# Patient Record
Sex: MALE | Race: ASIAN | NOT HISPANIC OR LATINO | URBAN - METROPOLITAN AREA
[De-identification: names, ages, dates, MRNs, and addresses within clinical notes are randomized per-mention and may not be internally consistent; named-entity substitution may affect disease eponyms.]

---

## 2017-08-11 ENCOUNTER — OUTPATIENT (OUTPATIENT)
Dept: OUTPATIENT SERVICES | Facility: HOSPITAL | Age: 72
LOS: 1 days | End: 2017-08-11
Payer: MEDICARE

## 2017-08-11 DIAGNOSIS — M79.605 PAIN IN LEFT LEG: ICD-10-CM

## 2017-08-11 PROCEDURE — 73502 X-RAY EXAM HIP UNI 2-3 VIEWS: CPT | Mod: 26,LT

## 2017-08-11 PROCEDURE — 73502 X-RAY EXAM HIP UNI 2-3 VIEWS: CPT

## 2017-09-27 ENCOUNTER — APPOINTMENT (OUTPATIENT)
Dept: ULTRASOUND IMAGING | Facility: HOSPITAL | Age: 72
End: 2017-09-27

## 2017-09-27 ENCOUNTER — OUTPATIENT (OUTPATIENT)
Dept: OUTPATIENT SERVICES | Facility: HOSPITAL | Age: 72
LOS: 1 days | End: 2017-09-27
Payer: MEDICARE

## 2017-09-27 DIAGNOSIS — Z00.00 ENCOUNTER FOR GENERAL ADULT MEDICAL EXAMINATION WITHOUT ABNORMAL FINDINGS: ICD-10-CM

## 2017-09-27 DIAGNOSIS — M79.604 PAIN IN RIGHT LEG: ICD-10-CM

## 2017-09-27 PROCEDURE — 76882 US LMTD JT/FCL EVL NVASC XTR: CPT | Mod: 26,LT

## 2017-09-27 PROCEDURE — 76882 US LMTD JT/FCL EVL NVASC XTR: CPT

## 2017-10-05 ENCOUNTER — APPOINTMENT (OUTPATIENT)
Dept: SURGERY | Facility: CLINIC | Age: 72
End: 2017-10-05

## 2017-10-05 VITALS
WEIGHT: 178 LBS | SYSTOLIC BLOOD PRESSURE: 212 MMHG | OXYGEN SATURATION: 100 % | HEIGHT: 66 IN | DIASTOLIC BLOOD PRESSURE: 85 MMHG | HEART RATE: 60 BPM | BODY MASS INDEX: 28.61 KG/M2 | TEMPERATURE: 98 F

## 2017-10-13 ENCOUNTER — APPOINTMENT (OUTPATIENT)
Dept: CT IMAGING | Facility: CLINIC | Age: 72
End: 2017-10-13

## 2017-10-13 ENCOUNTER — APPOINTMENT (OUTPATIENT)
Dept: MRI IMAGING | Facility: CLINIC | Age: 72
End: 2017-10-13
Payer: MEDICARE

## 2017-10-13 ENCOUNTER — OUTPATIENT (OUTPATIENT)
Dept: OUTPATIENT SERVICES | Facility: HOSPITAL | Age: 72
LOS: 1 days | End: 2017-10-13
Payer: MEDICARE

## 2017-10-13 DIAGNOSIS — Z00.8 ENCOUNTER FOR OTHER GENERAL EXAMINATION: ICD-10-CM

## 2017-10-13 PROCEDURE — A9585: CPT

## 2017-10-13 PROCEDURE — 72197 MRI PELVIS W/O & W/DYE: CPT

## 2017-10-13 PROCEDURE — 72197 MRI PELVIS W/O & W/DYE: CPT | Mod: 26

## 2017-10-23 ENCOUNTER — APPOINTMENT (OUTPATIENT)
Dept: SURGERY | Facility: CLINIC | Age: 72
End: 2017-10-23

## 2017-10-24 ENCOUNTER — OUTPATIENT (OUTPATIENT)
Dept: OUTPATIENT SERVICES | Facility: HOSPITAL | Age: 72
LOS: 1 days | End: 2017-10-24
Payer: MEDICARE

## 2017-10-24 ENCOUNTER — APPOINTMENT (OUTPATIENT)
Dept: RADIOLOGY | Facility: IMAGING CENTER | Age: 72
End: 2017-10-24
Payer: MEDICARE

## 2017-10-24 DIAGNOSIS — Z00.8 ENCOUNTER FOR OTHER GENERAL EXAMINATION: ICD-10-CM

## 2017-10-24 PROCEDURE — 73521 X-RAY EXAM HIPS BI 2 VIEWS: CPT | Mod: 26

## 2017-10-24 PROCEDURE — 73521 X-RAY EXAM HIPS BI 2 VIEWS: CPT

## 2017-10-24 PROCEDURE — 73552 X-RAY EXAM OF FEMUR 2/>: CPT

## 2017-10-24 PROCEDURE — 73552 X-RAY EXAM OF FEMUR 2/>: CPT | Mod: 26,LT

## 2017-10-27 ENCOUNTER — INPATIENT (INPATIENT)
Facility: HOSPITAL | Age: 72
LOS: 3 days | Discharge: HOME | DRG: 470 | End: 2017-10-31
Attending: ORTHOPAEDIC SURGERY | Admitting: ORTHOPAEDIC SURGERY
Payer: MEDICARE

## 2017-10-27 ENCOUNTER — RESULT REVIEW (OUTPATIENT)
Age: 72
End: 2017-10-27

## 2017-10-27 ENCOUNTER — TRANSCRIPTION ENCOUNTER (OUTPATIENT)
Age: 72
End: 2017-10-27

## 2017-10-27 VITALS
SYSTOLIC BLOOD PRESSURE: 204 MMHG | RESPIRATION RATE: 18 BRPM | DIASTOLIC BLOOD PRESSURE: 88 MMHG | OXYGEN SATURATION: 99 % | HEIGHT: 68 IN | TEMPERATURE: 98 F | HEART RATE: 66 BPM | WEIGHT: 169.76 LBS

## 2017-10-27 DIAGNOSIS — M84.350D STRESS FRACTURE, PELVIS, SUBSEQUENT ENCOUNTER FOR FRACTURE WITH ROUTINE HEALING: ICD-10-CM

## 2017-10-27 DIAGNOSIS — I10 ESSENTIAL (PRIMARY) HYPERTENSION: ICD-10-CM

## 2017-10-27 DIAGNOSIS — C21.0 MALIGNANT NEOPLASM OF ANUS, UNSPECIFIED: ICD-10-CM

## 2017-10-27 DIAGNOSIS — S72.009A FRACTURE OF UNSPECIFIED PART OF NECK OF UNSPECIFIED FEMUR, INITIAL ENCOUNTER FOR CLOSED FRACTURE: ICD-10-CM

## 2017-10-27 DIAGNOSIS — Z29.9 ENCOUNTER FOR PROPHYLACTIC MEASURES, UNSPECIFIED: ICD-10-CM

## 2017-10-27 LAB
ALBUMIN SERPL ELPH-MCNC: 3.9 G/DL — SIGNIFICANT CHANGE UP (ref 3.3–5)
ALP SERPL-CCNC: 123 U/L — HIGH (ref 30–120)
ALT FLD-CCNC: 16 U/L DA — SIGNIFICANT CHANGE UP (ref 10–60)
ANION GAP SERPL CALC-SCNC: 4 MMOL/L — LOW (ref 5–17)
ANION GAP SERPL CALC-SCNC: 5 MMOL/L — SIGNIFICANT CHANGE UP (ref 5–17)
APPEARANCE UR: ABNORMAL
APTT BLD: 31.7 SEC — SIGNIFICANT CHANGE UP (ref 27.5–37.4)
AST SERPL-CCNC: 19 U/L — SIGNIFICANT CHANGE UP (ref 10–40)
BACTERIA # UR AUTO: ABNORMAL
BASOPHILS # BLD AUTO: 0 K/UL — SIGNIFICANT CHANGE UP (ref 0–0.2)
BASOPHILS NFR BLD AUTO: 0.7 % — SIGNIFICANT CHANGE UP (ref 0–2)
BILIRUB SERPL-MCNC: 0.7 MG/DL — SIGNIFICANT CHANGE UP (ref 0.2–1.2)
BILIRUB UR-MCNC: NEGATIVE — SIGNIFICANT CHANGE UP
BLD GP AB SCN SERPL QL: SIGNIFICANT CHANGE UP
BUN SERPL-MCNC: 20 MG/DL — SIGNIFICANT CHANGE UP (ref 7–23)
BUN SERPL-MCNC: 21 MG/DL — SIGNIFICANT CHANGE UP (ref 7–23)
CALCIUM SERPL-MCNC: 9.3 MG/DL — SIGNIFICANT CHANGE UP (ref 8.4–10.5)
CALCIUM SERPL-MCNC: 9.8 MG/DL — SIGNIFICANT CHANGE UP (ref 8.4–10.5)
CHLORIDE SERPL-SCNC: 102 MMOL/L — SIGNIFICANT CHANGE UP (ref 96–108)
CHLORIDE SERPL-SCNC: 104 MMOL/L — SIGNIFICANT CHANGE UP (ref 96–108)
CO2 SERPL-SCNC: 29 MMOL/L — SIGNIFICANT CHANGE UP (ref 22–31)
CO2 SERPL-SCNC: 30 MMOL/L — SIGNIFICANT CHANGE UP (ref 22–31)
COLOR SPEC: YELLOW — SIGNIFICANT CHANGE UP
CREAT SERPL-MCNC: 1.09 MG/DL — SIGNIFICANT CHANGE UP (ref 0.5–1.3)
CREAT SERPL-MCNC: 1.27 MG/DL — SIGNIFICANT CHANGE UP (ref 0.5–1.3)
DIFF PNL FLD: NEGATIVE — SIGNIFICANT CHANGE UP
EOSINOPHIL # BLD AUTO: 0.1 K/UL — SIGNIFICANT CHANGE UP (ref 0–0.5)
EOSINOPHIL NFR BLD AUTO: 2.2 % — SIGNIFICANT CHANGE UP (ref 0–6)
EPI CELLS # UR: SIGNIFICANT CHANGE UP
GLUCOSE SERPL-MCNC: 105 MG/DL — HIGH (ref 70–99)
GLUCOSE SERPL-MCNC: 193 MG/DL — HIGH (ref 70–99)
GLUCOSE UR QL: NEGATIVE MG/DL — SIGNIFICANT CHANGE UP
HCT VFR BLD CALC: 38 % — LOW (ref 39–50)
HCT VFR BLD CALC: 41.4 % — SIGNIFICANT CHANGE UP (ref 39–50)
HGB BLD-MCNC: 12.1 G/DL — LOW (ref 13–17)
HGB BLD-MCNC: 12.6 G/DL — LOW (ref 13–17)
INR BLD: 1.13 RATIO — SIGNIFICANT CHANGE UP (ref 0.88–1.16)
KETONES UR-MCNC: NEGATIVE — SIGNIFICANT CHANGE UP
LEUKOCYTE ESTERASE UR-ACNC: NEGATIVE — SIGNIFICANT CHANGE UP
LYMPHOCYTES # BLD AUTO: 1.2 K/UL — SIGNIFICANT CHANGE UP (ref 1–3.3)
LYMPHOCYTES # BLD AUTO: 17.6 % — SIGNIFICANT CHANGE UP (ref 13–44)
MCHC RBC-ENTMCNC: 27 PG — SIGNIFICANT CHANGE UP (ref 27–34)
MCHC RBC-ENTMCNC: 30.5 GM/DL — LOW (ref 32–36)
MCV RBC AUTO: 88.3 FL — SIGNIFICANT CHANGE UP (ref 80–100)
MONOCYTES # BLD AUTO: 0.8 K/UL — SIGNIFICANT CHANGE UP (ref 0–0.9)
MONOCYTES NFR BLD AUTO: 12.2 % — SIGNIFICANT CHANGE UP (ref 2–14)
NEUTROPHILS # BLD AUTO: 4.4 K/UL — SIGNIFICANT CHANGE UP (ref 1.8–7.4)
NEUTROPHILS NFR BLD AUTO: 67.3 % — SIGNIFICANT CHANGE UP (ref 43–77)
NITRITE UR-MCNC: NEGATIVE — SIGNIFICANT CHANGE UP
PH UR: 7 — SIGNIFICANT CHANGE UP (ref 5–8)
PLATELET # BLD AUTO: 229 K/UL — SIGNIFICANT CHANGE UP (ref 150–400)
POTASSIUM SERPL-MCNC: 4.4 MMOL/L — SIGNIFICANT CHANGE UP (ref 3.5–5.3)
POTASSIUM SERPL-MCNC: 4.8 MMOL/L — SIGNIFICANT CHANGE UP (ref 3.5–5.3)
POTASSIUM SERPL-SCNC: 4.4 MMOL/L — SIGNIFICANT CHANGE UP (ref 3.5–5.3)
POTASSIUM SERPL-SCNC: 4.8 MMOL/L — SIGNIFICANT CHANGE UP (ref 3.5–5.3)
PROT SERPL-MCNC: 6.3 G/DL — SIGNIFICANT CHANGE UP (ref 6–8.3)
PROT UR-MCNC: 30 MG/DL
PROTHROM AB SERPL-ACNC: 12.4 SEC — SIGNIFICANT CHANGE UP (ref 9.8–12.7)
RBC # BLD: 4.68 M/UL — SIGNIFICANT CHANGE UP (ref 4.2–5.8)
RBC # FLD: 14.8 % — HIGH (ref 10.3–14.5)
RBC CASTS # UR COMP ASSIST: SIGNIFICANT CHANGE UP /HPF (ref 0–4)
SODIUM SERPL-SCNC: 136 MMOL/L — SIGNIFICANT CHANGE UP (ref 135–145)
SODIUM SERPL-SCNC: 138 MMOL/L — SIGNIFICANT CHANGE UP (ref 135–145)
SP GR SPEC: 1.01 — SIGNIFICANT CHANGE UP (ref 1.01–1.02)
UROBILINOGEN FLD QL: NEGATIVE MG/DL — SIGNIFICANT CHANGE UP
WBC # BLD: 6.6 K/UL — SIGNIFICANT CHANGE UP (ref 3.8–10.5)
WBC # FLD AUTO: 6.6 K/UL — SIGNIFICANT CHANGE UP (ref 3.8–10.5)
WBC UR QL: SIGNIFICANT CHANGE UP

## 2017-10-27 PROCEDURE — 73502 X-RAY EXAM HIP UNI 2-3 VIEWS: CPT | Mod: 26,LT

## 2017-10-27 PROCEDURE — 88311 DECALCIFY TISSUE: CPT | Mod: 26

## 2017-10-27 PROCEDURE — 99285 EMERGENCY DEPT VISIT HI MDM: CPT

## 2017-10-27 PROCEDURE — 27130 TOTAL HIP ARTHROPLASTY: CPT | Mod: AS,LT

## 2017-10-27 PROCEDURE — 71020: CPT | Mod: 26

## 2017-10-27 PROCEDURE — 88305 TISSUE EXAM BY PATHOLOGIST: CPT | Mod: 26

## 2017-10-27 PROCEDURE — 73700 CT LOWER EXTREMITY W/O DYE: CPT | Mod: 26,LT

## 2017-10-27 PROCEDURE — 93010 ELECTROCARDIOGRAM REPORT: CPT

## 2017-10-27 RX ORDER — MAGNESIUM HYDROXIDE 400 MG/1
30 TABLET, CHEWABLE ORAL DAILY
Qty: 0 | Refills: 0 | Status: DISCONTINUED | OUTPATIENT
Start: 2017-10-27 | End: 2017-10-31

## 2017-10-27 RX ORDER — CEFAZOLIN SODIUM 1 G
2000 VIAL (EA) INJECTION ONCE
Qty: 0 | Refills: 0 | Status: COMPLETED | OUTPATIENT
Start: 2017-10-27 | End: 2017-10-27

## 2017-10-27 RX ORDER — SENNA PLUS 8.6 MG/1
2 TABLET ORAL AT BEDTIME
Qty: 0 | Refills: 0 | Status: DISCONTINUED | OUTPATIENT
Start: 2017-10-27 | End: 2017-10-31

## 2017-10-27 RX ORDER — ACETAMINOPHEN 500 MG
1000 TABLET ORAL EVERY 6 HOURS
Qty: 0 | Refills: 0 | Status: COMPLETED | OUTPATIENT
Start: 2017-10-27 | End: 2017-10-28

## 2017-10-27 RX ORDER — HYDROMORPHONE HYDROCHLORIDE 2 MG/ML
0.5 INJECTION INTRAMUSCULAR; INTRAVENOUS; SUBCUTANEOUS
Qty: 0 | Refills: 0 | Status: DISCONTINUED | OUTPATIENT
Start: 2017-10-27 | End: 2017-10-31

## 2017-10-27 RX ORDER — SODIUM CHLORIDE 9 MG/ML
1000 INJECTION, SOLUTION INTRAVENOUS
Qty: 0 | Refills: 0 | Status: DISCONTINUED | OUTPATIENT
Start: 2017-10-27 | End: 2017-10-27

## 2017-10-27 RX ORDER — DOCUSATE SODIUM 100 MG
100 CAPSULE ORAL THREE TIMES A DAY
Qty: 0 | Refills: 0 | Status: DISCONTINUED | OUTPATIENT
Start: 2017-10-27 | End: 2017-10-31

## 2017-10-27 RX ORDER — ATENOLOL 25 MG/1
25 TABLET ORAL DAILY
Qty: 0 | Refills: 0 | Status: DISCONTINUED | OUTPATIENT
Start: 2017-10-27 | End: 2017-10-30

## 2017-10-27 RX ORDER — OXYCODONE HYDROCHLORIDE 5 MG/1
5 TABLET ORAL
Qty: 0 | Refills: 0 | Status: DISCONTINUED | OUTPATIENT
Start: 2017-10-27 | End: 2017-10-31

## 2017-10-27 RX ORDER — SODIUM CHLORIDE 9 MG/ML
3 INJECTION INTRAMUSCULAR; INTRAVENOUS; SUBCUTANEOUS ONCE
Qty: 0 | Refills: 0 | Status: COMPLETED | OUTPATIENT
Start: 2017-10-27 | End: 2017-10-27

## 2017-10-27 RX ORDER — ASPIRIN/CALCIUM CARB/MAGNESIUM 324 MG
162 TABLET ORAL EVERY 12 HOURS
Qty: 0 | Refills: 0 | Status: DISCONTINUED | OUTPATIENT
Start: 2017-10-28 | End: 2017-10-31

## 2017-10-27 RX ORDER — OXYCODONE AND ACETAMINOPHEN 5; 325 MG/1; MG/1
1 TABLET ORAL ONCE
Qty: 0 | Refills: 0 | Status: DISCONTINUED | OUTPATIENT
Start: 2017-10-27 | End: 2017-10-27

## 2017-10-27 RX ORDER — HYDROMORPHONE HYDROCHLORIDE 2 MG/ML
0.5 INJECTION INTRAMUSCULAR; INTRAVENOUS; SUBCUTANEOUS
Qty: 0 | Refills: 0 | Status: DISCONTINUED | OUTPATIENT
Start: 2017-10-27 | End: 2017-10-27

## 2017-10-27 RX ORDER — POLYETHYLENE GLYCOL 3350 17 G/17G
17 POWDER, FOR SOLUTION ORAL DAILY
Qty: 0 | Refills: 0 | Status: DISCONTINUED | OUTPATIENT
Start: 2017-10-27 | End: 2017-10-31

## 2017-10-27 RX ORDER — CEFAZOLIN SODIUM 1 G
2000 VIAL (EA) INJECTION EVERY 8 HOURS
Qty: 0 | Refills: 0 | Status: COMPLETED | OUTPATIENT
Start: 2017-10-27 | End: 2017-10-28

## 2017-10-27 RX ORDER — PANTOPRAZOLE SODIUM 20 MG/1
40 TABLET, DELAYED RELEASE ORAL DAILY
Qty: 0 | Refills: 0 | Status: DISCONTINUED | OUTPATIENT
Start: 2017-10-27 | End: 2017-10-31

## 2017-10-27 RX ORDER — HYDRALAZINE HCL 50 MG
10 TABLET ORAL ONCE
Qty: 0 | Refills: 0 | Status: COMPLETED | OUTPATIENT
Start: 2017-10-27 | End: 2017-10-27

## 2017-10-27 RX ORDER — ONDANSETRON 8 MG/1
4 TABLET, FILM COATED ORAL EVERY 6 HOURS
Qty: 0 | Refills: 0 | Status: DISCONTINUED | OUTPATIENT
Start: 2017-10-27 | End: 2017-10-31

## 2017-10-27 RX ORDER — OXYCODONE HYDROCHLORIDE 5 MG/1
10 TABLET ORAL
Qty: 0 | Refills: 0 | Status: DISCONTINUED | OUTPATIENT
Start: 2017-10-27 | End: 2017-10-31

## 2017-10-27 RX ORDER — ACETAMINOPHEN 500 MG
1000 TABLET ORAL EVERY 8 HOURS
Qty: 0 | Refills: 0 | Status: DISCONTINUED | OUTPATIENT
Start: 2017-10-28 | End: 2017-10-31

## 2017-10-27 RX ORDER — ONDANSETRON 8 MG/1
4 TABLET, FILM COATED ORAL ONCE
Qty: 0 | Refills: 0 | Status: DISCONTINUED | OUTPATIENT
Start: 2017-10-27 | End: 2017-10-27

## 2017-10-27 RX ORDER — SODIUM CHLORIDE 9 MG/ML
1000 INJECTION, SOLUTION INTRAVENOUS
Qty: 0 | Refills: 0 | Status: DISCONTINUED | OUTPATIENT
Start: 2017-10-27 | End: 2017-10-30

## 2017-10-27 RX ORDER — CELECOXIB 200 MG/1
200 CAPSULE ORAL
Qty: 0 | Refills: 0 | Status: DISCONTINUED | OUTPATIENT
Start: 2017-10-27 | End: 2017-10-30

## 2017-10-27 RX ADMIN — Medication 10 MILLIGRAM(S): at 12:00

## 2017-10-27 RX ADMIN — Medication 400 MILLIGRAM(S): at 21:08

## 2017-10-27 RX ADMIN — OXYCODONE HYDROCHLORIDE 5 MILLIGRAM(S): 5 TABLET ORAL at 22:20

## 2017-10-27 RX ADMIN — SODIUM CHLORIDE 100 MILLILITER(S): 9 INJECTION, SOLUTION INTRAVENOUS at 17:53

## 2017-10-27 RX ADMIN — OXYCODONE HYDROCHLORIDE 5 MILLIGRAM(S): 5 TABLET ORAL at 21:41

## 2017-10-27 RX ADMIN — OXYCODONE AND ACETAMINOPHEN 1 TABLET(S): 5; 325 TABLET ORAL at 09:20

## 2017-10-27 RX ADMIN — HYDROMORPHONE HYDROCHLORIDE 0.5 MILLIGRAM(S): 2 INJECTION INTRAMUSCULAR; INTRAVENOUS; SUBCUTANEOUS at 17:53

## 2017-10-27 RX ADMIN — SODIUM CHLORIDE 3 MILLILITER(S): 9 INJECTION INTRAMUSCULAR; INTRAVENOUS; SUBCUTANEOUS at 10:40

## 2017-10-27 RX ADMIN — HYDROMORPHONE HYDROCHLORIDE 0.5 MILLIGRAM(S): 2 INJECTION INTRAMUSCULAR; INTRAVENOUS; SUBCUTANEOUS at 18:10

## 2017-10-27 RX ADMIN — Medication 1000 MILLIGRAM(S): at 21:30

## 2017-10-27 RX ADMIN — OXYCODONE AND ACETAMINOPHEN 1 TABLET(S): 5; 325 TABLET ORAL at 09:18

## 2017-10-27 RX ADMIN — Medication 100 MILLIGRAM(S): at 22:24

## 2017-10-27 NOTE — CONSULT NOTE ADULT - ASSESSMENT
72 male with acute stress related left hip fx with htn likely from pain with hx of anal ca in remission

## 2017-10-27 NOTE — CONSULT NOTE ADULT - SUBJECTIVE AND OBJECTIVE BOX
HPI: pt with sudden onset of left hip pain since last night getting worse, unable to walk on it now, in er noted to have acute left hip fx, pt denies trauma, but does have hx of anal ca with radiation tx about 4 years ago, no prior hx of htn, dm or heart issues. does not take any meds except prn alieve.      PAST MEDICAL & SURGICAL HISTORY:  Anal cancer: Had Chemo and Radiation 4 years ago  No significant past surgical history      REVIEW OF SYSTEMS:    CONSTITUTIONAL: No fever, weight loss, or fatigue  EYES: No eye pain, visual disturbances, or discharge  ENMT:  No difficulty hearing, tinnitus, vertigo; No sinus or throat pain  NECK: No pain or stiffness  BREASTS: No pain, masses, or nipple discharge  RESPIRATORY: No cough, wheezing, chills or hemoptysis; No shortness of breath  CARDIOVASCULAR: No chest pain, palpitations, dizziness, or leg swelling  GASTROINTESTINAL: No abdominal or epigastric pain. No nausea, vomiting, or hematemesis; No diarrhea or constipation. No melena or hematochezia.  GENITOURINARY: No dysuria, frequency, hematuria, or incontinence  NEUROLOGICAL: No headaches, memory loss, loss of strength, numbness, or tremors  SKIN: No itching, burning, rashes, or lesions   LYMPH NODES: No enlarged glands  ENDOCRINE: No heat or cold intolerance; No hair loss  MUSCULOSKELETAL: left hip pain   PSYCHIATRIC: No depression, anxiety, mood swings, or difficulty sleeping  HEME/LYMPH: No easy bruising, or bleeding gums  ALLERGY AND IMMUNOLOGIC: No hives or eczema      MEDICATIONS  (STANDING):    MEDICATIONS  (PRN):      Allergies    No Known Allergies    Intolerances        SOCIAL HISTORY: lives with wife, non smoker, no etoh    FAMILY HISTORY: denies      Vital Signs Last 24 Hrs  T(C): 36.7 (27 Oct 2017 09:00), Max: 36.7 (27 Oct 2017 08:34)  T(F): 98.1 (27 Oct 2017 09:00), Max: 98.1 (27 Oct 2017 09:00)  HR: 62 (27 Oct 2017 10:30) (62 - 68)  BP: 180/74 (27 Oct 2017 10:30) (180/74 - 204/88)  BP(mean): --  RR: 18 (27 Oct 2017 10:30) (18 - 18)  SpO2: 99% (27 Oct 2017 10:30) (99% - 100%)    PHYSICAL EXAM:    GENERAL: NAD, well-groomed, well-developed  HEAD:  Atraumatic, Normocephalic  EYES: EOMI, PERRLA, conjunctiva and sclera clear  ENMT: No tonsillar erythema, exudates, or enlargement; Moist mucous membranes, Good dentition, No lesions  NECK: Supple, No JVD, Normal thyroid  NERVOUS SYSTEM:  Alert & Oriented X3, Good concentration  CHEST/LUNG: Clear to percussion bilaterally; No rales, rhonchi, wheezing, or rubs  HEART: Regular rate and rhythm; No murmurs, rubs, or gallops  ABDOMEN: Soft, Nontender, Nondistended; Bowel sounds present  EXTREMITIES:  2+ Peripheral Pulses, No clubbing, cyanosis, or edema, left hip pain  LYMPH: No lymphadenopathy noted   SKIN: No rashes or lesions      LABS:                        12.6   6.6   )-----------( 229      ( 27 Oct 2017 10:39 )             41.4     10-27    138  |  104  |  21  ----------------------------<  105<H>  4.8   |  30  |  1.09    Ca    9.8      27 Oct 2017 10:39    TPro  6.3  /  Alb  3.9  /  TBili  0.7  /  DBili  x   /  AST  19  /  ALT  16  /  AlkPhos  123<H>  10-27    PT/INR - ( 27 Oct 2017 10:39 )   PT: 12.4 sec;   INR: 1.13 ratio         PTT - ( 27 Oct 2017 10:39 )  PTT:31.7 sec  Urinalysis Basic - ( 27 Oct 2017 10:52 )    Color: Yellow / Appearance: Slightly Turbid / S.010 / pH: x  Gluc: x / Ketone: Negative  / Bili: Negative / Urobili: Negative mg/dL   Blood: x / Protein: 30 mg/dL / Nitrite: Negative   Leuk Esterase: Negative / RBC: 0-2 /HPF / WBC 0-2   Sq Epi: x / Non Sq Epi: Few / Bacteria: Occasional      EKG: nsr at 52 bpm without acute changes      RADIOLOGY & ADDITIONAL STUDIES:  < from: Xray Chest 2 Views PA/Lat (10.27.17 @ 11:09) >  EXAM:  CHEST PA & LAT                                  PROCEDURE DATE:  10/27/2017          INTERPRETATION:  Clinical information: Pain, admission chest x-ray, no   additional information provided.    No prior studies present for comparison    2 views, frontal and lateral.     Mediport catheter present on the right with tip localized to SVC. Clear   lungs. No sign of acute infiltrate effusion or congestive failure. Heart   size within normal limits. Hilar regions, mediastinal contours intact.   Mildly tortuous descending thoracic aorta. No acute osseous   abnormalities. Cardiac monitor leads overlie the chest.    IMPRESSION: See above report                  GEOVANNI CAMPBELL M.D.,ATTENDING RADIOLOGIST  This document has been electronically signed. Oct 27 2017 11:50AM                < end of copied text >  < from: CT Hip No Cont, Left (10.27.17 @ 09:59) >  EXAM:  CT HIP ONLY LT                                  PROCEDURE DATE:  10/27/2017          INTERPRETATION:  Clinical information: Left hip pain    Axial images obtained, coronal and sagittal images computer reformatted.    Subtle cortical fractures present, series 4 image 60, left acetabulum.   Mildly impacted subcapital fracture left hip. No evidence of hip   dislocation. Dense appearance of the left femoral head suspicious for   avascular necrosis. Bony structures appear demineralized.    Left inguinal region intact. Mildly enlarged prostate. Mild thickening of   urinary bladder wall.    IMPRESSION:    See above report. (Refer to recent MRI study dated 10/13/2017)                  GEOVANNI CAMPBELL M.D.,ATTENDING RADIOLOGIST  This document has been electronically signed. Oct 27 2017 10:06AM                < end of copied text >  < from: Xray Hip w/ Pelvis 2 or 3 Views, Left (10.27.17 @ 09:41) >  XAM:  XR HIP WITH PELV 2-3V LT                                  PROCEDURE DATE:  10/27/2017          INTERPRETATION:  Clinical information: Left hip pain    AP pelvis, AP and lateral left hip.    Comparison study dated 2017.    Bony structures are markedly demineralized.    The appearance of the left hip is changed since the prior exam. The joint   space appears almost completely absent, this may be related to avascular   necrosis of the left femoral head. Additionally fracture of the left   acetabulum may be present? Subcapital fracture of the left hip may also   be present? No evidence of hip dislocation. No destructive bone lesions   evident. Vascular calcifications present.  (Pseudoarthrosis, left   transverse process L5 and the left side of the sacrum).    IMPRESSION: See above report, (refer to recent MRI study dated   10/13/2017.)                  GEOVANNI CAMPBELL M.D.,ATTENDING RADIOLOGIST  This document has been electronically signed. Oct 27 2017  9:59AM                < end of copied text >  < from: US Extremity Nonvasc Limited, Left (17 @ 10:59) >  EXAM:  US EXTREM NONVASC LTD DONTE LT                            PROCEDURE DATE:  2017            INTERPRETATION:  CLINICAL INFORMATION: Left proximal/medial thigh   swelling and pain for 6 weeks.    COMPARISON: None available.    TECHNIQUE: Targeted sonography of the region of interest (left   proximal/medial thigh) with color Doppler.    FINDINGS:    There is mural thickening of the greater saphenous vein, which may be   from a previous venous thrombosis.    Left fat-containing nonreducible femoral hernia with a fascial defect of   1.9 cm.    IMPRESSION:    Left femoral hernia.                DOMINGA DWYER M.D., RADIOLOGY RESIDENT  This document has been electronically signed.  DEEPTHI STEWART M.D., ATTENDING RADIOLOGIST  This documenthas been electronically signed. Sep 27 2017 11:19AM                < end of copied text >

## 2017-10-27 NOTE — ED PROVIDER NOTE - MEDICAL DECISION MAKING DETAILS
left hip pain x 1 month, ortho consult, xray, ct, med left hip pain x 1 month, ortho consult, xray, ct, med, lab, ekg,

## 2017-10-27 NOTE — BRIEF OPERATIVE NOTE - PROCEDURE
<<-----Click on this checkbox to enter Procedure Hip replacement  10/27/2017  Left  Active  Edwardo Lopez

## 2017-10-27 NOTE — PATIENT PROFILE ADULT. - TEACHING/LEARNING LEARNING PREFERENCES
individual instruction/audio/written material/group instruction/verbal instruction/skill demonstration

## 2017-10-27 NOTE — ED PROVIDER NOTE - PROGRESS NOTE DETAILS
case dw Dr. Bull, requested ct and xr. pt had MRI lumbar spine 10/16/17 in [facility]- avascular necrosis of the left femoral head with ? nondisplaced subcapital left femoral neck fx.  Nondisplaced fx of left acetabulum. CT report dw Dr. Bull

## 2017-10-27 NOTE — PROGRESS NOTE ADULT - SUBJECTIVE AND OBJECTIVE BOX
Orthopaedic Post Op Note    Procedure: Left THR  Surgeon: Yung Bonner MD    72y Male comfortable, without complaints. Reported pain score = 2  Denies N/V, CP, SOB, numbness/tingling of extremities.    PE:  Vital Signs Last 24 Hrs  T(C): 36.3 (27 Oct 2017 18:21), Max: 36.8 (27 Oct 2017 11:02)  T(F): 97.4 (27 Oct 2017 18:21), Max: 98.2 (27 Oct 2017 11:02)  HR: 78 (27 Oct 2017 18:21) (62 - 83)  BP: 168/76 (27 Oct 2017 18:21) (124/60 - 204/88)  RR: 18 (27 Oct 2017 18:21) (12 - 20)  SpO2: 99% (27 Oct 2017 18:21) (99% - 100%)  General: Pt alert and oriented   Lungs: + BS CTA bilaterally  Heart: +S1 & S2 heard, RRR  Abd: + BS heard hypoactive, soft, NT, ND  Left Hip Dressing: C/D/I   Abd pillow in place  Bilateral LEs:  Motor:   5/5 dorsiflexion, plantarflexion, EHL  Sensation intact to LT   + DP Pulses    A/P: 72y Male stable POD#0 s/p  Left THR   -  Acetaminophen, Celebrex, Dilaudid/Oxycodone for Pain Control   - DVT ppx: aspirin  - Etta op IV abx: Ancef  - for HO ppx  Celebrex  - total hip precautions  - PT, OT per protocol  - F/U AM Labs  DCP = home Sun/Mon

## 2017-10-27 NOTE — CONSULT NOTE ADULT - PROBLEM SELECTOR RECOMMENDATION 2
start hydralazine 10 mg ivp tid while npo and then change to po at 25 mg po tid post op  likely pain induced  maintain adequate pain control

## 2017-10-28 ENCOUNTER — TRANSCRIPTION ENCOUNTER (OUTPATIENT)
Age: 72
End: 2017-10-28

## 2017-10-28 LAB
ANION GAP SERPL CALC-SCNC: 6 MMOL/L — SIGNIFICANT CHANGE UP (ref 5–17)
BUN SERPL-MCNC: 21 MG/DL — SIGNIFICANT CHANGE UP (ref 7–23)
CALCIUM SERPL-MCNC: 9.5 MG/DL — SIGNIFICANT CHANGE UP (ref 8.4–10.5)
CHLORIDE SERPL-SCNC: 102 MMOL/L — SIGNIFICANT CHANGE UP (ref 96–108)
CO2 SERPL-SCNC: 27 MMOL/L — SIGNIFICANT CHANGE UP (ref 22–31)
CREAT SERPL-MCNC: 1.3 MG/DL — SIGNIFICANT CHANGE UP (ref 0.5–1.3)
CULTURE RESULTS: NO GROWTH — SIGNIFICANT CHANGE UP
GLUCOSE SERPL-MCNC: 152 MG/DL — HIGH (ref 70–99)
HCT VFR BLD CALC: 33 % — LOW (ref 39–50)
HGB BLD-MCNC: 11 G/DL — LOW (ref 13–17)
MCHC RBC-ENTMCNC: 28.5 PG — SIGNIFICANT CHANGE UP (ref 27–34)
MCHC RBC-ENTMCNC: 33.5 GM/DL — SIGNIFICANT CHANGE UP (ref 32–36)
MCV RBC AUTO: 85.2 FL — SIGNIFICANT CHANGE UP (ref 80–100)
PLATELET # BLD AUTO: 218 K/UL — SIGNIFICANT CHANGE UP (ref 150–400)
POTASSIUM SERPL-MCNC: 5.3 MMOL/L — SIGNIFICANT CHANGE UP (ref 3.5–5.3)
POTASSIUM SERPL-SCNC: 5.3 MMOL/L — SIGNIFICANT CHANGE UP (ref 3.5–5.3)
RBC # BLD: 3.86 M/UL — LOW (ref 4.2–5.8)
RBC # FLD: 13.3 % — SIGNIFICANT CHANGE UP (ref 10.3–14.5)
SODIUM SERPL-SCNC: 135 MMOL/L — SIGNIFICANT CHANGE UP (ref 135–145)
SPECIMEN SOURCE: SIGNIFICANT CHANGE UP
WBC # BLD: 12.3 K/UL — HIGH (ref 3.8–10.5)
WBC # FLD AUTO: 12.3 K/UL — HIGH (ref 3.8–10.5)

## 2017-10-28 RX ADMIN — CELECOXIB 200 MILLIGRAM(S): 200 CAPSULE ORAL at 08:29

## 2017-10-28 RX ADMIN — Medication 162 MILLIGRAM(S): at 21:07

## 2017-10-28 RX ADMIN — OXYCODONE HYDROCHLORIDE 5 MILLIGRAM(S): 5 TABLET ORAL at 21:42

## 2017-10-28 RX ADMIN — Medication 100 MILLIGRAM(S): at 12:31

## 2017-10-28 RX ADMIN — Medication 400 MILLIGRAM(S): at 09:46

## 2017-10-28 RX ADMIN — Medication 1000 MILLIGRAM(S): at 11:18

## 2017-10-28 RX ADMIN — OXYCODONE HYDROCHLORIDE 5 MILLIGRAM(S): 5 TABLET ORAL at 12:20

## 2017-10-28 RX ADMIN — CELECOXIB 200 MILLIGRAM(S): 200 CAPSULE ORAL at 18:03

## 2017-10-28 RX ADMIN — OXYCODONE HYDROCHLORIDE 5 MILLIGRAM(S): 5 TABLET ORAL at 03:00

## 2017-10-28 RX ADMIN — CELECOXIB 200 MILLIGRAM(S): 200 CAPSULE ORAL at 18:06

## 2017-10-28 RX ADMIN — CELECOXIB 200 MILLIGRAM(S): 200 CAPSULE ORAL at 08:30

## 2017-10-28 RX ADMIN — OXYCODONE HYDROCHLORIDE 5 MILLIGRAM(S): 5 TABLET ORAL at 11:24

## 2017-10-28 RX ADMIN — PANTOPRAZOLE SODIUM 40 MILLIGRAM(S): 20 TABLET, DELAYED RELEASE ORAL at 12:30

## 2017-10-28 RX ADMIN — HYDROMORPHONE HYDROCHLORIDE 0.5 MILLIGRAM(S): 2 INJECTION INTRAMUSCULAR; INTRAVENOUS; SUBCUTANEOUS at 22:00

## 2017-10-28 RX ADMIN — Medication 100 MILLIGRAM(S): at 06:25

## 2017-10-28 RX ADMIN — Medication 162 MILLIGRAM(S): at 08:29

## 2017-10-28 RX ADMIN — OXYCODONE HYDROCHLORIDE 5 MILLIGRAM(S): 5 TABLET ORAL at 03:30

## 2017-10-28 RX ADMIN — Medication 400 MILLIGRAM(S): at 03:00

## 2017-10-28 RX ADMIN — HYDROMORPHONE HYDROCHLORIDE 0.5 MILLIGRAM(S): 2 INJECTION INTRAMUSCULAR; INTRAVENOUS; SUBCUTANEOUS at 21:42

## 2017-10-28 RX ADMIN — Medication 1000 MILLIGRAM(S): at 03:30

## 2017-10-28 RX ADMIN — OXYCODONE HYDROCHLORIDE 5 MILLIGRAM(S): 5 TABLET ORAL at 21:07

## 2017-10-28 RX ADMIN — Medication 1000 MILLIGRAM(S): at 16:07

## 2017-10-28 NOTE — DISCHARGE NOTE ADULT - CARE PLAN
Principal Discharge DX:	Closed fracture of left hip, initial encounter  Goal:	Improvement of Activities of Daily Living  Instructions for follow-up, activity and diet:	Physical Therapy /Occupational Therapy  Total Hip Protocol   - Ambulation  - Transfers   - Stairs   - ADLs (activities of daily living)    Posterior Total Hip Replacement precautions for 4 weeks  - Abduction pillow   - High hip chair for stiiting  - No internal rotation,   - No crossing legs   - No sleeping on opposite sides  - Ice packs to hip following Physical Therapy   Ice packs to hip for 30 min. every 3 hours and after physical therapy.   Keep incision clean and dry. May shower 5 days after surgery if no drainage from incision.  Prineo tape/suture removal on/near after Post Op Day # 14 in rehab facility / Surgeon's office. Principal Discharge DX:	Closed fracture of left hip, initial encounter  Goal:	Improvement of Activities of Daily Living  Instructions for follow-up, activity and diet:	Physical Therapy /Occupational Therapy  Total Hip Protocol   - Ambulation  - Transfers   - Stairs   - ADLs (activities of daily living)    Posterior Total Hip Replacement precautions for 4 weeks  - Abduction pillow   - High hip chair for stiiting  - No internal rotation,   - No crossing legs   - No sleeping on opposite sides  - Ice packs to hip following Physical Therapy   Ice packs to hip for 30 min. every 3 hours and after physical therapy.   Keep incision clean and dry. May shower 5 days after surgery if no drainage from incision.  Prineo tape/suture removal on/near after Post Op Day # 14 in rehab facility / Surgeon's office.  Instructions for follow-up, activity and diet:	- Call your doctor if you experience:  • An increase in pain not controlled by pain medication or change in activity or  position.  • Temperature greater than 101° F.  • Redness, increased swelling or foul smelling drainage from or around the  incision.  • Numbness, tingling or a change in color or temperature of the operative extremity.  • Call your doctor immediately if you experience chest pain, shortness of breath or calf pain.  Follow all verbal and written instructions. Take medications as prescribed. DO NOT drive, operate machinery, and/or make important decisions while on prescription pain medication. DO NOT hesitate to call Doctor's office with questions or concerns.

## 2017-10-28 NOTE — DISCHARGE NOTE ADULT - MEDICATION SUMMARY - MEDICATIONS TO TAKE
I will START or STAY ON the medications listed below when I get home from the hospital:    Rolling Walker with 5 inch Wheels  -- Indication: For S/p left total hip replacement    3-n-1 Commode  -- Indication: For S/p left total hip replacemetn    acetaminophen 500 mg oral tablet  -- 2 tab(s) by mouth every 8 hours x 14 days  -- Indication: For Pain    oxyCODONE 5 mg oral tablet  -- 1 tab(s) by mouth every 4 hours, As Needed    Reference #: [id number] MDD:6  -- Indication: For Pain    aspirin 81 mg oral delayed release tablet  -- 2 tab(s) by mouth every 12 hours  -- Indication: For Clot prevention    atenolol 25 mg oral tablet  -- 1 tab(s) by mouth once a day   -- Do not take this drug if you are pregnant.  It is very important that you take or use this exactly as directed.  Do not skip doses or discontinue unless directed by your doctor.  May cause drowsiness.  Alcohol may intensify this effect.  Use care when operating dangerous machinery.  Some non-prescription drugs may aggravate your condition.  Read all labels carefully.  If a warning appears, check with your doctor before taking.    -- Indication: For Hypertension    docusate sodium 100 mg oral capsule  -- 1 cap(s) by mouth 3 times a day as needed  -- Indication: For Constipation    polyethylene glycol 3350 oral powder for reconstitution  -- 17 gram(s) by mouth once a day, As needed, Constipation  -- Indication: For Constipation    senna oral tablet  -- 2 tab(s) by mouth once a day (at bedtime) as needed  -- Indication: For Constipation    pantoprazole 40 mg oral delayed release tablet  -- 1 tab(s) by mouth once a day  -- Indication: For Gastrointestinal protection I will START or STAY ON the medications listed below when I get home from the hospital:    Rolling Walker with 5 inch Wheels  -- Indication: For Ambulation    3-n-1 Commode  -- Indication: For S/p left total hip replacement    acetaminophen 500 mg oral tablet  -- 2 tab(s) by mouth every 8 hours x 14 days  -- Indication: For Pain    oxyCODONE 5 mg oral tablet  -- 1 tab(s) by mouth every 4 hours, As Needed    Reference #: [id number] MDD:6  -- Indication: For Pain    aspirin 81 mg oral delayed release tablet  -- 2 tab(s) by mouth every 12 hours  -- Indication: For Clot prevention    atenolol 50 mg oral tablet  -- 1 tab(s) by mouth once a day  -- Indication: For High blood pressure    docusate sodium 100 mg oral capsule  -- 1 cap(s) by mouth 3 times a day as needed  -- Indication: For Constipation    polyethylene glycol 3350 oral powder for reconstitution  -- 17 gram(s) by mouth once a day, As needed, Constipation  -- Indication: For Constipation    senna oral tablet  -- 2 tab(s) by mouth once a day (at bedtime) as needed  -- Indication: For Constipation    pantoprazole 40 mg oral delayed release tablet  -- 1 tab(s) by mouth once a day  -- Indication: For stomach protection

## 2017-10-28 NOTE — DISCHARGE NOTE ADULT - HOSPITAL COURSE
This patient was admitted to [facility] with a history of left hip supcapital fracture. Patient had medical clearance done to preform surgery. Patient had a total left hip replacement by Dr. Bonner on Oct 27th 2017. No operative or emma-operative complications arose during patients hospital course.  Patient received antibiotic according to SCIP guidelines for infection prevention.  Aspirin was given for DVT prophylaxis.  Anesthesia, Medical Hospitalist, Physical Therapy and Occupational Therapy were consulted. Patient is stable for discharge with a good prognosis.  Appropriate discharge instructions and medications are provided in this document. This patient was admitted to [facility] with a history of left hip supcapital fracture. Patient had medical clearance done to preform surgery. Patient had a total left hip replacement by Dr. Bonner on Oct 27th 2017. No operative or emma-operative complications arose during patients hospital course.  Patient received antibiotic according to SCIP guidelines for infection prevention.  Aspirin was given for DVT prophylaxis.  Anesthesia, Medical Hospitalist, Physical Therapy and Occupational Therapy were consulted. Patient is stable for discharge with a good prognosis.  Appropriate discharge instructions and medications are provided in this document. Patient has high blood pressure while inpatient and was put on atenolol 25mg oral once a day by medical doctor.

## 2017-10-28 NOTE — PHYSICAL THERAPY INITIAL EVALUATION ADULT - ACTIVE RANGE OF MOTION EXAMINATION, REHAB EVAL
kiel. upper extremity Active ROM was WNL (within normal limits)/bilateral lower extremity Active ROM was WNL (within normal limits)

## 2017-10-28 NOTE — DISCHARGE NOTE ADULT - NS AS ACTIVITY OBS
Showering allowed/Do not make important decisions/No Heavy lifting/straining/Showering allowed after post-operative day 6/Do not drive or operate machinery

## 2017-10-28 NOTE — PROGRESS NOTE ADULT - SUBJECTIVE AND OBJECTIVE BOX
Patient is a 72y old  Male who presents with a chief complaint of " Pain left hip x one month " (27 Oct 2017 10:54)      INTERVAL HPI/OVERNIGHT EVENTS: stable, pod 1 feels well. bp better controlled    MEDICATIONS  (STANDING):  acetaminophen   Tablet 1000 milliGRAM(s) Oral every 8 hours  aspirin enteric coated 162 milliGRAM(s) Oral every 12 hours  ATENolol  Tablet 25 milliGRAM(s) Oral daily  celecoxib 200 milliGRAM(s) Oral two times a day with meals  docusate sodium 100 milliGRAM(s) Oral three times a day  lactated ringers. 1000 milliLiter(s) (100 mL/Hr) IV Continuous <Continuous>  pantoprazole    Tablet 40 milliGRAM(s) Oral daily  senna 2 Tablet(s) Oral at bedtime    MEDICATIONS  (PRN):  aluminum hydroxide/magnesium hydroxide/simethicone Suspension 30 milliLiter(s) Oral four times a day PRN Indigestion  HYDROmorphone  Injectable 0.5 milliGRAM(s) IV Push every 3 hours PRN Severe Pain  magnesium hydroxide Suspension 30 milliLiter(s) Oral daily PRN Constipation  ondansetron Injectable 4 milliGRAM(s) IV Push every 6 hours PRN Nausea and/or Vomiting  oxyCODONE    IR 5 milliGRAM(s) Oral every 3 hours PRN Mild Pain  oxyCODONE    IR 10 milliGRAM(s) Oral every 3 hours PRN Moderate Pain  polyethylene glycol 3350 17 Gram(s) Oral daily PRN Constipation      Allergies    No Known Allergies    Intolerances        REVIEW OF SYSTEMS:  CONSTITUTIONAL: No fever, weight loss, or fatigue  EYES: No eye pain, visual disturbances  ENMT:  No difficulty hearing, tinnitus, vertigo; No sinus or throat pain  NECK: No pain or stiffness  RESPIRATORY: No cough, wheezing, chills or hemoptysis; No shortness of breath  CARDIOVASCULAR: No chest pain, palpitations, dizziness  GASTROINTESTINAL: No abdominal or epigastric pain. No nausea, vomiting, or hematemesis; No diarrhea or constipation. No melena or hematochezia.  GENITOURINARY: No dysuria, frequency, hematuria, or incontinence  NEUROLOGICAL: No headaches, memory loss, loss of strength, numbness, or tremors  SKIN: No itching, burning  LYMPH NODES: No enlarged glands  MUSCULOSKELETAL: No joint pain or swelling; No muscle, back, or extremity pain  PSYCHIATRIC: No depression, mood swings  HEME/LYMPH: No easy bruising, or bleeding gums  ALLERGY AND IMMUNOLOGIC: No hives    Vital Signs Last 24 Hrs  T(C): 36.6 (28 Oct 2017 07:15), Max: 36.8 (27 Oct 2017 11:02)  T(F): 97.8 (28 Oct 2017 07:15), Max: 98.2 (27 Oct 2017 11:02)  HR: 76 (28 Oct 2017 07:15) (62 - 83)  BP: 148/71 (28 Oct 2017 07:15) (124/60 - 180/74)  BP(mean): --  RR: 16 (28 Oct 2017 07:15) (12 - 20)  SpO2: 97% (28 Oct 2017 07:15) (97% - 100%)    PHYSICAL EXAM:  GENERAL: NAD, well-groomed, well-developed  HEAD:  Atraumatic, Normocephalic  EYES: EOMI, PERRLA, conjunctiva and sclera clear  ENMT: No tonsillar erythema, exudates, or enlargement   NECK: Supple, No JVD  NERVOUS SYSTEM:  Alert & Oriented X3, Good concentration  CHEST/LUNG: Clear to auscultation bilaterally; No rales, rhonchi, wheezing  HEART: Regular rate and rhythm  ABDOMEN: Soft, Nontender, Nondistended; Bowel sounds present  EXTREMITIES:  2+ Peripheral Pulses, left hip sx site clean   LYMPH: No lymphadenopathy noted  SKIN: No rashes     LABS:                        11.0   12.3  )-----------( 218      ( 28 Oct 2017 06:35 )             33.0     28 Oct 2017 06:35    135    |  102    |  21     ----------------------------<  152    5.3     |  27     |  1.30     Ca    9.5        28 Oct 2017 06:35    TPro  6.3    /  Alb  3.9    /  TBili  0.7    /  DBili  x      /  AST  19     /  ALT  16     /  AlkPhos  123    27 Oct 2017 10:39    PT/INR - ( 27 Oct 2017 10:39 )   PT: 12.4 sec;   INR: 1.13 ratio         PTT - ( 27 Oct 2017 10:39 )  PTT:31.7 sec  Urinalysis Basic - ( 27 Oct 2017 10:52 )    Color: Yellow / Appearance: Slightly Turbid / S.010 / pH: x  Gluc: x / Ketone: Negative  / Bili: Negative / Urobili: Negative mg/dL   Blood: x / Protein: 30 mg/dL / Nitrite: Negative   Leuk Esterase: Negative / RBC: 0-2 /HPF / WBC 0-2   Sq Epi: x / Non Sq Epi: Few / Bacteria: Occasional      CAPILLARY BLOOD GLUCOSE                RADIOLOGY & ADDITIONAL TESTS:  < from: Xray Chest 2 Views PA/Lat (10.27.17 @ 11:09) >  EXAM:  CHEST PA & LAT                                  PROCEDURE DATE:  10/27/2017          INTERPRETATION:  Clinical information: Pain, admission chest x-ray, no   additional information provided.    No prior studies present for comparison    2 views, frontal and lateral.     Mediport catheter present on the right with tip localized to SVC. Clear   lungs. No sign of acute infiltrate effusion or congestive failure. Heart   size within normal limits. Hilar regions, mediastinal contours intact.   Mildly tortuous descending thoracic aorta. No acute osseous   abnormalities. Cardiac monitor leads overlie the chest.    IMPRESSION: See above report                  GEOVANNI CAMPBELL M.D.,ATTENDING RADIOLOGIST  This document has been electronically signed. Oct 27 2017 11:50AM                < end of copied text >        Consultant(s) Notes Reviewed:  [x ] YES  [ ] NO    Care Discussed with Consultants/Other Providers [ x] YES  [ ] NO    Advanced Care Planning Discussed with Patien/Family [x ] YES  [ ] NO

## 2017-10-28 NOTE — PROGRESS NOTE ADULT - SUBJECTIVE AND OBJECTIVE BOX
THOMAS DUKE                                                               [id number]                                                     Allergies---No Known Allergies        Pt is a 72y year old Male s/p left THR.   Pain is 3/10.   Tolerating the diet.   The patient is A&O x 3, resting comfortably in bed with no complaints.   Denies any chest pain / shortness of breath / dyspnea/ nausea / vomiting / headaches or light headed ness.      Vital Signs Last 24 Hrs  T(F): 97.8 (10-28-17 @ 07:15), Max: 97.8 (10-28-17 @ 07:15)  HR: 76 (10-28-17 @ 07:15)  BP: 148/71 (10-28-17 @ 07:15)  RR: 16 (10-28-17 @ 07:15)  SpO2: 97% (10-28-17 @ 07:15)    I&O's Detail    27 Oct 2017 07:01  -  28 Oct 2017 07:00  --------------------------------------------------------  IN:    IV PiggyBack: 300 mL    lactated ringers.: 1100 mL    lactated ringers.: 1150 mL    Oral Fluid: 200 mL  Total IN: 2750 mL    OUT:    Estimated Blood Loss: 100 mL    Voided: 1150 mL  Total OUT: 1250 mL    Total NET: 1500 mL            PE:   Left Lower Extremity:   Dressing is C/D/I.   NVI.   Sensation is intact to light touch distally.   No gross evidence of motor deficits.   EHL/FHL/TA intact.    +2 DP/PT pulses.   Toes are pink and mobile.   Capillary refill < 2 seconds.   Negative calf tenderness.   No evidence of impending compartment syndrome.   PAS stockings are in place.   HV in place                             11.0   12.3  )--------------( 218                          10-28-17 @ 06:35               33.0       135   |  102  |  21  -----------------------------<  152                  10-28-17 @ 06:35  5.3    |  27    |  1.30    Ca    9.5                A:   Pt is a 72y year old Male S/P left total hip replacement, Post Op Day #1        Plan:    - Follow up with Medicine   - OOB with PT/OT   - DVT ppx = PAS +  aspirin enteric coated 162 milliGRAM(s) Oral every 12 hours   - Continue hip dislocation precautions   - Pain control    - Incentive spirometry   - Labs in A.M.   - Dressing change tomorrow   - D/C planning                                                                                                                                                                           Martin Brizuela RPA-C

## 2017-10-28 NOTE — DISCHARGE NOTE ADULT - COMMUNITY RESOURCES
Nurse to visit the day after hospital discharge; physical therapist to follow. Please contact the home care agency at the above phone number if you have not heard from them by 12 noon on the day after your hospital discharge.

## 2017-10-28 NOTE — OCCUPATIONAL THERAPY INITIAL EVALUATION ADULT - ADDITIONAL COMMENTS
Lives with his wife who is partially handicapped. 3 steps to enter with handrail. none inside. Stall shower.  Has a RW.

## 2017-10-28 NOTE — DISCHARGE NOTE ADULT - MEDICATION SUMMARY - MEDICATIONS TO STOP TAKING
I will STOP taking the medications listed below when I get home from the hospital:    Aleve  -- 1  by mouth , As Needed

## 2017-10-28 NOTE — DISCHARGE NOTE ADULT - OTHER SIGNIFICANT FINDINGS
Patient had high blood pressure in hospital. Patient was put on atenolol 25mg orally once a day. Patient to see primary care doctor to control blood pressure in the next 3-5 days.

## 2017-10-28 NOTE — PHYSICAL THERAPY INITIAL EVALUATION ADULT - PERTINENT HX OF CURRENT PROBLEM, REHAB EVAL
Pain in left hip for the past month. PMHx: Anal cancer, received radiation treatment resulting in stress fx.

## 2017-10-28 NOTE — PHYSICAL THERAPY INITIAL EVALUATION ADULT - ADDITIONAL COMMENTS
patient lives with wife (partially handicapped as per pt) in a house with 3 GRAY +HRs, no steps in side. Pt unsure if he has a cane, he has a rolling walker. Pt denies use of ADs prior to surgery.

## 2017-10-28 NOTE — DISCHARGE NOTE ADULT - CARE PROVIDER_API CALL
Yung Bonner), Orthopaedic Surgery  [address] [place] [address]  Phone: [phone]  Fax: [phone]

## 2017-10-28 NOTE — DISCHARGE NOTE ADULT - INSTRUCTIONS
For Constipation :   • Increase your water intake. Drink at least 8 glasses of water daily.  • Try adding fiber to your diet by eating fruits, vegetables and foods that are rich in grains.  • If you do experience constipation, you may take an over-the-counter stool softener/laxative such as Etta Colace, Sen[place]ot or  Milk of Magnesia.

## 2017-10-28 NOTE — DISCHARGE NOTE ADULT - ADDITIONAL INSTRUCTIONS
Follow up with Dr. Bonner in 7-10 days Follow up with Dr. Bonner in 7-10 days  Follow up with Primary care doctor in next 3-5 days

## 2017-10-29 LAB
ANION GAP SERPL CALC-SCNC: 4 MMOL/L — LOW (ref 5–17)
BUN SERPL-MCNC: 26 MG/DL — HIGH (ref 7–23)
CALCIUM SERPL-MCNC: 9 MG/DL — SIGNIFICANT CHANGE UP (ref 8.4–10.5)
CHLORIDE SERPL-SCNC: 101 MMOL/L — SIGNIFICANT CHANGE UP (ref 96–108)
CO2 SERPL-SCNC: 28 MMOL/L — SIGNIFICANT CHANGE UP (ref 22–31)
CREAT SERPL-MCNC: 1.29 MG/DL — SIGNIFICANT CHANGE UP (ref 0.5–1.3)
GLUCOSE SERPL-MCNC: 103 MG/DL — HIGH (ref 70–99)
HCT VFR BLD CALC: 30.5 % — LOW (ref 39–50)
HGB BLD-MCNC: 10.3 G/DL — LOW (ref 13–17)
MCHC RBC-ENTMCNC: 28.6 PG — SIGNIFICANT CHANGE UP (ref 27–34)
MCHC RBC-ENTMCNC: 33.6 GM/DL — SIGNIFICANT CHANGE UP (ref 32–36)
MCV RBC AUTO: 85.2 FL — SIGNIFICANT CHANGE UP (ref 80–100)
PLATELET # BLD AUTO: 204 K/UL — SIGNIFICANT CHANGE UP (ref 150–400)
POTASSIUM SERPL-MCNC: 4.4 MMOL/L — SIGNIFICANT CHANGE UP (ref 3.5–5.3)
POTASSIUM SERPL-SCNC: 4.4 MMOL/L — SIGNIFICANT CHANGE UP (ref 3.5–5.3)
RBC # BLD: 3.58 M/UL — LOW (ref 4.2–5.8)
RBC # FLD: 14.4 % — SIGNIFICANT CHANGE UP (ref 10.3–14.5)
SODIUM SERPL-SCNC: 133 MMOL/L — LOW (ref 135–145)
WBC # BLD: 8.1 K/UL — SIGNIFICANT CHANGE UP (ref 3.8–10.5)
WBC # FLD AUTO: 8.1 K/UL — SIGNIFICANT CHANGE UP (ref 3.8–10.5)

## 2017-10-29 RX ADMIN — OXYCODONE HYDROCHLORIDE 10 MILLIGRAM(S): 5 TABLET ORAL at 21:02

## 2017-10-29 RX ADMIN — OXYCODONE HYDROCHLORIDE 10 MILLIGRAM(S): 5 TABLET ORAL at 15:53

## 2017-10-29 RX ADMIN — CELECOXIB 200 MILLIGRAM(S): 200 CAPSULE ORAL at 19:26

## 2017-10-29 RX ADMIN — OXYCODONE HYDROCHLORIDE 10 MILLIGRAM(S): 5 TABLET ORAL at 20:30

## 2017-10-29 RX ADMIN — Medication 100 MILLIGRAM(S): at 15:53

## 2017-10-29 RX ADMIN — Medication 1000 MILLIGRAM(S): at 20:30

## 2017-10-29 RX ADMIN — SENNA PLUS 2 TABLET(S): 8.6 TABLET ORAL at 20:30

## 2017-10-29 RX ADMIN — PANTOPRAZOLE SODIUM 40 MILLIGRAM(S): 20 TABLET, DELAYED RELEASE ORAL at 12:41

## 2017-10-29 RX ADMIN — Medication 162 MILLIGRAM(S): at 09:30

## 2017-10-29 RX ADMIN — OXYCODONE HYDROCHLORIDE 10 MILLIGRAM(S): 5 TABLET ORAL at 16:23

## 2017-10-29 RX ADMIN — HYDROMORPHONE HYDROCHLORIDE 0.5 MILLIGRAM(S): 2 INJECTION INTRAMUSCULAR; INTRAVENOUS; SUBCUTANEOUS at 10:47

## 2017-10-29 RX ADMIN — OXYCODONE HYDROCHLORIDE 10 MILLIGRAM(S): 5 TABLET ORAL at 10:00

## 2017-10-29 RX ADMIN — CELECOXIB 200 MILLIGRAM(S): 200 CAPSULE ORAL at 09:30

## 2017-10-29 RX ADMIN — OXYCODONE HYDROCHLORIDE 10 MILLIGRAM(S): 5 TABLET ORAL at 13:11

## 2017-10-29 RX ADMIN — Medication 162 MILLIGRAM(S): at 20:30

## 2017-10-29 RX ADMIN — CELECOXIB 200 MILLIGRAM(S): 200 CAPSULE ORAL at 18:57

## 2017-10-29 RX ADMIN — OXYCODONE HYDROCHLORIDE 10 MILLIGRAM(S): 5 TABLET ORAL at 12:41

## 2017-10-29 RX ADMIN — OXYCODONE HYDROCHLORIDE 10 MILLIGRAM(S): 5 TABLET ORAL at 09:30

## 2017-10-29 RX ADMIN — Medication 1000 MILLIGRAM(S): at 15:53

## 2017-10-29 RX ADMIN — Medication 1000 MILLIGRAM(S): at 06:16

## 2017-10-29 RX ADMIN — HYDROMORPHONE HYDROCHLORIDE 0.5 MILLIGRAM(S): 2 INJECTION INTRAMUSCULAR; INTRAVENOUS; SUBCUTANEOUS at 11:17

## 2017-10-29 RX ADMIN — CELECOXIB 200 MILLIGRAM(S): 200 CAPSULE ORAL at 10:00

## 2017-10-29 RX ADMIN — Medication 100 MILLIGRAM(S): at 20:30

## 2017-10-29 NOTE — PROGRESS NOTE ADULT - SUBJECTIVE AND OBJECTIVE BOX
ORTHOPEDIC PA PROGRESS NOTE  THOMAS SRIKANTH      72y Male                                                                                                                               POD #    2d    STATUS POST:       Procedure: Hip replacement: Left           No complaints.      Pain (0-10):  Pt reports 4  Current Pain Management:  PRN    T(F): 97.9  HR: 72  BP: 158/70  RR: 16  SpO2: 100%                         10.3   8.1   )-----------( 204      ( 29 Oct 2017 06:39 )             30.5         10-29    133<L>  |  101  |  26<H>  ----------------------------<  103<H>  4.4   |  28  |  1.29    Ca    9.0      29 Oct 2017 06:39      Physical Exam :    -   Dressing C/D/I.   -   Distal Neurvascular status intact grossly.   -   Warm well perfused; capillary refill <3 seconds   -   (+)EHL/FHL   -   (+) Sensation to light touch  -   (-) Calf tenderness Bilaterally    A/P: 72y Male s/p Hip replacement: Left     -   Ortho Stable  -   Pain control:    -   Medicine to follow  -   DVT ppx:    PAS +  aspirin enteric coated: 162 milliGRAM(s) Oral, aspirin enteric coated: 162 milliGRAM(s) Oral  -   Weight bearing status:   -  Dispo:   Home monday  -   Prescribed Medications:  3-n-1 Commode:   Rolling Walker with 5 inch Wheels:

## 2017-10-30 DIAGNOSIS — N17.9 ACUTE KIDNEY FAILURE, UNSPECIFIED: ICD-10-CM

## 2017-10-30 LAB
ANION GAP SERPL CALC-SCNC: 2 MMOL/L — LOW (ref 5–17)
ANION GAP SERPL CALC-SCNC: 3 MMOL/L — LOW (ref 5–17)
ANION GAP SERPL CALC-SCNC: 4 MMOL/L — LOW (ref 5–17)
BUN SERPL-MCNC: 25 MG/DL — HIGH (ref 7–23)
BUN SERPL-MCNC: 29 MG/DL — HIGH (ref 7–23)
BUN SERPL-MCNC: 32 MG/DL — HIGH (ref 7–23)
CALCIUM SERPL-MCNC: 8.9 MG/DL — SIGNIFICANT CHANGE UP (ref 8.4–10.5)
CALCIUM SERPL-MCNC: 9 MG/DL — SIGNIFICANT CHANGE UP (ref 8.4–10.5)
CALCIUM SERPL-MCNC: 9 MG/DL — SIGNIFICANT CHANGE UP (ref 8.4–10.5)
CHLORIDE SERPL-SCNC: 100 MMOL/L — SIGNIFICANT CHANGE UP (ref 96–108)
CHLORIDE SERPL-SCNC: 101 MMOL/L — SIGNIFICANT CHANGE UP (ref 96–108)
CHLORIDE SERPL-SCNC: 102 MMOL/L — SIGNIFICANT CHANGE UP (ref 96–108)
CO2 SERPL-SCNC: 28 MMOL/L — SIGNIFICANT CHANGE UP (ref 22–31)
CO2 SERPL-SCNC: 28 MMOL/L — SIGNIFICANT CHANGE UP (ref 22–31)
CO2 SERPL-SCNC: 30 MMOL/L — SIGNIFICANT CHANGE UP (ref 22–31)
CREAT SERPL-MCNC: 1.21 MG/DL — SIGNIFICANT CHANGE UP (ref 0.5–1.3)
CREAT SERPL-MCNC: 1.35 MG/DL — HIGH (ref 0.5–1.3)
CREAT SERPL-MCNC: 1.45 MG/DL — HIGH (ref 0.5–1.3)
GLUCOSE SERPL-MCNC: 125 MG/DL — HIGH (ref 70–99)
GLUCOSE SERPL-MCNC: 142 MG/DL — HIGH (ref 70–99)
GLUCOSE SERPL-MCNC: 96 MG/DL — SIGNIFICANT CHANGE UP (ref 70–99)
HCT VFR BLD CALC: 30 % — LOW (ref 39–50)
HGB BLD-MCNC: 9.9 G/DL — LOW (ref 13–17)
MCHC RBC-ENTMCNC: 28.5 PG — SIGNIFICANT CHANGE UP (ref 27–34)
MCHC RBC-ENTMCNC: 32.9 GM/DL — SIGNIFICANT CHANGE UP (ref 32–36)
MCV RBC AUTO: 86.5 FL — SIGNIFICANT CHANGE UP (ref 80–100)
PLATELET # BLD AUTO: 190 K/UL — SIGNIFICANT CHANGE UP (ref 150–400)
POTASSIUM SERPL-MCNC: 5.7 MMOL/L — HIGH (ref 3.5–5.3)
POTASSIUM SERPL-MCNC: 5.9 MMOL/L — HIGH (ref 3.5–5.3)
POTASSIUM SERPL-MCNC: 6.2 MMOL/L — CRITICAL HIGH (ref 3.5–5.3)
POTASSIUM SERPL-SCNC: 5.7 MMOL/L — HIGH (ref 3.5–5.3)
POTASSIUM SERPL-SCNC: 5.9 MMOL/L — HIGH (ref 3.5–5.3)
POTASSIUM SERPL-SCNC: 6.2 MMOL/L — CRITICAL HIGH (ref 3.5–5.3)
RBC # BLD: 3.47 M/UL — LOW (ref 4.2–5.8)
RBC # FLD: 13.8 % — SIGNIFICANT CHANGE UP (ref 10.3–14.5)
SODIUM SERPL-SCNC: 132 MMOL/L — LOW (ref 135–145)
SODIUM SERPL-SCNC: 132 MMOL/L — LOW (ref 135–145)
SODIUM SERPL-SCNC: 134 MMOL/L — LOW (ref 135–145)
WBC # BLD: 7.9 K/UL — SIGNIFICANT CHANGE UP (ref 3.8–10.5)
WBC # FLD AUTO: 7.9 K/UL — SIGNIFICANT CHANGE UP (ref 3.8–10.5)

## 2017-10-30 PROCEDURE — 73502 X-RAY EXAM HIP UNI 2-3 VIEWS: CPT | Mod: 26,LT

## 2017-10-30 RX ORDER — SODIUM POLYSTYRENE SULFONATE 4.1 MEQ/G
30 POWDER, FOR SUSPENSION ORAL ONCE
Qty: 0 | Refills: 0 | Status: COMPLETED | OUTPATIENT
Start: 2017-10-30 | End: 2017-10-30

## 2017-10-30 RX ORDER — ASPIRIN/CALCIUM CARB/MAGNESIUM 324 MG
2 TABLET ORAL
Qty: 156 | Refills: 0
Start: 2017-10-30 | End: 2017-12-08

## 2017-10-30 RX ORDER — DOCUSATE SODIUM 100 MG
1 CAPSULE ORAL
Qty: 0 | Refills: 0 | DISCHARGE
Start: 2017-10-30

## 2017-10-30 RX ORDER — SENNA PLUS 8.6 MG/1
2 TABLET ORAL
Qty: 0 | Refills: 0 | DISCHARGE
Start: 2017-10-30

## 2017-10-30 RX ORDER — ACETAMINOPHEN 500 MG
2 TABLET ORAL
Qty: 0 | Refills: 0 | DISCHARGE
Start: 2017-10-30

## 2017-10-30 RX ORDER — SODIUM CHLORIDE 9 MG/ML
1000 INJECTION INTRAMUSCULAR; INTRAVENOUS; SUBCUTANEOUS
Qty: 0 | Refills: 0 | Status: DISCONTINUED | OUTPATIENT
Start: 2017-10-30 | End: 2017-10-31

## 2017-10-30 RX ORDER — POLYETHYLENE GLYCOL 3350 17 G/17G
17 POWDER, FOR SOLUTION ORAL
Qty: 0 | Refills: 0 | DISCHARGE
Start: 2017-10-30

## 2017-10-30 RX ORDER — ATENOLOL 25 MG/1
1 TABLET ORAL
Qty: 30 | Refills: 0
Start: 2017-10-30 | End: 2017-11-29

## 2017-10-30 RX ORDER — ATENOLOL 25 MG/1
1 TABLET ORAL
Qty: 30 | Refills: 0 | OUTPATIENT
Start: 2017-10-30 | End: 2017-11-29

## 2017-10-30 RX ORDER — CELECOXIB 200 MG/1
1 CAPSULE ORAL
Qty: 36 | Refills: 0 | OUTPATIENT
Start: 2017-10-30 | End: 2017-11-17

## 2017-10-30 RX ORDER — PANTOPRAZOLE SODIUM 20 MG/1
1 TABLET, DELAYED RELEASE ORAL
Qty: 39 | Refills: 0
Start: 2017-10-30 | End: 2017-12-08

## 2017-10-30 RX ORDER — ATENOLOL 25 MG/1
25 TABLET ORAL ONCE
Qty: 0 | Refills: 0 | Status: COMPLETED | OUTPATIENT
Start: 2017-10-30 | End: 2017-10-30

## 2017-10-30 RX ORDER — ATENOLOL 25 MG/1
50 TABLET ORAL DAILY
Qty: 0 | Refills: 0 | Status: DISCONTINUED | OUTPATIENT
Start: 2017-10-31 | End: 2017-10-31

## 2017-10-30 RX ORDER — OXYCODONE HYDROCHLORIDE 5 MG/1
1 TABLET ORAL
Qty: 78 | Refills: 0
Start: 2017-10-30 | End: 2017-11-12

## 2017-10-30 RX ORDER — SODIUM CHLORIDE 9 MG/ML
1000 INJECTION INTRAMUSCULAR; INTRAVENOUS; SUBCUTANEOUS
Qty: 0 | Refills: 0 | Status: DISCONTINUED | OUTPATIENT
Start: 2017-10-30 | End: 2017-10-30

## 2017-10-30 RX ADMIN — SODIUM CHLORIDE 100 MILLILITER(S): 9 INJECTION INTRAMUSCULAR; INTRAVENOUS; SUBCUTANEOUS at 10:29

## 2017-10-30 RX ADMIN — Medication 1000 MILLIGRAM(S): at 05:40

## 2017-10-30 RX ADMIN — Medication 100 MILLIGRAM(S): at 11:59

## 2017-10-30 RX ADMIN — SODIUM POLYSTYRENE SULFONATE 30 GRAM(S): 4.1 POWDER, FOR SUSPENSION ORAL at 15:27

## 2017-10-30 RX ADMIN — ATENOLOL 25 MILLIGRAM(S): 25 TABLET ORAL at 15:27

## 2017-10-30 RX ADMIN — Medication 1000 MILLIGRAM(S): at 11:59

## 2017-10-30 RX ADMIN — OXYCODONE HYDROCHLORIDE 10 MILLIGRAM(S): 5 TABLET ORAL at 13:40

## 2017-10-30 RX ADMIN — Medication 100 MILLIGRAM(S): at 21:31

## 2017-10-30 RX ADMIN — ATENOLOL 25 MILLIGRAM(S): 25 TABLET ORAL at 05:40

## 2017-10-30 RX ADMIN — Medication 162 MILLIGRAM(S): at 09:18

## 2017-10-30 RX ADMIN — Medication 1000 MILLIGRAM(S): at 21:32

## 2017-10-30 RX ADMIN — CELECOXIB 200 MILLIGRAM(S): 200 CAPSULE ORAL at 09:18

## 2017-10-30 RX ADMIN — OXYCODONE HYDROCHLORIDE 5 MILLIGRAM(S): 5 TABLET ORAL at 19:37

## 2017-10-30 RX ADMIN — CELECOXIB 200 MILLIGRAM(S): 200 CAPSULE ORAL at 09:19

## 2017-10-30 RX ADMIN — OXYCODONE HYDROCHLORIDE 10 MILLIGRAM(S): 5 TABLET ORAL at 10:00

## 2017-10-30 RX ADMIN — OXYCODONE HYDROCHLORIDE 10 MILLIGRAM(S): 5 TABLET ORAL at 09:18

## 2017-10-30 RX ADMIN — Medication 100 MILLIGRAM(S): at 05:40

## 2017-10-30 RX ADMIN — OXYCODONE HYDROCHLORIDE 10 MILLIGRAM(S): 5 TABLET ORAL at 17:00

## 2017-10-30 RX ADMIN — OXYCODONE HYDROCHLORIDE 10 MILLIGRAM(S): 5 TABLET ORAL at 13:03

## 2017-10-30 RX ADMIN — Medication 162 MILLIGRAM(S): at 21:32

## 2017-10-30 RX ADMIN — SODIUM POLYSTYRENE SULFONATE 30 GRAM(S): 4.1 POWDER, FOR SUSPENSION ORAL at 22:35

## 2017-10-30 RX ADMIN — OXYCODONE HYDROCHLORIDE 10 MILLIGRAM(S): 5 TABLET ORAL at 16:11

## 2017-10-30 RX ADMIN — OXYCODONE HYDROCHLORIDE 5 MILLIGRAM(S): 5 TABLET ORAL at 20:15

## 2017-10-30 RX ADMIN — PANTOPRAZOLE SODIUM 40 MILLIGRAM(S): 20 TABLET, DELAYED RELEASE ORAL at 09:18

## 2017-10-30 RX ADMIN — SENNA PLUS 2 TABLET(S): 8.6 TABLET ORAL at 21:31

## 2017-10-30 NOTE — PROGRESS NOTE ADULT - SUBJECTIVE AND OBJECTIVE BOX
Patient is a 72y old  Male who presents with a chief complaint of " Pain left hip x one month " (28 Oct 2017 14:30)      INTERVAL HPI/OVERNIGHT EVENTS: feels ok, still with pain, renal function bumped up today    MEDICATIONS  (STANDING):  acetaminophen   Tablet 1000 milliGRAM(s) Oral every 8 hours  aspirin enteric coated 162 milliGRAM(s) Oral every 12 hours  ATENolol  Tablet 25 milliGRAM(s) Oral daily  docusate sodium 100 milliGRAM(s) Oral three times a day  pantoprazole    Tablet 40 milliGRAM(s) Oral daily  senna 2 Tablet(s) Oral at bedtime  sodium chloride 0.9%. 1000 milliLiter(s) (100 mL/Hr) IV Continuous <Continuous>    MEDICATIONS  (PRN):  aluminum hydroxide/magnesium hydroxide/simethicone Suspension 30 milliLiter(s) Oral four times a day PRN Indigestion  bisacodyl Suppository 10 milliGRAM(s) Rectal daily PRN If no bowel movement by POD#2  HYDROmorphone  Injectable 0.5 milliGRAM(s) IV Push every 3 hours PRN Severe Pain  magnesium hydroxide Suspension 30 milliLiter(s) Oral daily PRN Constipation  ondansetron Injectable 4 milliGRAM(s) IV Push every 6 hours PRN Nausea and/or Vomiting  oxyCODONE    IR 5 milliGRAM(s) Oral every 3 hours PRN Mild Pain  oxyCODONE    IR 10 milliGRAM(s) Oral every 3 hours PRN Moderate Pain  polyethylene glycol 3350 17 Gram(s) Oral daily PRN Constipation      Allergies    No Known Allergies    Intolerances        REVIEW OF SYSTEMS:  CONSTITUTIONAL: No fever, weight loss, or fatigue  EYES: No eye pain, visual disturbances  ENMT:  No difficulty hearing, tinnitus, vertigo; No sinus or throat pain  NECK: No pain or stiffness  RESPIRATORY: No cough, wheezing, chills or hemoptysis; No shortness of breath  CARDIOVASCULAR: No chest pain, palpitations, dizziness  GASTROINTESTINAL: No abdominal or epigastric pain. No nausea, vomiting, or hematemesis; No diarrhea or constipation. No melena or hematochezia.  GENITOURINARY: No dysuria, frequency, hematuria, or incontinence  NEUROLOGICAL: No headaches, memory loss, loss of strength, numbness, or tremors  SKIN: No itching, burning  LYMPH NODES: No enlarged glands  MUSCULOSKELETAL: left joint pain  PSYCHIATRIC: No depression, mood swings  HEME/LYMPH: No easy bruising, or bleeding gums  ALLERGY AND IMMUNOLOGIC: No hives    Vital Signs Last 24 Hrs  T(C): 36.7 (29 Oct 2017 23:14), Max: 36.7 (29 Oct 2017 23:14)  T(F): 98 (29 Oct 2017 23:14), Max: 98 (29 Oct 2017 23:14)  HR: 80 (30 Oct 2017 08:00) (69 - 80)  BP: 160/60 (30 Oct 2017 08:00) (144/69 - 160/60)  BP(mean): --  RR: 16 (30 Oct 2017 08:00) (14 - 16)  SpO2: 99% (29 Oct 2017 23:14) (93% - 99%)    PHYSICAL EXAM:  GENERAL: NAD, well-groomed, well-developed  HEAD:  Atraumatic, Normocephalic  EYES: EOMI, PERRLA, conjunctiva and sclera clear  ENMT: No tonsillar erythema, exudates, or enlargement   NECK: Supple, No JVD  NERVOUS SYSTEM:  Alert & Oriented X3, Good concentration  CHEST/LUNG: Clear to auscultation bilaterally; No rales, rhonchi, wheezing  HEART: Regular rate and rhythm  ABDOMEN: Soft, Nontender, Nondistended; Bowel sounds present  EXTREMITIES:  2+ Peripheral Pulses   LYMPH: No lymphadenopathy noted  SKIN: No rashes     LABS:                        9.9    7.9   )-----------( 190      ( 30 Oct 2017 07:10 )             30.0     30 Oct 2017 07:10    132    |  100    |  32     ----------------------------<  96     5.7     |  28     |  1.45     Ca    8.9        30 Oct 2017 07:10          CAPILLARY BLOOD GLUCOSE        blood culture --   No growth   10-27 @ 16:56      urine culture --  10-27 @ 16:56  results   No growth 10-27 @ 16:56    wound with gram statin --    10-27 @ 16:56  organism  --   10-27 @ 16:56  specimen source .Urine Clean Catch (Midstream)  10-27 @ 16:56      RADIOLOGY & ADDITIONAL TESTS:  < from: Xray Hip w/ Pelvis 2 or 3 Views, Left (10.30.17 @ 08:27) >  XAM:  XR HIP WITH PELV 2-3V LT                                  PROCEDURE DATE:  10/30/2017          INTERPRETATION:  Clinical information: Left hip osteoarthritis    2 images, AP pelvis not including iliac crests, AP left hip.    A total left hipreplacement is identified, in place. Skin staples   present lateral soft tissues left thigh. Suggest follow-up left hip   radiographs when clinically warranted.    IMPRESSION:    See above report                    < end of copied text >        Consultant(s) Notes Reviewed:  [x ] YES  [ ] NO    Care Discussed with Consultants/Other Providers [x ] YES  [ ] NO    Advanced Care Planning Discussed with Patien/Family [x ] YES  [ ] NO

## 2017-10-30 NOTE — PROGRESS NOTE ADULT - SUBJECTIVE AND OBJECTIVE BOX
ORTHOPEDIC PA PROGRESS NOTE  THOMAS DUKE      72y Male                                                                                                                               POD #  3      STATUS POST:               Pre-Op Dx: Subcapital fracture of femur: Left    Post-Op Dx:  Subcapital fracture of femur: Left    Procedure: Hip replacement: Left by Dr. Bonner                                                Pain (0-10):  Pt reports 4/10 pain controlled with medication and proportionate to surgery. Pt denies any CP, SOB, fever, chills, numbness/tingling. Pt is positive void.  Current Pain Management:   [x ] Po Analgesics [ x] IM /IV Anagesics     T(F): --  HR: 69  BP: 144/69  RR: --  SpO2: --                         9.9    7.9   )-----------( 190      ( 30 Oct 2017 07:10 )             30.0         10-30    132<L>  |  100  |  32<H>  ----------------------------<  96  5.7<H>   |  28  |  1.45<H>    Ca    8.9      30 Oct 2017 07:10      Physical Exam :    -   Dressing C/D/I.   -   Distal Neurvascular status intact grossly.   -   Warm well perfused; capillary refill <3 seconds   -   (+)EHL/FHL   -   (+) Sensation to light touch  -   (-) Calf tenderness Bilaterally    A/P: 72y Male s/p Hip replacement: Left     -   Ortho Stable  -   F/u PT/ot  -   f/u am labs  -   Pain control   -   Medicine to follow  -   DVT ppx:     [x ]SCDs     [x ] ASA    -   Weight bearing status:  WBAT [x ]        -  Dispo:    Home when medically and PT cleared ORTHOPEDIC PA PROGRESS NOTE  THOMAS DUKE      72y Male                                                                                                                               POD #  3      STATUS POST:               Pre-Op Dx: Subcapital fracture of femur: Left    Post-Op Dx:  Subcapital fracture of femur: Left    Procedure: Hip replacement: Left by Dr. Bonner                                                Pain (0-10):  Pt reports 4/10 pain controlled with medication and proportionate to surgery. Pt denies any CP, SOB, fever, chills, numbness/tingling. Pt recently had foster removed. trial void to be done.  Current Pain Management:   [x ] Po Analgesics [ x] IM /IV Anagesics     T(F): --  HR: 69  BP: 144/69  RR: --  SpO2: --                         9.9    7.9   )-----------( 190      ( 30 Oct 2017 07:10 )             30.0         10-30    132<L>  |  100  |  32<H>  ----------------------------<  96  5.7<H>   |  28  |  1.45<H>    Ca    8.9      30 Oct 2017 07:10      Physical Exam :    -   Dressing C/D/I.   -   Distal Neurvascular status intact grossly.   -   Warm well perfused; capillary refill <3 seconds   -   (+)EHL/FHL   -   (+) Sensation to light touch  -   (-) Calf tenderness Bilaterally    A/P: 72y Male s/p Hip replacement: Left     -   Ortho Stable  -   F/u PT/ot  -   f/u am labs  -   Pain control   -   Medicine to follow  -   DVT ppx:     [x ]SCDs     [x ] ASA    -   Weight bearing status:  WBAT [x ]        -  Dispo:    Home when medically and PT cleared

## 2017-10-30 NOTE — PROVIDER CONTACT NOTE (CRITICAL VALUE NOTIFICATION) - RECOMMENDATIONS
Kayexelate ordered. Atenolol extra dose given, for BP.  Repeat labs at 20:00.  Discharge cancelled, as per OTTONIEL gan.

## 2017-10-31 VITALS
RESPIRATION RATE: 16 BRPM | OXYGEN SATURATION: 97 % | SYSTOLIC BLOOD PRESSURE: 155 MMHG | TEMPERATURE: 98 F | DIASTOLIC BLOOD PRESSURE: 73 MMHG | HEART RATE: 69 BPM

## 2017-10-31 DIAGNOSIS — E87.5 HYPERKALEMIA: ICD-10-CM

## 2017-10-31 LAB
ANION GAP SERPL CALC-SCNC: 3 MMOL/L — LOW (ref 5–17)
BUN SERPL-MCNC: 23 MG/DL — SIGNIFICANT CHANGE UP (ref 7–23)
CALCIUM SERPL-MCNC: 8.7 MG/DL — SIGNIFICANT CHANGE UP (ref 8.4–10.5)
CHLORIDE SERPL-SCNC: 101 MMOL/L — SIGNIFICANT CHANGE UP (ref 96–108)
CO2 SERPL-SCNC: 28 MMOL/L — SIGNIFICANT CHANGE UP (ref 22–31)
CREAT SERPL-MCNC: 1.24 MG/DL — SIGNIFICANT CHANGE UP (ref 0.5–1.3)
GLUCOSE SERPL-MCNC: 101 MG/DL — HIGH (ref 70–99)
POTASSIUM SERPL-MCNC: 4.9 MMOL/L — SIGNIFICANT CHANGE UP (ref 3.5–5.3)
POTASSIUM SERPL-SCNC: 4.9 MMOL/L — SIGNIFICANT CHANGE UP (ref 3.5–5.3)
SODIUM SERPL-SCNC: 132 MMOL/L — LOW (ref 135–145)

## 2017-10-31 RX ADMIN — Medication 100 MILLIGRAM(S): at 05:14

## 2017-10-31 RX ADMIN — Medication 1000 MILLIGRAM(S): at 05:14

## 2017-10-31 RX ADMIN — PANTOPRAZOLE SODIUM 40 MILLIGRAM(S): 20 TABLET, DELAYED RELEASE ORAL at 08:11

## 2017-10-31 RX ADMIN — ATENOLOL 50 MILLIGRAM(S): 25 TABLET ORAL at 05:14

## 2017-10-31 RX ADMIN — Medication 162 MILLIGRAM(S): at 08:11

## 2017-10-31 RX ADMIN — OXYCODONE HYDROCHLORIDE 10 MILLIGRAM(S): 5 TABLET ORAL at 11:34

## 2017-10-31 RX ADMIN — Medication 1000 MILLIGRAM(S): at 13:03

## 2017-10-31 RX ADMIN — POLYETHYLENE GLYCOL 3350 17 GRAM(S): 17 POWDER, FOR SOLUTION ORAL at 08:12

## 2017-10-31 RX ADMIN — Medication 100 MILLIGRAM(S): at 13:03

## 2017-10-31 RX ADMIN — OXYCODONE HYDROCHLORIDE 10 MILLIGRAM(S): 5 TABLET ORAL at 12:10

## 2017-10-31 NOTE — PROGRESS NOTE ADULT - PROBLEM SELECTOR PLAN 3
stable
corrected after 2 doses of kayaxelate  cleared for dc  stay away from nephrotoxins
stable  started on atenolol 25 mg qd last night  monitor  dc with bp meds  rx sent to pharmacy

## 2017-10-31 NOTE — PROGRESS NOTE ADULT - SUBJECTIVE AND OBJECTIVE BOX
Patient is a 72y old  Male who presents with a chief complaint of " Pain left hip x one month " (28 Oct 2017 14:30)      INTERVAL HPI/OVERNIGHT EVENTS:pt stable feels well, electrolytes corrected, ekg ok, cleared for dc    MEDICATIONS  (STANDING):  acetaminophen   Tablet 1000 milliGRAM(s) Oral every 8 hours  aspirin enteric coated 162 milliGRAM(s) Oral every 12 hours  ATENolol  Tablet 50 milliGRAM(s) Oral daily  bisacodyl Suppository 10 milliGRAM(s) Rectal once  docusate sodium 100 milliGRAM(s) Oral three times a day  pantoprazole    Tablet 40 milliGRAM(s) Oral daily  senna 2 Tablet(s) Oral at bedtime  sodium chloride 0.9%. 1000 milliLiter(s) (75 mL/Hr) IV Continuous <Continuous>    MEDICATIONS  (PRN):  aluminum hydroxide/magnesium hydroxide/simethicone Suspension 30 milliLiter(s) Oral four times a day PRN Indigestion  bisacodyl Suppository 10 milliGRAM(s) Rectal daily PRN If no bowel movement by POD#2  HYDROmorphone  Injectable 0.5 milliGRAM(s) IV Push every 3 hours PRN Severe Pain  magnesium hydroxide Suspension 30 milliLiter(s) Oral daily PRN Constipation  ondansetron Injectable 4 milliGRAM(s) IV Push every 6 hours PRN Nausea and/or Vomiting  oxyCODONE    IR 5 milliGRAM(s) Oral every 3 hours PRN Mild Pain  oxyCODONE    IR 10 milliGRAM(s) Oral every 3 hours PRN Moderate Pain  polyethylene glycol 3350 17 Gram(s) Oral daily PRN Constipation      Allergies    No Known Allergies    Intolerances        REVIEW OF SYSTEMS:  CONSTITUTIONAL: No fever, weight loss, or fatigue  EYES: No eye pain, visual disturbances  ENMT:  No difficulty hearing, tinnitus, vertigo; No sinus or throat pain  NECK: No pain or stiffness  RESPIRATORY: No cough, wheezing, chills or hemoptysis; No shortness of breath  CARDIOVASCULAR: No chest pain, palpitations, dizziness  GASTROINTESTINAL: No abdominal or epigastric pain. No nausea, vomiting, or hematemesis; No diarrhea or constipation. No melena or hematochezia.  GENITOURINARY: No dysuria, frequency, hematuria, or incontinence  NEUROLOGICAL: No headaches, memory loss, loss of strength, numbness, or tremors  SKIN: No itching, burning  LYMPH NODES: No enlarged glands  MUSCULOSKELETAL: No joint pain or swelling; No muscle, back, or extremity pain  PSYCHIATRIC: No depression, mood swings  HEME/LYMPH: No easy bruising, or bleeding gums  ALLERGY AND IMMUNOLOGIC: No hives    Vital Signs Last 24 Hrs  T(C): 36.8 (31 Oct 2017 08:07), Max: 36.8 (30 Oct 2017 23:00)  T(F): 98.3 (31 Oct 2017 08:07), Max: 98.3 (31 Oct 2017 08:07)  HR: 73 (31 Oct 2017 08:07) (71 - 76)  BP: 168/73 (31 Oct 2017 08:07) (166/63 - 172/76)  BP(mean): --  RR: 16 (31 Oct 2017 08:07) (16 - 16)  SpO2: 99% (31 Oct 2017 08:07) (96% - 99%)    PHYSICAL EXAM:  GENERAL: NAD, well-groomed, well-developed  HEAD:  Atraumatic, Normocephalic  EYES: EOMI, PERRLA, conjunctiva and sclera clear  ENMT: No tonsillar erythema, exudates, or enlargement   NECK: Supple, No JVD  NERVOUS SYSTEM:  Alert & Oriented X3, Good concentration  CHEST/LUNG: Clear to auscultation bilaterally; No rales, rhonchi, wheezing  HEART: Regular rate and rhythm  ABDOMEN: Soft, Nontender, Nondistended; Bowel sounds present  EXTREMITIES:  2+ Peripheral Pulses   LYMPH: No lymphadenopathy noted  SKIN: No rashes     LABS:    31 Oct 2017 08:40    132    |  101    |  23     ----------------------------<  101    4.9     |  28     |  1.24     Ca    8.7        31 Oct 2017 08:40          CAPILLARY BLOOD GLUCOSE        blood culture --   No growth   10-27 @ 16:56      urine culture --  10-27 @ 16:56  results   No growth 10-27 @ 16:56    wound with gram statin --    10-27 @ 16:56  organism  --   10-27 @ 16:56  specimen source .Urine Clean Catch (Midstream)  10-27 @ 16:56      RADIOLOGY & ADDITIONAL TESTS:  no new  EKG: nsr without any changes      Consultant(s) Notes Reviewed:  [x ] YES  [ ] NO    Care Discussed with Consultants/Other Providers [ x] YES  [ ] NO    Advanced Care Planning Discussed with Patien/Family [x ] YES  [ ] NO

## 2017-10-31 NOTE — PROGRESS NOTE ADULT - PROVIDER SPECIALTY LIST ADULT
Internal Medicine
Internal Medicine
Orthopedics
Internal Medicine

## 2017-10-31 NOTE — PROGRESS NOTE ADULT - SUBJECTIVE AND OBJECTIVE BOX
Orthopedic P.A.- POD# 4- s/p Left THR for fraqcture    Patient alert and comfortable in bed.  Denies hip pain or nausea.  NO BM since PREop 4 to 5 days ago.  Normal saline IVF running at 75ml/H.  Elevated systolic BP noted.    Vital Signs Last 24 Hrs  T(C): 36.8 (30 Oct 2017 23:00), Max: 36.8 (30 Oct 2017 23:00)  T(F): 98.2 (30 Oct 2017 23:00), Max: 98.2 (30 Oct 2017 23:00)  HR: 76 (31 Oct 2017 05:11) (71 - 76)  BP: 166/63 (31 Oct 2017 05:11) (166/63 - 172/76)  BP(mean): --  RR: 16 (30 Oct 2017 23:00) (16 - 16)  SpO2: 98% (30 Oct 2017 23:00) (96% - 99%)         I&O's Detail    30 Oct 2017 07:01  -  31 Oct 2017 07:00  --------------------------------------------------------  IN:    sodium chloride 0.9%.: 950 mL  Total IN: 950 mL    OUT:    Voided: 800 mL  Total OUT: 800 mL    Total NET: 150 mL      Labs:    TO BE DRAWN TODAY****                 30 Oct 2017 20:28    132<L>  |  101    |  25<H>  ----------------------------<  142<H>   ***Elevated creatinine slowly improving.  5.9<H>   |  28     |  1.21     Ca    9.0        30 Oct 2017 20:28                    MEDICATIONS:acetaminophen   Tablet 1000 milliGRAM(s) Oral every 8 hours  aluminum hydroxide/magnesium hydroxide/simethicone Suspension 30 milliLiter(s) Oral four times a day PRN  aspirin enteric coated 162 milliGRAM(s) Oral every 12 hours  ATENolol  Tablet 50 milliGRAM(s) Oral daily  bisacodyl Suppository 10 milliGRAM(s) Rectal once  bisacodyl Suppository 10 milliGRAM(s) Rectal daily PRN  docusate sodium 100 milliGRAM(s) Oral three times a day  HYDROmorphone  Injectable 0.5 milliGRAM(s) IV Push every 3 hours PRN  magnesium hydroxide Suspension 30 milliLiter(s) Oral daily PRN  ondansetron Injectable 4 milliGRAM(s) IV Push every 6 hours PRN  oxyCODONE    IR 5 milliGRAM(s) Oral every 3 hours PRN  oxyCODONE    IR 10 milliGRAM(s) Oral every 3 hours PRN  pantoprazole    Tablet 40 milliGRAM(s) Oral daily  polyethylene glycol 3350 17 Gram(s) Oral daily PRN  senna 2 Tablet(s) Oral at bedtime  sodium chloride 0.9%. 1000 milliLiter(s) IV Continuous <Continuous>    Anticoagulation:  aspirin enteric coated 162 milliGRAM(s) Oral every 12 hours      Pain medications:   acetaminophen   Tablet 1000 milliGRAM(s) Oral every 8 hours  HYDROmorphone  Injectable 0.5 milliGRAM(s) IV Push every 3 hours PRN  ondansetron Injectable 4 milliGRAM(s) IV Push every 6 hours PRN  oxyCODONE    IR 5 milliGRAM(s) Oral every 3 hours PRN  oxyCODONE    IR 10 milliGRAM(s) Oral every 3 hours PRN                                       Physical Exam:  Left hip- Abduction pillow in place.  Primary surgical bandage removed and sterile bandage placed over dry long stapled surgical wound. Scant bloody drainage noted mid wound,  Neurovascular grossly intact LE's.  EHL/ant.tibs 5/5.    Calves soft and non-tender.                                                                             -Follow up labs today (serum potassium, sodium and creatinine). Given Kayexolate yesterday for hyperkalemia.                        A/P:  Orthopedically stable; serum electrolytes abnormal  -Continue pain management with current plan.  -Continue DVT prophylaxis with  Ecotrin 162mg po every 12 hours for 6 weeks.  -PT/OT to increase ambulation and review posterior dislocation precautions  -Dr. Forde for medical care  -Discharge planning for HOME when medically cleared and ambulating safely.  -Further plan as per attendings. [facility]opedic P.A.- POD# 4- s/p Left THR for fraqcture    Patient alert and comfortable in bed.  Denies hip pain or nausea.  NO BM since PREop 4 to 5 days ago.  Normal saline IVF running at 75ml/H.  Elevated systolic BP noted.    Vital Signs Last 24 Hrs  T(C): 36.8 (30 Oct 2017 23:00), Max: 36.8 (30 Oct 2017 23:00)  T(F): 98.2 (30 Oct 2017 23:00), Max: 98.2 (30 Oct 2017 23:00)  HR: 76 (31 Oct 2017 05:11) (71 - 76)  BP: 166/63 (31 Oct 2017 05:11) (166/63 - 172/76)  BP(mean): --  RR: 16 (30 Oct 2017 23:00) (16 - 16)  SpO2: 98% (30 Oct 2017 23:00) (96% - 99%)         I&O's Detail    30 Oct 2017 07:01  -  31 Oct 2017 07:00  --------------------------------------------------------  IN:    sodium chloride 0.9%.: 950 mL  Total IN: 950 mL    OUT:    Voided: 800 mL  Total OUT: 800 mL    Total NET: 150 mL      Labs:    TO BE DRAWN TODAY****                 30 Oct 2017 20:28    132<L>  |  101    |  25<H>  ----------------------------<  142<H>   ***Elevated creatinine slowly improving.  5.9<H>   |  28     |  1.21     Ca    9.0        30 Oct 2017 20:28                    MEDICATIONS:acetaminophen   Tablet 1000 milliGRAM(s) Oral every 8 hours  aluminum hydroxide/magnesium hydroxide/simethicone Suspension 30 milliLiter(s) Oral four times a day PRN  aspirin enteric coated 162 milliGRAM(s) Oral every 12 hours  ATENolol  Tablet 50 milliGRAM(s) Oral daily  bisacodyl Suppository 10 milliGRAM(s) Rectal once  bisacodyl Suppository 10 milliGRAM(s) Rectal daily PRN  docusate sodium 100 milliGRAM(s) Oral three times a day  HYDROmorphone  Injectable 0.5 milliGRAM(s) IV Push every 3 hours PRN  magnesium hydroxide Suspension 30 milliLiter(s) Oral daily PRN  ondansetron Injectable 4 milliGRAM(s) IV Push every 6 hours PRN  oxyCODONE    IR 5 milliGRAM(s) Oral every 3 hours PRN  oxyCODONE    IR 10 milliGRAM(s) Oral every 3 hours PRN  pantoprazole    Tablet 40 milliGRAM(s) Oral daily  polyethylene glycol 3350 17 Gram(s) Oral daily PRN  senna 2 Tablet(s) Oral at bedtime  sodium chloride 0.9%. 1000 milliLiter(s) IV Continuous <Continuous>    Anticoagulation:  aspirin enteric coated 162 milliGRAM(s) Oral every 12 hours      Pain medications:   acetaminophen   Tablet 1000 milliGRAM(s) Oral every 8 hours  HYDROmorphone  Injectable 0.5 milliGRAM(s) IV Push every 3 hours PRN  ondansetron Injectable 4 milliGRAM(s) IV Push every 6 hours PRN  oxyCODONE    IR 5 milliGRAM(s) Oral every 3 hours PRN  oxyCODONE    IR 10 milliGRAM(s) Oral every 3 hours PRN                                       Physical Exam:  Left hip- Abduction pillow in place.  Primary surgical bandage removed and sterile bandage placed over dry long stapled surgical wound. Scant bloody drainage noted mid wound,  Neurovascular grossly intact LE's.  EHL/ant.tibs 5/5.    Calves soft and non-tender.                                                                                                 A/P:  Orthopedically stable; serum electrolytes abnormal    -Follow up labs today (serum potassium, sodium and creatinine). Given Kayexalate twice yesterday for hyperkalemia.   -Dulcolax suppository ordered for persistent constipation.  -Continue pain management with current plan.  -Continue DVT prophylaxis with  Ecotrin 162mg po every 12 hours for 6 weeks.  -PT/OT to increase ambulation and review posterior dislocation precautions  -Dr. Forde for medical care  -Discharge planning for HOME when medically cleared and ambulating safely.  -Further plan as per attendings. [facility]A.- POD# 4- s/p Left THR for fraqcture    Patient alert and comfortable in bed.  Denies hip pain or nausea.  NO BM since PREop 4 to 5 days ago.  Normal saline IVF running at 75ml/H.  Elevated systolic BP noted.    Vital Signs Last 24 Hrs  T(C): 36.8 (30 Oct 2017 23:00), Max: 36.8 (30 Oct 2017 23:00)  T(F): 98.2 (30 Oct 2017 23:00), Max: 98.2 (30 Oct 2017 23:00)  HR: 76 (31 Oct 2017 05:11) (71 - 76)  BP: 166/63 (31 Oct 2017 05:11) (166/63 - 172/76)  BP(mean): --  RR: 16 (30 Oct 2017 23:00) (16 - 16)  SpO2: 98% (30 Oct 2017 23:00) (96% - 99%)         I&O's Detail    30 Oct 2017 07:01  -  31 Oct 2017 07:00  --------------------------------------------------------  IN:    sodium chloride 0.9%.: 950 mL  Total IN: 950 mL    OUT:    Voided: 800 mL  Total OUT: 800 mL    Total NET: 150 mL      Labs:    TO BE DRAWN TODAY****                 30 Oct 2017 20:28    132<L>  |  101    |  25<H>  ----------------------------<  142<H>   ***Elevated creatinine slowly improving.  5.9<H>   |  28     |  1.21     Ca    9.0        30 Oct 2017 20:28                    MEDICATIONS:acetaminophen   Tablet 1000 milliGRAM(s) Oral every 8 hours  aluminum hydroxide/magnesium hydroxide/simethicone Suspension 30 milliLiter(s) Oral four times a day PRN  aspirin enteric coated 162 milliGRAM(s) Oral every 12 hours  ATENolol  Tablet 50 milliGRAM(s) Oral daily  bisacodyl Suppository 10 milliGRAM(s) Rectal once  bisacodyl Suppository 10 milliGRAM(s) Rectal daily PRN  docusate sodium 100 milliGRAM(s) Oral three times a day  HYDROmorphone  Injectable 0.5 milliGRAM(s) IV Push every 3 hours PRN  magnesium hydroxide Suspension 30 milliLiter(s) Oral daily PRN  ondansetron Injectable 4 milliGRAM(s) IV Push every 6 hours PRN  oxyCODONE    IR 5 milliGRAM(s) Oral every 3 hours PRN  oxyCODONE    IR 10 milliGRAM(s) Oral every 3 hours PRN  pantoprazole    Tablet 40 milliGRAM(s) Oral daily  polyethylene glycol 3350 17 Gram(s) Oral daily PRN  senna 2 Tablet(s) Oral at bedtime  sodium chloride 0.9%. 1000 milliLiter(s) IV Continuous <Continuous>    Anticoagulation:  aspirin enteric coated 162 milliGRAM(s) Oral every 12 hours      Pain medications:   acetaminophen   Tablet 1000 milliGRAM(s) Oral every 8 hours  HYDROmorphone  Injectable 0.5 milliGRAM(s) IV Push every 3 hours PRN  ondansetron Injectable 4 milliGRAM(s) IV Push every 6 hours PRN  oxyCODONE    IR 5 milliGRAM(s) Oral every 3 hours PRN  oxyCODONE    IR 10 milliGRAM(s) Oral every 3 hours PRN                                       Physical Exam:  Left hip- Abduction pillow in place.  Primary surgical bandage removed and sterile bandage placed over dry long stapled surgical wound. Scant bloody drainage noted mid wound,  Neurovascular grossly intact LE's.  EHL/ant.tibs 5/5.    Calves soft and non-tender.                                                                                                 A/P:  Orthopedically stable; serum electrolytes abnormal    -Follow up labs today (serum potassium, sodium and creatinine). Given Kayexalate twice yesterday for hyperkalemia.   -Dulcolax suppository ordered for persistent constipation.  -Continue pain management with current plan.  -Continue DVT prophylaxis with  Ecotrin 162mg po every 12 hours for 6 weeks.  -PT/OT to increase ambulation and review posterior dislocation precautions  -Dr. Maguire for continued medical care and management of newly diagnosed hypertension.  -Discharge planning for HOME when medically cleared and ambulating safely.  -Further plan as per attendings.

## 2017-10-31 NOTE — PROGRESS NOTE ADULT - PROBLEM SELECTOR PLAN 1
pod 3  doing well  pt fu  pain management  ortho fu  dc planning
pod 4  doing well  pt fu  pain management  ortho fu  dc planning
pod 1  doing well  pt fu  pain management  ortho fu  dc planning once ok with ortho

## 2017-10-31 NOTE — PROGRESS NOTE ADULT - PROBLEM SELECTOR PLAN 4
dvt prevention  pt
stable
stable  increase atenolol to 50 mg qd  rx sent to pharmacy  monitor  dc with bp meds

## 2017-10-31 NOTE — PROGRESS NOTE ADULT - PROBLEM SELECTOR PLAN 2
improved  dc ivf  cleared for dc home  encourage po fluid intake
slight bump in cr and drop in na  likely from volume depletion  start ivf  rpt bmp at 3 pm  can dc today if bmp normalized  encourage increase in po fluids
stable  started on atenolol 25 mg qd last night  monitor

## 2017-11-10 PROBLEM — C21.0 MALIGNANT NEOPLASM OF ANUS, UNSPECIFIED: Chronic | Status: ACTIVE | Noted: 2017-10-27

## 2017-11-15 ENCOUNTER — OUTPATIENT (OUTPATIENT)
Dept: OUTPATIENT SERVICES | Facility: HOSPITAL | Age: 72
LOS: 1 days | End: 2017-11-15
Payer: MEDICARE

## 2017-11-15 ENCOUNTER — APPOINTMENT (OUTPATIENT)
Dept: RADIOLOGY | Facility: CLINIC | Age: 72
End: 2017-11-15
Payer: MEDICARE

## 2017-11-15 DIAGNOSIS — Z00.8 ENCOUNTER FOR OTHER GENERAL EXAMINATION: ICD-10-CM

## 2017-11-15 PROCEDURE — 73502 X-RAY EXAM HIP UNI 2-3 VIEWS: CPT | Mod: 26,LT

## 2017-11-15 PROCEDURE — 73552 X-RAY EXAM OF FEMUR 2/>: CPT | Mod: 26,LT

## 2017-11-15 PROCEDURE — 73552 X-RAY EXAM OF FEMUR 2/>: CPT

## 2017-11-15 PROCEDURE — 73502 X-RAY EXAM HIP UNI 2-3 VIEWS: CPT

## 2017-12-19 PROCEDURE — 73502 X-RAY EXAM HIP UNI 2-3 VIEWS: CPT

## 2017-12-19 PROCEDURE — 99285 EMERGENCY DEPT VISIT HI MDM: CPT | Mod: 25

## 2017-12-19 PROCEDURE — 88311 DECALCIFY TISSUE: CPT

## 2017-12-19 PROCEDURE — 86850 RBC ANTIBODY SCREEN: CPT

## 2017-12-19 PROCEDURE — 96366 THER/PROPH/DIAG IV INF ADDON: CPT

## 2017-12-19 PROCEDURE — 86900 BLOOD TYPING SEROLOGIC ABO: CPT

## 2017-12-19 PROCEDURE — 97116 GAIT TRAINING THERAPY: CPT

## 2017-12-19 PROCEDURE — 97535 SELF CARE MNGMENT TRAINING: CPT

## 2017-12-19 PROCEDURE — C1713: CPT

## 2017-12-19 PROCEDURE — 80048 BASIC METABOLIC PNL TOTAL CA: CPT

## 2017-12-19 PROCEDURE — 86901 BLOOD TYPING SEROLOGIC RH(D): CPT

## 2017-12-19 PROCEDURE — 97165 OT EVAL LOW COMPLEX 30 MIN: CPT

## 2017-12-19 PROCEDURE — 97110 THERAPEUTIC EXERCISES: CPT

## 2017-12-19 PROCEDURE — 71046 X-RAY EXAM CHEST 2 VIEWS: CPT

## 2017-12-19 PROCEDURE — 36415 COLL VENOUS BLD VENIPUNCTURE: CPT

## 2017-12-19 PROCEDURE — 96365 THER/PROPH/DIAG IV INF INIT: CPT

## 2017-12-19 PROCEDURE — 96375 TX/PRO/DX INJ NEW DRUG ADDON: CPT

## 2017-12-19 PROCEDURE — 93005 ELECTROCARDIOGRAM TRACING: CPT

## 2017-12-19 PROCEDURE — 85730 THROMBOPLASTIN TIME PARTIAL: CPT

## 2017-12-19 PROCEDURE — 87086 URINE CULTURE/COLONY COUNT: CPT

## 2017-12-19 PROCEDURE — 85610 PROTHROMBIN TIME: CPT

## 2017-12-19 PROCEDURE — 94664 DEMO&/EVAL PT USE INHALER: CPT

## 2017-12-19 PROCEDURE — C1776: CPT

## 2017-12-19 PROCEDURE — 85018 HEMOGLOBIN: CPT

## 2017-12-19 PROCEDURE — 80053 COMPREHEN METABOLIC PANEL: CPT

## 2017-12-19 PROCEDURE — 73700 CT LOWER EXTREMITY W/O DYE: CPT

## 2017-12-19 PROCEDURE — 85027 COMPLETE CBC AUTOMATED: CPT

## 2017-12-19 PROCEDURE — 88305 TISSUE EXAM BY PATHOLOGIST: CPT

## 2017-12-19 PROCEDURE — 81001 URINALYSIS AUTO W/SCOPE: CPT

## 2018-02-28 ENCOUNTER — APPOINTMENT (OUTPATIENT)
Dept: RADIOLOGY | Facility: CLINIC | Age: 73
End: 2018-02-28
Payer: MEDICARE

## 2018-02-28 ENCOUNTER — OUTPATIENT (OUTPATIENT)
Dept: OUTPATIENT SERVICES | Facility: HOSPITAL | Age: 73
LOS: 1 days | End: 2018-02-28
Payer: MEDICARE

## 2018-02-28 DIAGNOSIS — Z00.8 ENCOUNTER FOR OTHER GENERAL EXAMINATION: ICD-10-CM

## 2018-02-28 PROCEDURE — 73502 X-RAY EXAM HIP UNI 2-3 VIEWS: CPT

## 2018-02-28 PROCEDURE — 73502 X-RAY EXAM HIP UNI 2-3 VIEWS: CPT | Mod: 26,LT

## 2018-05-08 ENCOUNTER — APPOINTMENT (OUTPATIENT)
Dept: SURGERY | Facility: CLINIC | Age: 73
End: 2018-05-08
Payer: MEDICARE

## 2018-05-08 VITALS
TEMPERATURE: 98.4 F | WEIGHT: 180 LBS | OXYGEN SATURATION: 100 % | SYSTOLIC BLOOD PRESSURE: 186 MMHG | HEIGHT: 66 IN | BODY MASS INDEX: 28.93 KG/M2 | HEART RATE: 68 BPM | RESPIRATION RATE: 15 BRPM | DIASTOLIC BLOOD PRESSURE: 104 MMHG

## 2018-05-08 DIAGNOSIS — K62.5 HEMORRHAGE OF ANUS AND RECTUM: ICD-10-CM

## 2018-05-08 DIAGNOSIS — R15.9 FULL INCONTINENCE OF FECES: ICD-10-CM

## 2018-05-08 DIAGNOSIS — K41.90 UNILATERAL FEMORAL HERNIA, W/OUT OBSTRUCTION OR GANGRENE, NOT SPECIFIED AS RECURRENT: ICD-10-CM

## 2018-05-08 DIAGNOSIS — R15.2 FULL INCONTINENCE OF FECES: ICD-10-CM

## 2018-05-08 PROCEDURE — 99213 OFFICE O/P EST LOW 20 MIN: CPT | Mod: 25

## 2018-05-08 PROCEDURE — 46614 ANOSCOPY CONTROL BLEEDING: CPT

## 2018-05-08 RX ORDER — CLOTRIMAZOLE AND BETAMETHASONE DIPROPIONATE 10; .5 MG/G; MG/G
1-0.05 CREAM TOPICAL
Qty: 45 | Refills: 0 | Status: DISCONTINUED | COMMUNITY
Start: 2017-12-18

## 2018-05-08 RX ORDER — DOXYCYCLINE HYCLATE 100 MG/1
100 TABLET ORAL
Qty: 14 | Refills: 0 | Status: DISCONTINUED | COMMUNITY
Start: 2018-03-08

## 2018-05-08 RX ORDER — SULFAMETHOXAZOLE AND TRIMETHOPRIM 800; 160 MG/1; MG/1
800-160 TABLET ORAL
Qty: 20 | Refills: 0 | Status: DISCONTINUED | COMMUNITY
Start: 2018-01-18

## 2018-05-08 RX ORDER — FLUCONAZOLE 100 MG/1
100 TABLET ORAL
Qty: 7 | Refills: 0 | Status: DISCONTINUED | COMMUNITY
Start: 2017-12-18

## 2018-05-08 RX ORDER — AZITHROMYCIN 250 MG/1
250 TABLET, FILM COATED ORAL
Qty: 6 | Refills: 0 | Status: DISCONTINUED | COMMUNITY
Start: 2018-01-08

## 2018-05-08 RX ORDER — METHYLPREDNISOLONE 4 MG/1
4 TABLET ORAL
Qty: 21 | Refills: 0 | Status: DISCONTINUED | COMMUNITY
Start: 2018-03-08

## 2018-10-24 ENCOUNTER — EMERGENCY (EMERGENCY)
Facility: HOSPITAL | Age: 73
LOS: 1 days | Discharge: HOME | End: 2018-10-24
Attending: EMERGENCY MEDICINE
Payer: MEDICARE

## 2018-10-24 VITALS
RESPIRATION RATE: 16 BRPM | HEART RATE: 97 BPM | OXYGEN SATURATION: 96 % | TEMPERATURE: 98 F | SYSTOLIC BLOOD PRESSURE: 190 MMHG | DIASTOLIC BLOOD PRESSURE: 82 MMHG

## 2018-10-24 VITALS
TEMPERATURE: 98 F | OXYGEN SATURATION: 100 % | RESPIRATION RATE: 18 BRPM | DIASTOLIC BLOOD PRESSURE: 97 MMHG | SYSTOLIC BLOOD PRESSURE: 184 MMHG | HEART RATE: 88 BPM

## 2018-10-24 LAB
ALBUMIN SERPL ELPH-MCNC: 4.3 G/DL — SIGNIFICANT CHANGE UP (ref 3.3–5)
ALP SERPL-CCNC: 84 U/L — SIGNIFICANT CHANGE UP (ref 40–120)
ALT FLD-CCNC: 9 U/L — LOW (ref 10–45)
ANION GAP SERPL CALC-SCNC: 13 MMOL/L — SIGNIFICANT CHANGE UP (ref 5–17)
AST SERPL-CCNC: 13 U/L — SIGNIFICANT CHANGE UP (ref 10–40)
BASE EXCESS BLDV CALC-SCNC: 0.5 MMOL/L — SIGNIFICANT CHANGE UP (ref -2–2)
BASOPHILS # BLD AUTO: 0 K/UL — SIGNIFICANT CHANGE UP (ref 0–0.2)
BASOPHILS NFR BLD AUTO: 0.1 % — SIGNIFICANT CHANGE UP (ref 0–2)
BILIRUB SERPL-MCNC: 0.8 MG/DL — SIGNIFICANT CHANGE UP (ref 0.2–1.2)
BUN SERPL-MCNC: 20 MG/DL — SIGNIFICANT CHANGE UP (ref 7–23)
CA-I SERPL-SCNC: 1.24 MMOL/L — SIGNIFICANT CHANGE UP (ref 1.12–1.3)
CALCIUM SERPL-MCNC: 10.5 MG/DL — SIGNIFICANT CHANGE UP (ref 8.4–10.5)
CHLORIDE BLDV-SCNC: 107 MMOL/L — SIGNIFICANT CHANGE UP (ref 96–108)
CHLORIDE SERPL-SCNC: 101 MMOL/L — SIGNIFICANT CHANGE UP (ref 96–108)
CO2 BLDV-SCNC: 28 MMOL/L — SIGNIFICANT CHANGE UP (ref 22–30)
CO2 SERPL-SCNC: 25 MMOL/L — SIGNIFICANT CHANGE UP (ref 22–31)
CREAT SERPL-MCNC: 1.42 MG/DL — HIGH (ref 0.5–1.3)
EOSINOPHIL # BLD AUTO: 0.2 K/UL — SIGNIFICANT CHANGE UP (ref 0–0.5)
EOSINOPHIL NFR BLD AUTO: 1.5 % — SIGNIFICANT CHANGE UP (ref 0–6)
GAS PNL BLDV: 130 MMOL/L — LOW (ref 136–145)
GAS PNL BLDV: SIGNIFICANT CHANGE UP
GLUCOSE BLDV-MCNC: 103 MG/DL — HIGH (ref 70–99)
GLUCOSE SERPL-MCNC: 104 MG/DL — HIGH (ref 70–99)
HCO3 BLDV-SCNC: 27 MMOL/L — SIGNIFICANT CHANGE UP (ref 21–29)
HCT VFR BLD CALC: 48.2 % — SIGNIFICANT CHANGE UP (ref 39–50)
HCT VFR BLDA CALC: 50 % — SIGNIFICANT CHANGE UP (ref 39–50)
HGB BLD CALC-MCNC: 16.3 G/DL — SIGNIFICANT CHANGE UP (ref 13–17)
HGB BLD-MCNC: 16.1 G/DL — SIGNIFICANT CHANGE UP (ref 13–17)
LACTATE BLDV-MCNC: 1.2 MMOL/L — SIGNIFICANT CHANGE UP (ref 0.7–2)
LIDOCAIN IGE QN: 37 U/L — SIGNIFICANT CHANGE UP (ref 7–60)
LYMPHOCYTES # BLD AUTO: 1.2 K/UL — SIGNIFICANT CHANGE UP (ref 1–3.3)
LYMPHOCYTES # BLD AUTO: 10.5 % — LOW (ref 13–44)
MAGNESIUM SERPL-MCNC: 1.8 MG/DL — SIGNIFICANT CHANGE UP (ref 1.6–2.6)
MCHC RBC-ENTMCNC: 28.5 PG — SIGNIFICANT CHANGE UP (ref 27–34)
MCHC RBC-ENTMCNC: 33.3 GM/DL — SIGNIFICANT CHANGE UP (ref 32–36)
MCV RBC AUTO: 85.7 FL — SIGNIFICANT CHANGE UP (ref 80–100)
MONOCYTES # BLD AUTO: 1.2 K/UL — HIGH (ref 0–0.9)
MONOCYTES NFR BLD AUTO: 10.3 % — SIGNIFICANT CHANGE UP (ref 2–14)
NEUTROPHILS # BLD AUTO: 9.1 K/UL — HIGH (ref 1.8–7.4)
NEUTROPHILS NFR BLD AUTO: 77.6 % — HIGH (ref 43–77)
PCO2 BLDV: 50 MMHG — SIGNIFICANT CHANGE UP (ref 35–50)
PH BLDV: 7.34 — LOW (ref 7.35–7.45)
PHOSPHATE SERPL-MCNC: 2.8 MG/DL — SIGNIFICANT CHANGE UP (ref 2.5–4.5)
PLATELET # BLD AUTO: 269 K/UL — SIGNIFICANT CHANGE UP (ref 150–400)
PO2 BLDV: 27 MMHG — SIGNIFICANT CHANGE UP (ref 25–45)
POTASSIUM BLDV-SCNC: 3.6 MMOL/L — SIGNIFICANT CHANGE UP (ref 3.5–5.3)
POTASSIUM SERPL-MCNC: 4.1 MMOL/L — SIGNIFICANT CHANGE UP (ref 3.5–5.3)
POTASSIUM SERPL-SCNC: 4.1 MMOL/L — SIGNIFICANT CHANGE UP (ref 3.5–5.3)
PROT SERPL-MCNC: 6.8 G/DL — SIGNIFICANT CHANGE UP (ref 6–8.3)
RAPID RVP RESULT: DETECTED
RBC # BLD: 5.63 M/UL — SIGNIFICANT CHANGE UP (ref 4.2–5.8)
RBC # FLD: 14 % — SIGNIFICANT CHANGE UP (ref 10.3–14.5)
RV+EV RNA SPEC QL NAA+PROBE: DETECTED
SAO2 % BLDV: 41 % — LOW (ref 67–88)
SODIUM SERPL-SCNC: 139 MMOL/L — SIGNIFICANT CHANGE UP (ref 135–145)
WBC # BLD: 11.8 K/UL — HIGH (ref 3.8–10.5)
WBC # FLD AUTO: 11.8 K/UL — HIGH (ref 3.8–10.5)

## 2018-10-24 PROCEDURE — 74176 CT ABD & PELVIS W/O CONTRAST: CPT

## 2018-10-24 PROCEDURE — 74176 CT ABD & PELVIS W/O CONTRAST: CPT | Mod: 26

## 2018-10-24 PROCEDURE — 83690 ASSAY OF LIPASE: CPT

## 2018-10-24 PROCEDURE — 71046 X-RAY EXAM CHEST 2 VIEWS: CPT

## 2018-10-24 PROCEDURE — 82803 BLOOD GASES ANY COMBINATION: CPT

## 2018-10-24 PROCEDURE — 82435 ASSAY OF BLOOD CHLORIDE: CPT

## 2018-10-24 PROCEDURE — 84100 ASSAY OF PHOSPHORUS: CPT

## 2018-10-24 PROCEDURE — 87633 RESP VIRUS 12-25 TARGETS: CPT

## 2018-10-24 PROCEDURE — 87486 CHLMYD PNEUM DNA AMP PROBE: CPT

## 2018-10-24 PROCEDURE — 36415 COLL VENOUS BLD VENIPUNCTURE: CPT

## 2018-10-24 PROCEDURE — 84295 ASSAY OF SERUM SODIUM: CPT

## 2018-10-24 PROCEDURE — 83735 ASSAY OF MAGNESIUM: CPT

## 2018-10-24 PROCEDURE — 85027 COMPLETE CBC AUTOMATED: CPT

## 2018-10-24 PROCEDURE — 82947 ASSAY GLUCOSE BLOOD QUANT: CPT

## 2018-10-24 PROCEDURE — 99284 EMERGENCY DEPT VISIT MOD MDM: CPT | Mod: 25

## 2018-10-24 PROCEDURE — 82330 ASSAY OF CALCIUM: CPT

## 2018-10-24 PROCEDURE — 87581 M.PNEUMON DNA AMP PROBE: CPT

## 2018-10-24 PROCEDURE — 71046 X-RAY EXAM CHEST 2 VIEWS: CPT | Mod: 26

## 2018-10-24 PROCEDURE — 80053 COMPREHEN METABOLIC PANEL: CPT

## 2018-10-24 PROCEDURE — 84132 ASSAY OF SERUM POTASSIUM: CPT

## 2018-10-24 PROCEDURE — 85014 HEMATOCRIT: CPT

## 2018-10-24 PROCEDURE — 87798 DETECT AGENT NOS DNA AMP: CPT

## 2018-10-24 PROCEDURE — 83605 ASSAY OF LACTIC ACID: CPT

## 2018-10-24 RX ORDER — SODIUM CHLORIDE 9 MG/ML
1000 INJECTION, SOLUTION INTRAVENOUS ONCE
Qty: 0 | Refills: 0 | Status: COMPLETED | OUTPATIENT
Start: 2018-10-24 | End: 2018-10-24

## 2018-10-24 RX ADMIN — SODIUM CHLORIDE 2000 MILLILITER(S): 9 INJECTION, SOLUTION INTRAVENOUS at 07:39

## 2018-10-24 NOTE — ED PROVIDER NOTE - OBJECTIVE STATEMENT
73 year old male presents c/o sore throat and cough x 3-4 days w/ onset of n/v/d for 2 days. Per patient he has been having a productive cough for the past few days associated with sore throat. Yesterday began to feel nauseous w /nb/nb vomiting and  non-bloddy diarrhea x 1 as well as malaise. This morning had 4 episodes of  watery diarrhea associated with mild abdominal pain . Denies sick contacts, recent abx use, recent travel, fevers, chills, headaches, dizziness, chest pain, sob, urinary symptoms

## 2018-10-24 NOTE — ED ADULT NURSE NOTE - OBJECTIVE STATEMENT
73 year old male presents to the ED ambulatory through the waiting room complaining of 2 days of abdominal pain and diarrhea and one episode of bilious vomiting yesterday. PMH of HTN and L hip replacement. Patient also endorses sore throat and productive cough (yellow sputum) for 1 week. Pt. denies headache, SOB, fever, chills, blood in stool, dysuria, hematuria, recent travel, recent sick contact. 20g peripheral IV placed in right AC and labs drawn and sent to lab. Patient undressed and placed into gown, call bell in hand and side rails up with bed in lowest position for safety. blanket provided. Comfort and safety provided.

## 2018-10-24 NOTE — ED PROVIDER NOTE - MEDICAL DECISION MAKING DETAILS
Arsenio: 73 year old male with cough, sore throat, and then nb/nb vomiting, and diarrhea.  no rash, no recent travel.w ill get labs, ivf, cxr, reassess

## 2018-10-24 NOTE — ED ADULT NURSE NOTE - CHPI ED NUR SYMPTOMS NEG
no abdominal distension/no blood in stool/no burning urination/no chills/no hematuria/no dysuria/no fever

## 2018-10-24 NOTE — ED PROVIDER NOTE - PHYSICAL EXAMINATION
CONSTITUTIONAL: Patient is awake, alert and oriented x 3. Patient is well appearing and in no acute distress.  HEAD: NCAT,   EYES: PERRL b/l, EOMI,   ENT: Airway patent, Nasal mucosa clear. Mouth with normal mucosa. Throat w/ erythema, no vesicles, no oropharyngeal exudates and uvula is midline.  NECK: Supple, FROM  LUNGS: CTA B/L,  HEART: RRR.+S1S2 no murmurs,   ABDOMEN: Soft nd/nt+bs no rebound or guarding.   EXTREMITY: no edema or calf tenderness b/l, FROM upper and lower ext b/l  SKIN: with no rash or lesions.   NEURO: No focal deficits

## 2018-10-24 NOTE — ED PROVIDER NOTE - PROGRESS NOTE DETAILS
Patient feeling better since arrival to ED. Reports decrease in diarrhea. Feels safe to go home. Will d/c w/ PCP follow up in the next 1-2 days   Shaila Fitzgerald PA-C

## 2018-10-24 NOTE — ED ADULT NURSE NOTE - NSIMPLEMENTINTERV_GEN_ALL_ED
Implemented All Universal Safety Interventions:  [place] to call system. Call bell, personal items and telephone within reach. Instruct patient to call for assistance. Room bathroom lighting operational. Non-slip footwear when patient is off stretcher. Physically safe environment: no spills, clutter or unnecessary equipment. Stretcher in lowest position, wheels locked, appropriate side rails in place.

## 2018-10-30 LAB
C DIFF TOX GENS STL QL NAA+PROBE: NORMAL
CDIFF BY PCR: NOT DETECTED

## 2018-10-31 ENCOUNTER — APPOINTMENT (OUTPATIENT)
Dept: SURGERY | Facility: CLINIC | Age: 73
End: 2018-10-31

## 2018-11-01 LAB — BACTERIA STL CULT: NORMAL

## 2019-07-30 ENCOUNTER — OUTPATIENT (OUTPATIENT)
Dept: OUTPATIENT SERVICES | Facility: HOSPITAL | Age: 74
LOS: 1 days | End: 2019-07-30
Payer: MEDICARE

## 2019-07-30 ENCOUNTER — APPOINTMENT (OUTPATIENT)
Dept: RADIOLOGY | Facility: HOSPITAL | Age: 74
End: 2019-07-30

## 2019-07-30 DIAGNOSIS — M87.00 IDIOPATHIC ASEPTIC NECROSIS OF UNSPECIFIED BONE: ICD-10-CM

## 2019-07-30 PROCEDURE — 73502 X-RAY EXAM HIP UNI 2-3 VIEWS: CPT | Mod: 26,RT

## 2019-12-09 ENCOUNTER — APPOINTMENT (OUTPATIENT)
Dept: RADIOLOGY | Facility: IMAGING CENTER | Age: 74
End: 2019-12-09
Payer: MEDICARE

## 2019-12-09 ENCOUNTER — OUTPATIENT (OUTPATIENT)
Dept: OUTPATIENT SERVICES | Facility: HOSPITAL | Age: 74
LOS: 1 days | End: 2019-12-09
Payer: MEDICARE

## 2019-12-09 DIAGNOSIS — Z00.8 ENCOUNTER FOR OTHER GENERAL EXAMINATION: ICD-10-CM

## 2019-12-09 PROCEDURE — 71046 X-RAY EXAM CHEST 2 VIEWS: CPT

## 2019-12-09 PROCEDURE — 71046 X-RAY EXAM CHEST 2 VIEWS: CPT | Mod: 26

## 2020-03-03 ENCOUNTER — OUTPATIENT (OUTPATIENT)
Dept: OUTPATIENT SERVICES | Facility: HOSPITAL | Age: 75
LOS: 1 days | End: 2020-03-03
Payer: MEDICARE

## 2020-03-03 DIAGNOSIS — Z00.8 ENCOUNTER FOR OTHER GENERAL EXAMINATION: ICD-10-CM

## 2020-03-03 PROCEDURE — 71260 CT THORAX DX C+: CPT | Mod: 26

## 2020-03-03 PROCEDURE — 82565 ASSAY OF CREATININE: CPT

## 2020-03-03 PROCEDURE — 74177 CT ABD & PELVIS W/CONTRAST: CPT | Mod: 26

## 2020-03-03 PROCEDURE — 74177 CT ABD & PELVIS W/CONTRAST: CPT

## 2020-03-03 PROCEDURE — 71260 CT THORAX DX C+: CPT

## 2020-05-06 ENCOUNTER — APPOINTMENT (OUTPATIENT)
Dept: SURGERY | Facility: CLINIC | Age: 75
End: 2020-05-06
Payer: MEDICARE

## 2020-05-06 VITALS
OXYGEN SATURATION: 95 % | RESPIRATION RATE: 18 BRPM | DIASTOLIC BLOOD PRESSURE: 80 MMHG | HEART RATE: 82 BPM | SYSTOLIC BLOOD PRESSURE: 160 MMHG | TEMPERATURE: 97.9 F

## 2020-05-06 DIAGNOSIS — K62.7 RADIATION PROCTITIS: ICD-10-CM

## 2020-05-06 DIAGNOSIS — K62.5 HEMORRHAGE OF ANUS AND RECTUM: ICD-10-CM

## 2020-05-06 PROCEDURE — 99213 OFFICE O/P EST LOW 20 MIN: CPT | Mod: 25

## 2020-05-06 PROCEDURE — 46614 ANOSCOPY CONTROL BLEEDING: CPT

## 2020-05-06 RX ORDER — SILVER SULFADIAZINE 10 MG/G
1 CREAM TOPICAL
Qty: 1 | Refills: 3 | Status: ACTIVE | COMMUNITY
Start: 2020-05-06 | End: 1900-01-01

## 2020-05-06 NOTE — ASSESSMENT
[FreeTextEntry1] : 10% formalin applied.  Silvadene prescription renewed.  Patient will follow-up PRN

## 2020-05-06 NOTE — HISTORY OF PRESENT ILLNESS
[de-identified] : Rick is a 75 y/o male here for evaluation of rectal bleeding. Pt has hx anal cancer 2012, chemo and RT ;proctitis with ulcer and bleeding controlled with Formalin since 2012; perianal skin bleeding and excoriation controlled with Silvadene. Colonoscopy from 11/7/12 demonstrated an anterior ulcer - Formalin applied. Last colonoscopy from 10/27/15 by Dr. Chandra demonstrated localized mucosal atrophy found at the anus and in the rectum. Non-bleeding external and internal hemorrhoids. Last seen on 5/8/18, patient with recurrent bleeding from radiation proctitis. 10% topical formalin applied.  \par Pt  reports BRPR  on Monday. Currently denies any bleeding. Denies any pain ,constipation or use of laxatives.  having daily BM once or twice

## 2020-05-06 NOTE — PHYSICAL EXAM
[de-identified] : Perianal inspection revealed punctate erythema consistent with radiation damage.  Scarring and mild stenosis noted on digital exam.  Anoscopy confirmed anterior radiation proctitis with bleeding.  Medium Hirschman anoscope needed.

## 2020-07-17 ENCOUNTER — APPOINTMENT (OUTPATIENT)
Dept: RADIOLOGY | Facility: IMAGING CENTER | Age: 75
End: 2020-07-17
Payer: MEDICARE

## 2020-07-17 ENCOUNTER — OUTPATIENT (OUTPATIENT)
Dept: OUTPATIENT SERVICES | Facility: HOSPITAL | Age: 75
LOS: 1 days | End: 2020-07-17
Payer: MEDICARE

## 2020-07-17 DIAGNOSIS — Z00.8 ENCOUNTER FOR OTHER GENERAL EXAMINATION: ICD-10-CM

## 2020-07-17 PROCEDURE — 73502 X-RAY EXAM HIP UNI 2-3 VIEWS: CPT | Mod: 26,LT

## 2020-07-17 PROCEDURE — 73502 X-RAY EXAM HIP UNI 2-3 VIEWS: CPT

## 2020-09-21 NOTE — OCCUPATIONAL THERAPY INITIAL EVALUATION ADULT - PHYSICAL ASSIST/NONPHYSICAL ASSIST:DRESS UPPER BODY, OT EVAL
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 Calderon 6/23/2020   
set-up required

## 2021-07-16 NOTE — PRE-OP CHECKLIST - TYPE OF SOLUTION
Pt. is leaning towards Advanced lucy. Pt. is Advanced vs Custom lucy pending Visual Field result. r/l

## 2022-01-01 ENCOUNTER — INPATIENT (INPATIENT)
Facility: HOSPITAL | Age: 77
LOS: 13 days | Discharge: HOME HEALTH | End: 2022-05-24
Attending: SURGERY | Admitting: SURGERY
Payer: MEDICARE

## 2022-01-01 ENCOUNTER — EMERGENCY (EMERGENCY)
Facility: HOSPITAL | Age: 77
LOS: 1 days | Discharge: HOME | End: 2022-01-01
Attending: EMERGENCY MEDICINE | Admitting: EMERGENCY MEDICINE
Payer: MEDICARE

## 2022-01-01 ENCOUNTER — OUTPATIENT (OUTPATIENT)
Dept: OUTPATIENT SERVICES | Facility: HOSPITAL | Age: 77
LOS: 1 days | End: 2022-01-01

## 2022-01-01 ENCOUNTER — TRANSCRIPTION ENCOUNTER (OUTPATIENT)
Age: 77
End: 2022-01-01

## 2022-01-01 ENCOUNTER — INPATIENT (INPATIENT)
Facility: HOSPITAL | Age: 77
LOS: 11 days | Discharge: EXPIRED | End: 2022-08-30
Attending: INTERNAL MEDICINE | Admitting: INTERNAL MEDICINE

## 2022-01-01 ENCOUNTER — RESULT REVIEW (OUTPATIENT)
Age: 77
End: 2022-01-01

## 2022-01-01 ENCOUNTER — APPOINTMENT (OUTPATIENT)
Dept: SURGICAL ONCOLOGY | Facility: HOSPITAL | Age: 77
End: 2022-01-01

## 2022-01-01 ENCOUNTER — INPATIENT (INPATIENT)
Facility: HOSPITAL | Age: 77
LOS: 13 days | Discharge: NURSING, REHAB OR LONG-TERM CARE | End: 2022-07-06
Attending: INTERNAL MEDICINE | Admitting: INTERNAL MEDICINE
Payer: MEDICARE

## 2022-01-01 ENCOUNTER — INPATIENT (INPATIENT)
Facility: HOSPITAL | Age: 77
LOS: 4 days | Discharge: HOME HEALTH | End: 2022-06-01
Attending: INTERNAL MEDICINE | Admitting: INTERNAL MEDICINE
Payer: MEDICARE

## 2022-01-01 ENCOUNTER — APPOINTMENT (OUTPATIENT)
Dept: SURGICAL ONCOLOGY | Facility: CLINIC | Age: 77
End: 2022-01-01

## 2022-01-01 ENCOUNTER — NON-APPOINTMENT (OUTPATIENT)
Age: 77
End: 2022-01-01

## 2022-01-01 ENCOUNTER — INPATIENT (INPATIENT)
Facility: HOSPITAL | Age: 77
LOS: 7 days | Discharge: HOME | DRG: 435 | End: 2022-05-07
Attending: SURGERY | Admitting: SURGERY
Payer: MEDICARE

## 2022-01-01 ENCOUNTER — APPOINTMENT (OUTPATIENT)
Dept: SURGICAL ONCOLOGY | Facility: CLINIC | Age: 77
End: 2022-01-01
Payer: MEDICARE

## 2022-01-01 ENCOUNTER — INPATIENT (INPATIENT)
Facility: HOSPITAL | Age: 77
LOS: 1 days | Discharge: HOME HEALTH | DRG: 393 | End: 2022-07-22
Attending: INTERNAL MEDICINE | Admitting: INTERNAL MEDICINE
Payer: MEDICARE

## 2022-01-01 ENCOUNTER — APPOINTMENT (OUTPATIENT)
Dept: INFECTIOUS DISEASE | Facility: CLINIC | Age: 77
End: 2022-01-01

## 2022-01-01 VITALS
RESPIRATION RATE: 18 BRPM | RESPIRATION RATE: 18 BRPM | HEART RATE: 56 BPM | OXYGEN SATURATION: 99 % | SYSTOLIC BLOOD PRESSURE: 132 MMHG | DIASTOLIC BLOOD PRESSURE: 68 MMHG | SYSTOLIC BLOOD PRESSURE: 186 MMHG | HEART RATE: 71 BPM | TEMPERATURE: 98 F | OXYGEN SATURATION: 100 % | DIASTOLIC BLOOD PRESSURE: 65 MMHG | TEMPERATURE: 98 F

## 2022-01-01 VITALS
HEART RATE: 80 BPM | TEMPERATURE: 99 F | SYSTOLIC BLOOD PRESSURE: 199 MMHG | DIASTOLIC BLOOD PRESSURE: 75 MMHG | OXYGEN SATURATION: 99 % | HEIGHT: 62 IN | RESPIRATION RATE: 18 BRPM

## 2022-01-01 VITALS
OXYGEN SATURATION: 99 % | HEART RATE: 85 BPM | SYSTOLIC BLOOD PRESSURE: 150 MMHG | DIASTOLIC BLOOD PRESSURE: 88 MMHG | HEIGHT: 62 IN | TEMPERATURE: 98 F | RESPIRATION RATE: 18 BRPM

## 2022-01-01 VITALS
DIASTOLIC BLOOD PRESSURE: 47 MMHG | RESPIRATION RATE: 16 BRPM | OXYGEN SATURATION: 100 % | SYSTOLIC BLOOD PRESSURE: 137 MMHG | HEART RATE: 47 BPM | HEIGHT: 62 IN | TEMPERATURE: 97 F

## 2022-01-01 VITALS
SYSTOLIC BLOOD PRESSURE: 178 MMHG | TEMPERATURE: 98 F | DIASTOLIC BLOOD PRESSURE: 75 MMHG | HEART RATE: 78 BPM | RESPIRATION RATE: 18 BRPM | OXYGEN SATURATION: 98 %

## 2022-01-01 VITALS
WEIGHT: 139 LBS | RESPIRATION RATE: 17 BRPM | BODY MASS INDEX: 22.34 KG/M2 | SYSTOLIC BLOOD PRESSURE: 177 MMHG | TEMPERATURE: 98.2 F | DIASTOLIC BLOOD PRESSURE: 76 MMHG | HEIGHT: 66 IN | OXYGEN SATURATION: 95 % | HEART RATE: 84 BPM

## 2022-01-01 VITALS
HEART RATE: 86 BPM | DIASTOLIC BLOOD PRESSURE: 71 MMHG | HEIGHT: 62 IN | OXYGEN SATURATION: 98 % | TEMPERATURE: 98 F | SYSTOLIC BLOOD PRESSURE: 144 MMHG | RESPIRATION RATE: 16 BRPM

## 2022-01-01 VITALS
OXYGEN SATURATION: 99 % | RESPIRATION RATE: 18 BRPM | HEART RATE: 69 BPM | SYSTOLIC BLOOD PRESSURE: 157 MMHG | TEMPERATURE: 98 F | DIASTOLIC BLOOD PRESSURE: 65 MMHG

## 2022-01-01 VITALS
HEART RATE: 102 BPM | HEIGHT: 62 IN | OXYGEN SATURATION: 99 % | TEMPERATURE: 98 F | DIASTOLIC BLOOD PRESSURE: 78 MMHG | SYSTOLIC BLOOD PRESSURE: 166 MMHG | RESPIRATION RATE: 18 BRPM

## 2022-01-01 VITALS
SYSTOLIC BLOOD PRESSURE: 190 MMHG | OXYGEN SATURATION: 99 % | HEIGHT: 66 IN | WEIGHT: 164.02 LBS | HEART RATE: 83 BPM | RESPIRATION RATE: 18 BRPM | TEMPERATURE: 98 F | DIASTOLIC BLOOD PRESSURE: 89 MMHG

## 2022-01-01 VITALS
DIASTOLIC BLOOD PRESSURE: 62 MMHG | HEIGHT: 70 IN | SYSTOLIC BLOOD PRESSURE: 133 MMHG | TEMPERATURE: 102 F | RESPIRATION RATE: 18 BRPM | OXYGEN SATURATION: 99 % | HEART RATE: 103 BPM

## 2022-01-01 VITALS
OXYGEN SATURATION: 99 % | WEIGHT: 125 LBS | HEART RATE: 60 BPM | DIASTOLIC BLOOD PRESSURE: 75 MMHG | RESPIRATION RATE: 16 BRPM | TEMPERATURE: 99 F | HEIGHT: 62 IN | SYSTOLIC BLOOD PRESSURE: 123 MMHG

## 2022-01-01 VITALS
DIASTOLIC BLOOD PRESSURE: 80 MMHG | OXYGEN SATURATION: 100 % | SYSTOLIC BLOOD PRESSURE: 178 MMHG | HEART RATE: 74 BPM | RESPIRATION RATE: 18 BRPM

## 2022-01-01 VITALS
OXYGEN SATURATION: 100 % | RESPIRATION RATE: 16 BRPM | TEMPERATURE: 98 F | HEART RATE: 59 BPM | DIASTOLIC BLOOD PRESSURE: 44 MMHG | SYSTOLIC BLOOD PRESSURE: 151 MMHG

## 2022-01-01 VITALS
DIASTOLIC BLOOD PRESSURE: 75 MMHG | SYSTOLIC BLOOD PRESSURE: 118 MMHG | HEIGHT: 66 IN | RESPIRATION RATE: 16 BRPM | OXYGEN SATURATION: 97 % | BODY MASS INDEX: 19.61 KG/M2 | HEART RATE: 88 BPM | WEIGHT: 122 LBS

## 2022-01-01 VITALS
HEART RATE: 80 BPM | SYSTOLIC BLOOD PRESSURE: 136 MMHG | RESPIRATION RATE: 18 BRPM | DIASTOLIC BLOOD PRESSURE: 67 MMHG | TEMPERATURE: 99 F | OXYGEN SATURATION: 98 %

## 2022-01-01 VITALS — TEMPERATURE: 98 F | HEART RATE: 102 BPM

## 2022-01-01 VITALS — HEART RATE: 63 BPM

## 2022-01-01 DIAGNOSIS — A08.4 VIRAL INTESTINAL INFECTION, UNSPECIFIED: ICD-10-CM

## 2022-01-01 DIAGNOSIS — I10 ESSENTIAL (PRIMARY) HYPERTENSION: ICD-10-CM

## 2022-01-01 DIAGNOSIS — N17.9 ACUTE KIDNEY FAILURE, UNSPECIFIED: ICD-10-CM

## 2022-01-01 DIAGNOSIS — Z01.818 ENCOUNTER FOR OTHER PREPROCEDURAL EXAMINATION: ICD-10-CM

## 2022-01-01 DIAGNOSIS — R60.9 EDEMA, UNSPECIFIED: ICD-10-CM

## 2022-01-01 DIAGNOSIS — Z29.9 ENCOUNTER FOR PROPHYLACTIC MEASURES, UNSPECIFIED: ICD-10-CM

## 2022-01-01 DIAGNOSIS — D64.9 ANEMIA, UNSPECIFIED: ICD-10-CM

## 2022-01-01 DIAGNOSIS — N39.0 URINARY TRACT INFECTION, SITE NOT SPECIFIED: ICD-10-CM

## 2022-01-01 DIAGNOSIS — K94.19 OTHER COMPLICATIONS OF ENTEROSTOMY: ICD-10-CM

## 2022-01-01 DIAGNOSIS — R10.9 UNSPECIFIED ABDOMINAL PAIN: ICD-10-CM

## 2022-01-01 DIAGNOSIS — E87.8 OTHER DISORDERS OF ELECTROLYTE AND FLUID BALANCE, NOT ELSEWHERE CLASSIFIED: ICD-10-CM
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
## 2022-01-01 DIAGNOSIS — Z98.890 OTHER SPECIFIED POSTPROCEDURAL STATES: Chronic | ICD-10-CM

## 2022-01-01 DIAGNOSIS — R50.9 FEVER, UNSPECIFIED: ICD-10-CM

## 2022-01-01 DIAGNOSIS — Z93.4 OTHER ARTIFICIAL OPENINGS OF GASTROINTESTINAL TRACT STATUS: Chronic | ICD-10-CM

## 2022-01-01 DIAGNOSIS — K94.13 ENTEROSTOMY MALFUNCTION: ICD-10-CM

## 2022-01-01 DIAGNOSIS — A41.89 OTHER SPECIFIED SEPSIS: ICD-10-CM

## 2022-01-01 DIAGNOSIS — R78.81 BACTEREMIA: ICD-10-CM

## 2022-01-01 DIAGNOSIS — Z46.59 ENCOUNTER FOR FITTING AND ADJUSTMENT OF OTHER GASTROINTESTINAL APPLIANCE AND DEVICE: ICD-10-CM

## 2022-01-01 DIAGNOSIS — Z93.4 OTHER ARTIFICIAL OPENINGS OF GASTROINTESTINAL TRACT STATUS: ICD-10-CM

## 2022-01-01 DIAGNOSIS — K52.9 NONINFECTIVE GASTROENTERITIS AND COLITIS, UNSPECIFIED: ICD-10-CM

## 2022-01-01 DIAGNOSIS — K31.5 OBSTRUCTION OF DUODENUM: ICD-10-CM

## 2022-01-01 DIAGNOSIS — A41.9 SEPSIS, UNSPECIFIED ORGANISM: ICD-10-CM

## 2022-01-01 DIAGNOSIS — T85.528A DISPLACEMENT OF OTHER GASTROINTESTINAL PROSTHETIC DEVICES, IMPLANTS AND GRAFTS, INITIAL ENCOUNTER: ICD-10-CM

## 2022-01-01 DIAGNOSIS — Z85.068 PERSONAL HISTORY OF OTHER MALIGNANT NEOPLASM OF SMALL INTESTINE: ICD-10-CM

## 2022-01-01 DIAGNOSIS — J21.9 ACUTE BRONCHIOLITIS, UNSPECIFIED: ICD-10-CM

## 2022-01-01 DIAGNOSIS — C25.9 MALIGNANT NEOPLASM OF PANCREAS, UNSPECIFIED: ICD-10-CM

## 2022-01-01 DIAGNOSIS — R19.7 DIARRHEA, UNSPECIFIED: ICD-10-CM

## 2022-01-01 DIAGNOSIS — K86.89 OTHER SPECIFIED DISEASES OF PANCREAS: ICD-10-CM

## 2022-01-01 DIAGNOSIS — R11.2 NAUSEA WITH VOMITING, UNSPECIFIED: ICD-10-CM

## 2022-01-01 DIAGNOSIS — Z51.89 ENCOUNTER FOR OTHER SPECIFIED AFTERCARE: ICD-10-CM

## 2022-01-01 DIAGNOSIS — R10.13 EPIGASTRIC PAIN: ICD-10-CM

## 2022-01-01 DIAGNOSIS — C26.9 MALIGNANT NEOPLASM OF ILL-DEFINED SITES WITHIN THE DIGESTIVE SYSTEM: ICD-10-CM

## 2022-01-01 DIAGNOSIS — C21.0 MALIGNANT NEOPLASM OF ANUS, UNSPECIFIED: ICD-10-CM

## 2022-01-01 DIAGNOSIS — D72.829 ELEVATED WHITE BLOOD CELL COUNT, UNSPECIFIED: ICD-10-CM

## 2022-01-01 DIAGNOSIS — C17.0 MALIGNANT NEOPLASM OF DUODENUM: ICD-10-CM

## 2022-01-01 DIAGNOSIS — Z71.89 OTHER SPECIFIED COUNSELING: ICD-10-CM

## 2022-01-01 DIAGNOSIS — R11.10 VOMITING, UNSPECIFIED: ICD-10-CM

## 2022-01-01 LAB
-  AMIKACIN: SIGNIFICANT CHANGE UP
-  AMIKACIN: SIGNIFICANT CHANGE UP
-  AMOXICILLIN/CLAVULANIC ACID: SIGNIFICANT CHANGE UP
-  AMOXICILLIN/CLAVULANIC ACID: SIGNIFICANT CHANGE UP
-  AMPICILLIN/SULBACTAM: SIGNIFICANT CHANGE UP
-  AMPICILLIN: SIGNIFICANT CHANGE UP
-  AMPICILLIN: SIGNIFICANT CHANGE UP
-  AZTREONAM: SIGNIFICANT CHANGE UP
-  AZTREONAM: SIGNIFICANT CHANGE UP
-  CEFAZOLIN: SIGNIFICANT CHANGE UP
-  CEFEPIME: SIGNIFICANT CHANGE UP
-  CEFEPIME: SIGNIFICANT CHANGE UP
-  CEFOXITIN: SIGNIFICANT CHANGE UP
-  CEFOXITIN: SIGNIFICANT CHANGE UP
-  CEFTRIAXONE: SIGNIFICANT CHANGE UP
-  CEFTRIAXONE: SIGNIFICANT CHANGE UP
-  CIPROFLOXACIN: SIGNIFICANT CHANGE UP
-  CIPROFLOXACIN: SIGNIFICANT CHANGE UP
-  CLINDAMYCIN: SIGNIFICANT CHANGE UP
-  ERTAPENEM: SIGNIFICANT CHANGE UP
-  ERTAPENEM: SIGNIFICANT CHANGE UP
-  ERYTHROMYCIN: SIGNIFICANT CHANGE UP
-  GENTAMICIN: SIGNIFICANT CHANGE UP
-  IMIPENEM: SIGNIFICANT CHANGE UP
-  IMIPENEM: SIGNIFICANT CHANGE UP
-  LEVOFLOXACIN: SIGNIFICANT CHANGE UP
-  LEVOFLOXACIN: SIGNIFICANT CHANGE UP
-  MEROPENEM: SIGNIFICANT CHANGE UP
-  MEROPENEM: SIGNIFICANT CHANGE UP
-  NITROFURANTOIN: SIGNIFICANT CHANGE UP
-  OXACILLIN: SIGNIFICANT CHANGE UP
-  PENICILLIN: SIGNIFICANT CHANGE UP
-  PIPERACILLIN/TAZOBACTAM: SIGNIFICANT CHANGE UP
-  PIPERACILLIN/TAZOBACTAM: SIGNIFICANT CHANGE UP
-  RIFAMPIN: SIGNIFICANT CHANGE UP
-  TETRACYCLINE: SIGNIFICANT CHANGE UP
-  TIGECYCLINE: SIGNIFICANT CHANGE UP
-  TOBRAMYCIN: SIGNIFICANT CHANGE UP
-  TOBRAMYCIN: SIGNIFICANT CHANGE UP
-  TRIMETHOPRIM/SULFAMETHOXAZOLE: SIGNIFICANT CHANGE UP
-  VANCOMYCIN: SIGNIFICANT CHANGE UP
ALBUMIN SERPL ELPH-MCNC: 2.8 G/DL — LOW (ref 3.3–5)
ALBUMIN SERPL ELPH-MCNC: 2.9 G/DL — LOW (ref 3.3–5)
ALBUMIN SERPL ELPH-MCNC: 3 G/DL — LOW (ref 3.3–5)
ALBUMIN SERPL ELPH-MCNC: 3.1 G/DL — LOW (ref 3.3–5)
ALBUMIN SERPL ELPH-MCNC: 3.1 G/DL — LOW (ref 3.3–5)
ALBUMIN SERPL ELPH-MCNC: 3.2 G/DL — LOW (ref 3.3–5)
ALBUMIN SERPL ELPH-MCNC: 3.3 G/DL — SIGNIFICANT CHANGE UP (ref 3.3–5)
ALBUMIN SERPL ELPH-MCNC: 3.3 G/DL — SIGNIFICANT CHANGE UP (ref 3.3–5)
ALBUMIN SERPL ELPH-MCNC: 3.4 G/DL — SIGNIFICANT CHANGE UP (ref 3.3–5)
ALBUMIN SERPL ELPH-MCNC: 3.5 G/DL — SIGNIFICANT CHANGE UP (ref 3.3–5)
ALBUMIN SERPL ELPH-MCNC: 3.5 G/DL — SIGNIFICANT CHANGE UP (ref 3.3–5)
ALBUMIN SERPL ELPH-MCNC: 3.7 G/DL — SIGNIFICANT CHANGE UP (ref 3.3–5)
ALBUMIN SERPL ELPH-MCNC: 3.7 G/DL — SIGNIFICANT CHANGE UP (ref 3.3–5)
ALBUMIN SERPL ELPH-MCNC: 4 G/DL — SIGNIFICANT CHANGE UP (ref 3.3–5)
ALBUMIN SERPL ELPH-MCNC: 4.5 G/DL — SIGNIFICANT CHANGE UP (ref 3.3–5)
ALP SERPL-CCNC: 107 U/L — SIGNIFICANT CHANGE UP (ref 40–120)
ALP SERPL-CCNC: 114 U/L — SIGNIFICANT CHANGE UP (ref 40–120)
ALP SERPL-CCNC: 120 U/L — SIGNIFICANT CHANGE UP (ref 40–120)
ALP SERPL-CCNC: 126 U/L — HIGH (ref 40–120)
ALP SERPL-CCNC: 129 U/L — HIGH (ref 40–120)
ALP SERPL-CCNC: 134 U/L — HIGH (ref 40–120)
ALP SERPL-CCNC: 135 U/L — HIGH (ref 40–120)
ALP SERPL-CCNC: 137 U/L — HIGH (ref 40–120)
ALP SERPL-CCNC: 141 U/L — HIGH (ref 40–120)
ALP SERPL-CCNC: 149 U/L — HIGH (ref 40–120)
ALP SERPL-CCNC: 155 U/L — HIGH (ref 40–120)
ALP SERPL-CCNC: 164 U/L — HIGH (ref 40–120)
ALP SERPL-CCNC: 165 U/L — HIGH (ref 40–120)
ALP SERPL-CCNC: 200 U/L — HIGH (ref 40–120)
ALP SERPL-CCNC: 49 U/L — SIGNIFICANT CHANGE UP (ref 40–120)
ALP SERPL-CCNC: 50 U/L — SIGNIFICANT CHANGE UP (ref 40–120)
ALP SERPL-CCNC: 51 U/L — SIGNIFICANT CHANGE UP (ref 40–120)
ALP SERPL-CCNC: 55 U/L — SIGNIFICANT CHANGE UP (ref 40–120)
ALP SERPL-CCNC: 63 U/L — SIGNIFICANT CHANGE UP (ref 40–120)
ALP SERPL-CCNC: 67 U/L — SIGNIFICANT CHANGE UP (ref 40–120)
ALP SERPL-CCNC: 71 U/L — SIGNIFICANT CHANGE UP (ref 40–120)
ALT FLD-CCNC: 11 U/L — SIGNIFICANT CHANGE UP (ref 10–45)
ALT FLD-CCNC: 11 U/L — SIGNIFICANT CHANGE UP (ref 4–41)
ALT FLD-CCNC: 12 U/L — SIGNIFICANT CHANGE UP (ref 4–41)
ALT FLD-CCNC: 13 U/L — SIGNIFICANT CHANGE UP (ref 4–41)
ALT FLD-CCNC: 22 U/L — SIGNIFICANT CHANGE UP (ref 4–41)
ALT FLD-CCNC: 28 U/L — SIGNIFICANT CHANGE UP (ref 4–41)
ALT FLD-CCNC: 45 U/L — HIGH (ref 4–41)
ALT FLD-CCNC: 50 U/L — HIGH (ref 4–41)
ALT FLD-CCNC: 54 U/L — HIGH (ref 4–41)
ALT FLD-CCNC: 6 U/L — LOW (ref 10–45)
ALT FLD-CCNC: 7 U/L — LOW (ref 10–45)
ALT FLD-CCNC: 7 U/L — LOW (ref 10–45)
ALT FLD-CCNC: 71 U/L — HIGH (ref 4–41)
ALT FLD-CCNC: 8 U/L — LOW (ref 10–45)
ALT FLD-CCNC: 8 U/L — SIGNIFICANT CHANGE UP (ref 4–41)
ALT FLD-CCNC: 9 U/L — SIGNIFICANT CHANGE UP (ref 4–41)
ALT FLD-CCNC: <5 U/L — LOW (ref 10–45)
ALT FLD-CCNC: <5 U/L — LOW (ref 10–45)
ANION GAP SERPL CALC-SCNC: 10 MMOL/L — SIGNIFICANT CHANGE UP (ref 5–17)
ANION GAP SERPL CALC-SCNC: 10 MMOL/L — SIGNIFICANT CHANGE UP (ref 7–14)
ANION GAP SERPL CALC-SCNC: 11 MMOL/L — SIGNIFICANT CHANGE UP (ref 5–17)
ANION GAP SERPL CALC-SCNC: 11 MMOL/L — SIGNIFICANT CHANGE UP (ref 5–17)
ANION GAP SERPL CALC-SCNC: 11 MMOL/L — SIGNIFICANT CHANGE UP (ref 7–14)
ANION GAP SERPL CALC-SCNC: 12 MMOL/L — SIGNIFICANT CHANGE UP (ref 5–17)
ANION GAP SERPL CALC-SCNC: 12 MMOL/L — SIGNIFICANT CHANGE UP (ref 7–14)
ANION GAP SERPL CALC-SCNC: 13 MMOL/L — SIGNIFICANT CHANGE UP (ref 5–17)
ANION GAP SERPL CALC-SCNC: 13 MMOL/L — SIGNIFICANT CHANGE UP (ref 7–14)
ANION GAP SERPL CALC-SCNC: 13 MMOL/L — SIGNIFICANT CHANGE UP (ref 7–14)
ANION GAP SERPL CALC-SCNC: 14 MMOL/L — SIGNIFICANT CHANGE UP (ref 7–14)
ANION GAP SERPL CALC-SCNC: 14 MMOL/L — SIGNIFICANT CHANGE UP (ref 7–14)
ANION GAP SERPL CALC-SCNC: 15 MMOL/L — HIGH (ref 7–14)
ANION GAP SERPL CALC-SCNC: 15 MMOL/L — HIGH (ref 7–14)
ANION GAP SERPL CALC-SCNC: 15 MMOL/L — SIGNIFICANT CHANGE UP (ref 5–17)
ANION GAP SERPL CALC-SCNC: 16 MMOL/L — HIGH (ref 7–14)
ANION GAP SERPL CALC-SCNC: 7 MMOL/L — SIGNIFICANT CHANGE UP (ref 5–17)
ANION GAP SERPL CALC-SCNC: 8 MMOL/L — SIGNIFICANT CHANGE UP (ref 5–17)
ANION GAP SERPL CALC-SCNC: 8 MMOL/L — SIGNIFICANT CHANGE UP (ref 7–14)
ANION GAP SERPL CALC-SCNC: 9 MMOL/L — SIGNIFICANT CHANGE UP (ref 5–17)
ANION GAP SERPL CALC-SCNC: 9 MMOL/L — SIGNIFICANT CHANGE UP (ref 5–17)
ANION GAP SERPL CALC-SCNC: 9 MMOL/L — SIGNIFICANT CHANGE UP (ref 7–14)
ANISOCYTOSIS BLD QL: SLIGHT — SIGNIFICANT CHANGE UP
APPEARANCE UR: ABNORMAL
APPEARANCE UR: CLEAR — SIGNIFICANT CHANGE UP
APTT BLD: 28.3 SEC — SIGNIFICANT CHANGE UP (ref 27–36.3)
APTT BLD: 28.4 SEC — SIGNIFICANT CHANGE UP (ref 27–36.3)
APTT BLD: 28.5 SEC — SIGNIFICANT CHANGE UP (ref 27.5–35.5)
APTT BLD: 28.6 SEC — SIGNIFICANT CHANGE UP (ref 27.5–35.5)
APTT BLD: 29.7 SEC — SIGNIFICANT CHANGE UP (ref 27–36.3)
APTT BLD: 30.7 SEC — SIGNIFICANT CHANGE UP (ref 27–36.3)
APTT BLD: 35.3 SEC — SIGNIFICANT CHANGE UP (ref 27.5–35.5)
APTT BLD: 36.7 SEC — HIGH (ref 27–36.3)
APTT BLD: 68.8 SEC — HIGH (ref 27–36.3)
AST SERPL-CCNC: 10 U/L — SIGNIFICANT CHANGE UP (ref 10–40)
AST SERPL-CCNC: 11 U/L — SIGNIFICANT CHANGE UP (ref 10–40)
AST SERPL-CCNC: 12 U/L — SIGNIFICANT CHANGE UP (ref 10–40)
AST SERPL-CCNC: 14 U/L — SIGNIFICANT CHANGE UP (ref 10–40)
AST SERPL-CCNC: 15 U/L — SIGNIFICANT CHANGE UP (ref 10–40)
AST SERPL-CCNC: 16 U/L — SIGNIFICANT CHANGE UP (ref 4–40)
AST SERPL-CCNC: 17 U/L — SIGNIFICANT CHANGE UP (ref 4–40)
AST SERPL-CCNC: 18 U/L — SIGNIFICANT CHANGE UP (ref 10–40)
AST SERPL-CCNC: 19 U/L — SIGNIFICANT CHANGE UP (ref 4–40)
AST SERPL-CCNC: 20 U/L — SIGNIFICANT CHANGE UP (ref 4–40)
AST SERPL-CCNC: 22 U/L — SIGNIFICANT CHANGE UP (ref 10–40)
AST SERPL-CCNC: 22 U/L — SIGNIFICANT CHANGE UP (ref 4–40)
AST SERPL-CCNC: 28 U/L — SIGNIFICANT CHANGE UP (ref 4–40)
AST SERPL-CCNC: 30 U/L — SIGNIFICANT CHANGE UP (ref 4–40)
AST SERPL-CCNC: 32 U/L — SIGNIFICANT CHANGE UP (ref 4–40)
AST SERPL-CCNC: 32 U/L — SIGNIFICANT CHANGE UP (ref 4–40)
AST SERPL-CCNC: 38 U/L — SIGNIFICANT CHANGE UP (ref 4–40)
B PERT DNA SPEC QL NAA+PROBE: SIGNIFICANT CHANGE UP
B PERT+PARAPERT DNA PNL SPEC NAA+PROBE: SIGNIFICANT CHANGE UP
BACTERIA # UR AUTO: ABNORMAL
BACTERIA # UR AUTO: NEGATIVE — SIGNIFICANT CHANGE UP
BASE EXCESS BLDV CALC-SCNC: 1.4 MMOL/L — SIGNIFICANT CHANGE UP (ref -2–3)
BASE EXCESS BLDV CALC-SCNC: 11.7 MMOL/L — HIGH (ref -2–2)
BASE EXCESS BLDV CALC-SCNC: 3 MMOL/L — SIGNIFICANT CHANGE UP (ref -2–3)
BASE EXCESS BLDV CALC-SCNC: 4.3 MMOL/L — HIGH (ref -2–3)
BASE EXCESS BLDV CALC-SCNC: 6.1 MMOL/L — HIGH (ref -2–3)
BASE EXCESS BLDV CALC-SCNC: 6.4 MMOL/L — HIGH (ref -2–3)
BASE EXCESS BLDV CALC-SCNC: 9.5 MMOL/L — HIGH (ref -2–3)
BASOPHILS # BLD AUTO: 0 K/UL — SIGNIFICANT CHANGE UP (ref 0–0.2)
BASOPHILS # BLD AUTO: 0 K/UL — SIGNIFICANT CHANGE UP (ref 0–0.2)
BASOPHILS # BLD AUTO: 0.02 K/UL — SIGNIFICANT CHANGE UP (ref 0–0.2)
BASOPHILS # BLD AUTO: 0.03 K/UL — SIGNIFICANT CHANGE UP (ref 0–0.2)
BASOPHILS # BLD AUTO: 0.04 K/UL — SIGNIFICANT CHANGE UP (ref 0–0.2)
BASOPHILS # BLD AUTO: 0.05 K/UL — SIGNIFICANT CHANGE UP (ref 0–0.2)
BASOPHILS # BLD AUTO: 0.06 K/UL — SIGNIFICANT CHANGE UP (ref 0–0.2)
BASOPHILS # BLD AUTO: 0.06 K/UL — SIGNIFICANT CHANGE UP (ref 0–0.2)
BASOPHILS NFR BLD AUTO: 0 % — SIGNIFICANT CHANGE UP (ref 0–2)
BASOPHILS NFR BLD AUTO: 0 % — SIGNIFICANT CHANGE UP (ref 0–2)
BASOPHILS NFR BLD AUTO: 0.3 % — SIGNIFICANT CHANGE UP (ref 0–2)
BASOPHILS NFR BLD AUTO: 0.4 % — SIGNIFICANT CHANGE UP (ref 0–2)
BASOPHILS NFR BLD AUTO: 0.5 % — SIGNIFICANT CHANGE UP (ref 0–2)
BASOPHILS NFR BLD AUTO: 0.5 % — SIGNIFICANT CHANGE UP (ref 0–2)
BASOPHILS NFR BLD AUTO: 0.6 % — SIGNIFICANT CHANGE UP (ref 0–2)
BASOPHILS NFR BLD AUTO: 0.8 % — SIGNIFICANT CHANGE UP (ref 0–2)
BILIRUB DIRECT SERPL-MCNC: <0.2 MG/DL — SIGNIFICANT CHANGE UP (ref 0–0.3)
BILIRUB INDIRECT FLD-MCNC: >0.2 MG/DL — SIGNIFICANT CHANGE UP (ref 0–1)
BILIRUB SERPL-MCNC: 0.3 MG/DL — SIGNIFICANT CHANGE UP (ref 0.2–1.2)
BILIRUB SERPL-MCNC: 0.4 MG/DL — SIGNIFICANT CHANGE UP (ref 0.2–1.2)
BILIRUB SERPL-MCNC: 0.5 MG/DL — SIGNIFICANT CHANGE UP (ref 0.2–1.2)
BILIRUB SERPL-MCNC: 0.6 MG/DL — SIGNIFICANT CHANGE UP (ref 0.2–1.2)
BILIRUB SERPL-MCNC: 0.7 MG/DL — SIGNIFICANT CHANGE UP (ref 0.2–1.2)
BILIRUB SERPL-MCNC: 0.8 MG/DL — SIGNIFICANT CHANGE UP (ref 0.2–1.2)
BILIRUB UR-MCNC: NEGATIVE — SIGNIFICANT CHANGE UP
BLD GP AB SCN SERPL QL: NEGATIVE — SIGNIFICANT CHANGE UP
BLOOD GAS VENOUS COMPREHENSIVE RESULT: SIGNIFICANT CHANGE UP
BLOOD GAS VENOUS COMPREHENSIVE RESULT: SIGNIFICANT CHANGE UP
BORDETELLA PARAPERTUSSIS (RAPRVP): SIGNIFICANT CHANGE UP
BUN SERPL-MCNC: 12 MG/DL — SIGNIFICANT CHANGE UP (ref 7–23)
BUN SERPL-MCNC: 13 MG/DL — SIGNIFICANT CHANGE UP (ref 7–23)
BUN SERPL-MCNC: 14 MG/DL — SIGNIFICANT CHANGE UP (ref 7–23)
BUN SERPL-MCNC: 15 MG/DL — SIGNIFICANT CHANGE UP (ref 7–23)
BUN SERPL-MCNC: 16 MG/DL — SIGNIFICANT CHANGE UP (ref 7–23)
BUN SERPL-MCNC: 16 MG/DL — SIGNIFICANT CHANGE UP (ref 7–23)
BUN SERPL-MCNC: 17 MG/DL — SIGNIFICANT CHANGE UP (ref 7–23)
BUN SERPL-MCNC: 17 MG/DL — SIGNIFICANT CHANGE UP (ref 7–23)
BUN SERPL-MCNC: 18 MG/DL — SIGNIFICANT CHANGE UP (ref 7–23)
BUN SERPL-MCNC: 19 MG/DL — SIGNIFICANT CHANGE UP (ref 7–23)
BUN SERPL-MCNC: 20 MG/DL — SIGNIFICANT CHANGE UP (ref 7–23)
BUN SERPL-MCNC: 21 MG/DL — SIGNIFICANT CHANGE UP (ref 7–23)
BUN SERPL-MCNC: 22 MG/DL — SIGNIFICANT CHANGE UP (ref 7–23)
BUN SERPL-MCNC: 22 MG/DL — SIGNIFICANT CHANGE UP (ref 7–23)
BUN SERPL-MCNC: 23 MG/DL — SIGNIFICANT CHANGE UP (ref 7–23)
BUN SERPL-MCNC: 24 MG/DL — HIGH (ref 7–23)
BUN SERPL-MCNC: 24 MG/DL — HIGH (ref 7–23)
BUN SERPL-MCNC: 25 MG/DL — HIGH (ref 7–23)
BUN SERPL-MCNC: 25 MG/DL — HIGH (ref 7–23)
BUN SERPL-MCNC: 26 MG/DL — HIGH (ref 7–23)
BUN SERPL-MCNC: 26 MG/DL — HIGH (ref 7–23)
BUN SERPL-MCNC: 27 MG/DL — HIGH (ref 7–23)
BUN SERPL-MCNC: 30 MG/DL — HIGH (ref 7–23)
BUN SERPL-MCNC: 31 MG/DL — HIGH (ref 7–23)
BUN SERPL-MCNC: 31 MG/DL — HIGH (ref 7–23)
BUN SERPL-MCNC: 32 MG/DL — HIGH (ref 7–23)
BUN SERPL-MCNC: 33 MG/DL — HIGH (ref 7–23)
BUN SERPL-MCNC: 34 MG/DL — HIGH (ref 7–23)
BUN SERPL-MCNC: 35 MG/DL — HIGH (ref 7–23)
BUN SERPL-MCNC: 36 MG/DL — HIGH (ref 7–23)
BUN SERPL-MCNC: 37 MG/DL — HIGH (ref 7–23)
BUN SERPL-MCNC: 38 MG/DL — HIGH (ref 7–23)
BUN SERPL-MCNC: 40 MG/DL — HIGH (ref 7–23)
BUN SERPL-MCNC: 40 MG/DL — HIGH (ref 7–23)
BUN SERPL-MCNC: 41 MG/DL — HIGH (ref 7–23)
BUN SERPL-MCNC: 41 MG/DL — HIGH (ref 7–23)
BUN SERPL-MCNC: 44 MG/DL — HIGH (ref 7–23)
BUN SERPL-MCNC: 44 MG/DL — HIGH (ref 7–23)
BUN SERPL-MCNC: 46 MG/DL — HIGH (ref 7–23)
BUN SERPL-MCNC: 47 MG/DL — HIGH (ref 7–23)
BUN SERPL-MCNC: 47 MG/DL — HIGH (ref 7–23)
BUN SERPL-MCNC: 51 MG/DL — HIGH (ref 7–23)
BUN SERPL-MCNC: 54 MG/DL — HIGH (ref 7–23)
BUN SERPL-MCNC: 55 MG/DL — HIGH (ref 7–23)
BUN SERPL-MCNC: 57 MG/DL — HIGH (ref 7–23)
BUN SERPL-MCNC: 60 MG/DL — HIGH (ref 7–23)
C PNEUM DNA SPEC QL NAA+PROBE: SIGNIFICANT CHANGE UP
CA-I SERPL-SCNC: 1.28 MMOL/L — SIGNIFICANT CHANGE UP (ref 1.15–1.33)
CA-I SERPL-SCNC: 1.3 MMOL/L — SIGNIFICANT CHANGE UP (ref 1.15–1.33)
CA-I SERPL-SCNC: 1.32 MMOL/L — SIGNIFICANT CHANGE UP (ref 1.15–1.33)
CA-I SERPL-SCNC: 1.35 MMOL/L — HIGH (ref 1.15–1.33)
CA-I SERPL-SCNC: 1.35 MMOL/L — HIGH (ref 1.15–1.33)
CA-I SERPL-SCNC: 1.37 MMOL/L — HIGH (ref 1.15–1.33)
CALCIUM SERPL-MCNC: 10 MG/DL — SIGNIFICANT CHANGE UP (ref 8.4–10.5)
CALCIUM SERPL-MCNC: 10 MG/DL — SIGNIFICANT CHANGE UP (ref 8.4–10.5)
CALCIUM SERPL-MCNC: 10.1 MG/DL — SIGNIFICANT CHANGE UP (ref 8.4–10.5)
CALCIUM SERPL-MCNC: 10.3 MG/DL — SIGNIFICANT CHANGE UP (ref 8.4–10.5)
CALCIUM SERPL-MCNC: 10.4 MG/DL — SIGNIFICANT CHANGE UP (ref 8.4–10.5)
CALCIUM SERPL-MCNC: 10.4 MG/DL — SIGNIFICANT CHANGE UP (ref 8.4–10.5)
CALCIUM SERPL-MCNC: 10.9 MG/DL — HIGH (ref 8.4–10.5)
CALCIUM SERPL-MCNC: 8.4 MG/DL — SIGNIFICANT CHANGE UP (ref 8.4–10.5)
CALCIUM SERPL-MCNC: 8.5 MG/DL — SIGNIFICANT CHANGE UP (ref 8.4–10.5)
CALCIUM SERPL-MCNC: 8.6 MG/DL — SIGNIFICANT CHANGE UP (ref 8.4–10.5)
CALCIUM SERPL-MCNC: 8.7 MG/DL — SIGNIFICANT CHANGE UP (ref 8.4–10.5)
CALCIUM SERPL-MCNC: 8.8 MG/DL — SIGNIFICANT CHANGE UP (ref 8.4–10.5)
CALCIUM SERPL-MCNC: 8.9 MG/DL — SIGNIFICANT CHANGE UP (ref 8.4–10.5)
CALCIUM SERPL-MCNC: 9 MG/DL — SIGNIFICANT CHANGE UP (ref 8.4–10.5)
CALCIUM SERPL-MCNC: 9 MG/DL — SIGNIFICANT CHANGE UP (ref 8.4–10.5)
CALCIUM SERPL-MCNC: 9.1 MG/DL — SIGNIFICANT CHANGE UP (ref 8.4–10.5)
CALCIUM SERPL-MCNC: 9.2 MG/DL — SIGNIFICANT CHANGE UP (ref 8.4–10.5)
CALCIUM SERPL-MCNC: 9.3 MG/DL — SIGNIFICANT CHANGE UP (ref 8.4–10.5)
CALCIUM SERPL-MCNC: 9.4 MG/DL — SIGNIFICANT CHANGE UP (ref 8.4–10.5)
CALCIUM SERPL-MCNC: 9.5 MG/DL — SIGNIFICANT CHANGE UP (ref 8.4–10.5)
CALCIUM SERPL-MCNC: 9.5 MG/DL — SIGNIFICANT CHANGE UP (ref 8.4–10.5)
CALCIUM SERPL-MCNC: 9.6 MG/DL — SIGNIFICANT CHANGE UP (ref 8.4–10.5)
CALCIUM SERPL-MCNC: 9.7 MG/DL — SIGNIFICANT CHANGE UP (ref 8.4–10.5)
CALCIUM SERPL-MCNC: 9.8 MG/DL — SIGNIFICANT CHANGE UP (ref 8.4–10.5)
CALCIUM SERPL-MCNC: 9.9 MG/DL — SIGNIFICANT CHANGE UP (ref 8.4–10.5)
CANCER AG19-9 SERPL-ACNC: 42 U/ML — HIGH
CANCER AG19-9 SERPL-ACNC: 47 U/ML — HIGH
CEA SERPL-MCNC: 5.4 NG/ML — HIGH (ref 0–3.8)
CEA SERPL-MCNC: 5.7 NG/ML — HIGH (ref 0–3.8)
CHLORIDE BLDV-SCNC: 100 MMOL/L — SIGNIFICANT CHANGE UP (ref 96–108)
CHLORIDE BLDV-SCNC: 101 MMOL/L — SIGNIFICANT CHANGE UP (ref 96–108)
CHLORIDE BLDV-SCNC: 109 MMOL/L — HIGH (ref 96–108)
CHLORIDE BLDV-SCNC: 93 MMOL/L — LOW (ref 96–108)
CHLORIDE BLDV-SCNC: 99 MMOL/L — SIGNIFICANT CHANGE UP (ref 96–108)
CHLORIDE SERPL-SCNC: 100 MMOL/L — SIGNIFICANT CHANGE UP (ref 96–108)
CHLORIDE SERPL-SCNC: 100 MMOL/L — SIGNIFICANT CHANGE UP (ref 98–107)
CHLORIDE SERPL-SCNC: 101 MMOL/L — SIGNIFICANT CHANGE UP (ref 98–107)
CHLORIDE SERPL-SCNC: 102 MMOL/L — SIGNIFICANT CHANGE UP (ref 96–108)
CHLORIDE SERPL-SCNC: 102 MMOL/L — SIGNIFICANT CHANGE UP (ref 98–107)
CHLORIDE SERPL-SCNC: 102 MMOL/L — SIGNIFICANT CHANGE UP (ref 98–107)
CHLORIDE SERPL-SCNC: 103 MMOL/L — SIGNIFICANT CHANGE UP (ref 98–107)
CHLORIDE SERPL-SCNC: 104 MMOL/L — SIGNIFICANT CHANGE UP (ref 96–108)
CHLORIDE SERPL-SCNC: 104 MMOL/L — SIGNIFICANT CHANGE UP (ref 98–107)
CHLORIDE SERPL-SCNC: 105 MMOL/L — SIGNIFICANT CHANGE UP (ref 98–107)
CHLORIDE SERPL-SCNC: 106 MMOL/L — SIGNIFICANT CHANGE UP (ref 96–108)
CHLORIDE SERPL-SCNC: 106 MMOL/L — SIGNIFICANT CHANGE UP (ref 96–108)
CHLORIDE SERPL-SCNC: 106 MMOL/L — SIGNIFICANT CHANGE UP (ref 98–107)
CHLORIDE SERPL-SCNC: 106 MMOL/L — SIGNIFICANT CHANGE UP (ref 98–107)
CHLORIDE SERPL-SCNC: 107 MMOL/L — SIGNIFICANT CHANGE UP (ref 96–108)
CHLORIDE SERPL-SCNC: 107 MMOL/L — SIGNIFICANT CHANGE UP (ref 98–107)
CHLORIDE SERPL-SCNC: 107 MMOL/L — SIGNIFICANT CHANGE UP (ref 98–107)
CHLORIDE SERPL-SCNC: 108 MMOL/L — SIGNIFICANT CHANGE UP (ref 96–108)
CHLORIDE SERPL-SCNC: 108 MMOL/L — SIGNIFICANT CHANGE UP (ref 96–108)
CHLORIDE SERPL-SCNC: 109 MMOL/L — HIGH (ref 96–108)
CHLORIDE SERPL-SCNC: 109 MMOL/L — HIGH (ref 98–107)
CHLORIDE SERPL-SCNC: 110 MMOL/L — HIGH (ref 98–107)
CHLORIDE SERPL-SCNC: 113 MMOL/L — HIGH (ref 98–107)
CHLORIDE SERPL-SCNC: 93 MMOL/L — LOW (ref 98–107)
CHLORIDE SERPL-SCNC: 94 MMOL/L — LOW (ref 96–108)
CHLORIDE SERPL-SCNC: 95 MMOL/L — LOW (ref 96–108)
CHLORIDE SERPL-SCNC: 97 MMOL/L — LOW (ref 98–107)
CHLORIDE SERPL-SCNC: 97 MMOL/L — LOW (ref 98–107)
CHLORIDE SERPL-SCNC: 97 MMOL/L — SIGNIFICANT CHANGE UP (ref 96–108)
CHLORIDE SERPL-SCNC: 98 MMOL/L — SIGNIFICANT CHANGE UP (ref 96–108)
CHLORIDE SERPL-SCNC: 98 MMOL/L — SIGNIFICANT CHANGE UP (ref 96–108)
CHLORIDE SERPL-SCNC: 98 MMOL/L — SIGNIFICANT CHANGE UP (ref 98–107)
CHLORIDE SERPL-SCNC: 99 MMOL/L — SIGNIFICANT CHANGE UP (ref 98–107)
CO2 BLDV-SCNC: 29.2 MMOL/L — HIGH (ref 22–26)
CO2 BLDV-SCNC: 30 MMOL/L — HIGH (ref 22–26)
CO2 BLDV-SCNC: 31.8 MMOL/L — HIGH (ref 22–26)
CO2 BLDV-SCNC: 33.7 MMOL/L — HIGH (ref 22–26)
CO2 BLDV-SCNC: 33.8 MMOL/L — HIGH (ref 22–26)
CO2 BLDV-SCNC: 36.3 MMOL/L — HIGH (ref 22–26)
CO2 BLDV-SCNC: 40 MMOL/L — HIGH (ref 22–26)
CO2 SERPL-SCNC: 16 MMOL/L — LOW (ref 22–31)
CO2 SERPL-SCNC: 17 MMOL/L — LOW (ref 22–31)
CO2 SERPL-SCNC: 18 MMOL/L — LOW (ref 22–31)
CO2 SERPL-SCNC: 19 MMOL/L — LOW (ref 22–31)
CO2 SERPL-SCNC: 20 MMOL/L — LOW (ref 22–31)
CO2 SERPL-SCNC: 21 MMOL/L — LOW (ref 22–31)
CO2 SERPL-SCNC: 22 MMOL/L — SIGNIFICANT CHANGE UP (ref 22–31)
CO2 SERPL-SCNC: 23 MMOL/L — SIGNIFICANT CHANGE UP (ref 22–31)
CO2 SERPL-SCNC: 24 MMOL/L — SIGNIFICANT CHANGE UP (ref 22–31)
CO2 SERPL-SCNC: 24 MMOL/L — SIGNIFICANT CHANGE UP (ref 22–31)
CO2 SERPL-SCNC: 25 MMOL/L — SIGNIFICANT CHANGE UP (ref 22–31)
CO2 SERPL-SCNC: 26 MMOL/L — SIGNIFICANT CHANGE UP (ref 22–31)
CO2 SERPL-SCNC: 26 MMOL/L — SIGNIFICANT CHANGE UP (ref 22–31)
CO2 SERPL-SCNC: 27 MMOL/L — SIGNIFICANT CHANGE UP (ref 22–31)
CO2 SERPL-SCNC: 27 MMOL/L — SIGNIFICANT CHANGE UP (ref 22–31)
CO2 SERPL-SCNC: 29 MMOL/L — SIGNIFICANT CHANGE UP (ref 22–31)
CO2 SERPL-SCNC: 30 MMOL/L — SIGNIFICANT CHANGE UP (ref 22–31)
CO2 SERPL-SCNC: 30 MMOL/L — SIGNIFICANT CHANGE UP (ref 22–31)
CO2 SERPL-SCNC: 31 MMOL/L — SIGNIFICANT CHANGE UP (ref 22–31)
CO2 SERPL-SCNC: 36 MMOL/L — HIGH (ref 22–31)
COLOR SPEC: SIGNIFICANT CHANGE UP
COLOR SPEC: SIGNIFICANT CHANGE UP
COLOR SPEC: YELLOW — SIGNIFICANT CHANGE UP
CREAT ?TM UR-MCNC: 106 MG/DL — SIGNIFICANT CHANGE UP
CREAT SERPL-MCNC: 0.69 MG/DL — SIGNIFICANT CHANGE UP (ref 0.5–1.3)
CREAT SERPL-MCNC: 0.71 MG/DL — SIGNIFICANT CHANGE UP (ref 0.5–1.3)
CREAT SERPL-MCNC: 0.72 MG/DL — SIGNIFICANT CHANGE UP (ref 0.5–1.3)
CREAT SERPL-MCNC: 0.73 MG/DL — SIGNIFICANT CHANGE UP (ref 0.5–1.3)
CREAT SERPL-MCNC: 0.73 MG/DL — SIGNIFICANT CHANGE UP (ref 0.5–1.3)
CREAT SERPL-MCNC: 0.74 MG/DL — SIGNIFICANT CHANGE UP (ref 0.5–1.3)
CREAT SERPL-MCNC: 0.75 MG/DL — SIGNIFICANT CHANGE UP (ref 0.5–1.3)
CREAT SERPL-MCNC: 0.8 MG/DL — SIGNIFICANT CHANGE UP (ref 0.5–1.3)
CREAT SERPL-MCNC: 0.92 MG/DL — SIGNIFICANT CHANGE UP (ref 0.5–1.3)
CREAT SERPL-MCNC: 0.94 MG/DL — SIGNIFICANT CHANGE UP (ref 0.5–1.3)
CREAT SERPL-MCNC: 0.94 MG/DL — SIGNIFICANT CHANGE UP (ref 0.5–1.3)
CREAT SERPL-MCNC: 0.96 MG/DL — SIGNIFICANT CHANGE UP (ref 0.5–1.3)
CREAT SERPL-MCNC: 0.97 MG/DL — SIGNIFICANT CHANGE UP (ref 0.5–1.3)
CREAT SERPL-MCNC: 0.99 MG/DL — SIGNIFICANT CHANGE UP (ref 0.5–1.3)
CREAT SERPL-MCNC: 1 MG/DL — SIGNIFICANT CHANGE UP (ref 0.5–1.3)
CREAT SERPL-MCNC: 1.02 MG/DL — SIGNIFICANT CHANGE UP (ref 0.5–1.3)
CREAT SERPL-MCNC: 1.04 MG/DL — SIGNIFICANT CHANGE UP (ref 0.5–1.3)
CREAT SERPL-MCNC: 1.05 MG/DL — SIGNIFICANT CHANGE UP (ref 0.5–1.3)
CREAT SERPL-MCNC: 1.07 MG/DL — SIGNIFICANT CHANGE UP (ref 0.5–1.3)
CREAT SERPL-MCNC: 1.08 MG/DL — SIGNIFICANT CHANGE UP (ref 0.5–1.3)
CREAT SERPL-MCNC: 1.08 MG/DL — SIGNIFICANT CHANGE UP (ref 0.5–1.3)
CREAT SERPL-MCNC: 1.09 MG/DL — SIGNIFICANT CHANGE UP (ref 0.5–1.3)
CREAT SERPL-MCNC: 1.13 MG/DL — SIGNIFICANT CHANGE UP (ref 0.5–1.3)
CREAT SERPL-MCNC: 1.14 MG/DL — SIGNIFICANT CHANGE UP (ref 0.5–1.3)
CREAT SERPL-MCNC: 1.15 MG/DL — SIGNIFICANT CHANGE UP (ref 0.5–1.3)
CREAT SERPL-MCNC: 1.15 MG/DL — SIGNIFICANT CHANGE UP (ref 0.5–1.3)
CREAT SERPL-MCNC: 1.17 MG/DL — SIGNIFICANT CHANGE UP (ref 0.5–1.3)
CREAT SERPL-MCNC: 1.19 MG/DL — SIGNIFICANT CHANGE UP (ref 0.5–1.3)
CREAT SERPL-MCNC: 1.19 MG/DL — SIGNIFICANT CHANGE UP (ref 0.5–1.3)
CREAT SERPL-MCNC: 1.2 MG/DL — SIGNIFICANT CHANGE UP (ref 0.5–1.3)
CREAT SERPL-MCNC: 1.21 MG/DL — SIGNIFICANT CHANGE UP (ref 0.5–1.3)
CREAT SERPL-MCNC: 1.22 MG/DL — SIGNIFICANT CHANGE UP (ref 0.5–1.3)
CREAT SERPL-MCNC: 1.23 MG/DL — SIGNIFICANT CHANGE UP (ref 0.5–1.3)
CREAT SERPL-MCNC: 1.24 MG/DL — SIGNIFICANT CHANGE UP (ref 0.5–1.3)
CREAT SERPL-MCNC: 1.27 MG/DL — SIGNIFICANT CHANGE UP (ref 0.5–1.3)
CREAT SERPL-MCNC: 1.28 MG/DL — SIGNIFICANT CHANGE UP (ref 0.5–1.3)
CREAT SERPL-MCNC: 1.31 MG/DL — HIGH (ref 0.5–1.3)
CREAT SERPL-MCNC: 1.37 MG/DL — HIGH (ref 0.5–1.3)
CREAT SERPL-MCNC: 1.38 MG/DL — HIGH (ref 0.5–1.3)
CREAT SERPL-MCNC: 1.39 MG/DL — HIGH (ref 0.5–1.3)
CREAT SERPL-MCNC: 1.4 MG/DL — HIGH (ref 0.5–1.3)
CREAT SERPL-MCNC: 1.41 MG/DL — HIGH (ref 0.5–1.3)
CREAT SERPL-MCNC: 1.46 MG/DL — HIGH (ref 0.5–1.3)
CREAT SERPL-MCNC: 1.49 MG/DL — HIGH (ref 0.5–1.3)
CREAT SERPL-MCNC: 1.49 MG/DL — HIGH (ref 0.5–1.3)
CREAT SERPL-MCNC: 1.55 MG/DL — HIGH (ref 0.5–1.3)
CREAT SERPL-MCNC: 1.59 MG/DL — HIGH (ref 0.5–1.3)
CREAT SERPL-MCNC: 1.66 MG/DL — HIGH (ref 0.5–1.3)
CULTURE RESULTS: SIGNIFICANT CHANGE UP
DIFF PNL FLD: NEGATIVE — SIGNIFICANT CHANGE UP
EGFR: 43 ML/MIN/1.73M2 — LOW
EGFR: 45 ML/MIN/1.73M2 — LOW
EGFR: 46 ML/MIN/1.73M2 — LOW
EGFR: 49 ML/MIN/1.73M2 — LOW
EGFR: 49 ML/MIN/1.73M2 — LOW
EGFR: 50 ML/MIN/1.73M2 — LOW
EGFR: 52 ML/MIN/1.73M2 — LOW
EGFR: 52 ML/MIN/1.73M2 — LOW
EGFR: 53 ML/MIN/1.73M2 — LOW
EGFR: 53 ML/MIN/1.73M2 — LOW
EGFR: 54 ML/MIN/1.73M2 — LOW
EGFR: 57 ML/MIN/1.73M2 — LOW
EGFR: 58 ML/MIN/1.73M2 — LOW
EGFR: 59 ML/MIN/1.73M2 — LOW
EGFR: 60 ML/MIN/1.73M2 — SIGNIFICANT CHANGE UP
EGFR: 61 ML/MIN/1.73M2 — SIGNIFICANT CHANGE UP
EGFR: 62 ML/MIN/1.73M2 — SIGNIFICANT CHANGE UP
EGFR: 63 ML/MIN/1.73M2 — SIGNIFICANT CHANGE UP
EGFR: 63 ML/MIN/1.73M2 — SIGNIFICANT CHANGE UP
EGFR: 64 ML/MIN/1.73M2 — SIGNIFICANT CHANGE UP
EGFR: 65 ML/MIN/1.73M2 — SIGNIFICANT CHANGE UP
EGFR: 66 ML/MIN/1.73M2 — SIGNIFICANT CHANGE UP
EGFR: 66 ML/MIN/1.73M2 — SIGNIFICANT CHANGE UP
EGFR: 67 ML/MIN/1.73M2 — SIGNIFICANT CHANGE UP
EGFR: 68 ML/MIN/1.73M2 — SIGNIFICANT CHANGE UP
EGFR: 71 ML/MIN/1.73M2 — SIGNIFICANT CHANGE UP
EGFR: 71 ML/MIN/1.73M2 — SIGNIFICANT CHANGE UP
EGFR: 72 ML/MIN/1.73M2 — SIGNIFICANT CHANGE UP
EGFR: 72 ML/MIN/1.73M2 — SIGNIFICANT CHANGE UP
EGFR: 74 ML/MIN/1.73M2 — SIGNIFICANT CHANGE UP
EGFR: 75 ML/MIN/1.73M2 — SIGNIFICANT CHANGE UP
EGFR: 76 ML/MIN/1.73M2 — SIGNIFICANT CHANGE UP
EGFR: 78 ML/MIN/1.73M2 — SIGNIFICANT CHANGE UP
EGFR: 79 ML/MIN/1.73M2 — SIGNIFICANT CHANGE UP
EGFR: 81 ML/MIN/1.73M2 — SIGNIFICANT CHANGE UP
EGFR: 82 ML/MIN/1.73M2 — SIGNIFICANT CHANGE UP
EGFR: 84 ML/MIN/1.73M2 — SIGNIFICANT CHANGE UP
EGFR: 85 ML/MIN/1.73M2 — SIGNIFICANT CHANGE UP
EGFR: 86 ML/MIN/1.73M2 — SIGNIFICANT CHANGE UP
EGFR: 92 ML/MIN/1.73M2 — SIGNIFICANT CHANGE UP
EGFR: 94 ML/MIN/1.73M2 — SIGNIFICANT CHANGE UP
EGFR: 95 ML/MIN/1.73M2 — SIGNIFICANT CHANGE UP
EGFR: 95 ML/MIN/1.73M2 — SIGNIFICANT CHANGE UP
EGFR: 96 ML/MIN/1.73M2 — SIGNIFICANT CHANGE UP
EOSINOPHIL # BLD AUTO: 0 K/UL — SIGNIFICANT CHANGE UP (ref 0–0.5)
EOSINOPHIL # BLD AUTO: 0.02 K/UL — SIGNIFICANT CHANGE UP (ref 0–0.5)
EOSINOPHIL # BLD AUTO: 0.03 K/UL — SIGNIFICANT CHANGE UP (ref 0–0.5)
EOSINOPHIL # BLD AUTO: 0.04 K/UL — SIGNIFICANT CHANGE UP (ref 0–0.5)
EOSINOPHIL # BLD AUTO: 0.05 K/UL — SIGNIFICANT CHANGE UP (ref 0–0.5)
EOSINOPHIL # BLD AUTO: 0.06 K/UL — SIGNIFICANT CHANGE UP (ref 0–0.5)
EOSINOPHIL # BLD AUTO: 0.06 K/UL — SIGNIFICANT CHANGE UP (ref 0–0.5)
EOSINOPHIL # BLD AUTO: 0.07 K/UL — SIGNIFICANT CHANGE UP (ref 0–0.5)
EOSINOPHIL # BLD AUTO: 0.08 K/UL — SIGNIFICANT CHANGE UP (ref 0–0.5)
EOSINOPHIL # BLD AUTO: 0.08 K/UL — SIGNIFICANT CHANGE UP (ref 0–0.5)
EOSINOPHIL # BLD AUTO: 0.09 K/UL — SIGNIFICANT CHANGE UP (ref 0–0.5)
EOSINOPHIL # BLD AUTO: 0.09 K/UL — SIGNIFICANT CHANGE UP (ref 0–0.5)
EOSINOPHIL # BLD AUTO: 0.1 K/UL — SIGNIFICANT CHANGE UP (ref 0–0.5)
EOSINOPHIL # BLD AUTO: 0.1 K/UL — SIGNIFICANT CHANGE UP (ref 0–0.5)
EOSINOPHIL # BLD AUTO: 0.16 K/UL — SIGNIFICANT CHANGE UP (ref 0–0.5)
EOSINOPHIL NFR BLD AUTO: 0 % — SIGNIFICANT CHANGE UP (ref 0–6)
EOSINOPHIL NFR BLD AUTO: 0.3 % — SIGNIFICANT CHANGE UP (ref 0–6)
EOSINOPHIL NFR BLD AUTO: 0.3 % — SIGNIFICANT CHANGE UP (ref 0–6)
EOSINOPHIL NFR BLD AUTO: 0.4 % — SIGNIFICANT CHANGE UP (ref 0–6)
EOSINOPHIL NFR BLD AUTO: 0.4 % — SIGNIFICANT CHANGE UP (ref 0–6)
EOSINOPHIL NFR BLD AUTO: 0.5 % — SIGNIFICANT CHANGE UP (ref 0–6)
EOSINOPHIL NFR BLD AUTO: 0.5 % — SIGNIFICANT CHANGE UP (ref 0–6)
EOSINOPHIL NFR BLD AUTO: 0.6 % — SIGNIFICANT CHANGE UP (ref 0–6)
EOSINOPHIL NFR BLD AUTO: 0.8 % — SIGNIFICANT CHANGE UP (ref 0–6)
EOSINOPHIL NFR BLD AUTO: 0.8 % — SIGNIFICANT CHANGE UP (ref 0–6)
EOSINOPHIL NFR BLD AUTO: 0.9 % — SIGNIFICANT CHANGE UP (ref 0–6)
EOSINOPHIL NFR BLD AUTO: 1 % — SIGNIFICANT CHANGE UP (ref 0–6)
EOSINOPHIL NFR BLD AUTO: 1.3 % — SIGNIFICANT CHANGE UP (ref 0–6)
EOSINOPHIL NFR BLD AUTO: 1.4 % — SIGNIFICANT CHANGE UP (ref 0–6)
EOSINOPHIL NFR BLD AUTO: 1.7 % — SIGNIFICANT CHANGE UP (ref 0–6)
EPI CELLS # UR: 0 /HPF — SIGNIFICANT CHANGE UP
EPI CELLS # UR: 0 /HPF — SIGNIFICANT CHANGE UP (ref 0–5)
EPI CELLS # UR: 1 /HPF — SIGNIFICANT CHANGE UP (ref 0–5)
EPI CELLS # UR: 1 /HPF — SIGNIFICANT CHANGE UP (ref 0–5)
FERRITIN SERPL-MCNC: 118 NG/ML — SIGNIFICANT CHANGE UP (ref 30–400)
FERRITIN SERPL-MCNC: 48 NG/ML — SIGNIFICANT CHANGE UP (ref 30–400)
FERRITIN SERPL-MCNC: 71 NG/ML — SIGNIFICANT CHANGE UP (ref 30–400)
FLUAV AG NPH QL: SIGNIFICANT CHANGE UP
FLUAV SUBTYP SPEC NAA+PROBE: SIGNIFICANT CHANGE UP
FLUBV AG NPH QL: SIGNIFICANT CHANGE UP
FLUBV RNA SPEC QL NAA+PROBE: SIGNIFICANT CHANGE UP
FOLATE SERPL-MCNC: 8.7 NG/ML — SIGNIFICANT CHANGE UP
FOLATE SERPL-MCNC: >20 NG/ML — SIGNIFICANT CHANGE UP
GAS PNL BLDV: 132 MMOL/L — LOW (ref 136–145)
GAS PNL BLDV: 133 MMOL/L — LOW (ref 136–145)
GAS PNL BLDV: 133 MMOL/L — LOW (ref 136–145)
GAS PNL BLDV: 135 MMOL/L — LOW (ref 136–145)
GAS PNL BLDV: 136 MMOL/L — SIGNIFICANT CHANGE UP (ref 136–145)
GAS PNL BLDV: 136 MMOL/L — SIGNIFICANT CHANGE UP (ref 136–145)
GAS PNL BLDV: 144 MMOL/L — SIGNIFICANT CHANGE UP (ref 136–145)
GAS PNL BLDV: SIGNIFICANT CHANGE UP
GLUCOSE BLDC GLUCOMTR-MCNC: 139 MG/DL — HIGH (ref 70–99)
GLUCOSE BLDC GLUCOMTR-MCNC: 150 MG/DL — HIGH (ref 70–99)
GLUCOSE BLDC GLUCOMTR-MCNC: 166 MG/DL — HIGH (ref 70–99)
GLUCOSE BLDC GLUCOMTR-MCNC: 92 MG/DL — SIGNIFICANT CHANGE UP (ref 70–99)
GLUCOSE BLDV-MCNC: 102 MG/DL — HIGH (ref 70–99)
GLUCOSE BLDV-MCNC: 109 MG/DL — HIGH (ref 70–99)
GLUCOSE BLDV-MCNC: 110 MG/DL — HIGH (ref 70–99)
GLUCOSE BLDV-MCNC: 113 MG/DL — HIGH (ref 70–99)
GLUCOSE BLDV-MCNC: 153 MG/DL — HIGH (ref 70–99)
GLUCOSE BLDV-MCNC: 82 MG/DL — SIGNIFICANT CHANGE UP (ref 70–99)
GLUCOSE BLDV-MCNC: 92 MG/DL — SIGNIFICANT CHANGE UP (ref 70–99)
GLUCOSE SERPL-MCNC: 101 MG/DL — HIGH (ref 70–99)
GLUCOSE SERPL-MCNC: 109 MG/DL — HIGH (ref 70–99)
GLUCOSE SERPL-MCNC: 111 MG/DL — HIGH (ref 70–99)
GLUCOSE SERPL-MCNC: 112 MG/DL — HIGH (ref 70–99)
GLUCOSE SERPL-MCNC: 113 MG/DL — HIGH (ref 70–99)
GLUCOSE SERPL-MCNC: 114 MG/DL — HIGH (ref 70–99)
GLUCOSE SERPL-MCNC: 115 MG/DL — HIGH (ref 70–99)
GLUCOSE SERPL-MCNC: 115 MG/DL — HIGH (ref 70–99)
GLUCOSE SERPL-MCNC: 116 MG/DL — HIGH (ref 70–99)
GLUCOSE SERPL-MCNC: 117 MG/DL — HIGH (ref 70–99)
GLUCOSE SERPL-MCNC: 117 MG/DL — HIGH (ref 70–99)
GLUCOSE SERPL-MCNC: 118 MG/DL — HIGH (ref 70–99)
GLUCOSE SERPL-MCNC: 119 MG/DL — HIGH (ref 70–99)
GLUCOSE SERPL-MCNC: 120 MG/DL — HIGH (ref 70–99)
GLUCOSE SERPL-MCNC: 121 MG/DL — HIGH (ref 70–99)
GLUCOSE SERPL-MCNC: 122 MG/DL — HIGH (ref 70–99)
GLUCOSE SERPL-MCNC: 122 MG/DL — HIGH (ref 70–99)
GLUCOSE SERPL-MCNC: 123 MG/DL — HIGH (ref 70–99)
GLUCOSE SERPL-MCNC: 126 MG/DL — HIGH (ref 70–99)
GLUCOSE SERPL-MCNC: 128 MG/DL — HIGH (ref 70–99)
GLUCOSE SERPL-MCNC: 129 MG/DL — HIGH (ref 70–99)
GLUCOSE SERPL-MCNC: 129 MG/DL — HIGH (ref 70–99)
GLUCOSE SERPL-MCNC: 130 MG/DL — HIGH (ref 70–99)
GLUCOSE SERPL-MCNC: 133 MG/DL — HIGH (ref 70–99)
GLUCOSE SERPL-MCNC: 134 MG/DL — HIGH (ref 70–99)
GLUCOSE SERPL-MCNC: 137 MG/DL — HIGH (ref 70–99)
GLUCOSE SERPL-MCNC: 138 MG/DL — HIGH (ref 70–99)
GLUCOSE SERPL-MCNC: 139 MG/DL — HIGH (ref 70–99)
GLUCOSE SERPL-MCNC: 140 MG/DL — HIGH (ref 70–99)
GLUCOSE SERPL-MCNC: 141 MG/DL — HIGH (ref 70–99)
GLUCOSE SERPL-MCNC: 142 MG/DL — HIGH (ref 70–99)
GLUCOSE SERPL-MCNC: 145 MG/DL — HIGH (ref 70–99)
GLUCOSE SERPL-MCNC: 148 MG/DL — HIGH (ref 70–99)
GLUCOSE SERPL-MCNC: 151 MG/DL — HIGH (ref 70–99)
GLUCOSE SERPL-MCNC: 152 MG/DL — HIGH (ref 70–99)
GLUCOSE SERPL-MCNC: 153 MG/DL — HIGH (ref 70–99)
GLUCOSE SERPL-MCNC: 153 MG/DL — HIGH (ref 70–99)
GLUCOSE SERPL-MCNC: 159 MG/DL — HIGH (ref 70–99)
GLUCOSE SERPL-MCNC: 159 MG/DL — HIGH (ref 70–99)
GLUCOSE SERPL-MCNC: 161 MG/DL — HIGH (ref 70–99)
GLUCOSE SERPL-MCNC: 162 MG/DL — HIGH (ref 70–99)
GLUCOSE SERPL-MCNC: 172 MG/DL — HIGH (ref 70–99)
GLUCOSE SERPL-MCNC: 63 MG/DL — LOW (ref 70–99)
GLUCOSE SERPL-MCNC: 78 MG/DL — SIGNIFICANT CHANGE UP (ref 70–99)
GLUCOSE SERPL-MCNC: 80 MG/DL — SIGNIFICANT CHANGE UP (ref 70–99)
GLUCOSE SERPL-MCNC: 86 MG/DL — SIGNIFICANT CHANGE UP (ref 70–99)
GLUCOSE SERPL-MCNC: 97 MG/DL — SIGNIFICANT CHANGE UP (ref 70–99)
GLUCOSE SERPL-MCNC: 97 MG/DL — SIGNIFICANT CHANGE UP (ref 70–99)
GLUCOSE SERPL-MCNC: 98 MG/DL — SIGNIFICANT CHANGE UP (ref 70–99)
GLUCOSE SERPL-MCNC: 99 MG/DL — SIGNIFICANT CHANGE UP (ref 70–99)
GLUCOSE UR QL: ABNORMAL
GLUCOSE UR QL: NEGATIVE — SIGNIFICANT CHANGE UP
HADV DNA SPEC QL NAA+PROBE: SIGNIFICANT CHANGE UP
HAPTOGLOB SERPL-MCNC: 144 MG/DL — SIGNIFICANT CHANGE UP (ref 34–200)
HAPTOGLOB SERPL-MCNC: 225 MG/DL — HIGH (ref 34–200)
HCO3 BLDV-SCNC: 28 MMOL/L — SIGNIFICANT CHANGE UP (ref 22–29)
HCO3 BLDV-SCNC: 28 MMOL/L — SIGNIFICANT CHANGE UP (ref 22–29)
HCO3 BLDV-SCNC: 30 MMOL/L — HIGH (ref 22–29)
HCO3 BLDV-SCNC: 32 MMOL/L — HIGH (ref 22–29)
HCO3 BLDV-SCNC: 32 MMOL/L — HIGH (ref 22–29)
HCO3 BLDV-SCNC: 35 MMOL/L — HIGH (ref 22–29)
HCO3 BLDV-SCNC: 38 MMOL/L — HIGH (ref 22–29)
HCOV 229E RNA SPEC QL NAA+PROBE: SIGNIFICANT CHANGE UP
HCOV HKU1 RNA SPEC QL NAA+PROBE: SIGNIFICANT CHANGE UP
HCOV NL63 RNA SPEC QL NAA+PROBE: SIGNIFICANT CHANGE UP
HCOV OC43 RNA SPEC QL NAA+PROBE: SIGNIFICANT CHANGE UP
HCOV PNL SPEC NAA+PROBE: DETECTED
HCT VFR BLD CALC: 23.4 % — LOW (ref 39–50)
HCT VFR BLD CALC: 23.9 % — LOW (ref 39–50)
HCT VFR BLD CALC: 25.3 % — LOW (ref 39–50)
HCT VFR BLD CALC: 27.5 % — LOW (ref 39–50)
HCT VFR BLD CALC: 27.7 % — LOW (ref 39–50)
HCT VFR BLD CALC: 29 % — LOW (ref 39–50)
HCT VFR BLD CALC: 29.1 % — LOW (ref 39–50)
HCT VFR BLD CALC: 29.2 % — LOW (ref 39–50)
HCT VFR BLD CALC: 29.4 % — LOW (ref 39–50)
HCT VFR BLD CALC: 29.4 % — LOW (ref 39–50)
HCT VFR BLD CALC: 29.6 % — LOW (ref 39–50)
HCT VFR BLD CALC: 30 % — LOW (ref 39–50)
HCT VFR BLD CALC: 30.1 % — LOW (ref 39–50)
HCT VFR BLD CALC: 30.2 % — LOW (ref 39–50)
HCT VFR BLD CALC: 30.2 % — LOW (ref 39–50)
HCT VFR BLD CALC: 30.4 % — LOW (ref 39–50)
HCT VFR BLD CALC: 30.5 % — LOW (ref 39–50)
HCT VFR BLD CALC: 30.6 % — LOW (ref 39–50)
HCT VFR BLD CALC: 30.6 % — LOW (ref 39–50)
HCT VFR BLD CALC: 30.7 % — LOW (ref 39–50)
HCT VFR BLD CALC: 30.7 % — LOW (ref 39–50)
HCT VFR BLD CALC: 30.8 % — LOW (ref 39–50)
HCT VFR BLD CALC: 30.8 % — LOW (ref 39–50)
HCT VFR BLD CALC: 31.1 % — LOW (ref 39–50)
HCT VFR BLD CALC: 31.2 % — LOW (ref 39–50)
HCT VFR BLD CALC: 31.5 % — LOW (ref 39–50)
HCT VFR BLD CALC: 31.6 % — LOW (ref 39–50)
HCT VFR BLD CALC: 31.8 % — LOW (ref 39–50)
HCT VFR BLD CALC: 32 % — LOW (ref 39–50)
HCT VFR BLD CALC: 32.1 % — LOW (ref 39–50)
HCT VFR BLD CALC: 32.2 % — LOW (ref 39–50)
HCT VFR BLD CALC: 32.2 % — LOW (ref 39–50)
HCT VFR BLD CALC: 32.4 % — LOW (ref 39–50)
HCT VFR BLD CALC: 32.8 % — LOW (ref 39–50)
HCT VFR BLD CALC: 32.8 % — LOW (ref 39–50)
HCT VFR BLD CALC: 32.9 % — LOW (ref 39–50)
HCT VFR BLD CALC: 32.9 % — LOW (ref 39–50)
HCT VFR BLD CALC: 33.1 % — LOW (ref 39–50)
HCT VFR BLD CALC: 33.2 % — LOW (ref 39–50)
HCT VFR BLD CALC: 33.2 % — LOW (ref 39–50)
HCT VFR BLD CALC: 33.5 % — LOW (ref 39–50)
HCT VFR BLD CALC: 34.1 % — LOW (ref 39–50)
HCT VFR BLD CALC: 34.1 % — LOW (ref 39–50)
HCT VFR BLD CALC: 34.7 % — LOW (ref 39–50)
HCT VFR BLD CALC: 34.8 % — LOW (ref 39–50)
HCT VFR BLD CALC: 35.2 % — LOW (ref 39–50)
HCT VFR BLD CALC: 35.2 % — LOW (ref 39–50)
HCT VFR BLD CALC: 37.5 % — LOW (ref 39–50)
HCT VFR BLD CALC: 38.2 % — LOW (ref 39–50)
HCT VFR BLDA CALC: 28 % — LOW (ref 39–51)
HCT VFR BLDA CALC: 30 % — LOW (ref 39–51)
HCT VFR BLDA CALC: 32 % — LOW (ref 39–51)
HCT VFR BLDA CALC: 35 % — LOW (ref 39–51)
HCV AB S/CO SERPL IA: 0.05 S/CO — SIGNIFICANT CHANGE UP (ref 0–0.99)
HCV AB SERPL-IMP: SIGNIFICANT CHANGE UP
HGB BLD CALC-MCNC: 10 G/DL — LOW (ref 13–17)
HGB BLD CALC-MCNC: 10.1 G/DL — LOW (ref 13–17)
HGB BLD CALC-MCNC: 10.5 G/DL — LOW (ref 13–17)
HGB BLD CALC-MCNC: 11.6 G/DL — LOW (ref 12.6–17.4)
HGB BLD CALC-MCNC: 9.4 G/DL — LOW (ref 13–17)
HGB BLD CALC-MCNC: 9.9 G/DL — LOW (ref 13–17)
HGB BLD CALC-MCNC: 9.9 G/DL — LOW (ref 13–17)
HGB BLD-MCNC: 10 G/DL — LOW (ref 13–17)
HGB BLD-MCNC: 10.1 G/DL — LOW (ref 13–17)
HGB BLD-MCNC: 10.2 G/DL — LOW (ref 13–17)
HGB BLD-MCNC: 10.2 G/DL — LOW (ref 13–17)
HGB BLD-MCNC: 10.3 G/DL — LOW (ref 13–17)
HGB BLD-MCNC: 10.3 G/DL — LOW (ref 13–17)
HGB BLD-MCNC: 10.5 G/DL — LOW (ref 13–17)
HGB BLD-MCNC: 10.5 G/DL — LOW (ref 13–17)
HGB BLD-MCNC: 10.6 G/DL — LOW (ref 13–17)
HGB BLD-MCNC: 10.7 G/DL — LOW (ref 13–17)
HGB BLD-MCNC: 10.7 G/DL — LOW (ref 13–17)
HGB BLD-MCNC: 10.8 G/DL — LOW (ref 13–17)
HGB BLD-MCNC: 10.9 G/DL — LOW (ref 13–17)
HGB BLD-MCNC: 10.9 G/DL — LOW (ref 13–17)
HGB BLD-MCNC: 11.1 G/DL — LOW (ref 13–17)
HGB BLD-MCNC: 11.1 G/DL — LOW (ref 13–17)
HGB BLD-MCNC: 11.2 G/DL — LOW (ref 13–17)
HGB BLD-MCNC: 11.5 G/DL — LOW (ref 13–17)
HGB BLD-MCNC: 11.6 G/DL — LOW (ref 13–17)
HGB BLD-MCNC: 12.4 G/DL — LOW (ref 13–17)
HGB BLD-MCNC: 7.3 G/DL — LOW (ref 13–17)
HGB BLD-MCNC: 7.4 G/DL — LOW (ref 13–17)
HGB BLD-MCNC: 7.9 G/DL — LOW (ref 13–17)
HGB BLD-MCNC: 8.5 G/DL — LOW (ref 13–17)
HGB BLD-MCNC: 8.5 G/DL — LOW (ref 13–17)
HGB BLD-MCNC: 9 G/DL — LOW (ref 13–17)
HGB BLD-MCNC: 9.2 G/DL — LOW (ref 13–17)
HGB BLD-MCNC: 9.3 G/DL — LOW (ref 13–17)
HGB BLD-MCNC: 9.3 G/DL — LOW (ref 13–17)
HGB BLD-MCNC: 9.4 G/DL — LOW (ref 13–17)
HGB BLD-MCNC: 9.4 G/DL — LOW (ref 13–17)
HGB BLD-MCNC: 9.5 G/DL — LOW (ref 13–17)
HGB BLD-MCNC: 9.6 G/DL — LOW (ref 13–17)
HGB BLD-MCNC: 9.7 G/DL — LOW (ref 13–17)
HGB BLD-MCNC: 9.8 G/DL — LOW (ref 13–17)
HGB BLD-MCNC: 9.9 G/DL — LOW (ref 13–17)
HMPV RNA SPEC QL NAA+PROBE: SIGNIFICANT CHANGE UP
HPIV1 RNA SPEC QL NAA+PROBE: SIGNIFICANT CHANGE UP
HPIV2 RNA SPEC QL NAA+PROBE: SIGNIFICANT CHANGE UP
HPIV3 RNA SPEC QL NAA+PROBE: SIGNIFICANT CHANGE UP
HPIV4 RNA SPEC QL NAA+PROBE: SIGNIFICANT CHANGE UP
HYALINE CASTS # UR AUTO: 0 /LPF — SIGNIFICANT CHANGE UP (ref 0–7)
HYALINE CASTS # UR AUTO: 0 /LPF — SIGNIFICANT CHANGE UP (ref 0–7)
HYALINE CASTS # UR AUTO: 1 /LPF — SIGNIFICANT CHANGE UP (ref 0–7)
HYALINE CASTS # UR AUTO: 2 /LPF — SIGNIFICANT CHANGE UP (ref 0–2)
HYPOCHROMIA BLD QL: SLIGHT — SIGNIFICANT CHANGE UP
IANC: 4.02 K/UL — SIGNIFICANT CHANGE UP (ref 1.8–7.4)
IANC: 4.24 K/UL — SIGNIFICANT CHANGE UP (ref 1.8–7.4)
IANC: 4.85 K/UL — SIGNIFICANT CHANGE UP (ref 1.8–7.4)
IANC: 5.06 K/UL — SIGNIFICANT CHANGE UP (ref 1.8–7.4)
IANC: 5.66 K/UL — SIGNIFICANT CHANGE UP (ref 1.8–7.4)
IANC: 5.95 K/UL — SIGNIFICANT CHANGE UP (ref 1.8–7.4)
IANC: 6.09 K/UL — SIGNIFICANT CHANGE UP (ref 1.8–7.4)
IANC: 6.72 K/UL — SIGNIFICANT CHANGE UP (ref 1.8–7.4)
IANC: 6.84 K/UL — SIGNIFICANT CHANGE UP (ref 1.8–7.4)
IANC: 6.9 K/UL — SIGNIFICANT CHANGE UP (ref 1.8–7.4)
IANC: 7.14 K/UL — SIGNIFICANT CHANGE UP (ref 1.8–7.4)
IANC: 7.3 K/UL — SIGNIFICANT CHANGE UP (ref 1.8–7.4)
IANC: 8.21 K/UL — HIGH (ref 1.8–7.4)
IANC: 8.63 K/UL — HIGH (ref 1.8–7.4)
IANC: 9.11 K/UL — HIGH (ref 1.8–7.4)
IMM GRANULOCYTES NFR BLD AUTO: 0.5 % — SIGNIFICANT CHANGE UP (ref 0–1.5)
IMM GRANULOCYTES NFR BLD AUTO: 0.5 % — SIGNIFICANT CHANGE UP (ref 0–1.5)
IMM GRANULOCYTES NFR BLD AUTO: 0.6 % — SIGNIFICANT CHANGE UP (ref 0–1.5)
IMM GRANULOCYTES NFR BLD AUTO: 0.9 % — SIGNIFICANT CHANGE UP (ref 0–1.5)
IMM GRANULOCYTES NFR BLD AUTO: 1.1 % — SIGNIFICANT CHANGE UP (ref 0–1.5)
IMM GRANULOCYTES NFR BLD AUTO: 1.4 % — SIGNIFICANT CHANGE UP (ref 0–1.5)
IMM GRANULOCYTES NFR BLD AUTO: 1.5 % — SIGNIFICANT CHANGE UP (ref 0–1.5)
IMM GRANULOCYTES NFR BLD AUTO: 1.8 % — HIGH (ref 0–1.5)
IMM GRANULOCYTES NFR BLD AUTO: 2.3 % — HIGH (ref 0–1.5)
IMM GRANULOCYTES NFR BLD AUTO: 4.2 % — HIGH (ref 0–1.5)
IMM GRANULOCYTES NFR BLD AUTO: 4.4 % — HIGH (ref 0–1.5)
IMM GRANULOCYTES NFR BLD AUTO: 9.7 % — HIGH (ref 0–1.5)
IMM GRANULOCYTES NFR BLD AUTO: 9.9 % — HIGH (ref 0–1.5)
INR BLD: 1.12 RATIO — SIGNIFICANT CHANGE UP (ref 0.88–1.16)
INR BLD: 1.15 RATIO — SIGNIFICANT CHANGE UP (ref 0.88–1.16)
INR BLD: 1.18 RATIO — HIGH (ref 0.88–1.16)
INR BLD: 1.2 RATIO — HIGH (ref 0.88–1.16)
INR BLD: 1.2 RATIO — HIGH (ref 0.88–1.16)
INR BLD: 1.26 RATIO — HIGH (ref 0.88–1.16)
INR BLD: 1.26 RATIO — HIGH (ref 0.88–1.16)
INR BLD: 1.33 RATIO — HIGH (ref 0.88–1.16)
INR BLD: 1.4 RATIO — HIGH (ref 0.88–1.16)
IRON SATN MFR SERPL: 12 UG/DL — LOW (ref 45–165)
IRON SATN MFR SERPL: 15 % — SIGNIFICANT CHANGE UP (ref 14–50)
IRON SATN MFR SERPL: 28 UG/DL — LOW (ref 45–165)
IRON SATN MFR SERPL: 36 UG/DL — LOW (ref 45–165)
IRON SATN MFR SERPL: 4 % — LOW (ref 16–55)
IRON SATN MFR SERPL: 8 % — LOW (ref 16–55)
KETONES UR-MCNC: NEGATIVE — SIGNIFICANT CHANGE UP
LACTATE BLDV-MCNC: 0.8 MMOL/L — SIGNIFICANT CHANGE UP (ref 0.7–2)
LACTATE BLDV-MCNC: 1 MMOL/L — SIGNIFICANT CHANGE UP (ref 0.5–2)
LACTATE BLDV-MCNC: 1.2 MMOL/L — SIGNIFICANT CHANGE UP (ref 0.5–2)
LACTATE BLDV-MCNC: 1.4 MMOL/L — SIGNIFICANT CHANGE UP (ref 0.5–2)
LACTATE BLDV-MCNC: 1.6 MMOL/L — SIGNIFICANT CHANGE UP (ref 0.5–2)
LACTATE BLDV-MCNC: 1.8 MMOL/L — SIGNIFICANT CHANGE UP (ref 0.5–2)
LACTATE BLDV-MCNC: 1.9 MMOL/L — SIGNIFICANT CHANGE UP (ref 0.5–2)
LACTATE SERPL-SCNC: 1.5 MMOL/L — SIGNIFICANT CHANGE UP (ref 0.5–2)
LDH SERPL L TO P-CCNC: 152 U/L — SIGNIFICANT CHANGE UP (ref 50–242)
LDH SERPL L TO P-CCNC: 183 U/L — SIGNIFICANT CHANGE UP (ref 50–242)
LEUKOCYTE ESTERASE UR-ACNC: NEGATIVE — SIGNIFICANT CHANGE UP
LIDOCAIN IGE QN: 153 U/L — HIGH (ref 7–60)
LIDOCAIN IGE QN: 176 U/L — HIGH (ref 7–60)
LIDOCAIN IGE QN: 214 U/L — HIGH (ref 7–60)
LIDOCAIN IGE QN: 419 U/L — HIGH (ref 7–60)
LYMPHOCYTES # BLD AUTO: 0.08 K/UL — LOW (ref 1–3.3)
LYMPHOCYTES # BLD AUTO: 0.18 K/UL — LOW (ref 1–3.3)
LYMPHOCYTES # BLD AUTO: 0.36 K/UL — LOW (ref 1–3.3)
LYMPHOCYTES # BLD AUTO: 0.37 K/UL — LOW (ref 1–3.3)
LYMPHOCYTES # BLD AUTO: 0.43 K/UL — LOW (ref 1–3.3)
LYMPHOCYTES # BLD AUTO: 0.54 K/UL — LOW (ref 1–3.3)
LYMPHOCYTES # BLD AUTO: 0.9 % — LOW (ref 13–44)
LYMPHOCYTES # BLD AUTO: 1 K/UL — SIGNIFICANT CHANGE UP (ref 1–3.3)
LYMPHOCYTES # BLD AUTO: 1.14 K/UL — SIGNIFICANT CHANGE UP (ref 1–3.3)
LYMPHOCYTES # BLD AUTO: 1.21 K/UL — SIGNIFICANT CHANGE UP (ref 1–3.3)
LYMPHOCYTES # BLD AUTO: 1.3 K/UL — SIGNIFICANT CHANGE UP (ref 1–3.3)
LYMPHOCYTES # BLD AUTO: 1.31 K/UL — SIGNIFICANT CHANGE UP (ref 1–3.3)
LYMPHOCYTES # BLD AUTO: 1.31 K/UL — SIGNIFICANT CHANGE UP (ref 1–3.3)
LYMPHOCYTES # BLD AUTO: 1.43 K/UL — SIGNIFICANT CHANGE UP (ref 1–3.3)
LYMPHOCYTES # BLD AUTO: 1.58 K/UL — SIGNIFICANT CHANGE UP (ref 1–3.3)
LYMPHOCYTES # BLD AUTO: 1.59 K/UL — SIGNIFICANT CHANGE UP (ref 1–3.3)
LYMPHOCYTES # BLD AUTO: 1.7 % — LOW (ref 13–44)
LYMPHOCYTES # BLD AUTO: 1.89 K/UL — SIGNIFICANT CHANGE UP (ref 1–3.3)
LYMPHOCYTES # BLD AUTO: 1.94 K/UL — SIGNIFICANT CHANGE UP (ref 1–3.3)
LYMPHOCYTES # BLD AUTO: 10.2 % — LOW (ref 13–44)
LYMPHOCYTES # BLD AUTO: 12.3 % — LOW (ref 13–44)
LYMPHOCYTES # BLD AUTO: 13 % — SIGNIFICANT CHANGE UP (ref 13–44)
LYMPHOCYTES # BLD AUTO: 15 % — SIGNIFICANT CHANGE UP (ref 13–44)
LYMPHOCYTES # BLD AUTO: 15 % — SIGNIFICANT CHANGE UP (ref 13–44)
LYMPHOCYTES # BLD AUTO: 15.7 % — SIGNIFICANT CHANGE UP (ref 13–44)
LYMPHOCYTES # BLD AUTO: 16.9 % — SIGNIFICANT CHANGE UP (ref 13–44)
LYMPHOCYTES # BLD AUTO: 18 % — SIGNIFICANT CHANGE UP (ref 13–44)
LYMPHOCYTES # BLD AUTO: 19.7 % — SIGNIFICANT CHANGE UP (ref 13–44)
LYMPHOCYTES # BLD AUTO: 22.9 % — SIGNIFICANT CHANGE UP (ref 13–44)
LYMPHOCYTES # BLD AUTO: 25 % — SIGNIFICANT CHANGE UP (ref 13–44)
LYMPHOCYTES # BLD AUTO: 4.5 % — LOW (ref 13–44)
LYMPHOCYTES # BLD AUTO: 4.8 % — LOW (ref 13–44)
LYMPHOCYTES # BLD AUTO: 6.5 % — LOW (ref 13–44)
LYMPHOCYTES # BLD AUTO: 6.9 % — LOW (ref 13–44)
M PNEUMO DNA SPEC QL NAA+PROBE: SIGNIFICANT CHANGE UP
MAGNESIUM SERPL-MCNC: 1.6 MG/DL — SIGNIFICANT CHANGE UP (ref 1.6–2.6)
MAGNESIUM SERPL-MCNC: 1.7 MG/DL — SIGNIFICANT CHANGE UP (ref 1.6–2.6)
MAGNESIUM SERPL-MCNC: 1.8 MG/DL — SIGNIFICANT CHANGE UP (ref 1.6–2.6)
MAGNESIUM SERPL-MCNC: 1.9 MG/DL — SIGNIFICANT CHANGE UP (ref 1.6–2.6)
MAGNESIUM SERPL-MCNC: 2 MG/DL — SIGNIFICANT CHANGE UP (ref 1.6–2.6)
MAGNESIUM SERPL-MCNC: 2.1 MG/DL — SIGNIFICANT CHANGE UP (ref 1.6–2.6)
MAGNESIUM SERPL-MCNC: 2.2 MG/DL — SIGNIFICANT CHANGE UP (ref 1.6–2.6)
MAGNESIUM SERPL-MCNC: 2.2 MG/DL — SIGNIFICANT CHANGE UP (ref 1.6–2.6)
MAGNESIUM SERPL-MCNC: 2.3 MG/DL — SIGNIFICANT CHANGE UP (ref 1.6–2.6)
MAGNESIUM SERPL-MCNC: 2.3 MG/DL — SIGNIFICANT CHANGE UP (ref 1.6–2.6)
MAGNESIUM SERPL-MCNC: 2.4 MG/DL — SIGNIFICANT CHANGE UP (ref 1.6–2.6)
MAGNESIUM SERPL-MCNC: 2.4 MG/DL — SIGNIFICANT CHANGE UP (ref 1.6–2.6)
MAGNESIUM SERPL-MCNC: 2.6 MG/DL — SIGNIFICANT CHANGE UP (ref 1.6–2.6)
MCHC RBC-ENTMCNC: 24.8 PG — LOW (ref 27–34)
MCHC RBC-ENTMCNC: 25 PG — LOW (ref 27–34)
MCHC RBC-ENTMCNC: 25 PG — LOW (ref 27–34)
MCHC RBC-ENTMCNC: 25.1 PG — LOW (ref 27–34)
MCHC RBC-ENTMCNC: 25.2 PG — LOW (ref 27–34)
MCHC RBC-ENTMCNC: 25.3 PG — LOW (ref 27–34)
MCHC RBC-ENTMCNC: 25.4 PG — LOW (ref 27–34)
MCHC RBC-ENTMCNC: 25.4 PG — LOW (ref 27–34)
MCHC RBC-ENTMCNC: 25.5 PG — LOW (ref 27–34)
MCHC RBC-ENTMCNC: 25.7 PG — LOW (ref 27–34)
MCHC RBC-ENTMCNC: 25.8 PG — LOW (ref 27–34)
MCHC RBC-ENTMCNC: 26 PG — LOW (ref 27–34)
MCHC RBC-ENTMCNC: 26 PG — LOW (ref 27–34)
MCHC RBC-ENTMCNC: 26.1 PG — LOW (ref 27–34)
MCHC RBC-ENTMCNC: 26.3 PG — LOW (ref 27–34)
MCHC RBC-ENTMCNC: 26.3 PG — LOW (ref 27–34)
MCHC RBC-ENTMCNC: 26.6 PG — LOW (ref 27–34)
MCHC RBC-ENTMCNC: 26.7 PG — LOW (ref 27–34)
MCHC RBC-ENTMCNC: 26.8 PG — LOW (ref 27–34)
MCHC RBC-ENTMCNC: 26.9 PG — LOW (ref 27–34)
MCHC RBC-ENTMCNC: 27 PG — SIGNIFICANT CHANGE UP (ref 27–34)
MCHC RBC-ENTMCNC: 27.1 PG — SIGNIFICANT CHANGE UP (ref 27–34)
MCHC RBC-ENTMCNC: 27.1 PG — SIGNIFICANT CHANGE UP (ref 27–34)
MCHC RBC-ENTMCNC: 27.2 PG — SIGNIFICANT CHANGE UP (ref 27–34)
MCHC RBC-ENTMCNC: 27.3 PG — SIGNIFICANT CHANGE UP (ref 27–34)
MCHC RBC-ENTMCNC: 27.4 PG — SIGNIFICANT CHANGE UP (ref 27–34)
MCHC RBC-ENTMCNC: 27.4 PG — SIGNIFICANT CHANGE UP (ref 27–34)
MCHC RBC-ENTMCNC: 27.5 PG — SIGNIFICANT CHANGE UP (ref 27–34)
MCHC RBC-ENTMCNC: 30.2 GM/DL — LOW (ref 32–36)
MCHC RBC-ENTMCNC: 30.4 GM/DL — LOW (ref 32–36)
MCHC RBC-ENTMCNC: 30.4 GM/DL — LOW (ref 32–36)
MCHC RBC-ENTMCNC: 30.5 GM/DL — LOW (ref 32–36)
MCHC RBC-ENTMCNC: 30.5 GM/DL — LOW (ref 32–36)
MCHC RBC-ENTMCNC: 30.7 GM/DL — LOW (ref 32–36)
MCHC RBC-ENTMCNC: 30.8 GM/DL — LOW (ref 32–36)
MCHC RBC-ENTMCNC: 30.9 GM/DL — LOW (ref 32–36)
MCHC RBC-ENTMCNC: 31 GM/DL — LOW (ref 32–36)
MCHC RBC-ENTMCNC: 31.1 GM/DL — LOW (ref 32–36)
MCHC RBC-ENTMCNC: 31.2 GM/DL — LOW (ref 32–36)
MCHC RBC-ENTMCNC: 31.5 GM/DL — LOW (ref 32–36)
MCHC RBC-ENTMCNC: 31.6 GM/DL — LOW (ref 32–36)
MCHC RBC-ENTMCNC: 31.6 GM/DL — LOW (ref 32–36)
MCHC RBC-ENTMCNC: 31.7 GM/DL — LOW (ref 32–36)
MCHC RBC-ENTMCNC: 31.8 GM/DL — LOW (ref 32–36)
MCHC RBC-ENTMCNC: 31.9 GM/DL — LOW (ref 32–36)
MCHC RBC-ENTMCNC: 31.9 GM/DL — LOW (ref 32–36)
MCHC RBC-ENTMCNC: 32 GM/DL — SIGNIFICANT CHANGE UP (ref 32–36)
MCHC RBC-ENTMCNC: 32.1 GM/DL — SIGNIFICANT CHANGE UP (ref 32–36)
MCHC RBC-ENTMCNC: 32.2 GM/DL — SIGNIFICANT CHANGE UP (ref 32–36)
MCHC RBC-ENTMCNC: 32.3 GM/DL — SIGNIFICANT CHANGE UP (ref 32–36)
MCHC RBC-ENTMCNC: 32.5 GM/DL — SIGNIFICANT CHANGE UP (ref 32–36)
MCHC RBC-ENTMCNC: 32.7 GM/DL — SIGNIFICANT CHANGE UP (ref 32–36)
MCHC RBC-ENTMCNC: 32.9 GM/DL — SIGNIFICANT CHANGE UP (ref 32–36)
MCHC RBC-ENTMCNC: 33 GM/DL — SIGNIFICANT CHANGE UP (ref 32–36)
MCHC RBC-ENTMCNC: 33.2 GM/DL — SIGNIFICANT CHANGE UP (ref 32–36)
MCV RBC AUTO: 79.6 FL — LOW (ref 80–100)
MCV RBC AUTO: 80 FL — SIGNIFICANT CHANGE UP (ref 80–100)
MCV RBC AUTO: 80.3 FL — SIGNIFICANT CHANGE UP (ref 80–100)
MCV RBC AUTO: 80.3 FL — SIGNIFICANT CHANGE UP (ref 80–100)
MCV RBC AUTO: 80.4 FL — SIGNIFICANT CHANGE UP (ref 80–100)
MCV RBC AUTO: 80.5 FL — SIGNIFICANT CHANGE UP (ref 80–100)
MCV RBC AUTO: 80.6 FL — SIGNIFICANT CHANGE UP (ref 80–100)
MCV RBC AUTO: 80.7 FL — SIGNIFICANT CHANGE UP (ref 80–100)
MCV RBC AUTO: 81 FL — SIGNIFICANT CHANGE UP (ref 80–100)
MCV RBC AUTO: 81 FL — SIGNIFICANT CHANGE UP (ref 80–100)
MCV RBC AUTO: 81.4 FL — SIGNIFICANT CHANGE UP (ref 80–100)
MCV RBC AUTO: 81.5 FL — SIGNIFICANT CHANGE UP (ref 80–100)
MCV RBC AUTO: 81.5 FL — SIGNIFICANT CHANGE UP (ref 80–100)
MCV RBC AUTO: 81.7 FL — SIGNIFICANT CHANGE UP (ref 80–100)
MCV RBC AUTO: 82 FL — SIGNIFICANT CHANGE UP (ref 80–100)
MCV RBC AUTO: 82.2 FL — SIGNIFICANT CHANGE UP (ref 80–100)
MCV RBC AUTO: 82.3 FL — SIGNIFICANT CHANGE UP (ref 80–100)
MCV RBC AUTO: 82.4 FL — SIGNIFICANT CHANGE UP (ref 80–100)
MCV RBC AUTO: 82.4 FL — SIGNIFICANT CHANGE UP (ref 80–100)
MCV RBC AUTO: 83.1 FL — SIGNIFICANT CHANGE UP (ref 80–100)
MCV RBC AUTO: 83.4 FL — SIGNIFICANT CHANGE UP (ref 80–100)
MCV RBC AUTO: 83.4 FL — SIGNIFICANT CHANGE UP (ref 80–100)
MCV RBC AUTO: 83.5 FL — SIGNIFICANT CHANGE UP (ref 80–100)
MCV RBC AUTO: 83.6 FL — SIGNIFICANT CHANGE UP (ref 80–100)
MCV RBC AUTO: 83.6 FL — SIGNIFICANT CHANGE UP (ref 80–100)
MCV RBC AUTO: 83.8 FL — SIGNIFICANT CHANGE UP (ref 80–100)
MCV RBC AUTO: 83.9 FL — SIGNIFICANT CHANGE UP (ref 80–100)
MCV RBC AUTO: 83.9 FL — SIGNIFICANT CHANGE UP (ref 80–100)
MCV RBC AUTO: 84 FL — SIGNIFICANT CHANGE UP (ref 80–100)
MCV RBC AUTO: 84.1 FL — SIGNIFICANT CHANGE UP (ref 80–100)
MCV RBC AUTO: 84.5 FL — SIGNIFICANT CHANGE UP (ref 80–100)
MCV RBC AUTO: 84.7 FL — SIGNIFICANT CHANGE UP (ref 80–100)
MCV RBC AUTO: 84.7 FL — SIGNIFICANT CHANGE UP (ref 80–100)
MCV RBC AUTO: 84.9 FL — SIGNIFICANT CHANGE UP (ref 80–100)
MCV RBC AUTO: 85 FL — SIGNIFICANT CHANGE UP (ref 80–100)
MCV RBC AUTO: 85.5 FL — SIGNIFICANT CHANGE UP (ref 80–100)
MCV RBC AUTO: 85.6 FL — SIGNIFICANT CHANGE UP (ref 80–100)
MCV RBC AUTO: 85.7 FL — SIGNIFICANT CHANGE UP (ref 80–100)
MCV RBC AUTO: 85.7 FL — SIGNIFICANT CHANGE UP (ref 80–100)
MCV RBC AUTO: 85.8 FL — SIGNIFICANT CHANGE UP (ref 80–100)
MCV RBC AUTO: 86 FL — SIGNIFICANT CHANGE UP (ref 80–100)
MCV RBC AUTO: 86.2 FL — SIGNIFICANT CHANGE UP (ref 80–100)
MCV RBC AUTO: 86.2 FL — SIGNIFICANT CHANGE UP (ref 80–100)
MCV RBC AUTO: 86.3 FL — SIGNIFICANT CHANGE UP (ref 80–100)
MCV RBC AUTO: 86.5 FL — SIGNIFICANT CHANGE UP (ref 80–100)
MCV RBC AUTO: 86.6 FL — SIGNIFICANT CHANGE UP (ref 80–100)
MCV RBC AUTO: 86.8 FL — SIGNIFICANT CHANGE UP (ref 80–100)
MCV RBC AUTO: 87 FL — SIGNIFICANT CHANGE UP (ref 80–100)
MCV RBC AUTO: 87.8 FL — SIGNIFICANT CHANGE UP (ref 80–100)
METAMYELOCYTES # FLD: 2.6 % — HIGH (ref 0–1)
METHOD TYPE: SIGNIFICANT CHANGE UP
MICROCYTES BLD QL: SLIGHT — SIGNIFICANT CHANGE UP
MONOCYTES # BLD AUTO: 0.6 K/UL — SIGNIFICANT CHANGE UP (ref 0–0.9)
MONOCYTES # BLD AUTO: 0.8 K/UL — SIGNIFICANT CHANGE UP (ref 0–0.9)
MONOCYTES # BLD AUTO: 0.82 K/UL — SIGNIFICANT CHANGE UP (ref 0–0.9)
MONOCYTES # BLD AUTO: 0.83 K/UL — SIGNIFICANT CHANGE UP (ref 0–0.9)
MONOCYTES # BLD AUTO: 0.86 K/UL — SIGNIFICANT CHANGE UP (ref 0–0.9)
MONOCYTES # BLD AUTO: 0.89 K/UL — SIGNIFICANT CHANGE UP (ref 0–0.9)
MONOCYTES # BLD AUTO: 0.91 K/UL — HIGH (ref 0–0.9)
MONOCYTES # BLD AUTO: 0.93 K/UL — HIGH (ref 0–0.9)
MONOCYTES # BLD AUTO: 0.95 K/UL — HIGH (ref 0–0.9)
MONOCYTES # BLD AUTO: 0.95 K/UL — HIGH (ref 0–0.9)
MONOCYTES # BLD AUTO: 1.03 K/UL — HIGH (ref 0–0.9)
MONOCYTES # BLD AUTO: 1.09 K/UL — HIGH (ref 0–0.9)
MONOCYTES # BLD AUTO: 1.1 K/UL — HIGH (ref 0–0.9)
MONOCYTES # BLD AUTO: 1.13 K/UL — HIGH (ref 0–0.9)
MONOCYTES # BLD AUTO: 1.18 K/UL — HIGH (ref 0–0.9)
MONOCYTES # BLD AUTO: 1.2 K/UL — HIGH (ref 0–0.9)
MONOCYTES # BLD AUTO: 1.27 K/UL — HIGH (ref 0–0.9)
MONOCYTES NFR BLD AUTO: 10.2 % — SIGNIFICANT CHANGE UP (ref 2–14)
MONOCYTES NFR BLD AUTO: 10.4 % — SIGNIFICANT CHANGE UP (ref 2–14)
MONOCYTES NFR BLD AUTO: 10.6 % — SIGNIFICANT CHANGE UP (ref 2–14)
MONOCYTES NFR BLD AUTO: 10.7 % — SIGNIFICANT CHANGE UP (ref 2–14)
MONOCYTES NFR BLD AUTO: 10.8 % — SIGNIFICANT CHANGE UP (ref 2–14)
MONOCYTES NFR BLD AUTO: 10.9 % — SIGNIFICANT CHANGE UP (ref 2–14)
MONOCYTES NFR BLD AUTO: 11.2 % — SIGNIFICANT CHANGE UP (ref 2–14)
MONOCYTES NFR BLD AUTO: 11.7 % — SIGNIFICANT CHANGE UP (ref 2–14)
MONOCYTES NFR BLD AUTO: 12.3 % — SIGNIFICANT CHANGE UP (ref 2–14)
MONOCYTES NFR BLD AUTO: 12.9 % — SIGNIFICANT CHANGE UP (ref 2–14)
MONOCYTES NFR BLD AUTO: 13.5 % — SIGNIFICANT CHANGE UP (ref 2–14)
MONOCYTES NFR BLD AUTO: 14.5 % — HIGH (ref 2–14)
MONOCYTES NFR BLD AUTO: 16.5 % — HIGH (ref 2–14)
MONOCYTES NFR BLD AUTO: 8.5 % — SIGNIFICANT CHANGE UP (ref 2–14)
MONOCYTES NFR BLD AUTO: 8.9 % — SIGNIFICANT CHANGE UP (ref 2–14)
MRSA PCR RESULT.: SIGNIFICANT CHANGE UP
MYELOCYTES NFR BLD: 1.7 % — HIGH (ref 0–0)
NEUTROPHILS # BLD AUTO: 4.02 K/UL — SIGNIFICANT CHANGE UP (ref 1.8–7.4)
NEUTROPHILS # BLD AUTO: 4.24 K/UL — SIGNIFICANT CHANGE UP (ref 1.8–7.4)
NEUTROPHILS # BLD AUTO: 4.7 K/UL — SIGNIFICANT CHANGE UP (ref 1.8–7.4)
NEUTROPHILS # BLD AUTO: 4.85 K/UL — SIGNIFICANT CHANGE UP (ref 1.8–7.4)
NEUTROPHILS # BLD AUTO: 4.91 K/UL — SIGNIFICANT CHANGE UP (ref 1.8–7.4)
NEUTROPHILS # BLD AUTO: 5.06 K/UL — SIGNIFICANT CHANGE UP (ref 1.8–7.4)
NEUTROPHILS # BLD AUTO: 5.66 K/UL — SIGNIFICANT CHANGE UP (ref 1.8–7.4)
NEUTROPHILS # BLD AUTO: 5.95 K/UL — SIGNIFICANT CHANGE UP (ref 1.8–7.4)
NEUTROPHILS # BLD AUTO: 6.09 K/UL — SIGNIFICANT CHANGE UP (ref 1.8–7.4)
NEUTROPHILS # BLD AUTO: 6.84 K/UL — SIGNIFICANT CHANGE UP (ref 1.8–7.4)
NEUTROPHILS # BLD AUTO: 6.9 K/UL — SIGNIFICANT CHANGE UP (ref 1.8–7.4)
NEUTROPHILS # BLD AUTO: 7.14 K/UL — SIGNIFICANT CHANGE UP (ref 1.8–7.4)
NEUTROPHILS # BLD AUTO: 7.3 K/UL — SIGNIFICANT CHANGE UP (ref 1.8–7.4)
NEUTROPHILS # BLD AUTO: 7.44 K/UL — HIGH (ref 1.8–7.4)
NEUTROPHILS # BLD AUTO: 8.21 K/UL — HIGH (ref 1.8–7.4)
NEUTROPHILS # BLD AUTO: 8.63 K/UL — HIGH (ref 1.8–7.4)
NEUTROPHILS # BLD AUTO: 9.06 K/UL — HIGH (ref 1.8–7.4)
NEUTROPHILS NFR BLD AUTO: 60.2 % — SIGNIFICANT CHANGE UP (ref 43–77)
NEUTROPHILS NFR BLD AUTO: 61.2 % — SIGNIFICANT CHANGE UP (ref 43–77)
NEUTROPHILS NFR BLD AUTO: 62.6 % — SIGNIFICANT CHANGE UP (ref 43–77)
NEUTROPHILS NFR BLD AUTO: 63.1 % — SIGNIFICANT CHANGE UP (ref 43–77)
NEUTROPHILS NFR BLD AUTO: 67.8 % — SIGNIFICANT CHANGE UP (ref 43–77)
NEUTROPHILS NFR BLD AUTO: 67.9 % — SIGNIFICANT CHANGE UP (ref 43–77)
NEUTROPHILS NFR BLD AUTO: 70.7 % — SIGNIFICANT CHANGE UP (ref 43–77)
NEUTROPHILS NFR BLD AUTO: 71.1 % — SIGNIFICANT CHANGE UP (ref 43–77)
NEUTROPHILS NFR BLD AUTO: 71.5 % — SIGNIFICANT CHANGE UP (ref 43–77)
NEUTROPHILS NFR BLD AUTO: 73.3 % — SIGNIFICANT CHANGE UP (ref 43–77)
NEUTROPHILS NFR BLD AUTO: 74.3 % — SIGNIFICANT CHANGE UP (ref 43–77)
NEUTROPHILS NFR BLD AUTO: 75.7 % — SIGNIFICANT CHANGE UP (ref 43–77)
NEUTROPHILS NFR BLD AUTO: 77.2 % — HIGH (ref 43–77)
NEUTROPHILS NFR BLD AUTO: 77.4 % — HIGH (ref 43–77)
NEUTROPHILS NFR BLD AUTO: 77.4 % — HIGH (ref 43–77)
NEUTROPHILS NFR BLD AUTO: 77.9 % — HIGH (ref 43–77)
NEUTROPHILS NFR BLD AUTO: 78.3 % — HIGH (ref 43–77)
NEUTS BAND # BLD: 5.2 % — SIGNIFICANT CHANGE UP (ref 0–6)
NITRITE UR-MCNC: NEGATIVE — SIGNIFICANT CHANGE UP
NRBC # BLD: 0 /100 WBCS — SIGNIFICANT CHANGE UP
NRBC # BLD: 0 /100 WBCS — SIGNIFICANT CHANGE UP (ref 0–0)
NRBC # FLD: 0 K/UL — SIGNIFICANT CHANGE UP
NRBC # FLD: 0 K/UL — SIGNIFICANT CHANGE UP (ref 0–0)
OB PNL STL: POSITIVE
ORGANISM # SPEC MICROSCOPIC CNT: SIGNIFICANT CHANGE UP
OSMOLALITY UR: 750 MOSM/KG — SIGNIFICANT CHANGE UP (ref 50–1200)
OVALOCYTES BLD QL SMEAR: SLIGHT — SIGNIFICANT CHANGE UP
PCO2 BLDV: 43 MMHG — SIGNIFICANT CHANGE UP (ref 42–55)
PCO2 BLDV: 50 MMHG — SIGNIFICANT CHANGE UP (ref 42–55)
PCO2 BLDV: 51 MMHG — SIGNIFICANT CHANGE UP (ref 42–55)
PCO2 BLDV: 52 MMHG — SIGNIFICANT CHANGE UP (ref 42–55)
PCO2 BLDV: 53 MMHG — SIGNIFICANT CHANGE UP (ref 42–55)
PCO2 BLDV: 55 MMHG — SIGNIFICANT CHANGE UP (ref 42–55)
PCO2 BLDV: 59 MMHG — HIGH (ref 42–55)
PH BLDV: 7.32 — SIGNIFICANT CHANGE UP (ref 7.32–7.43)
PH BLDV: 7.38 — SIGNIFICANT CHANGE UP (ref 7.32–7.43)
PH BLDV: 7.39 — SIGNIFICANT CHANGE UP (ref 7.32–7.43)
PH BLDV: 7.4 — SIGNIFICANT CHANGE UP (ref 7.32–7.43)
PH BLDV: 7.42 — SIGNIFICANT CHANGE UP (ref 7.32–7.43)
PH BLDV: 7.42 — SIGNIFICANT CHANGE UP (ref 7.32–7.43)
PH BLDV: 7.45 — HIGH (ref 7.32–7.43)
PH UR: 6 — SIGNIFICANT CHANGE UP (ref 5–8)
PH UR: 7 — SIGNIFICANT CHANGE UP (ref 5–8)
PH UR: 7 — SIGNIFICANT CHANGE UP (ref 5–8)
PH UR: 8 — SIGNIFICANT CHANGE UP (ref 5–8)
PH UR: 8.5 — HIGH (ref 5–8)
PHOSPHATE SERPL-MCNC: 1.4 MG/DL — LOW (ref 2.5–4.5)
PHOSPHATE SERPL-MCNC: 1.6 MG/DL — LOW (ref 2.5–4.5)
PHOSPHATE SERPL-MCNC: 1.8 MG/DL — LOW (ref 2.5–4.5)
PHOSPHATE SERPL-MCNC: 1.8 MG/DL — LOW (ref 2.5–4.5)
PHOSPHATE SERPL-MCNC: 1.9 MG/DL — LOW (ref 2.5–4.5)
PHOSPHATE SERPL-MCNC: 2.3 MG/DL — LOW (ref 2.5–4.5)
PHOSPHATE SERPL-MCNC: 2.3 MG/DL — LOW (ref 2.5–4.5)
PHOSPHATE SERPL-MCNC: 2.4 MG/DL — LOW (ref 2.5–4.5)
PHOSPHATE SERPL-MCNC: 2.5 MG/DL — SIGNIFICANT CHANGE UP (ref 2.5–4.5)
PHOSPHATE SERPL-MCNC: 2.5 MG/DL — SIGNIFICANT CHANGE UP (ref 2.5–4.5)
PHOSPHATE SERPL-MCNC: 2.6 MG/DL — SIGNIFICANT CHANGE UP (ref 2.5–4.5)
PHOSPHATE SERPL-MCNC: 2.7 MG/DL — SIGNIFICANT CHANGE UP (ref 2.5–4.5)
PHOSPHATE SERPL-MCNC: 2.8 MG/DL — SIGNIFICANT CHANGE UP (ref 2.5–4.5)
PHOSPHATE SERPL-MCNC: 3 MG/DL — SIGNIFICANT CHANGE UP (ref 2.5–4.5)
PHOSPHATE SERPL-MCNC: 3.1 MG/DL — SIGNIFICANT CHANGE UP (ref 2.5–4.5)
PHOSPHATE SERPL-MCNC: 3.1 MG/DL — SIGNIFICANT CHANGE UP (ref 2.5–4.5)
PHOSPHATE SERPL-MCNC: 3.2 MG/DL — SIGNIFICANT CHANGE UP (ref 2.5–4.5)
PHOSPHATE SERPL-MCNC: 3.2 MG/DL — SIGNIFICANT CHANGE UP (ref 2.5–4.5)
PHOSPHATE SERPL-MCNC: 3.3 MG/DL — SIGNIFICANT CHANGE UP (ref 2.5–4.5)
PHOSPHATE SERPL-MCNC: 3.4 MG/DL — SIGNIFICANT CHANGE UP (ref 2.5–4.5)
PHOSPHATE SERPL-MCNC: 3.5 MG/DL — SIGNIFICANT CHANGE UP (ref 2.5–4.5)
PHOSPHATE SERPL-MCNC: 3.6 MG/DL — SIGNIFICANT CHANGE UP (ref 2.5–4.5)
PHOSPHATE SERPL-MCNC: 3.7 MG/DL — SIGNIFICANT CHANGE UP (ref 2.5–4.5)
PHOSPHATE SERPL-MCNC: 4.1 MG/DL — SIGNIFICANT CHANGE UP (ref 2.5–4.5)
PLAT MORPH BLD: NORMAL — SIGNIFICANT CHANGE UP
PLATELET # BLD AUTO: 160 K/UL — SIGNIFICANT CHANGE UP (ref 150–400)
PLATELET # BLD AUTO: 184 K/UL — SIGNIFICANT CHANGE UP (ref 150–400)
PLATELET # BLD AUTO: 190 K/UL — SIGNIFICANT CHANGE UP (ref 150–400)
PLATELET # BLD AUTO: 198 K/UL — SIGNIFICANT CHANGE UP (ref 150–400)
PLATELET # BLD AUTO: 198 K/UL — SIGNIFICANT CHANGE UP (ref 150–400)
PLATELET # BLD AUTO: 200 K/UL — SIGNIFICANT CHANGE UP (ref 150–400)
PLATELET # BLD AUTO: 206 K/UL — SIGNIFICANT CHANGE UP (ref 150–400)
PLATELET # BLD AUTO: 212 K/UL — SIGNIFICANT CHANGE UP (ref 150–400)
PLATELET # BLD AUTO: 214 K/UL — SIGNIFICANT CHANGE UP (ref 150–400)
PLATELET # BLD AUTO: 243 K/UL — SIGNIFICANT CHANGE UP (ref 150–400)
PLATELET # BLD AUTO: 245 K/UL — SIGNIFICANT CHANGE UP (ref 150–400)
PLATELET # BLD AUTO: 250 K/UL — SIGNIFICANT CHANGE UP (ref 150–400)
PLATELET # BLD AUTO: 251 K/UL — SIGNIFICANT CHANGE UP (ref 150–400)
PLATELET # BLD AUTO: 255 K/UL — SIGNIFICANT CHANGE UP (ref 150–400)
PLATELET # BLD AUTO: 257 K/UL — SIGNIFICANT CHANGE UP (ref 150–400)
PLATELET # BLD AUTO: 258 K/UL — SIGNIFICANT CHANGE UP (ref 150–400)
PLATELET # BLD AUTO: 259 K/UL — SIGNIFICANT CHANGE UP (ref 150–400)
PLATELET # BLD AUTO: 262 K/UL — SIGNIFICANT CHANGE UP (ref 150–400)
PLATELET # BLD AUTO: 262 K/UL — SIGNIFICANT CHANGE UP (ref 150–400)
PLATELET # BLD AUTO: 266 K/UL — SIGNIFICANT CHANGE UP (ref 150–400)
PLATELET # BLD AUTO: 273 K/UL — SIGNIFICANT CHANGE UP (ref 150–400)
PLATELET # BLD AUTO: 277 K/UL — SIGNIFICANT CHANGE UP (ref 150–400)
PLATELET # BLD AUTO: 279 K/UL — SIGNIFICANT CHANGE UP (ref 150–400)
PLATELET # BLD AUTO: 279 K/UL — SIGNIFICANT CHANGE UP (ref 150–400)
PLATELET # BLD AUTO: 280 K/UL — SIGNIFICANT CHANGE UP (ref 150–400)
PLATELET # BLD AUTO: 281 K/UL — SIGNIFICANT CHANGE UP (ref 150–400)
PLATELET # BLD AUTO: 282 K/UL — SIGNIFICANT CHANGE UP (ref 150–400)
PLATELET # BLD AUTO: 284 K/UL — SIGNIFICANT CHANGE UP (ref 150–400)
PLATELET # BLD AUTO: 284 K/UL — SIGNIFICANT CHANGE UP (ref 150–400)
PLATELET # BLD AUTO: 287 K/UL — SIGNIFICANT CHANGE UP (ref 150–400)
PLATELET # BLD AUTO: 290 K/UL — SIGNIFICANT CHANGE UP (ref 150–400)
PLATELET # BLD AUTO: 291 K/UL — SIGNIFICANT CHANGE UP (ref 150–400)
PLATELET # BLD AUTO: 293 K/UL — SIGNIFICANT CHANGE UP (ref 150–400)
PLATELET # BLD AUTO: 296 K/UL — SIGNIFICANT CHANGE UP (ref 150–400)
PLATELET # BLD AUTO: 297 K/UL — SIGNIFICANT CHANGE UP (ref 150–400)
PLATELET # BLD AUTO: 297 K/UL — SIGNIFICANT CHANGE UP (ref 150–400)
PLATELET # BLD AUTO: 308 K/UL — SIGNIFICANT CHANGE UP (ref 150–400)
PLATELET # BLD AUTO: 308 K/UL — SIGNIFICANT CHANGE UP (ref 150–400)
PLATELET # BLD AUTO: 309 K/UL — SIGNIFICANT CHANGE UP (ref 150–400)
PLATELET # BLD AUTO: 310 K/UL — SIGNIFICANT CHANGE UP (ref 150–400)
PLATELET # BLD AUTO: 321 K/UL — SIGNIFICANT CHANGE UP (ref 150–400)
PLATELET # BLD AUTO: 321 K/UL — SIGNIFICANT CHANGE UP (ref 150–400)
PLATELET # BLD AUTO: 324 K/UL — SIGNIFICANT CHANGE UP (ref 150–400)
PLATELET # BLD AUTO: 326 K/UL — SIGNIFICANT CHANGE UP (ref 150–400)
PLATELET # BLD AUTO: 333 K/UL — SIGNIFICANT CHANGE UP (ref 150–400)
PLATELET # BLD AUTO: 334 K/UL — SIGNIFICANT CHANGE UP (ref 150–400)
PLATELET # BLD AUTO: 335 K/UL — SIGNIFICANT CHANGE UP (ref 150–400)
PLATELET # BLD AUTO: 340 K/UL — SIGNIFICANT CHANGE UP (ref 150–400)
PLATELET # BLD AUTO: 348 K/UL — SIGNIFICANT CHANGE UP (ref 150–400)
PLATELET # BLD AUTO: 367 K/UL — SIGNIFICANT CHANGE UP (ref 150–400)
PLATELET # BLD AUTO: 370 K/UL — SIGNIFICANT CHANGE UP (ref 150–400)
PLATELET # BLD AUTO: 372 K/UL — SIGNIFICANT CHANGE UP (ref 150–400)
PLATELET # BLD AUTO: 381 K/UL — SIGNIFICANT CHANGE UP (ref 150–400)
PLATELET # BLD AUTO: 387 K/UL — SIGNIFICANT CHANGE UP (ref 150–400)
PLATELET # BLD AUTO: 389 K/UL — SIGNIFICANT CHANGE UP (ref 150–400)
PLATELET # BLD AUTO: 390 K/UL — SIGNIFICANT CHANGE UP (ref 150–400)
PLATELET # BLD AUTO: 399 K/UL — SIGNIFICANT CHANGE UP (ref 150–400)
PLATELET COUNT - ESTIMATE: NORMAL — SIGNIFICANT CHANGE UP
PO2 BLDV: 22 MMHG — SIGNIFICANT CHANGE UP
PO2 BLDV: 23 MMHG — LOW (ref 25–45)
PO2 BLDV: 26 MMHG — SIGNIFICANT CHANGE UP
PO2 BLDV: 28 MMHG — SIGNIFICANT CHANGE UP
PO2 BLDV: 40 MMHG — SIGNIFICANT CHANGE UP
PO2 BLDV: 41 MMHG — SIGNIFICANT CHANGE UP
PO2 BLDV: <20 MMHG — SIGNIFICANT CHANGE UP
POIKILOCYTOSIS BLD QL AUTO: SLIGHT — SIGNIFICANT CHANGE UP
POLYCHROMASIA BLD QL SMEAR: SLIGHT — SIGNIFICANT CHANGE UP
POTASSIUM BLDV-SCNC: 3.1 MMOL/L — LOW (ref 3.5–5.1)
POTASSIUM BLDV-SCNC: 3.1 MMOL/L — LOW (ref 3.5–5.1)
POTASSIUM BLDV-SCNC: 3.2 MMOL/L — LOW (ref 3.5–5.1)
POTASSIUM BLDV-SCNC: 3.5 MMOL/L — SIGNIFICANT CHANGE UP (ref 3.5–5.1)
POTASSIUM BLDV-SCNC: 3.9 MMOL/L — SIGNIFICANT CHANGE UP (ref 3.5–5.1)
POTASSIUM BLDV-SCNC: 4 MMOL/L — SIGNIFICANT CHANGE UP (ref 3.5–5.1)
POTASSIUM BLDV-SCNC: 4.5 MMOL/L — SIGNIFICANT CHANGE UP (ref 3.5–5.1)
POTASSIUM SERPL-MCNC: 3.1 MMOL/L — LOW (ref 3.5–5.3)
POTASSIUM SERPL-MCNC: 3.2 MMOL/L — LOW (ref 3.5–5.3)
POTASSIUM SERPL-MCNC: 3.4 MMOL/L — LOW (ref 3.5–5.3)
POTASSIUM SERPL-MCNC: 3.5 MMOL/L — SIGNIFICANT CHANGE UP (ref 3.5–5.3)
POTASSIUM SERPL-MCNC: 3.5 MMOL/L — SIGNIFICANT CHANGE UP (ref 3.5–5.3)
POTASSIUM SERPL-MCNC: 3.6 MMOL/L — SIGNIFICANT CHANGE UP (ref 3.5–5.3)
POTASSIUM SERPL-MCNC: 3.6 MMOL/L — SIGNIFICANT CHANGE UP (ref 3.5–5.3)
POTASSIUM SERPL-MCNC: 3.7 MMOL/L — SIGNIFICANT CHANGE UP (ref 3.5–5.3)
POTASSIUM SERPL-MCNC: 3.8 MMOL/L — SIGNIFICANT CHANGE UP (ref 3.5–5.3)
POTASSIUM SERPL-MCNC: 3.9 MMOL/L — SIGNIFICANT CHANGE UP (ref 3.5–5.3)
POTASSIUM SERPL-MCNC: 4 MMOL/L — SIGNIFICANT CHANGE UP (ref 3.5–5.3)
POTASSIUM SERPL-MCNC: 4.1 MMOL/L — SIGNIFICANT CHANGE UP (ref 3.5–5.3)
POTASSIUM SERPL-MCNC: 4.2 MMOL/L — SIGNIFICANT CHANGE UP (ref 3.5–5.3)
POTASSIUM SERPL-MCNC: 4.3 MMOL/L — SIGNIFICANT CHANGE UP (ref 3.5–5.3)
POTASSIUM SERPL-MCNC: 4.4 MMOL/L — SIGNIFICANT CHANGE UP (ref 3.5–5.3)
POTASSIUM SERPL-MCNC: 4.5 MMOL/L — SIGNIFICANT CHANGE UP (ref 3.5–5.3)
POTASSIUM SERPL-MCNC: 4.5 MMOL/L — SIGNIFICANT CHANGE UP (ref 3.5–5.3)
POTASSIUM SERPL-MCNC: 4.6 MMOL/L — SIGNIFICANT CHANGE UP (ref 3.5–5.3)
POTASSIUM SERPL-MCNC: 4.8 MMOL/L — SIGNIFICANT CHANGE UP (ref 3.5–5.3)
POTASSIUM SERPL-MCNC: 4.8 MMOL/L — SIGNIFICANT CHANGE UP (ref 3.5–5.3)
POTASSIUM SERPL-MCNC: 4.9 MMOL/L — SIGNIFICANT CHANGE UP (ref 3.5–5.3)
POTASSIUM SERPL-MCNC: 5 MMOL/L — SIGNIFICANT CHANGE UP (ref 3.5–5.3)
POTASSIUM SERPL-MCNC: 5 MMOL/L — SIGNIFICANT CHANGE UP (ref 3.5–5.3)
POTASSIUM SERPL-MCNC: 5.1 MMOL/L — SIGNIFICANT CHANGE UP (ref 3.5–5.3)
POTASSIUM SERPL-MCNC: 5.1 MMOL/L — SIGNIFICANT CHANGE UP (ref 3.5–5.3)
POTASSIUM SERPL-MCNC: 5.3 MMOL/L — SIGNIFICANT CHANGE UP (ref 3.5–5.3)
POTASSIUM SERPL-MCNC: 5.5 MMOL/L — HIGH (ref 3.5–5.3)
POTASSIUM SERPL-SCNC: 3.1 MMOL/L — LOW (ref 3.5–5.3)
POTASSIUM SERPL-SCNC: 3.2 MMOL/L — LOW (ref 3.5–5.3)
POTASSIUM SERPL-SCNC: 3.4 MMOL/L — LOW (ref 3.5–5.3)
POTASSIUM SERPL-SCNC: 3.5 MMOL/L — SIGNIFICANT CHANGE UP (ref 3.5–5.3)
POTASSIUM SERPL-SCNC: 3.5 MMOL/L — SIGNIFICANT CHANGE UP (ref 3.5–5.3)
POTASSIUM SERPL-SCNC: 3.6 MMOL/L — SIGNIFICANT CHANGE UP (ref 3.5–5.3)
POTASSIUM SERPL-SCNC: 3.6 MMOL/L — SIGNIFICANT CHANGE UP (ref 3.5–5.3)
POTASSIUM SERPL-SCNC: 3.7 MMOL/L — SIGNIFICANT CHANGE UP (ref 3.5–5.3)
POTASSIUM SERPL-SCNC: 3.8 MMOL/L — SIGNIFICANT CHANGE UP (ref 3.5–5.3)
POTASSIUM SERPL-SCNC: 3.9 MMOL/L — SIGNIFICANT CHANGE UP (ref 3.5–5.3)
POTASSIUM SERPL-SCNC: 4 MMOL/L — SIGNIFICANT CHANGE UP (ref 3.5–5.3)
POTASSIUM SERPL-SCNC: 4.1 MMOL/L — SIGNIFICANT CHANGE UP (ref 3.5–5.3)
POTASSIUM SERPL-SCNC: 4.2 MMOL/L — SIGNIFICANT CHANGE UP (ref 3.5–5.3)
POTASSIUM SERPL-SCNC: 4.3 MMOL/L — SIGNIFICANT CHANGE UP (ref 3.5–5.3)
POTASSIUM SERPL-SCNC: 4.4 MMOL/L — SIGNIFICANT CHANGE UP (ref 3.5–5.3)
POTASSIUM SERPL-SCNC: 4.5 MMOL/L — SIGNIFICANT CHANGE UP (ref 3.5–5.3)
POTASSIUM SERPL-SCNC: 4.5 MMOL/L — SIGNIFICANT CHANGE UP (ref 3.5–5.3)
POTASSIUM SERPL-SCNC: 4.6 MMOL/L — SIGNIFICANT CHANGE UP (ref 3.5–5.3)
POTASSIUM SERPL-SCNC: 4.8 MMOL/L — SIGNIFICANT CHANGE UP (ref 3.5–5.3)
POTASSIUM SERPL-SCNC: 4.8 MMOL/L — SIGNIFICANT CHANGE UP (ref 3.5–5.3)
POTASSIUM SERPL-SCNC: 4.9 MMOL/L — SIGNIFICANT CHANGE UP (ref 3.5–5.3)
POTASSIUM SERPL-SCNC: 5 MMOL/L — SIGNIFICANT CHANGE UP (ref 3.5–5.3)
POTASSIUM SERPL-SCNC: 5 MMOL/L — SIGNIFICANT CHANGE UP (ref 3.5–5.3)
POTASSIUM SERPL-SCNC: 5.1 MMOL/L — SIGNIFICANT CHANGE UP (ref 3.5–5.3)
POTASSIUM SERPL-SCNC: 5.1 MMOL/L — SIGNIFICANT CHANGE UP (ref 3.5–5.3)
POTASSIUM SERPL-SCNC: 5.3 MMOL/L — SIGNIFICANT CHANGE UP (ref 3.5–5.3)
POTASSIUM SERPL-SCNC: 5.5 MMOL/L — HIGH (ref 3.5–5.3)
POTASSIUM UR-SCNC: 51.2 MMOL/L — SIGNIFICANT CHANGE UP
PROCALCITONIN SERPL-MCNC: 0.13 NG/ML — HIGH (ref 0.02–0.1)
PROCALCITONIN SERPL-MCNC: 0.2 NG/ML — HIGH (ref 0.02–0.1)
PROCALCITONIN SERPL-MCNC: 0.28 NG/ML — HIGH (ref 0.02–0.1)
PROT SERPL-MCNC: 4.7 G/DL — LOW (ref 6–8.3)
PROT SERPL-MCNC: 4.9 G/DL — LOW (ref 6–8.3)
PROT SERPL-MCNC: 5 G/DL — LOW (ref 6–8.3)
PROT SERPL-MCNC: 5.1 G/DL — LOW (ref 6–8.3)
PROT SERPL-MCNC: 5.4 G/DL — LOW (ref 6–8.3)
PROT SERPL-MCNC: 5.5 G/DL — LOW (ref 6–8.3)
PROT SERPL-MCNC: 5.6 G/DL — LOW (ref 6–8.3)
PROT SERPL-MCNC: 5.6 G/DL — LOW (ref 6–8.3)
PROT SERPL-MCNC: 5.7 G/DL — LOW (ref 6–8.3)
PROT SERPL-MCNC: 5.8 G/DL — LOW (ref 6–8.3)
PROT SERPL-MCNC: 5.9 G/DL — LOW (ref 6–8.3)
PROT SERPL-MCNC: 6 G/DL — SIGNIFICANT CHANGE UP (ref 6–8.3)
PROT SERPL-MCNC: 6 G/DL — SIGNIFICANT CHANGE UP (ref 6–8.3)
PROT SERPL-MCNC: 6.3 G/DL — SIGNIFICANT CHANGE UP (ref 6–8.3)
PROT SERPL-MCNC: 6.6 G/DL — SIGNIFICANT CHANGE UP (ref 6–8.3)
PROT UR-MCNC: ABNORMAL
PROTHROM AB SERPL-ACNC: 13 SEC — SIGNIFICANT CHANGE UP (ref 10.5–13.4)
PROTHROM AB SERPL-ACNC: 13.4 SEC — SIGNIFICANT CHANGE UP (ref 10.5–13.4)
PROTHROM AB SERPL-ACNC: 13.7 SEC — HIGH (ref 10.5–13.4)
PROTHROM AB SERPL-ACNC: 13.9 SEC — HIGH (ref 10.5–13.4)
PROTHROM AB SERPL-ACNC: 14 SEC — HIGH (ref 10.5–13.4)
PROTHROM AB SERPL-ACNC: 14.7 SEC — HIGH (ref 10.5–13.4)
PROTHROM AB SERPL-ACNC: 14.7 SEC — HIGH (ref 10.5–13.4)
PROTHROM AB SERPL-ACNC: 15.3 SEC — HIGH (ref 10.5–13.4)
PROTHROM AB SERPL-ACNC: 16.2 SEC — HIGH (ref 10.5–13.4)
RAPID RVP RESULT: DETECTED
RAPID RVP RESULT: SIGNIFICANT CHANGE UP
RBC # BLD: 2.87 M/UL — LOW (ref 4.2–5.8)
RBC # BLD: 2.95 M/UL — LOW (ref 4.2–5.8)
RBC # BLD: 3.14 M/UL — LOW (ref 4.2–5.8)
RBC # BLD: 3.19 M/UL — LOW (ref 4.2–5.8)
RBC # BLD: 3.2 M/UL — LOW (ref 4.2–5.8)
RBC # BLD: 3.44 M/UL — LOW (ref 4.2–5.8)
RBC # BLD: 3.44 M/UL — LOW (ref 4.2–5.8)
RBC # BLD: 3.52 M/UL — LOW (ref 4.2–5.8)
RBC # BLD: 3.53 M/UL — LOW (ref 4.2–5.8)
RBC # BLD: 3.55 M/UL — LOW (ref 4.2–5.8)
RBC # BLD: 3.55 M/UL — LOW (ref 4.2–5.8)
RBC # BLD: 3.56 M/UL — LOW (ref 4.2–5.8)
RBC # BLD: 3.57 M/UL — LOW (ref 4.2–5.8)
RBC # BLD: 3.6 M/UL — LOW (ref 4.2–5.8)
RBC # BLD: 3.65 M/UL — LOW (ref 4.2–5.8)
RBC # BLD: 3.65 M/UL — LOW (ref 4.2–5.8)
RBC # BLD: 3.66 M/UL — LOW (ref 4.2–5.8)
RBC # BLD: 3.66 M/UL — LOW (ref 4.2–5.8)
RBC # BLD: 3.67 M/UL — LOW (ref 4.2–5.8)
RBC # BLD: 3.7 M/UL — LOW (ref 4.2–5.8)
RBC # BLD: 3.71 M/UL — LOW (ref 4.2–5.8)
RBC # BLD: 3.72 M/UL — LOW (ref 4.2–5.8)
RBC # BLD: 3.73 M/UL — LOW (ref 4.2–5.8)
RBC # BLD: 3.73 M/UL — LOW (ref 4.2–5.8)
RBC # BLD: 3.74 M/UL — LOW (ref 4.2–5.8)
RBC # BLD: 3.76 M/UL — LOW (ref 4.2–5.8)
RBC # BLD: 3.8 M/UL — LOW (ref 4.2–5.8)
RBC # BLD: 3.81 M/UL — LOW (ref 4.2–5.8)
RBC # BLD: 3.83 M/UL — LOW (ref 4.2–5.8)
RBC # BLD: 3.84 M/UL — LOW (ref 4.2–5.8)
RBC # BLD: 3.85 M/UL — LOW (ref 4.2–5.8)
RBC # BLD: 3.85 M/UL — LOW (ref 4.2–5.8)
RBC # BLD: 3.87 M/UL — LOW (ref 4.2–5.8)
RBC # BLD: 3.88 M/UL — LOW (ref 4.2–5.8)
RBC # BLD: 3.91 M/UL — LOW (ref 4.2–5.8)
RBC # BLD: 3.92 M/UL — LOW (ref 4.2–5.8)
RBC # BLD: 3.92 M/UL — LOW (ref 4.2–5.8)
RBC # BLD: 3.95 M/UL — LOW (ref 4.2–5.8)
RBC # BLD: 3.96 M/UL — LOW (ref 4.2–5.8)
RBC # BLD: 3.99 M/UL — LOW (ref 4.2–5.8)
RBC # BLD: 4 M/UL — LOW (ref 4.2–5.8)
RBC # BLD: 4 M/UL — LOW (ref 4.2–5.8)
RBC # BLD: 4.06 M/UL — LOW (ref 4.2–5.8)
RBC # BLD: 4.07 M/UL — LOW (ref 4.2–5.8)
RBC # BLD: 4.11 M/UL — LOW (ref 4.2–5.8)
RBC # BLD: 4.15 M/UL — LOW (ref 4.2–5.8)
RBC # BLD: 4.17 M/UL — LOW (ref 4.2–5.8)
RBC # BLD: 4.2 M/UL — SIGNIFICANT CHANGE UP (ref 4.2–5.8)
RBC # BLD: 4.21 M/UL — SIGNIFICANT CHANGE UP (ref 4.2–5.8)
RBC # BLD: 4.31 M/UL — SIGNIFICANT CHANGE UP (ref 4.2–5.8)
RBC # BLD: 4.54 M/UL — SIGNIFICANT CHANGE UP (ref 4.2–5.8)
RBC # FLD: 14 % — SIGNIFICANT CHANGE UP (ref 10.3–14.5)
RBC # FLD: 14 % — SIGNIFICANT CHANGE UP (ref 10.3–14.5)
RBC # FLD: 14.1 % — SIGNIFICANT CHANGE UP (ref 10.3–14.5)
RBC # FLD: 14.2 % — SIGNIFICANT CHANGE UP (ref 10.3–14.5)
RBC # FLD: 14.3 % — SIGNIFICANT CHANGE UP (ref 10.3–14.5)
RBC # FLD: 14.3 % — SIGNIFICANT CHANGE UP (ref 10.3–14.5)
RBC # FLD: 14.4 % — SIGNIFICANT CHANGE UP (ref 10.3–14.5)
RBC # FLD: 14.5 % — SIGNIFICANT CHANGE UP (ref 10.3–14.5)
RBC # FLD: 14.6 % — HIGH (ref 10.3–14.5)
RBC # FLD: 14.6 % — HIGH (ref 10.3–14.5)
RBC # FLD: 14.9 % — HIGH (ref 10.3–14.5)
RBC # FLD: 15 % — HIGH (ref 10.3–14.5)
RBC # FLD: 15.2 % — HIGH (ref 10.3–14.5)
RBC # FLD: 15.3 % — HIGH (ref 10.3–14.5)
RBC # FLD: 15.9 % — HIGH (ref 10.3–14.5)
RBC # FLD: 16 % — HIGH (ref 10.3–14.5)
RBC # FLD: 16 % — HIGH (ref 10.3–14.5)
RBC # FLD: 16.1 % — HIGH (ref 10.3–14.5)
RBC # FLD: 16.2 % — HIGH (ref 10.3–14.5)
RBC # FLD: 16.3 % — HIGH (ref 10.3–14.5)
RBC # FLD: 16.5 % — HIGH (ref 10.3–14.5)
RBC # FLD: 16.5 % — HIGH (ref 10.3–14.5)
RBC # FLD: 16.6 % — HIGH (ref 10.3–14.5)
RBC # FLD: 16.8 % — HIGH (ref 10.3–14.5)
RBC # FLD: 17 % — HIGH (ref 10.3–14.5)
RBC # FLD: 17.1 % — HIGH (ref 10.3–14.5)
RBC # FLD: 17.1 % — HIGH (ref 10.3–14.5)
RBC # FLD: 17.2 % — HIGH (ref 10.3–14.5)
RBC # FLD: 17.5 % — HIGH (ref 10.3–14.5)
RBC # FLD: 17.9 % — HIGH (ref 10.3–14.5)
RBC # FLD: 18 % — HIGH (ref 10.3–14.5)
RBC # FLD: 18.2 % — HIGH (ref 10.3–14.5)
RBC # FLD: 18.4 % — HIGH (ref 10.3–14.5)
RBC BLD AUTO: ABNORMAL
RBC CASTS # UR COMP ASSIST: 1 /HPF — SIGNIFICANT CHANGE UP (ref 0–4)
RBC CASTS # UR COMP ASSIST: 2 /HPF — SIGNIFICANT CHANGE UP (ref 0–4)
RH IG SCN BLD-IMP: POSITIVE — SIGNIFICANT CHANGE UP
RSV RNA NPH QL NAA+NON-PROBE: SIGNIFICANT CHANGE UP
RSV RNA SPEC QL NAA+PROBE: SIGNIFICANT CHANGE UP
RV+EV RNA SPEC QL NAA+PROBE: SIGNIFICANT CHANGE UP
S AUREUS DNA NOSE QL NAA+PROBE: DETECTED
S AUREUS DNA NOSE QL NAA+PROBE: DETECTED
S AUREUS DNA NOSE QL NAA+PROBE: SIGNIFICANT CHANGE UP
S AUREUS DNA NOSE QL NAA+PROBE: SIGNIFICANT CHANGE UP
SAO2 % BLDV: 29 % — SIGNIFICANT CHANGE UP
SAO2 % BLDV: 30.8 % — SIGNIFICANT CHANGE UP
SAO2 % BLDV: 31.4 % — LOW (ref 67–88)
SAO2 % BLDV: 32.2 % — SIGNIFICANT CHANGE UP
SAO2 % BLDV: 40 % — SIGNIFICANT CHANGE UP
SAO2 % BLDV: 63.9 % — SIGNIFICANT CHANGE UP
SAO2 % BLDV: 70.6 % — SIGNIFICANT CHANGE UP
SARS-COV-2 RNA SPEC QL NAA+PROBE: SIGNIFICANT CHANGE UP
SODIUM SERPL-SCNC: 128 MMOL/L — LOW (ref 135–145)
SODIUM SERPL-SCNC: 129 MMOL/L — LOW (ref 135–145)
SODIUM SERPL-SCNC: 129 MMOL/L — LOW (ref 135–145)
SODIUM SERPL-SCNC: 130 MMOL/L — LOW (ref 135–145)
SODIUM SERPL-SCNC: 131 MMOL/L — LOW (ref 135–145)
SODIUM SERPL-SCNC: 132 MMOL/L — LOW (ref 135–145)
SODIUM SERPL-SCNC: 133 MMOL/L — LOW (ref 135–145)
SODIUM SERPL-SCNC: 134 MMOL/L — LOW (ref 135–145)
SODIUM SERPL-SCNC: 135 MMOL/L — SIGNIFICANT CHANGE UP (ref 135–145)
SODIUM SERPL-SCNC: 136 MMOL/L — SIGNIFICANT CHANGE UP (ref 135–145)
SODIUM SERPL-SCNC: 137 MMOL/L — SIGNIFICANT CHANGE UP (ref 135–145)
SODIUM SERPL-SCNC: 138 MMOL/L — SIGNIFICANT CHANGE UP (ref 135–145)
SODIUM SERPL-SCNC: 139 MMOL/L — SIGNIFICANT CHANGE UP (ref 135–145)
SODIUM SERPL-SCNC: 141 MMOL/L — SIGNIFICANT CHANGE UP (ref 135–145)
SODIUM SERPL-SCNC: 142 MMOL/L — SIGNIFICANT CHANGE UP (ref 135–145)
SODIUM SERPL-SCNC: 146 MMOL/L — HIGH (ref 135–145)
SODIUM SERPL-SCNC: 147 MMOL/L — HIGH (ref 135–145)
SODIUM UR-SCNC: 33 MMOL/L — SIGNIFICANT CHANGE UP
SP GR SPEC: 1.01 — SIGNIFICANT CHANGE UP (ref 1.01–1.02)
SP GR SPEC: 1.02 — SIGNIFICANT CHANGE UP (ref 1.01–1.02)
SP GR SPEC: 1.02 — SIGNIFICANT CHANGE UP (ref 1–1.05)
SP GR SPEC: 1.03 — SIGNIFICANT CHANGE UP (ref 1.01–1.05)
SP GR SPEC: 1.03 — SIGNIFICANT CHANGE UP (ref 1–1.05)
SPECIMEN SOURCE: SIGNIFICANT CHANGE UP
TIBC SERPL-MCNC: 248 UG/DL — SIGNIFICANT CHANGE UP (ref 220–430)
TIBC SERPL-MCNC: 291 UG/DL — SIGNIFICANT CHANGE UP (ref 220–430)
TIBC SERPL-MCNC: 327 UG/DL — SIGNIFICANT CHANGE UP (ref 220–430)
TRANSFERRIN SERPL-MCNC: 260 MG/DL — SIGNIFICANT CHANGE UP (ref 200–360)
TROPONIN T, HIGH SENSITIVITY RESULT: 24 NG/L — SIGNIFICANT CHANGE UP (ref 0–51)
TROPONIN T, HIGH SENSITIVITY RESULT: 50 NG/L — SIGNIFICANT CHANGE UP
TROPONIN T, HIGH SENSITIVITY RESULT: 63 NG/L — CRITICAL HIGH
TROPONIN T, HIGH SENSITIVITY RESULT: 73 NG/L — CRITICAL HIGH
TSH SERPL-MCNC: 2.43 UIU/ML — SIGNIFICANT CHANGE UP (ref 0.27–4.2)
UIBC SERPL-MCNC: 212 UG/DL — SIGNIFICANT CHANGE UP (ref 110–370)
UIBC SERPL-MCNC: 279 UG/DL — SIGNIFICANT CHANGE UP (ref 110–370)
UIBC SERPL-MCNC: 299 UG/DL — SIGNIFICANT CHANGE UP (ref 110–370)
UROBILINOGEN FLD QL: NEGATIVE — SIGNIFICANT CHANGE UP
UROBILINOGEN FLD QL: NEGATIVE — SIGNIFICANT CHANGE UP
UROBILINOGEN FLD QL: SIGNIFICANT CHANGE UP
VIT B12 SERPL-MCNC: 1301 PG/ML — HIGH (ref 232–1245)
VIT B12 SERPL-MCNC: 1805 PG/ML — HIGH (ref 232–1245)
WBC # BLD: 10.05 K/UL — SIGNIFICANT CHANGE UP (ref 3.8–10.5)
WBC # BLD: 10.09 K/UL — SIGNIFICANT CHANGE UP (ref 3.8–10.5)
WBC # BLD: 10.16 K/UL — SIGNIFICANT CHANGE UP (ref 3.8–10.5)
WBC # BLD: 10.52 K/UL — HIGH (ref 3.8–10.5)
WBC # BLD: 10.62 K/UL — HIGH (ref 3.8–10.5)
WBC # BLD: 10.65 K/UL — HIGH (ref 3.8–10.5)
WBC # BLD: 10.96 K/UL — HIGH (ref 3.8–10.5)
WBC # BLD: 11.15 K/UL — HIGH (ref 3.8–10.5)
WBC # BLD: 11.78 K/UL — HIGH (ref 3.8–10.5)
WBC # BLD: 11.8 K/UL — HIGH (ref 3.8–10.5)
WBC # BLD: 12.02 K/UL — HIGH (ref 3.8–10.5)
WBC # BLD: 12.09 K/UL — HIGH (ref 3.8–10.5)
WBC # BLD: 12.33 K/UL — HIGH (ref 3.8–10.5)
WBC # BLD: 13.3 K/UL — HIGH (ref 3.8–10.5)
WBC # BLD: 19.89 K/UL — HIGH (ref 3.8–10.5)
WBC # BLD: 4.17 K/UL — SIGNIFICANT CHANGE UP (ref 3.8–10.5)
WBC # BLD: 4.25 K/UL — SIGNIFICANT CHANGE UP (ref 3.8–10.5)
WBC # BLD: 5.5 K/UL — SIGNIFICANT CHANGE UP (ref 3.8–10.5)
WBC # BLD: 6.03 K/UL — SIGNIFICANT CHANGE UP (ref 3.8–10.5)
WBC # BLD: 6.05 K/UL — SIGNIFICANT CHANGE UP (ref 3.8–10.5)
WBC # BLD: 6.29 K/UL — SIGNIFICANT CHANGE UP (ref 3.8–10.5)
WBC # BLD: 6.37 K/UL — SIGNIFICANT CHANGE UP (ref 3.8–10.5)
WBC # BLD: 6.4 K/UL — SIGNIFICANT CHANGE UP (ref 3.8–10.5)
WBC # BLD: 6.42 K/UL — SIGNIFICANT CHANGE UP (ref 3.8–10.5)
WBC # BLD: 6.65 K/UL — SIGNIFICANT CHANGE UP (ref 3.8–10.5)
WBC # BLD: 6.66 K/UL — SIGNIFICANT CHANGE UP (ref 3.8–10.5)
WBC # BLD: 6.68 K/UL — SIGNIFICANT CHANGE UP (ref 3.8–10.5)
WBC # BLD: 6.75 K/UL — SIGNIFICANT CHANGE UP (ref 3.8–10.5)
WBC # BLD: 6.87 K/UL — SIGNIFICANT CHANGE UP (ref 3.8–10.5)
WBC # BLD: 6.98 K/UL — SIGNIFICANT CHANGE UP (ref 3.8–10.5)
WBC # BLD: 7.17 K/UL — SIGNIFICANT CHANGE UP (ref 3.8–10.5)
WBC # BLD: 7.22 K/UL — SIGNIFICANT CHANGE UP (ref 3.8–10.5)
WBC # BLD: 7.23 K/UL — SIGNIFICANT CHANGE UP (ref 3.8–10.5)
WBC # BLD: 7.25 K/UL — SIGNIFICANT CHANGE UP (ref 3.8–10.5)
WBC # BLD: 7.29 K/UL — SIGNIFICANT CHANGE UP (ref 3.8–10.5)
WBC # BLD: 7.42 K/UL — SIGNIFICANT CHANGE UP (ref 3.8–10.5)
WBC # BLD: 7.45 K/UL — SIGNIFICANT CHANGE UP (ref 3.8–10.5)
WBC # BLD: 7.51 K/UL — SIGNIFICANT CHANGE UP (ref 3.8–10.5)
WBC # BLD: 7.69 K/UL — SIGNIFICANT CHANGE UP (ref 3.8–10.5)
WBC # BLD: 7.72 K/UL — SIGNIFICANT CHANGE UP (ref 3.8–10.5)
WBC # BLD: 7.75 K/UL — SIGNIFICANT CHANGE UP (ref 3.8–10.5)
WBC # BLD: 7.81 K/UL — SIGNIFICANT CHANGE UP (ref 3.8–10.5)
WBC # BLD: 8.09 K/UL — SIGNIFICANT CHANGE UP (ref 3.8–10.5)
WBC # BLD: 8.26 K/UL — SIGNIFICANT CHANGE UP (ref 3.8–10.5)
WBC # BLD: 8.28 K/UL — SIGNIFICANT CHANGE UP (ref 3.8–10.5)
WBC # BLD: 8.37 K/UL — SIGNIFICANT CHANGE UP (ref 3.8–10.5)
WBC # BLD: 8.47 K/UL — SIGNIFICANT CHANGE UP (ref 3.8–10.5)
WBC # BLD: 8.66 K/UL — SIGNIFICANT CHANGE UP (ref 3.8–10.5)
WBC # BLD: 8.87 K/UL — SIGNIFICANT CHANGE UP (ref 3.8–10.5)
WBC # BLD: 8.91 K/UL — SIGNIFICANT CHANGE UP (ref 3.8–10.5)
WBC # BLD: 9.05 K/UL — SIGNIFICANT CHANGE UP (ref 3.8–10.5)
WBC # BLD: 9.07 K/UL — SIGNIFICANT CHANGE UP (ref 3.8–10.5)
WBC # BLD: 9.3 K/UL — SIGNIFICANT CHANGE UP (ref 3.8–10.5)
WBC # BLD: 9.43 K/UL — SIGNIFICANT CHANGE UP (ref 3.8–10.5)
WBC # BLD: 9.52 K/UL — SIGNIFICANT CHANGE UP (ref 3.8–10.5)
WBC # BLD: 9.56 K/UL — SIGNIFICANT CHANGE UP (ref 3.8–10.5)
WBC # BLD: 9.64 K/UL — SIGNIFICANT CHANGE UP (ref 3.8–10.5)
WBC # FLD AUTO: 10.05 K/UL — SIGNIFICANT CHANGE UP (ref 3.8–10.5)
WBC # FLD AUTO: 10.09 K/UL — SIGNIFICANT CHANGE UP (ref 3.8–10.5)
WBC # FLD AUTO: 10.16 K/UL — SIGNIFICANT CHANGE UP (ref 3.8–10.5)
WBC # FLD AUTO: 10.52 K/UL — HIGH (ref 3.8–10.5)
WBC # FLD AUTO: 10.62 K/UL — HIGH (ref 3.8–10.5)
WBC # FLD AUTO: 10.65 K/UL — HIGH (ref 3.8–10.5)
WBC # FLD AUTO: 10.96 K/UL — HIGH (ref 3.8–10.5)
WBC # FLD AUTO: 11.15 K/UL — HIGH (ref 3.8–10.5)
WBC # FLD AUTO: 11.78 K/UL — HIGH (ref 3.8–10.5)
WBC # FLD AUTO: 11.8 K/UL — HIGH (ref 3.8–10.5)
WBC # FLD AUTO: 12.02 K/UL — HIGH (ref 3.8–10.5)
WBC # FLD AUTO: 12.09 K/UL — HIGH (ref 3.8–10.5)
WBC # FLD AUTO: 12.33 K/UL — HIGH (ref 3.8–10.5)
WBC # FLD AUTO: 13.3 K/UL — HIGH (ref 3.8–10.5)
WBC # FLD AUTO: 19.89 K/UL — HIGH (ref 3.8–10.5)
WBC # FLD AUTO: 4.17 K/UL — SIGNIFICANT CHANGE UP (ref 3.8–10.5)
WBC # FLD AUTO: 4.25 K/UL — SIGNIFICANT CHANGE UP (ref 3.8–10.5)
WBC # FLD AUTO: 5.5 K/UL — SIGNIFICANT CHANGE UP (ref 3.8–10.5)
WBC # FLD AUTO: 6.03 K/UL — SIGNIFICANT CHANGE UP (ref 3.8–10.5)
WBC # FLD AUTO: 6.05 K/UL — SIGNIFICANT CHANGE UP (ref 3.8–10.5)
WBC # FLD AUTO: 6.29 K/UL — SIGNIFICANT CHANGE UP (ref 3.8–10.5)
WBC # FLD AUTO: 6.37 K/UL — SIGNIFICANT CHANGE UP (ref 3.8–10.5)
WBC # FLD AUTO: 6.4 K/UL — SIGNIFICANT CHANGE UP (ref 3.8–10.5)
WBC # FLD AUTO: 6.42 K/UL — SIGNIFICANT CHANGE UP (ref 3.8–10.5)
WBC # FLD AUTO: 6.65 K/UL — SIGNIFICANT CHANGE UP (ref 3.8–10.5)
WBC # FLD AUTO: 6.66 K/UL — SIGNIFICANT CHANGE UP (ref 3.8–10.5)
WBC # FLD AUTO: 6.68 K/UL — SIGNIFICANT CHANGE UP (ref 3.8–10.5)
WBC # FLD AUTO: 6.75 K/UL — SIGNIFICANT CHANGE UP (ref 3.8–10.5)
WBC # FLD AUTO: 6.87 K/UL — SIGNIFICANT CHANGE UP (ref 3.8–10.5)
WBC # FLD AUTO: 6.98 K/UL — SIGNIFICANT CHANGE UP (ref 3.8–10.5)
WBC # FLD AUTO: 7.17 K/UL — SIGNIFICANT CHANGE UP (ref 3.8–10.5)
WBC # FLD AUTO: 7.22 K/UL — SIGNIFICANT CHANGE UP (ref 3.8–10.5)
WBC # FLD AUTO: 7.23 K/UL — SIGNIFICANT CHANGE UP (ref 3.8–10.5)
WBC # FLD AUTO: 7.25 K/UL — SIGNIFICANT CHANGE UP (ref 3.8–10.5)
WBC # FLD AUTO: 7.29 K/UL — SIGNIFICANT CHANGE UP (ref 3.8–10.5)
WBC # FLD AUTO: 7.42 K/UL — SIGNIFICANT CHANGE UP (ref 3.8–10.5)
WBC # FLD AUTO: 7.45 K/UL — SIGNIFICANT CHANGE UP (ref 3.8–10.5)
WBC # FLD AUTO: 7.51 K/UL — SIGNIFICANT CHANGE UP (ref 3.8–10.5)
WBC # FLD AUTO: 7.69 K/UL — SIGNIFICANT CHANGE UP (ref 3.8–10.5)
WBC # FLD AUTO: 7.72 K/UL — SIGNIFICANT CHANGE UP (ref 3.8–10.5)
WBC # FLD AUTO: 7.75 K/UL — SIGNIFICANT CHANGE UP (ref 3.8–10.5)
WBC # FLD AUTO: 7.81 K/UL — SIGNIFICANT CHANGE UP (ref 3.8–10.5)
WBC # FLD AUTO: 8.09 K/UL — SIGNIFICANT CHANGE UP (ref 3.8–10.5)
WBC # FLD AUTO: 8.26 K/UL — SIGNIFICANT CHANGE UP (ref 3.8–10.5)
WBC # FLD AUTO: 8.28 K/UL — SIGNIFICANT CHANGE UP (ref 3.8–10.5)
WBC # FLD AUTO: 8.37 K/UL — SIGNIFICANT CHANGE UP (ref 3.8–10.5)
WBC # FLD AUTO: 8.47 K/UL — SIGNIFICANT CHANGE UP (ref 3.8–10.5)
WBC # FLD AUTO: 8.66 K/UL — SIGNIFICANT CHANGE UP (ref 3.8–10.5)
WBC # FLD AUTO: 8.87 K/UL — SIGNIFICANT CHANGE UP (ref 3.8–10.5)
WBC # FLD AUTO: 8.91 K/UL — SIGNIFICANT CHANGE UP (ref 3.8–10.5)
WBC # FLD AUTO: 9.05 K/UL — SIGNIFICANT CHANGE UP (ref 3.8–10.5)
WBC # FLD AUTO: 9.07 K/UL — SIGNIFICANT CHANGE UP (ref 3.8–10.5)
WBC # FLD AUTO: 9.3 K/UL — SIGNIFICANT CHANGE UP (ref 3.8–10.5)
WBC # FLD AUTO: 9.43 K/UL — SIGNIFICANT CHANGE UP (ref 3.8–10.5)
WBC # FLD AUTO: 9.52 K/UL — SIGNIFICANT CHANGE UP (ref 3.8–10.5)
WBC # FLD AUTO: 9.56 K/UL — SIGNIFICANT CHANGE UP (ref 3.8–10.5)
WBC # FLD AUTO: 9.64 K/UL — SIGNIFICANT CHANGE UP (ref 3.8–10.5)
WBC UR QL: 0 /HPF — SIGNIFICANT CHANGE UP (ref 0–5)
WBC UR QL: 1 /HPF — SIGNIFICANT CHANGE UP (ref 0–5)

## 2022-01-01 PROCEDURE — 93005 ELECTROCARDIOGRAM TRACING: CPT

## 2022-01-01 PROCEDURE — 70450 CT HEAD/BRAIN W/O DYE: CPT | Mod: 26,MA

## 2022-01-01 PROCEDURE — 83615 LACTATE (LD) (LDH) ENZYME: CPT

## 2022-01-01 PROCEDURE — 93010 ELECTROCARDIOGRAM REPORT: CPT

## 2022-01-01 PROCEDURE — 36415 COLL VENOUS BLD VENIPUNCTURE: CPT

## 2022-01-01 PROCEDURE — 99232 SBSQ HOSP IP/OBS MODERATE 35: CPT

## 2022-01-01 PROCEDURE — 99233 SBSQ HOSP IP/OBS HIGH 50: CPT

## 2022-01-01 PROCEDURE — U0005: CPT

## 2022-01-01 PROCEDURE — 49451 REPLACE DUOD/JEJ TUBE PERC: CPT

## 2022-01-01 PROCEDURE — 12002 RPR S/N/AX/GEN/TRNK2.6-7.5CM: CPT

## 2022-01-01 PROCEDURE — 82435 ASSAY OF BLOOD CHLORIDE: CPT

## 2022-01-01 PROCEDURE — 74177 CT ABD & PELVIS W/CONTRAST: CPT | Mod: MG

## 2022-01-01 PROCEDURE — 99223 1ST HOSP IP/OBS HIGH 75: CPT

## 2022-01-01 PROCEDURE — 99285 EMERGENCY DEPT VISIT HI MDM: CPT | Mod: 25

## 2022-01-01 PROCEDURE — C1769: CPT

## 2022-01-01 PROCEDURE — 81001 URINALYSIS AUTO W/SCOPE: CPT

## 2022-01-01 PROCEDURE — 44186 LAP JEJUNOSTOMY: CPT

## 2022-01-01 PROCEDURE — 80053 COMPREHEN METABOLIC PANEL: CPT

## 2022-01-01 PROCEDURE — 80048 BASIC METABOLIC PNL TOTAL CA: CPT

## 2022-01-01 PROCEDURE — 88307 TISSUE EXAM BY PATHOLOGIST: CPT

## 2022-01-01 PROCEDURE — 74176 CT ABD & PELVIS W/O CONTRAST: CPT | Mod: 26

## 2022-01-01 PROCEDURE — 93306 TTE W/DOPPLER COMPLETE: CPT | Mod: 26

## 2022-01-01 PROCEDURE — 86850 RBC ANTIBODY SCREEN: CPT

## 2022-01-01 PROCEDURE — 71275 CT ANGIOGRAPHY CHEST: CPT | Mod: 26,MA

## 2022-01-01 PROCEDURE — 99024 POSTOP FOLLOW-UP VISIT: CPT

## 2022-01-01 PROCEDURE — 76000 FLUOROSCOPY <1 HR PHYS/QHP: CPT | Mod: 26,59,GC

## 2022-01-01 PROCEDURE — 83735 ASSAY OF MAGNESIUM: CPT

## 2022-01-01 PROCEDURE — 36430 TRANSFUSION BLD/BLD COMPNT: CPT

## 2022-01-01 PROCEDURE — 99233 SBSQ HOSP IP/OBS HIGH 50: CPT | Mod: GC

## 2022-01-01 PROCEDURE — 82378 CARCINOEMBRYONIC ANTIGEN: CPT

## 2022-01-01 PROCEDURE — 82803 BLOOD GASES ANY COMBINATION: CPT

## 2022-01-01 PROCEDURE — 84132 ASSAY OF SERUM POTASSIUM: CPT

## 2022-01-01 PROCEDURE — 83540 ASSAY OF IRON: CPT

## 2022-01-01 PROCEDURE — 82728 ASSAY OF FERRITIN: CPT

## 2022-01-01 PROCEDURE — 97162 PT EVAL MOD COMPLEX 30 MIN: CPT

## 2022-01-01 PROCEDURE — 99285 EMERGENCY DEPT VISIT HI MDM: CPT

## 2022-01-01 PROCEDURE — 87635 SARS-COV-2 COVID-19 AMP PRB: CPT

## 2022-01-01 PROCEDURE — 99231 SBSQ HOSP IP/OBS SF/LOW 25: CPT

## 2022-01-01 PROCEDURE — 83550 IRON BINDING TEST: CPT

## 2022-01-01 PROCEDURE — 76770 US EXAM ABDO BACK WALL COMP: CPT | Mod: 26

## 2022-01-01 PROCEDURE — 99223 1ST HOSP IP/OBS HIGH 75: CPT | Mod: GC

## 2022-01-01 PROCEDURE — 74177 CT ABD & PELVIS W/CONTRAST: CPT | Mod: 26,MG

## 2022-01-01 PROCEDURE — 99232 SBSQ HOSP IP/OBS MODERATE 35: CPT | Mod: GC

## 2022-01-01 PROCEDURE — 36573 INSJ PICC RS&I 5 YR+: CPT

## 2022-01-01 PROCEDURE — 86900 BLOOD TYPING SEROLOGIC ABO: CPT

## 2022-01-01 PROCEDURE — 87040 BLOOD CULTURE FOR BACTERIA: CPT

## 2022-01-01 PROCEDURE — 74018 RADEX ABDOMEN 1 VIEW: CPT | Mod: 26

## 2022-01-01 PROCEDURE — 85610 PROTHROMBIN TIME: CPT

## 2022-01-01 PROCEDURE — 83010 ASSAY OF HAPTOGLOBIN QUANT: CPT

## 2022-01-01 PROCEDURE — 93306 TTE W/DOPPLER COMPLETE: CPT

## 2022-01-01 PROCEDURE — 43753 TX GASTRO INTUB W/ASP: CPT

## 2022-01-01 PROCEDURE — 72125 CT NECK SPINE W/O DYE: CPT | Mod: 26,MA

## 2022-01-01 PROCEDURE — 88313 SPECIAL STAINS GROUP 2: CPT

## 2022-01-01 PROCEDURE — C1887: CPT

## 2022-01-01 PROCEDURE — 71045 X-RAY EXAM CHEST 1 VIEW: CPT | Mod: 26

## 2022-01-01 PROCEDURE — 82272 OCCULT BLD FECES 1-3 TESTS: CPT

## 2022-01-01 PROCEDURE — 0225U NFCT DS DNA&RNA 21 SARSCOV2: CPT

## 2022-01-01 PROCEDURE — 74330 X-RAY BILE/PANC ENDOSCOPY: CPT

## 2022-01-01 PROCEDURE — 88341 IMHCHEM/IMCYTCHM EA ADD ANTB: CPT | Mod: 26

## 2022-01-01 PROCEDURE — 71250 CT THORAX DX C-: CPT | Mod: 26

## 2022-01-01 PROCEDURE — 85025 COMPLETE CBC W/AUTO DIFF WBC: CPT

## 2022-01-01 PROCEDURE — 84466 ASSAY OF TRANSFERRIN: CPT

## 2022-01-01 PROCEDURE — 74177 CT ABD & PELVIS W/CONTRAST: CPT | Mod: 26,MA

## 2022-01-01 PROCEDURE — 96375 TX/PRO/DX INJ NEW DRUG ADDON: CPT

## 2022-01-01 PROCEDURE — 93010 ELECTROCARDIOGRAM REPORT: CPT | Mod: 76

## 2022-01-01 PROCEDURE — 99284 EMERGENCY DEPT VISIT MOD MDM: CPT

## 2022-01-01 PROCEDURE — 99222 1ST HOSP IP/OBS MODERATE 55: CPT

## 2022-01-01 PROCEDURE — G1004: CPT

## 2022-01-01 PROCEDURE — 86301 IMMUNOASSAY TUMOR CA 19-9: CPT

## 2022-01-01 PROCEDURE — 71045 X-RAY EXAM CHEST 1 VIEW: CPT

## 2022-01-01 PROCEDURE — 82746 ASSAY OF FOLIC ACID SERUM: CPT

## 2022-01-01 PROCEDURE — 85730 THROMBOPLASTIN TIME PARTIAL: CPT

## 2022-01-01 PROCEDURE — 85018 HEMOGLOBIN: CPT

## 2022-01-01 PROCEDURE — 85027 COMPLETE CBC AUTOMATED: CPT

## 2022-01-01 PROCEDURE — 93010 ELECTROCARDIOGRAM REPORT: CPT | Mod: 59

## 2022-01-01 PROCEDURE — 83605 ASSAY OF LACTIC ACID: CPT

## 2022-01-01 PROCEDURE — 88341 IMHCHEM/IMCYTCHM EA ADD ANTB: CPT

## 2022-01-01 PROCEDURE — 43830 GSTRST OPEN WO CONSTJ TUBE: CPT

## 2022-01-01 PROCEDURE — 36590 REMOVAL TUNNELED CV CATH: CPT

## 2022-01-01 PROCEDURE — U0003: CPT

## 2022-01-01 PROCEDURE — 49465 FLUORO EXAM OF G/COLON TUBE: CPT

## 2022-01-01 PROCEDURE — 88342 IMHCHEM/IMCYTCHM 1ST ANTB: CPT | Mod: 26

## 2022-01-01 PROCEDURE — 82607 VITAMIN B-12: CPT

## 2022-01-01 PROCEDURE — 88313 SPECIAL STAINS GROUP 2: CPT | Mod: 26

## 2022-01-01 PROCEDURE — 87637 SARSCOV2&INF A&B&RSV AMP PRB: CPT

## 2022-01-01 PROCEDURE — 87640 STAPH A DNA AMP PROBE: CPT

## 2022-01-01 PROCEDURE — 49465 FLUORO EXAM OF G/COLON TUBE: CPT | Mod: XS

## 2022-01-01 PROCEDURE — 87641 MR-STAPH DNA AMP PROBE: CPT

## 2022-01-01 PROCEDURE — 43238 EGD US FINE NEEDLE BX/ASPIR: CPT | Mod: GC

## 2022-01-01 PROCEDURE — 88307 TISSUE EXAM BY PATHOLOGIST: CPT | Mod: 26

## 2022-01-01 PROCEDURE — 72170 X-RAY EXAM OF PELVIS: CPT | Mod: 26

## 2022-01-01 PROCEDURE — 84100 ASSAY OF PHOSPHORUS: CPT

## 2022-01-01 PROCEDURE — 96374 THER/PROPH/DIAG INJ IV PUSH: CPT

## 2022-01-01 PROCEDURE — 84295 ASSAY OF SERUM SODIUM: CPT

## 2022-01-01 PROCEDURE — 83690 ASSAY OF LIPASE: CPT

## 2022-01-01 PROCEDURE — 86901 BLOOD TYPING SEROLOGIC RH(D): CPT

## 2022-01-01 PROCEDURE — 99222 1ST HOSP IP/OBS MODERATE 55: CPT | Mod: GC

## 2022-01-01 PROCEDURE — 86923 COMPATIBILITY TEST ELECTRIC: CPT

## 2022-01-01 PROCEDURE — L8699: CPT

## 2022-01-01 PROCEDURE — 71250 CT THORAX DX C-: CPT | Mod: MG

## 2022-01-01 PROCEDURE — P9016: CPT

## 2022-01-01 PROCEDURE — 99497 ADVNCD CARE PLAN 30 MIN: CPT | Mod: 25

## 2022-01-01 PROCEDURE — 82330 ASSAY OF CALCIUM: CPT

## 2022-01-01 PROCEDURE — 99214 OFFICE O/P EST MOD 30 MIN: CPT

## 2022-01-01 PROCEDURE — 86803 HEPATITIS C AB TEST: CPT

## 2022-01-01 PROCEDURE — 82947 ASSAY GLUCOSE BLOOD QUANT: CPT

## 2022-01-01 PROCEDURE — 84484 ASSAY OF TROPONIN QUANT: CPT

## 2022-01-01 PROCEDURE — 85014 HEMATOCRIT: CPT

## 2022-01-01 PROCEDURE — 94640 AIRWAY INHALATION TREATMENT: CPT

## 2022-01-01 DEVICE — GWIRE ANGL .025X180 STD: Type: IMPLANTABLE DEVICE | Status: FUNCTIONAL

## 2022-01-01 DEVICE — FEEDING TUBE GASTROSTOMY MIC 20FR: Type: IMPLANTABLE DEVICE | Status: FUNCTIONAL

## 2022-01-01 RX ORDER — SODIUM CHLORIDE 9 MG/ML
1000 INJECTION INTRAMUSCULAR; INTRAVENOUS; SUBCUTANEOUS
Refills: 0 | Status: DISCONTINUED | OUTPATIENT
Start: 2022-01-01 | End: 2022-01-01

## 2022-01-01 RX ORDER — AMLODIPINE BESYLATE 2.5 MG/1
10 TABLET ORAL DAILY
Refills: 0 | Status: DISCONTINUED | OUTPATIENT
Start: 2022-01-01 | End: 2022-01-01

## 2022-01-01 RX ORDER — ONDANSETRON 8 MG/1
4 TABLET, FILM COATED ORAL EVERY 8 HOURS
Refills: 0 | Status: DISCONTINUED | OUTPATIENT
Start: 2022-01-01 | End: 2022-01-01

## 2022-01-01 RX ORDER — SODIUM CHLORIDE 9 MG/ML
1000 INJECTION, SOLUTION INTRAVENOUS
Refills: 0 | Status: DISCONTINUED | OUTPATIENT
Start: 2022-01-01 | End: 2022-01-01

## 2022-01-01 RX ORDER — ACETAMINOPHEN 500 MG
650 TABLET ORAL ONCE
Refills: 0 | Status: COMPLETED | OUTPATIENT
Start: 2022-01-01 | End: 2022-01-01

## 2022-01-01 RX ORDER — BENZOCAINE AND MENTHOL 5; 1 G/100ML; G/100ML
1 LIQUID ORAL EVERY 6 HOURS
Refills: 0 | Status: DISCONTINUED | OUTPATIENT
Start: 2022-01-01 | End: 2022-01-01

## 2022-01-01 RX ORDER — SODIUM CHLORIDE 9 MG/ML
2000 INJECTION INTRAMUSCULAR; INTRAVENOUS; SUBCUTANEOUS ONCE
Refills: 0 | Status: COMPLETED | OUTPATIENT
Start: 2022-01-01 | End: 2022-01-01

## 2022-01-01 RX ORDER — SODIUM CHLORIDE 9 MG/ML
250 INJECTION INTRAMUSCULAR; INTRAVENOUS; SUBCUTANEOUS ONCE
Refills: 0 | Status: COMPLETED | OUTPATIENT
Start: 2022-01-01 | End: 2022-01-01

## 2022-01-01 RX ORDER — POTASSIUM PHOSPHATE, MONOBASIC POTASSIUM PHOSPHATE, DIBASIC 236; 224 MG/ML; MG/ML
15 INJECTION, SOLUTION INTRAVENOUS ONCE
Refills: 0 | Status: COMPLETED | OUTPATIENT
Start: 2022-01-01 | End: 2022-01-01

## 2022-01-01 RX ORDER — IRON SUCROSE 20 MG/ML
200 INJECTION, SOLUTION INTRAVENOUS EVERY 24 HOURS
Refills: 0 | Status: DISCONTINUED | OUTPATIENT
Start: 2022-01-01 | End: 2022-01-01

## 2022-01-01 RX ORDER — OXYCODONE HYDROCHLORIDE 5 MG/1
5 TABLET ORAL EVERY 4 HOURS
Refills: 0 | Status: DISCONTINUED | OUTPATIENT
Start: 2022-01-01 | End: 2022-01-01

## 2022-01-01 RX ORDER — SODIUM CHLORIDE 9 MG/ML
500 INJECTION, SOLUTION INTRAVENOUS
Refills: 0 | Status: DISCONTINUED | OUTPATIENT
Start: 2022-01-01 | End: 2022-01-01

## 2022-01-01 RX ORDER — BENZOCAINE AND MENTHOL 5; 1 G/100ML; G/100ML
1 LIQUID ORAL THREE TIMES A DAY
Refills: 0 | Status: DISCONTINUED | OUTPATIENT
Start: 2022-01-01 | End: 2022-01-01

## 2022-01-01 RX ORDER — HYDRALAZINE HCL 50 MG
2.5 TABLET ORAL ONCE
Refills: 0 | Status: COMPLETED | OUTPATIENT
Start: 2022-01-01 | End: 2022-01-01

## 2022-01-01 RX ORDER — HYDROMORPHONE HYDROCHLORIDE 2 MG/ML
0.5 INJECTION INTRAMUSCULAR; INTRAVENOUS; SUBCUTANEOUS EVERY 6 HOURS
Refills: 0 | Status: DISCONTINUED | OUTPATIENT
Start: 2022-01-01 | End: 2022-01-01

## 2022-01-01 RX ORDER — BENZOCAINE AND MENTHOL 5; 1 G/100ML; G/100ML
1 LIQUID ORAL ONCE
Refills: 0 | Status: DISCONTINUED | OUTPATIENT
Start: 2022-01-01 | End: 2022-01-01

## 2022-01-01 RX ORDER — PIPERACILLIN AND TAZOBACTAM 4; .5 G/20ML; G/20ML
3.38 INJECTION, POWDER, LYOPHILIZED, FOR SOLUTION INTRAVENOUS EVERY 8 HOURS
Refills: 0 | Status: DISCONTINUED | OUTPATIENT
Start: 2022-01-01 | End: 2022-01-01

## 2022-01-01 RX ORDER — SODIUM,POTASSIUM PHOSPHATES 278-250MG
2 POWDER IN PACKET (EA) ORAL ONCE
Refills: 0 | Status: COMPLETED | OUTPATIENT
Start: 2022-01-01 | End: 2022-01-01

## 2022-01-01 RX ORDER — ENOXAPARIN SODIUM 100 MG/ML
40 INJECTION SUBCUTANEOUS EVERY 24 HOURS
Refills: 0 | Status: DISCONTINUED | OUTPATIENT
Start: 2022-01-01 | End: 2022-01-01

## 2022-01-01 RX ORDER — PANTOPRAZOLE SODIUM 20 MG/1
40 TABLET, DELAYED RELEASE ORAL
Qty: 120 | Refills: 0
Start: 2022-01-01 | End: 2022-01-01

## 2022-01-01 RX ORDER — CEFAZOLIN SODIUM 1 G
2000 VIAL (EA) INJECTION ONCE
Refills: 0 | Status: COMPLETED | OUTPATIENT
Start: 2022-01-01 | End: 2022-01-01

## 2022-01-01 RX ORDER — PANTOPRAZOLE SODIUM 20 MG/1
40 TABLET, DELAYED RELEASE ORAL
Refills: 0 | Status: DISCONTINUED | OUTPATIENT
Start: 2022-01-01 | End: 2022-01-01

## 2022-01-01 RX ORDER — ONDANSETRON 8 MG/1
4 TABLET, FILM COATED ORAL ONCE
Refills: 0 | Status: COMPLETED | OUTPATIENT
Start: 2022-01-01 | End: 2022-01-01

## 2022-01-01 RX ORDER — ACETAMINOPHEN 500 MG
1000 TABLET ORAL ONCE
Refills: 0 | Status: COMPLETED | OUTPATIENT
Start: 2022-01-01 | End: 2022-01-01

## 2022-01-01 RX ORDER — SODIUM CHLORIDE 9 MG/ML
10 INJECTION INTRAMUSCULAR; INTRAVENOUS; SUBCUTANEOUS
Refills: 0 | Status: DISCONTINUED | OUTPATIENT
Start: 2022-01-01 | End: 2022-01-01

## 2022-01-01 RX ORDER — ATENOLOL 25 MG/1
25 TABLET ORAL DAILY
Refills: 0 | Status: DISCONTINUED | OUTPATIENT
Start: 2022-01-01 | End: 2022-01-01

## 2022-01-01 RX ORDER — FLUTICASONE PROPIONATE 50 MCG
2 SPRAY, SUSPENSION NASAL ONCE
Refills: 0 | Status: COMPLETED | OUTPATIENT
Start: 2022-01-01 | End: 2022-01-01

## 2022-01-01 RX ORDER — DEXTROSE MONOHYDRATE, SODIUM CHLORIDE, AND POTASSIUM CHLORIDE 50; .745; 4.5 G/1000ML; G/1000ML; G/1000ML
1000 INJECTION, SOLUTION INTRAVENOUS
Refills: 0 | Status: DISCONTINUED | OUTPATIENT
Start: 2022-01-01 | End: 2022-01-01

## 2022-01-01 RX ORDER — INFLUENZA VIRUS VACCINE 15; 15; 15; 15 UG/.5ML; UG/.5ML; UG/.5ML; UG/.5ML
0.7 SUSPENSION INTRAMUSCULAR ONCE
Refills: 0 | Status: DISCONTINUED | OUTPATIENT
Start: 2022-01-01 | End: 2022-01-01

## 2022-01-01 RX ORDER — SODIUM CHLORIDE 9 MG/ML
1000 INJECTION INTRAMUSCULAR; INTRAVENOUS; SUBCUTANEOUS ONCE
Refills: 0 | Status: DISCONTINUED | OUTPATIENT
Start: 2022-01-01 | End: 2022-01-01

## 2022-01-01 RX ORDER — MAGNESIUM SULFATE 500 MG/ML
2 VIAL (ML) INJECTION ONCE
Refills: 0 | Status: DISCONTINUED | OUTPATIENT
Start: 2022-01-01 | End: 2022-01-01

## 2022-01-01 RX ORDER — CHLORHEXIDINE GLUCONATE 213 G/1000ML
1 SOLUTION TOPICAL DAILY
Refills: 0 | Status: DISCONTINUED | OUTPATIENT
Start: 2022-01-01 | End: 2022-01-01

## 2022-01-01 RX ORDER — BENZOCAINE 10 %
1 GEL (GRAM) MUCOUS MEMBRANE THREE TIMES A DAY
Refills: 0 | Status: DISCONTINUED | OUTPATIENT
Start: 2022-01-01 | End: 2022-01-01

## 2022-01-01 RX ORDER — PANTOPRAZOLE SODIUM 20 MG/1
1 TABLET, DELAYED RELEASE ORAL
Qty: 30 | Refills: 0
Start: 2022-01-01 | End: 2022-01-01

## 2022-01-01 RX ORDER — HYDRALAZINE HCL 50 MG
25 TABLET ORAL THREE TIMES A DAY
Refills: 0 | Status: DISCONTINUED | OUTPATIENT
Start: 2022-01-01 | End: 2022-01-01

## 2022-01-01 RX ORDER — SODIUM CHLORIDE 9 MG/ML
1000 INJECTION, SOLUTION INTRAVENOUS ONCE
Refills: 0 | Status: COMPLETED | OUTPATIENT
Start: 2022-01-01 | End: 2022-01-01

## 2022-01-01 RX ORDER — NYSTATIN 500MM UNIT
5 POWDER (EA) MISCELLANEOUS
Qty: 0 | Refills: 0 | DISCHARGE
Start: 2022-01-01

## 2022-01-01 RX ORDER — SODIUM CHLORIDE 9 MG/ML
500 INJECTION INTRAMUSCULAR; INTRAVENOUS; SUBCUTANEOUS ONCE
Refills: 0 | Status: COMPLETED | OUTPATIENT
Start: 2022-01-01 | End: 2022-01-01

## 2022-01-01 RX ORDER — SODIUM BICARBONATE 1 MEQ/ML
650 SYRINGE (ML) INTRAVENOUS ONCE
Refills: 0 | Status: COMPLETED | OUTPATIENT
Start: 2022-01-01 | End: 2022-01-01

## 2022-01-01 RX ORDER — MUPIROCIN 20 MG/G
1 OINTMENT TOPICAL
Refills: 0 | Status: DISCONTINUED | OUTPATIENT
Start: 2022-01-01 | End: 2022-01-01

## 2022-01-01 RX ORDER — AMLODIPINE BESYLATE 2.5 MG/1
5 TABLET ORAL DAILY
Refills: 0 | Status: DISCONTINUED | OUTPATIENT
Start: 2022-01-01 | End: 2022-01-01

## 2022-01-01 RX ORDER — FLUTICASONE PROPIONATE 50 MCG
1 SPRAY, SUSPENSION NASAL EVERY 12 HOURS
Refills: 0 | Status: DISCONTINUED | OUTPATIENT
Start: 2022-01-01 | End: 2022-01-01

## 2022-01-01 RX ORDER — ACETAMINOPHEN 500 MG
1000 TABLET ORAL EVERY 6 HOURS
Refills: 0 | Status: COMPLETED | OUTPATIENT
Start: 2022-01-01 | End: 2022-01-01

## 2022-01-01 RX ORDER — NYSTATIN 500MM UNIT
500000 POWDER (EA) MISCELLANEOUS
Refills: 0 | Status: DISCONTINUED | OUTPATIENT
Start: 2022-01-01 | End: 2022-01-01

## 2022-01-01 RX ORDER — ATENOLOL 25 MG/1
50 TABLET ORAL DAILY
Refills: 0 | Status: DISCONTINUED | OUTPATIENT
Start: 2022-01-01 | End: 2022-01-01

## 2022-01-01 RX ORDER — POTASSIUM CHLORIDE 20 MEQ
40 PACKET (EA) ORAL EVERY 4 HOURS
Refills: 0 | Status: DISCONTINUED | OUTPATIENT
Start: 2022-01-01 | End: 2022-01-01

## 2022-01-01 RX ORDER — LANOLIN/MINERAL OIL
1 LOTION (ML) TOPICAL DAILY
Refills: 0 | Status: DISCONTINUED | OUTPATIENT
Start: 2022-01-01 | End: 2022-01-01

## 2022-01-01 RX ORDER — BENZOCAINE AND MENTHOL 5; 1 G/100ML; G/100ML
1 LIQUID ORAL
Refills: 0 | Status: DISCONTINUED | OUTPATIENT
Start: 2022-01-01 | End: 2022-01-01

## 2022-01-01 RX ORDER — MAGNESIUM SULFATE 500 MG/ML
1 VIAL (ML) INJECTION ONCE
Refills: 0 | Status: DISCONTINUED | OUTPATIENT
Start: 2022-01-01 | End: 2022-01-01

## 2022-01-01 RX ORDER — HEPARIN SODIUM 5000 [USP'U]/ML
5000 INJECTION INTRAVENOUS; SUBCUTANEOUS EVERY 8 HOURS
Refills: 0 | Status: DISCONTINUED | OUTPATIENT
Start: 2022-01-01 | End: 2022-01-01

## 2022-01-01 RX ORDER — MUPIROCIN 20 MG/G
1 OINTMENT TOPICAL THREE TIMES A DAY
Refills: 0 | Status: DISCONTINUED | OUTPATIENT
Start: 2022-01-01 | End: 2022-01-01

## 2022-01-01 RX ORDER — HYDRALAZINE HCL 50 MG
10 TABLET ORAL ONCE
Refills: 0 | Status: COMPLETED | OUTPATIENT
Start: 2022-01-01 | End: 2022-01-01

## 2022-01-01 RX ORDER — LANOLIN/MINERAL OIL
1 LOTION (ML) TOPICAL
Qty: 0 | Refills: 0 | DISCHARGE

## 2022-01-01 RX ORDER — SODIUM CHLORIDE 9 MG/ML
2200 INJECTION, SOLUTION INTRAVENOUS ONCE
Refills: 0 | Status: COMPLETED | OUTPATIENT
Start: 2022-01-01 | End: 2022-01-01

## 2022-01-01 RX ORDER — LANOLIN ALCOHOL/MO/W.PET/CERES
3 CREAM (GRAM) TOPICAL AT BEDTIME
Refills: 0 | Status: DISCONTINUED | OUTPATIENT
Start: 2022-01-01 | End: 2022-01-01

## 2022-01-01 RX ORDER — PIPERACILLIN AND TAZOBACTAM 4; .5 G/20ML; G/20ML
3.38 INJECTION, POWDER, LYOPHILIZED, FOR SOLUTION INTRAVENOUS ONCE
Refills: 0 | Status: COMPLETED | OUTPATIENT
Start: 2022-01-01 | End: 2022-01-01

## 2022-01-01 RX ORDER — HEPARIN SODIUM 5000 [USP'U]/ML
5000 INJECTION INTRAVENOUS; SUBCUTANEOUS EVERY 12 HOURS
Refills: 0 | Status: DISCONTINUED | OUTPATIENT
Start: 2022-01-01 | End: 2022-01-01

## 2022-01-01 RX ORDER — POTASSIUM CHLORIDE 20 MEQ
40 PACKET (EA) ORAL ONCE
Refills: 0 | Status: COMPLETED | OUTPATIENT
Start: 2022-01-01 | End: 2022-01-01

## 2022-01-01 RX ORDER — CEFAZOLIN SODIUM 1 G
2000 VIAL (EA) INJECTION EVERY 8 HOURS
Refills: 0 | Status: DISCONTINUED | OUTPATIENT
Start: 2022-01-01 | End: 2022-01-01

## 2022-01-01 RX ORDER — ATENOLOL 25 MG/1
1 TABLET ORAL
Qty: 30 | Refills: 0
Start: 2022-01-01 | End: 2022-01-01

## 2022-01-01 RX ORDER — VANCOMYCIN HCL 1 G
1000 VIAL (EA) INTRAVENOUS EVERY 12 HOURS
Refills: 0 | Status: DISCONTINUED | OUTPATIENT
Start: 2022-01-01 | End: 2022-01-01

## 2022-01-01 RX ORDER — LOPERAMIDE HCL 2 MG
4 TABLET ORAL ONCE
Refills: 0 | Status: COMPLETED | OUTPATIENT
Start: 2022-01-01 | End: 2022-01-01

## 2022-01-01 RX ORDER — ACETAMINOPHEN 500 MG
650 TABLET ORAL EVERY 6 HOURS
Refills: 0 | Status: DISCONTINUED | OUTPATIENT
Start: 2022-01-01 | End: 2022-01-01

## 2022-01-01 RX ORDER — FAMOTIDINE 10 MG/ML
20 INJECTION INTRAVENOUS ONCE
Refills: 0 | Status: COMPLETED | OUTPATIENT
Start: 2022-01-01 | End: 2022-01-01

## 2022-01-01 RX ORDER — CAPECITABINE 500 MG/1
0 TABLET ORAL
Qty: 0 | Refills: 0 | DISCHARGE

## 2022-01-01 RX ORDER — LOPERAMIDE HCL 2 MG
2 TABLET ORAL ONCE
Refills: 0 | Status: COMPLETED | OUTPATIENT
Start: 2022-01-01 | End: 2022-01-01

## 2022-01-01 RX ORDER — PANTOPRAZOLE SODIUM 20 MG/1
40 TABLET, DELAYED RELEASE ORAL DAILY
Refills: 0 | Status: DISCONTINUED | OUTPATIENT
Start: 2022-01-01 | End: 2022-01-01

## 2022-01-01 RX ORDER — HYDRALAZINE HCL 50 MG
2.5 TABLET ORAL DAILY
Refills: 0 | Status: DISCONTINUED | OUTPATIENT
Start: 2022-01-01 | End: 2022-01-01

## 2022-01-01 RX ORDER — NYSTATIN 500MM UNIT
500000 POWDER (EA) MISCELLANEOUS
Refills: 0 | Status: COMPLETED | OUTPATIENT
Start: 2022-01-01 | End: 2022-01-01

## 2022-01-01 RX ORDER — POTASSIUM CHLORIDE 20 MEQ
10 PACKET (EA) ORAL
Refills: 0 | Status: COMPLETED | OUTPATIENT
Start: 2022-01-01 | End: 2022-01-01

## 2022-01-01 RX ORDER — ATENOLOL 25 MG/1
25 TABLET ORAL ONCE
Refills: 0 | Status: DISCONTINUED | OUTPATIENT
Start: 2022-01-01 | End: 2022-01-01

## 2022-01-01 RX ORDER — ONDANSETRON 8 MG/1
1 TABLET, FILM COATED ORAL
Qty: 90 | Refills: 0
Start: 2022-01-01 | End: 2022-01-01

## 2022-01-01 RX ORDER — HYDRALAZINE HCL 50 MG
25 TABLET ORAL ONCE
Refills: 0 | Status: COMPLETED | OUTPATIENT
Start: 2022-01-01 | End: 2022-01-01

## 2022-01-01 RX ORDER — PANTOPRAZOLE SODIUM 20 MG/1
80 TABLET, DELAYED RELEASE ORAL ONCE
Refills: 0 | Status: COMPLETED | OUTPATIENT
Start: 2022-01-01 | End: 2022-01-01

## 2022-01-01 RX ORDER — LOPERAMIDE HCL 2 MG
2 TABLET ORAL ONCE
Refills: 0 | Status: DISCONTINUED | OUTPATIENT
Start: 2022-01-01 | End: 2022-01-01

## 2022-01-01 RX ORDER — ASPIRIN/CALCIUM CARB/MAGNESIUM 324 MG
81 TABLET ORAL DAILY
Refills: 0 | Status: DISCONTINUED | OUTPATIENT
Start: 2022-01-01 | End: 2022-01-01

## 2022-01-01 RX ORDER — AMLODIPINE BESYLATE 2.5 MG/1
1 TABLET ORAL
Qty: 30 | Refills: 0
Start: 2022-01-01 | End: 2022-01-01

## 2022-01-01 RX ORDER — POTASSIUM PHOSPHATE, MONOBASIC POTASSIUM PHOSPHATE, DIBASIC 236; 224 MG/ML; MG/ML
30 INJECTION, SOLUTION INTRAVENOUS ONCE
Refills: 0 | Status: DISCONTINUED | OUTPATIENT
Start: 2022-01-01 | End: 2022-01-01

## 2022-01-01 RX ORDER — POTASSIUM PHOSPHATE, MONOBASIC POTASSIUM PHOSPHATE, DIBASIC 236; 224 MG/ML; MG/ML
30 INJECTION, SOLUTION INTRAVENOUS ONCE
Refills: 0 | Status: COMPLETED | OUTPATIENT
Start: 2022-01-01 | End: 2022-01-01

## 2022-01-01 RX ORDER — LIPASE/PROTEASE/AMYLASE 16-48-48K
1 CAPSULE,DELAYED RELEASE (ENTERIC COATED) ORAL
Qty: 30 | Refills: 0
Start: 2022-01-01 | End: 2022-01-01

## 2022-01-01 RX ORDER — VANCOMYCIN HCL 1 G
1000 VIAL (EA) INTRAVENOUS ONCE
Refills: 0 | Status: COMPLETED | OUTPATIENT
Start: 2022-01-01 | End: 2022-01-01

## 2022-01-01 RX ORDER — LISINOPRIL 2.5 MG/1
5 TABLET ORAL DAILY
Refills: 0 | Status: DISCONTINUED | OUTPATIENT
Start: 2022-01-01 | End: 2022-01-01

## 2022-01-01 RX ORDER — ONDANSETRON 8 MG/1
4 TABLET, FILM COATED ORAL EVERY 6 HOURS
Refills: 0 | Status: DISCONTINUED | OUTPATIENT
Start: 2022-01-01 | End: 2022-01-01

## 2022-01-01 RX ORDER — OXYCODONE HYDROCHLORIDE 5 MG/1
5 TABLET ORAL EVERY 6 HOURS
Refills: 0 | Status: DISCONTINUED | OUTPATIENT
Start: 2022-01-01 | End: 2022-01-01

## 2022-01-01 RX ORDER — LISINOPRIL 2.5 MG/1
1 TABLET ORAL
Qty: 30 | Refills: 0
Start: 2022-01-01 | End: 2022-01-01

## 2022-01-01 RX ORDER — LABETALOL HCL 100 MG
10 TABLET ORAL ONCE
Refills: 0 | Status: COMPLETED | OUTPATIENT
Start: 2022-01-01 | End: 2022-01-01

## 2022-01-01 RX ORDER — MUPIROCIN 20 MG/G
1 OINTMENT TOPICAL
Qty: 0 | Refills: 0 | DISCHARGE

## 2022-01-01 RX ORDER — SODIUM,POTASSIUM PHOSPHATES 278-250MG
1 POWDER IN PACKET (EA) ORAL
Refills: 0 | Status: COMPLETED | OUTPATIENT
Start: 2022-01-01 | End: 2022-01-01

## 2022-01-01 RX ORDER — POTASSIUM CHLORIDE 20 MEQ
10 PACKET (EA) ORAL
Refills: 0 | Status: DISCONTINUED | OUTPATIENT
Start: 2022-01-01 | End: 2022-01-01

## 2022-01-01 RX ORDER — MAGNESIUM SULFATE 500 MG/ML
2 VIAL (ML) INJECTION ONCE
Refills: 0 | Status: COMPLETED | OUTPATIENT
Start: 2022-01-01 | End: 2022-01-01

## 2022-01-01 RX ORDER — CEFAZOLIN SODIUM 1 G
VIAL (EA) INJECTION
Refills: 0 | Status: DISCONTINUED | OUTPATIENT
Start: 2022-01-01 | End: 2022-01-01

## 2022-01-01 RX ORDER — LANOLIN/MINERAL OIL
1 LOTION (ML) TOPICAL
Qty: 0 | Refills: 0 | DISCHARGE
Start: 2022-01-01

## 2022-01-01 RX ORDER — LIPASE/PROTEASE/AMYLASE 16-48-48K
1 CAPSULE,DELAYED RELEASE (ENTERIC COATED) ORAL
Qty: 14 | Refills: 0
Start: 2022-01-01 | End: 2022-01-01

## 2022-01-01 RX ORDER — MAGNESIUM SULFATE 500 MG/ML
1 VIAL (ML) INJECTION ONCE
Refills: 0 | Status: COMPLETED | OUTPATIENT
Start: 2022-01-01 | End: 2022-01-01

## 2022-01-01 RX ORDER — METOPROLOL TARTRATE 50 MG
5 TABLET ORAL EVERY 6 HOURS
Refills: 0 | Status: DISCONTINUED | OUTPATIENT
Start: 2022-01-01 | End: 2022-01-01

## 2022-01-01 RX ORDER — CHLORHEXIDINE GLUCONATE 213 G/1000ML
1 SOLUTION TOPICAL
Refills: 0 | Status: DISCONTINUED | OUTPATIENT
Start: 2022-01-01 | End: 2022-01-01

## 2022-01-01 RX ORDER — MORPHINE SULFATE 50 MG/1
2 CAPSULE, EXTENDED RELEASE ORAL ONCE
Refills: 0 | Status: DISCONTINUED | OUTPATIENT
Start: 2022-01-01 | End: 2022-01-01

## 2022-01-01 RX ORDER — PANTOPRAZOLE SODIUM 20 MG/1
40 TABLET, DELAYED RELEASE ORAL ONCE
Refills: 0 | Status: COMPLETED | OUTPATIENT
Start: 2022-01-01 | End: 2022-01-01

## 2022-01-01 RX ORDER — CEFAZOLIN SODIUM 1 G
2000 VIAL (EA) INJECTION
Qty: 0 | Refills: 0 | DISCHARGE
Start: 2022-01-01

## 2022-01-01 RX ORDER — SENNA PLUS 8.6 MG/1
2 TABLET ORAL AT BEDTIME
Refills: 0 | Status: DISCONTINUED | OUTPATIENT
Start: 2022-01-01 | End: 2022-01-01

## 2022-01-01 RX ORDER — HYDRALAZINE HCL 50 MG
5 TABLET ORAL ONCE
Refills: 0 | Status: COMPLETED | OUTPATIENT
Start: 2022-01-01 | End: 2022-01-01

## 2022-01-01 RX ORDER — MUPIROCIN 20 MG/G
1 OINTMENT TOPICAL
Refills: 0 | Status: COMPLETED | OUTPATIENT
Start: 2022-01-01 | End: 2022-01-01

## 2022-01-01 RX ORDER — ACETAMINOPHEN 500 MG
2 TABLET ORAL
Qty: 0 | Refills: 0 | DISCHARGE
Start: 2022-01-01

## 2022-01-01 RX ORDER — ACETAMINOPHEN 500 MG
325 TABLET ORAL EVERY 6 HOURS
Refills: 0 | Status: DISCONTINUED | OUTPATIENT
Start: 2022-01-01 | End: 2022-01-01

## 2022-01-01 RX ORDER — LIPASE/PROTEASE/AMYLASE 16-48-48K
2 CAPSULE,DELAYED RELEASE (ENTERIC COATED) ORAL ONCE
Refills: 0 | Status: COMPLETED | OUTPATIENT
Start: 2022-01-01 | End: 2022-01-01

## 2022-01-01 RX ORDER — DIATRIZOATE MEGLUMINE 180 MG/ML
90 INJECTION, SOLUTION INTRAVESICAL ONCE
Refills: 0 | Status: DISCONTINUED | OUTPATIENT
Start: 2022-01-01 | End: 2022-01-01

## 2022-01-01 RX ORDER — TETRACAINE/BENZOCAINE/BUTAMBEN 2%-14%-2%
1 OINTMENT (GRAM) TOPICAL DAILY
Refills: 0 | Status: DISCONTINUED | OUTPATIENT
Start: 2022-01-01 | End: 2022-01-01

## 2022-01-01 RX ORDER — SODIUM BICARBONATE 1 MEQ/ML
325 SYRINGE (ML) INTRAVENOUS ONCE
Refills: 0 | Status: COMPLETED | OUTPATIENT
Start: 2022-01-01 | End: 2022-01-01

## 2022-01-01 RX ORDER — SODIUM,POTASSIUM PHOSPHATES 278-250MG
2 POWDER IN PACKET (EA) ORAL
Refills: 0 | Status: COMPLETED | OUTPATIENT
Start: 2022-01-01 | End: 2022-01-01

## 2022-01-01 RX ORDER — AMLODIPINE BESYLATE 2.5 MG/1
1 TABLET ORAL
Qty: 0 | Refills: 0 | DISCHARGE
Start: 2022-01-01

## 2022-01-01 RX ORDER — ASPIRIN/CALCIUM CARB/MAGNESIUM 324 MG
2 TABLET ORAL
Qty: 156 | Refills: 0
Start: 2022-01-01 | End: 2022-01-01

## 2022-01-01 RX ORDER — LABETALOL HCL 100 MG
5 TABLET ORAL ONCE
Refills: 0 | Status: COMPLETED | OUTPATIENT
Start: 2022-01-01 | End: 2022-01-01

## 2022-01-01 RX ORDER — POLYETHYLENE GLYCOL 3350 17 G/17G
17 POWDER, FOR SOLUTION ORAL DAILY
Refills: 0 | Status: DISCONTINUED | OUTPATIENT
Start: 2022-01-01 | End: 2022-01-01

## 2022-01-01 RX ORDER — LOPERAMIDE HCL 2 MG
2 TABLET ORAL EVERY 4 HOURS
Refills: 0 | Status: DISCONTINUED | OUTPATIENT
Start: 2022-01-01 | End: 2022-01-01

## 2022-01-01 RX ORDER — ASPIRIN/CALCIUM CARB/MAGNESIUM 324 MG
1 TABLET ORAL
Qty: 0 | Refills: 0 | DISCHARGE
Start: 2022-01-01 | End: 2022-01-01

## 2022-01-01 RX ORDER — HYDROMORPHONE HYDROCHLORIDE 2 MG/ML
0.5 INJECTION INTRAMUSCULAR; INTRAVENOUS; SUBCUTANEOUS
Refills: 0 | Status: DISCONTINUED | OUTPATIENT
Start: 2022-01-01 | End: 2022-01-01

## 2022-01-01 RX ORDER — POTASSIUM CHLORIDE 20 MEQ
20 PACKET (EA) ORAL ONCE
Refills: 0 | Status: COMPLETED | OUTPATIENT
Start: 2022-01-01 | End: 2022-01-01

## 2022-01-01 RX ORDER — ATENOLOL 25 MG/1
1 TABLET ORAL
Qty: 0 | Refills: 0 | DISCHARGE
Start: 2022-01-01

## 2022-01-01 RX ORDER — SODIUM CHLORIDE 9 MG/ML
500 INJECTION INTRAMUSCULAR; INTRAVENOUS; SUBCUTANEOUS
Refills: 0 | Status: COMPLETED | OUTPATIENT
Start: 2022-01-01 | End: 2022-01-01

## 2022-01-01 RX ORDER — LANOLIN/MINERAL OIL
1 LOTION (ML) TOPICAL
Refills: 0 | Status: DISCONTINUED | OUTPATIENT
Start: 2022-01-01 | End: 2022-01-01

## 2022-01-01 RX ORDER — ONDANSETRON 8 MG/1
4 TABLET, FILM COATED ORAL ONCE
Refills: 0 | Status: DISCONTINUED | OUTPATIENT
Start: 2022-01-01 | End: 2022-01-01

## 2022-01-01 RX ORDER — POLYETHYLENE GLYCOL 3350 17 G/17G
17 POWDER, FOR SOLUTION ORAL
Refills: 0 | Status: DISCONTINUED | OUTPATIENT
Start: 2022-01-01 | End: 2022-01-01

## 2022-01-01 RX ORDER — ACETAMINOPHEN 500 MG
700 TABLET ORAL EVERY 6 HOURS
Refills: 0 | Status: DISCONTINUED | OUTPATIENT
Start: 2022-01-01 | End: 2022-01-01

## 2022-01-01 RX ORDER — LOPERAMIDE HCL 2 MG
2 TABLET ORAL EVERY 6 HOURS
Refills: 0 | Status: DISCONTINUED | OUTPATIENT
Start: 2022-01-01 | End: 2022-01-01

## 2022-01-01 RX ADMIN — HEPARIN SODIUM 5000 UNIT(S): 5000 INJECTION INTRAVENOUS; SUBCUTANEOUS at 15:17

## 2022-01-01 RX ADMIN — Medication 100 MILLIGRAM(S): at 20:26

## 2022-01-01 RX ADMIN — PIPERACILLIN AND TAZOBACTAM 200 GRAM(S): 4; .5 INJECTION, POWDER, LYOPHILIZED, FOR SOLUTION INTRAVENOUS at 19:12

## 2022-01-01 RX ADMIN — AMLODIPINE BESYLATE 10 MILLIGRAM(S): 2.5 TABLET ORAL at 05:44

## 2022-01-01 RX ADMIN — Medication 25 MILLIGRAM(S): at 13:07

## 2022-01-01 RX ADMIN — HEPARIN SODIUM 5000 UNIT(S): 5000 INJECTION INTRAVENOUS; SUBCUTANEOUS at 06:43

## 2022-01-01 RX ADMIN — SODIUM CHLORIDE 500 MILLILITER(S): 9 INJECTION INTRAMUSCULAR; INTRAVENOUS; SUBCUTANEOUS at 17:15

## 2022-01-01 RX ADMIN — Medication 25 MILLIGRAM(S): at 13:22

## 2022-01-01 RX ADMIN — Medication 325 MILLIGRAM(S): at 05:20

## 2022-01-01 RX ADMIN — Medication 100 MILLIGRAM(S): at 06:30

## 2022-01-01 RX ADMIN — BENZOCAINE AND MENTHOL 1 LOZENGE: 5; 1 LIQUID ORAL at 06:05

## 2022-01-01 RX ADMIN — Medication 500000 UNIT(S): at 06:51

## 2022-01-01 RX ADMIN — PANTOPRAZOLE SODIUM 40 MILLIGRAM(S): 20 TABLET, DELAYED RELEASE ORAL at 06:46

## 2022-01-01 RX ADMIN — SODIUM CHLORIDE 125 MILLILITER(S): 9 INJECTION, SOLUTION INTRAVENOUS at 22:21

## 2022-01-01 RX ADMIN — Medication 500000 UNIT(S): at 22:42

## 2022-01-01 RX ADMIN — HEPARIN SODIUM 5000 UNIT(S): 5000 INJECTION INTRAVENOUS; SUBCUTANEOUS at 22:00

## 2022-01-01 RX ADMIN — Medication 1 TABLET(S): at 16:37

## 2022-01-01 RX ADMIN — MUPIROCIN 1 APPLICATION(S): 20 OINTMENT TOPICAL at 22:24

## 2022-01-01 RX ADMIN — AMLODIPINE BESYLATE 10 MILLIGRAM(S): 2.5 TABLET ORAL at 06:06

## 2022-01-01 RX ADMIN — MUPIROCIN 1 APPLICATION(S): 20 OINTMENT TOPICAL at 13:24

## 2022-01-01 RX ADMIN — MUPIROCIN 1 APPLICATION(S): 20 OINTMENT TOPICAL at 06:21

## 2022-01-01 RX ADMIN — Medication 2 MILLIGRAM(S): at 07:42

## 2022-01-01 RX ADMIN — Medication 500000 UNIT(S): at 17:37

## 2022-01-01 RX ADMIN — SODIUM CHLORIDE 75 MILLILITER(S): 9 INJECTION, SOLUTION INTRAVENOUS at 22:27

## 2022-01-01 RX ADMIN — Medication 2.5 MILLIGRAM(S): at 00:14

## 2022-01-01 RX ADMIN — AMLODIPINE BESYLATE 5 MILLIGRAM(S): 2.5 TABLET ORAL at 09:16

## 2022-01-01 RX ADMIN — Medication 650 MILLIGRAM(S): at 00:24

## 2022-01-01 RX ADMIN — CHLORHEXIDINE GLUCONATE 1 APPLICATION(S): 213 SOLUTION TOPICAL at 11:58

## 2022-01-01 RX ADMIN — Medication 650 MILLIGRAM(S): at 20:38

## 2022-01-01 RX ADMIN — ENOXAPARIN SODIUM 40 MILLIGRAM(S): 100 INJECTION SUBCUTANEOUS at 05:45

## 2022-01-01 RX ADMIN — Medication 1 TABLET(S): at 13:00

## 2022-01-01 RX ADMIN — Medication 1 TABLET(S): at 12:43

## 2022-01-01 RX ADMIN — HYDROMORPHONE HYDROCHLORIDE 0.5 MILLIGRAM(S): 2 INJECTION INTRAMUSCULAR; INTRAVENOUS; SUBCUTANEOUS at 20:57

## 2022-01-01 RX ADMIN — MUPIROCIN 1 APPLICATION(S): 20 OINTMENT TOPICAL at 13:36

## 2022-01-01 RX ADMIN — Medication 1 TABLET(S): at 12:11

## 2022-01-01 RX ADMIN — Medication 5 MILLIGRAM(S): at 11:08

## 2022-01-01 RX ADMIN — Medication 325 MILLIGRAM(S): at 23:34

## 2022-01-01 RX ADMIN — SODIUM CHLORIDE 100 MILLILITER(S): 9 INJECTION INTRAMUSCULAR; INTRAVENOUS; SUBCUTANEOUS at 18:33

## 2022-01-01 RX ADMIN — Medication 500000 UNIT(S): at 12:33

## 2022-01-01 RX ADMIN — Medication 1 SPRAY(S): at 18:28

## 2022-01-01 RX ADMIN — ENOXAPARIN SODIUM 40 MILLIGRAM(S): 100 INJECTION SUBCUTANEOUS at 18:23

## 2022-01-01 RX ADMIN — ONDANSETRON 4 MILLIGRAM(S): 8 TABLET, FILM COATED ORAL at 11:44

## 2022-01-01 RX ADMIN — Medication 25 MILLIGRAM(S): at 17:18

## 2022-01-01 RX ADMIN — HEPARIN SODIUM 5000 UNIT(S): 5000 INJECTION INTRAVENOUS; SUBCUTANEOUS at 21:35

## 2022-01-01 RX ADMIN — Medication 5 MILLIGRAM(S): at 05:57

## 2022-01-01 RX ADMIN — AMLODIPINE BESYLATE 5 MILLIGRAM(S): 2.5 TABLET ORAL at 06:51

## 2022-01-01 RX ADMIN — Medication 2 PACKET(S): at 08:35

## 2022-01-01 RX ADMIN — Medication 100 MILLIGRAM(S): at 12:05

## 2022-01-01 RX ADMIN — AMLODIPINE BESYLATE 5 MILLIGRAM(S): 2.5 TABLET ORAL at 05:46

## 2022-01-01 RX ADMIN — PANTOPRAZOLE SODIUM 40 MILLIGRAM(S): 20 TABLET, DELAYED RELEASE ORAL at 11:57

## 2022-01-01 RX ADMIN — BENZOCAINE AND MENTHOL 1 LOZENGE: 5; 1 LIQUID ORAL at 06:07

## 2022-01-01 RX ADMIN — Medication 10 MILLIGRAM(S): at 19:14

## 2022-01-01 RX ADMIN — Medication 500000 UNIT(S): at 17:26

## 2022-01-01 RX ADMIN — DEXTROSE MONOHYDRATE, SODIUM CHLORIDE, AND POTASSIUM CHLORIDE 100 MILLILITER(S): 50; .745; 4.5 INJECTION, SOLUTION INTRAVENOUS at 08:38

## 2022-01-01 RX ADMIN — Medication 25 MILLIGRAM(S): at 05:24

## 2022-01-01 RX ADMIN — OXYCODONE HYDROCHLORIDE 5 MILLIGRAM(S): 5 TABLET ORAL at 20:49

## 2022-01-01 RX ADMIN — Medication 1000 MILLIGRAM(S): at 06:50

## 2022-01-01 RX ADMIN — ENOXAPARIN SODIUM 40 MILLIGRAM(S): 100 INJECTION SUBCUTANEOUS at 05:39

## 2022-01-01 RX ADMIN — Medication 10 MILLIGRAM(S): at 23:07

## 2022-01-01 RX ADMIN — Medication 10 MILLIGRAM(S): at 11:09

## 2022-01-01 RX ADMIN — Medication 400 MILLIGRAM(S): at 05:03

## 2022-01-01 RX ADMIN — Medication 25 MILLIGRAM(S): at 05:19

## 2022-01-01 RX ADMIN — Medication 4 MILLIGRAM(S): at 11:48

## 2022-01-01 RX ADMIN — Medication 25 MILLIGRAM(S): at 22:44

## 2022-01-01 RX ADMIN — Medication 400 MILLIGRAM(S): at 23:05

## 2022-01-01 RX ADMIN — MUPIROCIN 1 APPLICATION(S): 20 OINTMENT TOPICAL at 13:34

## 2022-01-01 RX ADMIN — SODIUM CHLORIDE 50 MILLILITER(S): 9 INJECTION INTRAMUSCULAR; INTRAVENOUS; SUBCUTANEOUS at 07:18

## 2022-01-01 RX ADMIN — Medication 1 TABLET(S): at 11:57

## 2022-01-01 RX ADMIN — AMLODIPINE BESYLATE 5 MILLIGRAM(S): 2.5 TABLET ORAL at 06:03

## 2022-01-01 RX ADMIN — Medication 650 MILLIGRAM(S): at 19:04

## 2022-01-01 RX ADMIN — PANTOPRAZOLE SODIUM 40 MILLIGRAM(S): 20 TABLET, DELAYED RELEASE ORAL at 05:01

## 2022-01-01 RX ADMIN — Medication 10 MILLIGRAM(S): at 12:44

## 2022-01-01 RX ADMIN — Medication 25 MILLIGRAM(S): at 05:48

## 2022-01-01 RX ADMIN — PANTOPRAZOLE SODIUM 40 MILLIGRAM(S): 20 TABLET, DELAYED RELEASE ORAL at 12:32

## 2022-01-01 RX ADMIN — HEPARIN SODIUM 5000 UNIT(S): 5000 INJECTION INTRAVENOUS; SUBCUTANEOUS at 06:00

## 2022-01-01 RX ADMIN — Medication 25 MILLIGRAM(S): at 06:45

## 2022-01-01 RX ADMIN — Medication 5 MILLIGRAM(S): at 12:00

## 2022-01-01 RX ADMIN — ATENOLOL 25 MILLIGRAM(S): 25 TABLET ORAL at 07:01

## 2022-01-01 RX ADMIN — Medication 500000 UNIT(S): at 11:45

## 2022-01-01 RX ADMIN — HEPARIN SODIUM 5000 UNIT(S): 5000 INJECTION INTRAVENOUS; SUBCUTANEOUS at 06:06

## 2022-01-01 RX ADMIN — HEPARIN SODIUM 5000 UNIT(S): 5000 INJECTION INTRAVENOUS; SUBCUTANEOUS at 14:56

## 2022-01-01 RX ADMIN — HEPARIN SODIUM 5000 UNIT(S): 5000 INJECTION INTRAVENOUS; SUBCUTANEOUS at 17:11

## 2022-01-01 RX ADMIN — ATENOLOL 25 MILLIGRAM(S): 25 TABLET ORAL at 05:46

## 2022-01-01 RX ADMIN — Medication 400 MILLIGRAM(S): at 15:58

## 2022-01-01 RX ADMIN — Medication 650 MILLIGRAM(S): at 19:45

## 2022-01-01 RX ADMIN — HEPARIN SODIUM 5000 UNIT(S): 5000 INJECTION INTRAVENOUS; SUBCUTANEOUS at 05:01

## 2022-01-01 RX ADMIN — CHLORHEXIDINE GLUCONATE 1 APPLICATION(S): 213 SOLUTION TOPICAL at 12:24

## 2022-01-01 RX ADMIN — Medication 250 MILLIGRAM(S): at 21:42

## 2022-01-01 RX ADMIN — Medication 500000 UNIT(S): at 20:26

## 2022-01-01 RX ADMIN — ATENOLOL 25 MILLIGRAM(S): 25 TABLET ORAL at 06:06

## 2022-01-01 RX ADMIN — CHLORHEXIDINE GLUCONATE 1 APPLICATION(S): 213 SOLUTION TOPICAL at 13:23

## 2022-01-01 RX ADMIN — Medication 500000 UNIT(S): at 17:34

## 2022-01-01 RX ADMIN — OXYCODONE HYDROCHLORIDE 5 MILLIGRAM(S): 5 TABLET ORAL at 00:23

## 2022-01-01 RX ADMIN — ATENOLOL 25 MILLIGRAM(S): 25 TABLET ORAL at 05:00

## 2022-01-01 RX ADMIN — Medication 100 MILLIEQUIVALENT(S): at 12:11

## 2022-01-01 RX ADMIN — Medication 500000 UNIT(S): at 22:41

## 2022-01-01 RX ADMIN — MUPIROCIN 1 APPLICATION(S): 20 OINTMENT TOPICAL at 21:59

## 2022-01-01 RX ADMIN — ATENOLOL 25 MILLIGRAM(S): 25 TABLET ORAL at 05:23

## 2022-01-01 RX ADMIN — BENZOCAINE AND MENTHOL 1 LOZENGE: 5; 1 LIQUID ORAL at 05:56

## 2022-01-01 RX ADMIN — MUPIROCIN 1 APPLICATION(S): 20 OINTMENT TOPICAL at 06:49

## 2022-01-01 RX ADMIN — PANTOPRAZOLE SODIUM 40 MILLIGRAM(S): 20 TABLET, DELAYED RELEASE ORAL at 06:41

## 2022-01-01 RX ADMIN — Medication 500000 UNIT(S): at 05:44

## 2022-01-01 RX ADMIN — Medication 25 MILLIGRAM(S): at 22:05

## 2022-01-01 RX ADMIN — PIPERACILLIN AND TAZOBACTAM 25 GRAM(S): 4; .5 INJECTION, POWDER, LYOPHILIZED, FOR SOLUTION INTRAVENOUS at 10:14

## 2022-01-01 RX ADMIN — Medication 100 MILLIGRAM(S): at 13:01

## 2022-01-01 RX ADMIN — ENOXAPARIN SODIUM 40 MILLIGRAM(S): 100 INJECTION SUBCUTANEOUS at 07:36

## 2022-01-01 RX ADMIN — OXYCODONE HYDROCHLORIDE 5 MILLIGRAM(S): 5 TABLET ORAL at 22:09

## 2022-01-01 RX ADMIN — Medication 500000 UNIT(S): at 13:13

## 2022-01-01 RX ADMIN — AMLODIPINE BESYLATE 5 MILLIGRAM(S): 2.5 TABLET ORAL at 07:43

## 2022-01-01 RX ADMIN — OXYCODONE HYDROCHLORIDE 5 MILLIGRAM(S): 5 TABLET ORAL at 03:26

## 2022-01-01 RX ADMIN — Medication 100 MILLIGRAM(S): at 21:47

## 2022-01-01 RX ADMIN — PIPERACILLIN AND TAZOBACTAM 25 GRAM(S): 4; .5 INJECTION, POWDER, LYOPHILIZED, FOR SOLUTION INTRAVENOUS at 17:30

## 2022-01-01 RX ADMIN — Medication 100 MILLIGRAM(S): at 12:48

## 2022-01-01 RX ADMIN — AMLODIPINE BESYLATE 10 MILLIGRAM(S): 2.5 TABLET ORAL at 06:28

## 2022-01-01 RX ADMIN — Medication 500000 UNIT(S): at 20:56

## 2022-01-01 RX ADMIN — CHLORHEXIDINE GLUCONATE 1 APPLICATION(S): 213 SOLUTION TOPICAL at 12:48

## 2022-01-01 RX ADMIN — Medication 5 MILLIGRAM(S): at 17:52

## 2022-01-01 RX ADMIN — DEXTROSE MONOHYDRATE, SODIUM CHLORIDE, AND POTASSIUM CHLORIDE 100 MILLILITER(S): 50; .745; 4.5 INJECTION, SOLUTION INTRAVENOUS at 02:38

## 2022-01-01 RX ADMIN — PIPERACILLIN AND TAZOBACTAM 25 GRAM(S): 4; .5 INJECTION, POWDER, LYOPHILIZED, FOR SOLUTION INTRAVENOUS at 17:34

## 2022-01-01 RX ADMIN — Medication 100 MILLIGRAM(S): at 20:32

## 2022-01-01 RX ADMIN — Medication 10 MILLIGRAM(S): at 17:29

## 2022-01-01 RX ADMIN — Medication 2 PACKET(S): at 11:32

## 2022-01-01 RX ADMIN — MUPIROCIN 1 APPLICATION(S): 20 OINTMENT TOPICAL at 22:47

## 2022-01-01 RX ADMIN — Medication 100 MILLIEQUIVALENT(S): at 19:28

## 2022-01-01 RX ADMIN — OXYCODONE HYDROCHLORIDE 5 MILLIGRAM(S): 5 TABLET ORAL at 17:41

## 2022-01-01 RX ADMIN — Medication 500000 UNIT(S): at 11:51

## 2022-01-01 RX ADMIN — PANTOPRAZOLE SODIUM 40 MILLIGRAM(S): 20 TABLET, DELAYED RELEASE ORAL at 13:22

## 2022-01-01 RX ADMIN — Medication 325 MILLIGRAM(S): at 01:22

## 2022-01-01 RX ADMIN — MORPHINE SULFATE 2 MILLIGRAM(S): 50 CAPSULE, EXTENDED RELEASE ORAL at 20:55

## 2022-01-01 RX ADMIN — MUPIROCIN 1 APPLICATION(S): 20 OINTMENT TOPICAL at 15:16

## 2022-01-01 RX ADMIN — OXYCODONE HYDROCHLORIDE 5 MILLIGRAM(S): 5 TABLET ORAL at 16:50

## 2022-01-01 RX ADMIN — Medication 100 MILLIGRAM(S): at 05:24

## 2022-01-01 RX ADMIN — Medication 2 MILLIGRAM(S): at 13:50

## 2022-01-01 RX ADMIN — Medication 5 MILLIGRAM(S): at 05:15

## 2022-01-01 RX ADMIN — MUPIROCIN 1 APPLICATION(S): 20 OINTMENT TOPICAL at 16:15

## 2022-01-01 RX ADMIN — ATENOLOL 25 MILLIGRAM(S): 25 TABLET ORAL at 06:50

## 2022-01-01 RX ADMIN — CHLORHEXIDINE GLUCONATE 1 APPLICATION(S): 213 SOLUTION TOPICAL at 09:11

## 2022-01-01 RX ADMIN — Medication 500000 UNIT(S): at 07:43

## 2022-01-01 RX ADMIN — OXYCODONE HYDROCHLORIDE 5 MILLIGRAM(S): 5 TABLET ORAL at 19:53

## 2022-01-01 RX ADMIN — AMLODIPINE BESYLATE 5 MILLIGRAM(S): 2.5 TABLET ORAL at 05:58

## 2022-01-01 RX ADMIN — HEPARIN SODIUM 5000 UNIT(S): 5000 INJECTION INTRAVENOUS; SUBCUTANEOUS at 05:15

## 2022-01-01 RX ADMIN — HYDROMORPHONE HYDROCHLORIDE 0.5 MILLIGRAM(S): 2 INJECTION INTRAMUSCULAR; INTRAVENOUS; SUBCUTANEOUS at 18:05

## 2022-01-01 RX ADMIN — Medication 400 MILLIGRAM(S): at 05:01

## 2022-01-01 RX ADMIN — Medication 81 MILLIGRAM(S): at 11:22

## 2022-01-01 RX ADMIN — Medication 25 GRAM(S): at 05:48

## 2022-01-01 RX ADMIN — PANTOPRAZOLE SODIUM 40 MILLIGRAM(S): 20 TABLET, DELAYED RELEASE ORAL at 12:05

## 2022-01-01 RX ADMIN — PANTOPRAZOLE SODIUM 40 MILLIGRAM(S): 20 TABLET, DELAYED RELEASE ORAL at 14:37

## 2022-01-01 RX ADMIN — Medication 40 MILLIEQUIVALENT(S): at 10:14

## 2022-01-01 RX ADMIN — OXYCODONE HYDROCHLORIDE 5 MILLIGRAM(S): 5 TABLET ORAL at 22:15

## 2022-01-01 RX ADMIN — Medication 1 TABLET(S): at 11:46

## 2022-01-01 RX ADMIN — MUPIROCIN 1 APPLICATION(S): 20 OINTMENT TOPICAL at 06:18

## 2022-01-01 RX ADMIN — Medication 100 MILLIGRAM(S): at 05:32

## 2022-01-01 RX ADMIN — AMLODIPINE BESYLATE 10 MILLIGRAM(S): 2.5 TABLET ORAL at 06:31

## 2022-01-01 RX ADMIN — HYDROMORPHONE HYDROCHLORIDE 0.5 MILLIGRAM(S): 2 INJECTION INTRAMUSCULAR; INTRAVENOUS; SUBCUTANEOUS at 08:16

## 2022-01-01 RX ADMIN — Medication 5 MILLIGRAM(S): at 23:33

## 2022-01-01 RX ADMIN — ATENOLOL 50 MILLIGRAM(S): 25 TABLET ORAL at 06:29

## 2022-01-01 RX ADMIN — Medication 1 TABLET(S): at 09:03

## 2022-01-01 RX ADMIN — ATENOLOL 25 MILLIGRAM(S): 25 TABLET ORAL at 06:41

## 2022-01-01 RX ADMIN — DEXTROSE MONOHYDRATE, SODIUM CHLORIDE, AND POTASSIUM CHLORIDE 100 MILLILITER(S): 50; .745; 4.5 INJECTION, SOLUTION INTRAVENOUS at 14:56

## 2022-01-01 RX ADMIN — Medication 325 MILLIGRAM(S): at 11:32

## 2022-01-01 RX ADMIN — AMLODIPINE BESYLATE 5 MILLIGRAM(S): 2.5 TABLET ORAL at 05:05

## 2022-01-01 RX ADMIN — Medication 100 MILLIEQUIVALENT(S): at 08:46

## 2022-01-01 RX ADMIN — HYDROMORPHONE HYDROCHLORIDE 0.5 MILLIGRAM(S): 2 INJECTION INTRAMUSCULAR; INTRAVENOUS; SUBCUTANEOUS at 08:27

## 2022-01-01 RX ADMIN — HEPARIN SODIUM 5000 UNIT(S): 5000 INJECTION INTRAVENOUS; SUBCUTANEOUS at 22:43

## 2022-01-01 RX ADMIN — Medication 500000 UNIT(S): at 22:40

## 2022-01-01 RX ADMIN — HEPARIN SODIUM 5000 UNIT(S): 5000 INJECTION INTRAVENOUS; SUBCUTANEOUS at 17:42

## 2022-01-01 RX ADMIN — Medication 100 MILLIGRAM(S): at 12:29

## 2022-01-01 RX ADMIN — PANTOPRAZOLE SODIUM 40 MILLIGRAM(S): 20 TABLET, DELAYED RELEASE ORAL at 13:04

## 2022-01-01 RX ADMIN — AMLODIPINE BESYLATE 10 MILLIGRAM(S): 2.5 TABLET ORAL at 21:44

## 2022-01-01 RX ADMIN — HEPARIN SODIUM 5000 UNIT(S): 5000 INJECTION INTRAVENOUS; SUBCUTANEOUS at 21:30

## 2022-01-01 RX ADMIN — PANTOPRAZOLE SODIUM 40 MILLIGRAM(S): 20 TABLET, DELAYED RELEASE ORAL at 17:52

## 2022-01-01 RX ADMIN — PANTOPRAZOLE SODIUM 40 MILLIGRAM(S): 20 TABLET, DELAYED RELEASE ORAL at 11:29

## 2022-01-01 RX ADMIN — Medication 0.27 MILLIGRAM(S): at 23:53

## 2022-01-01 RX ADMIN — MUPIROCIN 1 APPLICATION(S): 20 OINTMENT TOPICAL at 05:46

## 2022-01-01 RX ADMIN — PANTOPRAZOLE SODIUM 40 MILLIGRAM(S): 20 TABLET, DELAYED RELEASE ORAL at 18:27

## 2022-01-01 RX ADMIN — ONDANSETRON 4 MILLIGRAM(S): 8 TABLET, FILM COATED ORAL at 12:30

## 2022-01-01 RX ADMIN — DEXTROSE MONOHYDRATE, SODIUM CHLORIDE, AND POTASSIUM CHLORIDE 100 MILLILITER(S): 50; .745; 4.5 INJECTION, SOLUTION INTRAVENOUS at 11:35

## 2022-01-01 RX ADMIN — MUPIROCIN 1 APPLICATION(S): 20 OINTMENT TOPICAL at 05:39

## 2022-01-01 RX ADMIN — CHLORHEXIDINE GLUCONATE 1 APPLICATION(S): 213 SOLUTION TOPICAL at 11:30

## 2022-01-01 RX ADMIN — Medication 2 MILLIGRAM(S): at 17:37

## 2022-01-01 RX ADMIN — Medication 100 MILLIEQUIVALENT(S): at 10:11

## 2022-01-01 RX ADMIN — Medication 1000 MILLIGRAM(S): at 06:07

## 2022-01-01 RX ADMIN — HEPARIN SODIUM 5000 UNIT(S): 5000 INJECTION INTRAVENOUS; SUBCUTANEOUS at 13:07

## 2022-01-01 RX ADMIN — Medication 325 MILLIGRAM(S): at 19:28

## 2022-01-01 RX ADMIN — Medication 500000 UNIT(S): at 18:44

## 2022-01-01 RX ADMIN — Medication 500000 UNIT(S): at 18:43

## 2022-01-01 RX ADMIN — HEPARIN SODIUM 5000 UNIT(S): 5000 INJECTION INTRAVENOUS; SUBCUTANEOUS at 23:40

## 2022-01-01 RX ADMIN — HEPARIN SODIUM 5000 UNIT(S): 5000 INJECTION INTRAVENOUS; SUBCUTANEOUS at 14:52

## 2022-01-01 RX ADMIN — HEPARIN SODIUM 5000 UNIT(S): 5000 INJECTION INTRAVENOUS; SUBCUTANEOUS at 23:06

## 2022-01-01 RX ADMIN — Medication 400 MILLIGRAM(S): at 17:52

## 2022-01-01 RX ADMIN — OXYCODONE HYDROCHLORIDE 5 MILLIGRAM(S): 5 TABLET ORAL at 22:55

## 2022-01-01 RX ADMIN — DEXTROSE MONOHYDRATE, SODIUM CHLORIDE, AND POTASSIUM CHLORIDE 100 MILLILITER(S): 50; .745; 4.5 INJECTION, SOLUTION INTRAVENOUS at 20:06

## 2022-01-01 RX ADMIN — DEXTROSE MONOHYDRATE, SODIUM CHLORIDE, AND POTASSIUM CHLORIDE 50 MILLILITER(S): 50; .745; 4.5 INJECTION, SOLUTION INTRAVENOUS at 22:00

## 2022-01-01 RX ADMIN — Medication 1 TABLET(S): at 11:45

## 2022-01-01 RX ADMIN — Medication 5 MILLIGRAM(S): at 23:26

## 2022-01-01 RX ADMIN — CHLORHEXIDINE GLUCONATE 1 APPLICATION(S): 213 SOLUTION TOPICAL at 12:03

## 2022-01-01 RX ADMIN — MUPIROCIN 1 APPLICATION(S): 20 OINTMENT TOPICAL at 22:44

## 2022-01-01 RX ADMIN — AMLODIPINE BESYLATE 10 MILLIGRAM(S): 2.5 TABLET ORAL at 22:20

## 2022-01-01 RX ADMIN — ENOXAPARIN SODIUM 40 MILLIGRAM(S): 100 INJECTION SUBCUTANEOUS at 06:31

## 2022-01-01 RX ADMIN — SODIUM CHLORIDE 100 MILLILITER(S): 9 INJECTION INTRAMUSCULAR; INTRAVENOUS; SUBCUTANEOUS at 23:58

## 2022-01-01 RX ADMIN — PANTOPRAZOLE SODIUM 40 MILLIGRAM(S): 20 TABLET, DELAYED RELEASE ORAL at 05:20

## 2022-01-01 RX ADMIN — Medication 1 SPRAY(S): at 05:06

## 2022-01-01 RX ADMIN — HEPARIN SODIUM 5000 UNIT(S): 5000 INJECTION INTRAVENOUS; SUBCUTANEOUS at 23:25

## 2022-01-01 RX ADMIN — Medication 400 MILLIGRAM(S): at 00:29

## 2022-01-01 RX ADMIN — Medication 1 TABLET(S): at 11:05

## 2022-01-01 RX ADMIN — HEPARIN SODIUM 5000 UNIT(S): 5000 INJECTION INTRAVENOUS; SUBCUTANEOUS at 22:31

## 2022-01-01 RX ADMIN — BENZOCAINE AND MENTHOL 1 LOZENGE: 5; 1 LIQUID ORAL at 23:53

## 2022-01-01 RX ADMIN — ENOXAPARIN SODIUM 40 MILLIGRAM(S): 100 INJECTION SUBCUTANEOUS at 06:21

## 2022-01-01 RX ADMIN — HEPARIN SODIUM 5000 UNIT(S): 5000 INJECTION INTRAVENOUS; SUBCUTANEOUS at 06:05

## 2022-01-01 RX ADMIN — OXYCODONE HYDROCHLORIDE 5 MILLIGRAM(S): 5 TABLET ORAL at 23:32

## 2022-01-01 RX ADMIN — Medication 2 MILLIGRAM(S): at 00:35

## 2022-01-01 RX ADMIN — PIPERACILLIN AND TAZOBACTAM 200 GRAM(S): 4; .5 INJECTION, POWDER, LYOPHILIZED, FOR SOLUTION INTRAVENOUS at 08:01

## 2022-01-01 RX ADMIN — HYDROMORPHONE HYDROCHLORIDE 0.5 MILLIGRAM(S): 2 INJECTION INTRAMUSCULAR; INTRAVENOUS; SUBCUTANEOUS at 21:24

## 2022-01-01 RX ADMIN — OXYCODONE HYDROCHLORIDE 5 MILLIGRAM(S): 5 TABLET ORAL at 22:59

## 2022-01-01 RX ADMIN — BENZOCAINE AND MENTHOL 1 LOZENGE: 5; 1 LIQUID ORAL at 06:43

## 2022-01-01 RX ADMIN — Medication 5 MILLIGRAM(S): at 23:05

## 2022-01-01 RX ADMIN — ATENOLOL 25 MILLIGRAM(S): 25 TABLET ORAL at 07:45

## 2022-01-01 RX ADMIN — Medication 25 MILLIGRAM(S): at 14:41

## 2022-01-01 RX ADMIN — AMLODIPINE BESYLATE 5 MILLIGRAM(S): 2.5 TABLET ORAL at 06:25

## 2022-01-01 RX ADMIN — PIPERACILLIN AND TAZOBACTAM 25 GRAM(S): 4; .5 INJECTION, POWDER, LYOPHILIZED, FOR SOLUTION INTRAVENOUS at 11:06

## 2022-01-01 RX ADMIN — AMLODIPINE BESYLATE 10 MILLIGRAM(S): 2.5 TABLET ORAL at 08:22

## 2022-01-01 RX ADMIN — AMLODIPINE BESYLATE 10 MILLIGRAM(S): 2.5 TABLET ORAL at 07:16

## 2022-01-01 RX ADMIN — Medication 650 MILLIGRAM(S): at 00:13

## 2022-01-01 RX ADMIN — Medication 100 MILLIGRAM(S): at 20:55

## 2022-01-01 RX ADMIN — SODIUM CHLORIDE 75 MILLILITER(S): 9 INJECTION, SOLUTION INTRAVENOUS at 10:59

## 2022-01-01 RX ADMIN — HEPARIN SODIUM 5000 UNIT(S): 5000 INJECTION INTRAVENOUS; SUBCUTANEOUS at 21:27

## 2022-01-01 RX ADMIN — Medication 500000 UNIT(S): at 06:28

## 2022-01-01 RX ADMIN — Medication 500000 UNIT(S): at 06:52

## 2022-01-01 RX ADMIN — Medication 5 MILLIGRAM(S): at 17:16

## 2022-01-01 RX ADMIN — HEPARIN SODIUM 5000 UNIT(S): 5000 INJECTION INTRAVENOUS; SUBCUTANEOUS at 06:01

## 2022-01-01 RX ADMIN — Medication 1 TABLET(S): at 12:57

## 2022-01-01 RX ADMIN — Medication 25 MILLIGRAM(S): at 13:03

## 2022-01-01 RX ADMIN — POTASSIUM PHOSPHATE, MONOBASIC POTASSIUM PHOSPHATE, DIBASIC 83.33 MILLIMOLE(S): 236; 224 INJECTION, SOLUTION INTRAVENOUS at 08:38

## 2022-01-01 RX ADMIN — MUPIROCIN 1 APPLICATION(S): 20 OINTMENT TOPICAL at 13:28

## 2022-01-01 RX ADMIN — PANTOPRAZOLE SODIUM 40 MILLIGRAM(S): 20 TABLET, DELAYED RELEASE ORAL at 11:55

## 2022-01-01 RX ADMIN — PANTOPRAZOLE SODIUM 40 MILLIGRAM(S): 20 TABLET, DELAYED RELEASE ORAL at 12:04

## 2022-01-01 RX ADMIN — SODIUM CHLORIDE 2200 MILLILITER(S): 9 INJECTION, SOLUTION INTRAVENOUS at 17:55

## 2022-01-01 RX ADMIN — CHLORHEXIDINE GLUCONATE 1 APPLICATION(S): 213 SOLUTION TOPICAL at 19:19

## 2022-01-01 RX ADMIN — MUPIROCIN 1 APPLICATION(S): 20 OINTMENT TOPICAL at 05:20

## 2022-01-01 RX ADMIN — DEXTROSE MONOHYDRATE, SODIUM CHLORIDE, AND POTASSIUM CHLORIDE 50 MILLILITER(S): 50; .745; 4.5 INJECTION, SOLUTION INTRAVENOUS at 06:48

## 2022-01-01 RX ADMIN — ENOXAPARIN SODIUM 40 MILLIGRAM(S): 100 INJECTION SUBCUTANEOUS at 06:54

## 2022-01-01 RX ADMIN — AMLODIPINE BESYLATE 10 MILLIGRAM(S): 2.5 TABLET ORAL at 21:34

## 2022-01-01 RX ADMIN — CHLORHEXIDINE GLUCONATE 1 APPLICATION(S): 213 SOLUTION TOPICAL at 12:10

## 2022-01-01 RX ADMIN — Medication 400 MILLIGRAM(S): at 17:15

## 2022-01-01 RX ADMIN — POTASSIUM PHOSPHATE, MONOBASIC POTASSIUM PHOSPHATE, DIBASIC 62.5 MILLIMOLE(S): 236; 224 INJECTION, SOLUTION INTRAVENOUS at 13:57

## 2022-01-01 RX ADMIN — DEXTROSE MONOHYDRATE, SODIUM CHLORIDE, AND POTASSIUM CHLORIDE 100 MILLILITER(S): 50; .745; 4.5 INJECTION, SOLUTION INTRAVENOUS at 22:31

## 2022-01-01 RX ADMIN — HEPARIN SODIUM 5000 UNIT(S): 5000 INJECTION INTRAVENOUS; SUBCUTANEOUS at 21:59

## 2022-01-01 RX ADMIN — HEPARIN SODIUM 5000 UNIT(S): 5000 INJECTION INTRAVENOUS; SUBCUTANEOUS at 21:44

## 2022-01-01 RX ADMIN — ONDANSETRON 4 MILLIGRAM(S): 8 TABLET, FILM COATED ORAL at 23:04

## 2022-01-01 RX ADMIN — HEPARIN SODIUM 5000 UNIT(S): 5000 INJECTION INTRAVENOUS; SUBCUTANEOUS at 19:05

## 2022-01-01 RX ADMIN — PANTOPRAZOLE SODIUM 40 MILLIGRAM(S): 20 TABLET, DELAYED RELEASE ORAL at 11:06

## 2022-01-01 RX ADMIN — PIPERACILLIN AND TAZOBACTAM 25 GRAM(S): 4; .5 INJECTION, POWDER, LYOPHILIZED, FOR SOLUTION INTRAVENOUS at 02:00

## 2022-01-01 RX ADMIN — PIPERACILLIN AND TAZOBACTAM 25 GRAM(S): 4; .5 INJECTION, POWDER, LYOPHILIZED, FOR SOLUTION INTRAVENOUS at 13:49

## 2022-01-01 RX ADMIN — Medication 500000 UNIT(S): at 11:56

## 2022-01-01 RX ADMIN — Medication 100 MILLIGRAM(S): at 13:10

## 2022-01-01 RX ADMIN — CHLORHEXIDINE GLUCONATE 1 APPLICATION(S): 213 SOLUTION TOPICAL at 12:04

## 2022-01-01 RX ADMIN — AMLODIPINE BESYLATE 10 MILLIGRAM(S): 2.5 TABLET ORAL at 23:16

## 2022-01-01 RX ADMIN — CHLORHEXIDINE GLUCONATE 1 APPLICATION(S): 213 SOLUTION TOPICAL at 13:01

## 2022-01-01 RX ADMIN — Medication 1000 MILLIGRAM(S): at 02:32

## 2022-01-01 RX ADMIN — ATENOLOL 25 MILLIGRAM(S): 25 TABLET ORAL at 06:30

## 2022-01-01 RX ADMIN — HEPARIN SODIUM 5000 UNIT(S): 5000 INJECTION INTRAVENOUS; SUBCUTANEOUS at 05:49

## 2022-01-01 RX ADMIN — AMLODIPINE BESYLATE 5 MILLIGRAM(S): 2.5 TABLET ORAL at 06:36

## 2022-01-01 RX ADMIN — Medication 5 MILLIGRAM(S): at 23:49

## 2022-01-01 RX ADMIN — Medication 100 MILLIEQUIVALENT(S): at 20:48

## 2022-01-01 RX ADMIN — OXYCODONE HYDROCHLORIDE 5 MILLIGRAM(S): 5 TABLET ORAL at 12:47

## 2022-01-01 RX ADMIN — Medication 25 MILLIGRAM(S): at 14:38

## 2022-01-01 RX ADMIN — BENZOCAINE AND MENTHOL 1 LOZENGE: 5; 1 LIQUID ORAL at 17:17

## 2022-01-01 RX ADMIN — Medication 325 MILLIGRAM(S): at 12:04

## 2022-01-01 RX ADMIN — MUPIROCIN 1 APPLICATION(S): 20 OINTMENT TOPICAL at 00:30

## 2022-01-01 RX ADMIN — DEXTROSE MONOHYDRATE, SODIUM CHLORIDE, AND POTASSIUM CHLORIDE 100 MILLILITER(S): 50; .745; 4.5 INJECTION, SOLUTION INTRAVENOUS at 18:26

## 2022-01-01 RX ADMIN — Medication 500000 UNIT(S): at 07:35

## 2022-01-01 RX ADMIN — AMLODIPINE BESYLATE 10 MILLIGRAM(S): 2.5 TABLET ORAL at 05:37

## 2022-01-01 RX ADMIN — MUPIROCIN 1 APPLICATION(S): 20 OINTMENT TOPICAL at 13:49

## 2022-01-01 RX ADMIN — Medication 20 MILLIEQUIVALENT(S): at 11:10

## 2022-01-01 RX ADMIN — Medication 500000 UNIT(S): at 06:31

## 2022-01-01 RX ADMIN — MUPIROCIN 1 APPLICATION(S): 20 OINTMENT TOPICAL at 06:30

## 2022-01-01 RX ADMIN — CHLORHEXIDINE GLUCONATE 1 APPLICATION(S): 213 SOLUTION TOPICAL at 12:32

## 2022-01-01 RX ADMIN — PANTOPRAZOLE SODIUM 40 MILLIGRAM(S): 20 TABLET, DELAYED RELEASE ORAL at 06:07

## 2022-01-01 RX ADMIN — PIPERACILLIN AND TAZOBACTAM 25 GRAM(S): 4; .5 INJECTION, POWDER, LYOPHILIZED, FOR SOLUTION INTRAVENOUS at 21:08

## 2022-01-01 RX ADMIN — HEPARIN SODIUM 5000 UNIT(S): 5000 INJECTION INTRAVENOUS; SUBCUTANEOUS at 22:05

## 2022-01-01 RX ADMIN — ENOXAPARIN SODIUM 40 MILLIGRAM(S): 100 INJECTION SUBCUTANEOUS at 06:18

## 2022-01-01 RX ADMIN — OXYCODONE HYDROCHLORIDE 5 MILLIGRAM(S): 5 TABLET ORAL at 13:00

## 2022-01-01 RX ADMIN — Medication 500000 UNIT(S): at 07:17

## 2022-01-01 RX ADMIN — Medication 500000 UNIT(S): at 05:24

## 2022-01-01 RX ADMIN — PIPERACILLIN AND TAZOBACTAM 25 GRAM(S): 4; .5 INJECTION, POWDER, LYOPHILIZED, FOR SOLUTION INTRAVENOUS at 07:18

## 2022-01-01 RX ADMIN — MUPIROCIN 1 APPLICATION(S): 20 OINTMENT TOPICAL at 18:09

## 2022-01-01 RX ADMIN — PANTOPRAZOLE SODIUM 40 MILLIGRAM(S): 20 TABLET, DELAYED RELEASE ORAL at 07:01

## 2022-01-01 RX ADMIN — Medication 5 MILLIGRAM(S): at 17:26

## 2022-01-01 RX ADMIN — Medication 500000 UNIT(S): at 11:06

## 2022-01-01 RX ADMIN — AMLODIPINE BESYLATE 10 MILLIGRAM(S): 2.5 TABLET ORAL at 06:41

## 2022-01-01 RX ADMIN — PANTOPRAZOLE SODIUM 40 MILLIGRAM(S): 20 TABLET, DELAYED RELEASE ORAL at 09:27

## 2022-01-01 RX ADMIN — Medication 25 MILLIGRAM(S): at 21:27

## 2022-01-01 RX ADMIN — BENZOCAINE AND MENTHOL 1 LOZENGE: 5; 1 LIQUID ORAL at 20:06

## 2022-01-01 RX ADMIN — DEXTROSE MONOHYDRATE, SODIUM CHLORIDE, AND POTASSIUM CHLORIDE 100 MILLILITER(S): 50; .745; 4.5 INJECTION, SOLUTION INTRAVENOUS at 19:30

## 2022-01-01 RX ADMIN — Medication 25 MILLIGRAM(S): at 05:15

## 2022-01-01 RX ADMIN — CHLORHEXIDINE GLUCONATE 1 APPLICATION(S): 213 SOLUTION TOPICAL at 11:56

## 2022-01-01 RX ADMIN — HEPARIN SODIUM 5000 UNIT(S): 5000 INJECTION INTRAVENOUS; SUBCUTANEOUS at 08:20

## 2022-01-01 RX ADMIN — ONDANSETRON 4 MILLIGRAM(S): 8 TABLET, FILM COATED ORAL at 17:56

## 2022-01-01 RX ADMIN — Medication 500000 UNIT(S): at 23:08

## 2022-01-01 RX ADMIN — DEXTROSE MONOHYDRATE, SODIUM CHLORIDE, AND POTASSIUM CHLORIDE 100 MILLILITER(S): 50; .745; 4.5 INJECTION, SOLUTION INTRAVENOUS at 23:19

## 2022-01-01 RX ADMIN — BENZOCAINE AND MENTHOL 1 LOZENGE: 5; 1 LIQUID ORAL at 09:31

## 2022-01-01 RX ADMIN — MUPIROCIN 1 APPLICATION(S): 20 OINTMENT TOPICAL at 18:26

## 2022-01-01 RX ADMIN — Medication 100 GRAM(S): at 11:24

## 2022-01-01 RX ADMIN — HEPARIN SODIUM 5000 UNIT(S): 5000 INJECTION INTRAVENOUS; SUBCUTANEOUS at 06:25

## 2022-01-01 RX ADMIN — CHLORHEXIDINE GLUCONATE 1 APPLICATION(S): 213 SOLUTION TOPICAL at 17:45

## 2022-01-01 RX ADMIN — OXYCODONE HYDROCHLORIDE 5 MILLIGRAM(S): 5 TABLET ORAL at 12:00

## 2022-01-01 RX ADMIN — ENOXAPARIN SODIUM 40 MILLIGRAM(S): 100 INJECTION SUBCUTANEOUS at 07:18

## 2022-01-01 RX ADMIN — Medication 5 MILLIGRAM(S): at 04:49

## 2022-01-01 RX ADMIN — OXYCODONE HYDROCHLORIDE 5 MILLIGRAM(S): 5 TABLET ORAL at 01:23

## 2022-01-01 RX ADMIN — Medication 500000 UNIT(S): at 05:50

## 2022-01-01 RX ADMIN — PANTOPRAZOLE SODIUM 40 MILLIGRAM(S): 20 TABLET, DELAYED RELEASE ORAL at 17:16

## 2022-01-01 RX ADMIN — Medication 325 MILLIGRAM(S): at 01:41

## 2022-01-01 RX ADMIN — Medication 100 MILLIGRAM(S): at 12:10

## 2022-01-01 RX ADMIN — SODIUM CHLORIDE 30 MILLILITER(S): 9 INJECTION INTRAMUSCULAR; INTRAVENOUS; SUBCUTANEOUS at 15:30

## 2022-01-01 RX ADMIN — AMLODIPINE BESYLATE 5 MILLIGRAM(S): 2.5 TABLET ORAL at 14:59

## 2022-01-01 RX ADMIN — Medication 400 MILLIGRAM(S): at 09:42

## 2022-01-01 RX ADMIN — BENZOCAINE AND MENTHOL 1 LOZENGE: 5; 1 LIQUID ORAL at 05:57

## 2022-01-01 RX ADMIN — Medication 25 MILLIGRAM(S): at 13:54

## 2022-01-01 RX ADMIN — ONDANSETRON 4 MILLIGRAM(S): 8 TABLET, FILM COATED ORAL at 13:33

## 2022-01-01 RX ADMIN — Medication 500000 UNIT(S): at 22:45

## 2022-01-01 RX ADMIN — OXYCODONE HYDROCHLORIDE 5 MILLIGRAM(S): 5 TABLET ORAL at 00:28

## 2022-01-01 RX ADMIN — ENOXAPARIN SODIUM 40 MILLIGRAM(S): 100 INJECTION SUBCUTANEOUS at 05:51

## 2022-01-01 RX ADMIN — HYDROMORPHONE HYDROCHLORIDE 0.5 MILLIGRAM(S): 2 INJECTION INTRAMUSCULAR; INTRAVENOUS; SUBCUTANEOUS at 11:55

## 2022-01-01 RX ADMIN — MUPIROCIN 1 APPLICATION(S): 20 OINTMENT TOPICAL at 06:55

## 2022-01-01 RX ADMIN — PIPERACILLIN AND TAZOBACTAM 25 GRAM(S): 4; .5 INJECTION, POWDER, LYOPHILIZED, FOR SOLUTION INTRAVENOUS at 02:05

## 2022-01-01 RX ADMIN — Medication 100 MILLIGRAM(S): at 21:20

## 2022-01-01 RX ADMIN — PIPERACILLIN AND TAZOBACTAM 25 GRAM(S): 4; .5 INJECTION, POWDER, LYOPHILIZED, FOR SOLUTION INTRAVENOUS at 18:22

## 2022-01-01 RX ADMIN — ONDANSETRON 4 MILLIGRAM(S): 8 TABLET, FILM COATED ORAL at 04:02

## 2022-01-01 RX ADMIN — Medication 1 TABLET(S): at 11:56

## 2022-01-01 RX ADMIN — Medication 400 MILLIGRAM(S): at 18:29

## 2022-01-01 RX ADMIN — HEPARIN SODIUM 5000 UNIT(S): 5000 INJECTION INTRAVENOUS; SUBCUTANEOUS at 21:58

## 2022-01-01 RX ADMIN — AMLODIPINE BESYLATE 5 MILLIGRAM(S): 2.5 TABLET ORAL at 06:50

## 2022-01-01 RX ADMIN — Medication 400 MILLIGRAM(S): at 05:19

## 2022-01-01 RX ADMIN — Medication 81 MILLIGRAM(S): at 11:11

## 2022-01-01 RX ADMIN — CHLORHEXIDINE GLUCONATE 1 APPLICATION(S): 213 SOLUTION TOPICAL at 13:03

## 2022-01-01 RX ADMIN — Medication 500000 UNIT(S): at 13:03

## 2022-01-01 RX ADMIN — SODIUM CHLORIDE 75 MILLILITER(S): 9 INJECTION INTRAMUSCULAR; INTRAVENOUS; SUBCUTANEOUS at 10:24

## 2022-01-01 RX ADMIN — LISINOPRIL 5 MILLIGRAM(S): 2.5 TABLET ORAL at 17:12

## 2022-01-01 RX ADMIN — Medication 40 MILLIEQUIVALENT(S): at 12:23

## 2022-01-01 RX ADMIN — Medication 1 TABLET(S): at 06:59

## 2022-01-01 RX ADMIN — SODIUM CHLORIDE 75 MILLILITER(S): 9 INJECTION INTRAMUSCULAR; INTRAVENOUS; SUBCUTANEOUS at 11:27

## 2022-01-01 RX ADMIN — PANTOPRAZOLE SODIUM 40 MILLIGRAM(S): 20 TABLET, DELAYED RELEASE ORAL at 12:03

## 2022-01-01 RX ADMIN — Medication 500000 UNIT(S): at 17:24

## 2022-01-01 RX ADMIN — Medication 1000 MILLIGRAM(S): at 00:00

## 2022-01-01 RX ADMIN — POTASSIUM PHOSPHATE, MONOBASIC POTASSIUM PHOSPHATE, DIBASIC 83.33 MILLIMOLE(S): 236; 224 INJECTION, SOLUTION INTRAVENOUS at 08:11

## 2022-01-01 RX ADMIN — ATENOLOL 25 MILLIGRAM(S): 25 TABLET ORAL at 06:46

## 2022-01-01 RX ADMIN — AMLODIPINE BESYLATE 10 MILLIGRAM(S): 2.5 TABLET ORAL at 06:30

## 2022-01-01 RX ADMIN — ATENOLOL 25 MILLIGRAM(S): 25 TABLET ORAL at 05:21

## 2022-01-01 RX ADMIN — SODIUM CHLORIDE 2000 MILLILITER(S): 9 INJECTION INTRAMUSCULAR; INTRAVENOUS; SUBCUTANEOUS at 13:04

## 2022-01-01 RX ADMIN — AMLODIPINE BESYLATE 10 MILLIGRAM(S): 2.5 TABLET ORAL at 07:36

## 2022-01-01 RX ADMIN — Medication 100 MILLIGRAM(S): at 22:37

## 2022-01-01 RX ADMIN — Medication 500000 UNIT(S): at 00:34

## 2022-01-01 RX ADMIN — Medication 1 TABLET(S): at 13:58

## 2022-01-01 RX ADMIN — Medication 400 MILLIGRAM(S): at 04:37

## 2022-01-01 RX ADMIN — ATENOLOL 25 MILLIGRAM(S): 25 TABLET ORAL at 08:22

## 2022-01-01 RX ADMIN — PANTOPRAZOLE SODIUM 40 MILLIGRAM(S): 20 TABLET, DELAYED RELEASE ORAL at 12:56

## 2022-01-01 RX ADMIN — PANTOPRAZOLE SODIUM 40 MILLIGRAM(S): 20 TABLET, DELAYED RELEASE ORAL at 12:11

## 2022-01-01 RX ADMIN — PANTOPRAZOLE SODIUM 40 MILLIGRAM(S): 20 TABLET, DELAYED RELEASE ORAL at 11:45

## 2022-01-01 RX ADMIN — ATENOLOL 25 MILLIGRAM(S): 25 TABLET ORAL at 13:03

## 2022-01-01 RX ADMIN — Medication 400 MILLIGRAM(S): at 11:56

## 2022-01-01 RX ADMIN — Medication 2 MILLIGRAM(S): at 12:11

## 2022-01-01 RX ADMIN — Medication 50 GRAM(S): at 10:51

## 2022-01-01 RX ADMIN — Medication 500000 UNIT(S): at 05:46

## 2022-01-01 RX ADMIN — AMLODIPINE BESYLATE 5 MILLIGRAM(S): 2.5 TABLET ORAL at 05:01

## 2022-01-01 RX ADMIN — SODIUM CHLORIDE 125 MILLILITER(S): 9 INJECTION, SOLUTION INTRAVENOUS at 17:52

## 2022-01-01 RX ADMIN — MORPHINE SULFATE 2 MILLIGRAM(S): 50 CAPSULE, EXTENDED RELEASE ORAL at 16:20

## 2022-01-01 RX ADMIN — MUPIROCIN 1 APPLICATION(S): 20 OINTMENT TOPICAL at 17:25

## 2022-01-01 RX ADMIN — ATENOLOL 25 MILLIGRAM(S): 25 TABLET ORAL at 06:05

## 2022-01-01 RX ADMIN — ONDANSETRON 4 MILLIGRAM(S): 8 TABLET, FILM COATED ORAL at 20:55

## 2022-01-01 RX ADMIN — PANTOPRAZOLE SODIUM 40 MILLIGRAM(S): 20 TABLET, DELAYED RELEASE ORAL at 05:56

## 2022-01-01 RX ADMIN — PANTOPRAZOLE SODIUM 40 MILLIGRAM(S): 20 TABLET, DELAYED RELEASE ORAL at 06:29

## 2022-01-01 RX ADMIN — Medication 1 SPRAY(S): at 09:26

## 2022-01-01 RX ADMIN — SODIUM CHLORIDE 500 MILLILITER(S): 9 INJECTION INTRAMUSCULAR; INTRAVENOUS; SUBCUTANEOUS at 17:42

## 2022-01-01 RX ADMIN — PANTOPRAZOLE SODIUM 80 MILLIGRAM(S): 20 TABLET, DELAYED RELEASE ORAL at 11:41

## 2022-01-01 RX ADMIN — Medication 500000 UNIT(S): at 18:23

## 2022-01-01 RX ADMIN — Medication 1 SPRAY(S): at 06:26

## 2022-01-01 RX ADMIN — PANTOPRAZOLE SODIUM 40 MILLIGRAM(S): 20 TABLET, DELAYED RELEASE ORAL at 05:57

## 2022-01-01 RX ADMIN — Medication 1 TABLET(S): at 14:49

## 2022-01-01 RX ADMIN — MUPIROCIN 1 APPLICATION(S): 20 OINTMENT TOPICAL at 22:14

## 2022-01-01 RX ADMIN — Medication 10 MILLIGRAM(S): at 18:13

## 2022-01-01 RX ADMIN — Medication 100 MILLIGRAM(S): at 05:23

## 2022-01-01 RX ADMIN — Medication 500000 UNIT(S): at 12:10

## 2022-01-01 RX ADMIN — Medication 500000 UNIT(S): at 17:07

## 2022-01-01 RX ADMIN — PIPERACILLIN AND TAZOBACTAM 25 GRAM(S): 4; .5 INJECTION, POWDER, LYOPHILIZED, FOR SOLUTION INTRAVENOUS at 06:50

## 2022-01-01 RX ADMIN — Medication 25 MILLIGRAM(S): at 22:00

## 2022-01-01 RX ADMIN — HEPARIN SODIUM 5000 UNIT(S): 5000 INJECTION INTRAVENOUS; SUBCUTANEOUS at 19:53

## 2022-01-01 RX ADMIN — HEPARIN SODIUM 5000 UNIT(S): 5000 INJECTION INTRAVENOUS; SUBCUTANEOUS at 22:23

## 2022-01-01 RX ADMIN — Medication 500000 UNIT(S): at 18:09

## 2022-01-01 RX ADMIN — PANTOPRAZOLE SODIUM 40 MILLIGRAM(S): 20 TABLET, DELAYED RELEASE ORAL at 19:56

## 2022-01-01 RX ADMIN — PANTOPRAZOLE SODIUM 40 MILLIGRAM(S): 20 TABLET, DELAYED RELEASE ORAL at 17:26

## 2022-01-01 RX ADMIN — Medication 325 MILLIGRAM(S): at 00:33

## 2022-01-01 RX ADMIN — LISINOPRIL 5 MILLIGRAM(S): 2.5 TABLET ORAL at 06:06

## 2022-01-01 RX ADMIN — SODIUM CHLORIDE 1000 MILLILITER(S): 9 INJECTION, SOLUTION INTRAVENOUS at 15:42

## 2022-01-01 RX ADMIN — ATENOLOL 25 MILLIGRAM(S): 25 TABLET ORAL at 09:15

## 2022-01-01 RX ADMIN — Medication 2 SPRAY(S): at 12:04

## 2022-01-01 RX ADMIN — HEPARIN SODIUM 5000 UNIT(S): 5000 INJECTION INTRAVENOUS; SUBCUTANEOUS at 22:44

## 2022-01-01 RX ADMIN — PANTOPRAZOLE SODIUM 40 MILLIGRAM(S): 20 TABLET, DELAYED RELEASE ORAL at 14:25

## 2022-01-01 RX ADMIN — ONDANSETRON 4 MILLIGRAM(S): 8 TABLET, FILM COATED ORAL at 07:50

## 2022-01-01 RX ADMIN — ONDANSETRON 4 MILLIGRAM(S): 8 TABLET, FILM COATED ORAL at 06:27

## 2022-01-01 RX ADMIN — Medication 5 MILLIGRAM(S): at 05:05

## 2022-01-01 RX ADMIN — PANTOPRAZOLE SODIUM 40 MILLIGRAM(S): 20 TABLET, DELAYED RELEASE ORAL at 13:02

## 2022-01-01 RX ADMIN — ONDANSETRON 4 MILLIGRAM(S): 8 TABLET, FILM COATED ORAL at 20:25

## 2022-01-01 RX ADMIN — Medication 500000 UNIT(S): at 06:03

## 2022-01-01 RX ADMIN — Medication 400 MILLIGRAM(S): at 11:26

## 2022-01-01 RX ADMIN — ENOXAPARIN SODIUM 40 MILLIGRAM(S): 100 INJECTION SUBCUTANEOUS at 06:29

## 2022-01-01 RX ADMIN — Medication 100 MILLIGRAM(S): at 19:10

## 2022-01-01 RX ADMIN — SODIUM CHLORIDE 125 MILLILITER(S): 9 INJECTION, SOLUTION INTRAVENOUS at 01:23

## 2022-01-01 RX ADMIN — Medication 325 MILLIGRAM(S): at 13:07

## 2022-01-01 RX ADMIN — Medication 650 MILLIGRAM(S): at 01:00

## 2022-01-01 RX ADMIN — HEPARIN SODIUM 5000 UNIT(S): 5000 INJECTION INTRAVENOUS; SUBCUTANEOUS at 15:09

## 2022-01-01 RX ADMIN — Medication 85 MILLIMOLE(S): at 08:23

## 2022-01-01 RX ADMIN — Medication 100 MILLIGRAM(S): at 06:02

## 2022-01-01 RX ADMIN — PIPERACILLIN AND TAZOBACTAM 25 GRAM(S): 4; .5 INJECTION, POWDER, LYOPHILIZED, FOR SOLUTION INTRAVENOUS at 01:41

## 2022-01-01 RX ADMIN — Medication 400 MILLIGRAM(S): at 23:06

## 2022-01-01 RX ADMIN — MUPIROCIN 1 APPLICATION(S): 20 OINTMENT TOPICAL at 23:09

## 2022-01-01 RX ADMIN — ONDANSETRON 4 MILLIGRAM(S): 8 TABLET, FILM COATED ORAL at 20:34

## 2022-01-01 RX ADMIN — Medication 25 MILLIGRAM(S): at 21:43

## 2022-01-01 RX ADMIN — Medication 400 MILLIGRAM(S): at 05:59

## 2022-01-01 RX ADMIN — SODIUM CHLORIDE 50 MILLILITER(S): 9 INJECTION INTRAMUSCULAR; INTRAVENOUS; SUBCUTANEOUS at 11:07

## 2022-01-01 RX ADMIN — OXYCODONE HYDROCHLORIDE 5 MILLIGRAM(S): 5 TABLET ORAL at 01:28

## 2022-01-01 RX ADMIN — HEPARIN SODIUM 5000 UNIT(S): 5000 INJECTION INTRAVENOUS; SUBCUTANEOUS at 13:03

## 2022-01-01 RX ADMIN — HEPARIN SODIUM 5000 UNIT(S): 5000 INJECTION INTRAVENOUS; SUBCUTANEOUS at 21:50

## 2022-01-01 RX ADMIN — Medication 500000 UNIT(S): at 06:33

## 2022-01-01 RX ADMIN — DEXTROSE MONOHYDRATE, SODIUM CHLORIDE, AND POTASSIUM CHLORIDE 100 MILLILITER(S): 50; .745; 4.5 INJECTION, SOLUTION INTRAVENOUS at 10:15

## 2022-01-01 RX ADMIN — Medication 650 MILLIGRAM(S): at 17:30

## 2022-01-01 RX ADMIN — BENZOCAINE AND MENTHOL 1 LOZENGE: 5; 1 LIQUID ORAL at 17:26

## 2022-01-01 RX ADMIN — HEPARIN SODIUM 5000 UNIT(S): 5000 INJECTION INTRAVENOUS; SUBCUTANEOUS at 13:45

## 2022-01-01 RX ADMIN — HEPARIN SODIUM 5000 UNIT(S): 5000 INJECTION INTRAVENOUS; SUBCUTANEOUS at 06:29

## 2022-01-01 RX ADMIN — Medication 25 MILLIGRAM(S): at 21:30

## 2022-01-01 RX ADMIN — PANTOPRAZOLE SODIUM 40 MILLIGRAM(S): 20 TABLET, DELAYED RELEASE ORAL at 12:43

## 2022-01-01 RX ADMIN — PANTOPRAZOLE SODIUM 40 MILLIGRAM(S): 20 TABLET, DELAYED RELEASE ORAL at 07:45

## 2022-01-01 RX ADMIN — MUPIROCIN 1 APPLICATION(S): 20 OINTMENT TOPICAL at 06:31

## 2022-01-01 RX ADMIN — Medication 650 MILLIGRAM(S): at 10:00

## 2022-01-01 RX ADMIN — MUPIROCIN 1 APPLICATION(S): 20 OINTMENT TOPICAL at 15:12

## 2022-01-01 RX ADMIN — CHLORHEXIDINE GLUCONATE 1 APPLICATION(S): 213 SOLUTION TOPICAL at 11:49

## 2022-01-01 RX ADMIN — Medication 1 TABLET(S): at 12:05

## 2022-01-01 RX ADMIN — CHLORHEXIDINE GLUCONATE 1 APPLICATION(S): 213 SOLUTION TOPICAL at 11:06

## 2022-01-01 RX ADMIN — Medication 1 SPRAY(S): at 22:32

## 2022-01-01 RX ADMIN — OXYCODONE HYDROCHLORIDE 5 MILLIGRAM(S): 5 TABLET ORAL at 16:49

## 2022-01-01 RX ADMIN — ONDANSETRON 4 MILLIGRAM(S): 8 TABLET, FILM COATED ORAL at 17:33

## 2022-01-01 RX ADMIN — MUPIROCIN 1 APPLICATION(S): 20 OINTMENT TOPICAL at 07:36

## 2022-01-01 RX ADMIN — HEPARIN SODIUM 5000 UNIT(S): 5000 INJECTION INTRAVENOUS; SUBCUTANEOUS at 07:45

## 2022-01-01 RX ADMIN — CHLORHEXIDINE GLUCONATE 1 APPLICATION(S): 213 SOLUTION TOPICAL at 14:26

## 2022-01-01 RX ADMIN — SODIUM CHLORIDE 50 MILLILITER(S): 9 INJECTION, SOLUTION INTRAVENOUS at 11:32

## 2022-01-01 RX ADMIN — Medication 500000 UNIT(S): at 05:22

## 2022-01-01 RX ADMIN — PIPERACILLIN AND TAZOBACTAM 25 GRAM(S): 4; .5 INJECTION, POWDER, LYOPHILIZED, FOR SOLUTION INTRAVENOUS at 09:29

## 2022-01-01 RX ADMIN — Medication 500000 UNIT(S): at 05:39

## 2022-01-01 RX ADMIN — MUPIROCIN 1 APPLICATION(S): 20 OINTMENT TOPICAL at 17:02

## 2022-01-01 RX ADMIN — Medication 100 GRAM(S): at 08:08

## 2022-01-01 RX ADMIN — Medication 500000 UNIT(S): at 14:49

## 2022-01-01 RX ADMIN — OXYCODONE HYDROCHLORIDE 5 MILLIGRAM(S): 5 TABLET ORAL at 09:03

## 2022-01-01 RX ADMIN — Medication 500000 UNIT(S): at 19:14

## 2022-01-01 RX ADMIN — SODIUM CHLORIDE 125 MILLILITER(S): 9 INJECTION, SOLUTION INTRAVENOUS at 20:51

## 2022-01-01 RX ADMIN — MUPIROCIN 1 APPLICATION(S): 20 OINTMENT TOPICAL at 15:07

## 2022-01-01 RX ADMIN — MUPIROCIN 1 APPLICATION(S): 20 OINTMENT TOPICAL at 05:41

## 2022-01-01 RX ADMIN — Medication 400 MILLIGRAM(S): at 23:33

## 2022-01-01 RX ADMIN — SODIUM CHLORIDE 75 MILLILITER(S): 9 INJECTION INTRAMUSCULAR; INTRAVENOUS; SUBCUTANEOUS at 08:57

## 2022-01-01 RX ADMIN — OXYCODONE HYDROCHLORIDE 5 MILLIGRAM(S): 5 TABLET ORAL at 22:44

## 2022-01-01 RX ADMIN — AMLODIPINE BESYLATE 10 MILLIGRAM(S): 2.5 TABLET ORAL at 06:18

## 2022-01-01 RX ADMIN — Medication 500000 UNIT(S): at 12:11

## 2022-01-01 RX ADMIN — MUPIROCIN 1 APPLICATION(S): 20 OINTMENT TOPICAL at 07:17

## 2022-01-01 RX ADMIN — PANTOPRAZOLE SODIUM 40 MILLIGRAM(S): 20 TABLET, DELAYED RELEASE ORAL at 17:27

## 2022-01-01 RX ADMIN — Medication 1 SPRAY(S): at 06:47

## 2022-01-01 RX ADMIN — Medication 25 MILLIGRAM(S): at 06:05

## 2022-01-01 RX ADMIN — MUPIROCIN 1 APPLICATION(S): 20 OINTMENT TOPICAL at 22:40

## 2022-01-01 RX ADMIN — PANTOPRAZOLE SODIUM 40 MILLIGRAM(S): 20 TABLET, DELAYED RELEASE ORAL at 06:38

## 2022-01-01 RX ADMIN — Medication 400 MILLIGRAM(S): at 23:26

## 2022-01-01 RX ADMIN — ATENOLOL 25 MILLIGRAM(S): 25 TABLET ORAL at 06:51

## 2022-01-01 RX ADMIN — AMLODIPINE BESYLATE 5 MILLIGRAM(S): 2.5 TABLET ORAL at 05:23

## 2022-01-01 RX ADMIN — Medication 25 MILLIGRAM(S): at 15:09

## 2022-01-01 RX ADMIN — Medication 500000 UNIT(S): at 13:00

## 2022-01-01 RX ADMIN — CHLORHEXIDINE GLUCONATE 1 APPLICATION(S): 213 SOLUTION TOPICAL at 14:50

## 2022-01-01 RX ADMIN — Medication 50 GRAM(S): at 08:11

## 2022-01-01 RX ADMIN — Medication 500000 UNIT(S): at 01:14

## 2022-01-01 RX ADMIN — MUPIROCIN 1 APPLICATION(S): 20 OINTMENT TOPICAL at 06:32

## 2022-01-01 RX ADMIN — HYDROMORPHONE HYDROCHLORIDE 0.5 MILLIGRAM(S): 2 INJECTION INTRAMUSCULAR; INTRAVENOUS; SUBCUTANEOUS at 11:39

## 2022-01-01 RX ADMIN — Medication 1 TABLET(S): at 13:02

## 2022-01-01 RX ADMIN — Medication 325 MILLIGRAM(S): at 12:02

## 2022-01-01 RX ADMIN — HEPARIN SODIUM 5000 UNIT(S): 5000 INJECTION INTRAVENOUS; SUBCUTANEOUS at 05:24

## 2022-01-01 RX ADMIN — Medication 100 MILLIGRAM(S): at 06:43

## 2022-01-01 RX ADMIN — HYDROMORPHONE HYDROCHLORIDE 0.5 MILLIGRAM(S): 2 INJECTION INTRAMUSCULAR; INTRAVENOUS; SUBCUTANEOUS at 14:57

## 2022-01-01 RX ADMIN — PANTOPRAZOLE SODIUM 40 MILLIGRAM(S): 20 TABLET, DELAYED RELEASE ORAL at 09:03

## 2022-01-01 RX ADMIN — CHLORHEXIDINE GLUCONATE 1 APPLICATION(S): 213 SOLUTION TOPICAL at 11:50

## 2022-01-01 RX ADMIN — Medication 100 MILLIGRAM(S): at 22:43

## 2022-01-01 RX ADMIN — Medication 500000 UNIT(S): at 17:09

## 2022-01-01 RX ADMIN — ONDANSETRON 4 MILLIGRAM(S): 8 TABLET, FILM COATED ORAL at 21:27

## 2022-01-01 RX ADMIN — Medication 100 MILLIGRAM(S): at 06:31

## 2022-01-01 RX ADMIN — HEPARIN SODIUM 5000 UNIT(S): 5000 INJECTION INTRAVENOUS; SUBCUTANEOUS at 13:54

## 2022-01-01 RX ADMIN — Medication 100 MILLIGRAM(S): at 13:37

## 2022-01-01 RX ADMIN — ONDANSETRON 4 MILLIGRAM(S): 8 TABLET, FILM COATED ORAL at 03:26

## 2022-01-01 RX ADMIN — Medication 500000 UNIT(S): at 12:08

## 2022-01-01 RX ADMIN — DEXTROSE MONOHYDRATE, SODIUM CHLORIDE, AND POTASSIUM CHLORIDE 100 MILLILITER(S): 50; .745; 4.5 INJECTION, SOLUTION INTRAVENOUS at 06:41

## 2022-01-01 RX ADMIN — Medication 25 GRAM(S): at 11:10

## 2022-01-01 RX ADMIN — HEPARIN SODIUM 5000 UNIT(S): 5000 INJECTION INTRAVENOUS; SUBCUTANEOUS at 14:59

## 2022-01-01 RX ADMIN — Medication 25 MILLIGRAM(S): at 23:15

## 2022-01-01 RX ADMIN — Medication 25 MILLIGRAM(S): at 06:06

## 2022-01-01 RX ADMIN — Medication 10 MILLIGRAM(S): at 01:23

## 2022-01-01 RX ADMIN — Medication 2 MILLIGRAM(S): at 02:00

## 2022-01-01 RX ADMIN — Medication 100 MILLIGRAM(S): at 12:40

## 2022-01-01 RX ADMIN — PIPERACILLIN AND TAZOBACTAM 25 GRAM(S): 4; .5 INJECTION, POWDER, LYOPHILIZED, FOR SOLUTION INTRAVENOUS at 18:40

## 2022-01-01 RX ADMIN — MUPIROCIN 1 APPLICATION(S): 20 OINTMENT TOPICAL at 06:03

## 2022-01-01 RX ADMIN — Medication 1 SPRAY(S): at 13:22

## 2022-01-01 RX ADMIN — BENZOCAINE AND MENTHOL 1 LOZENGE: 5; 1 LIQUID ORAL at 17:29

## 2022-01-01 RX ADMIN — Medication 5 MILLIGRAM(S): at 00:32

## 2022-01-01 RX ADMIN — PIPERACILLIN AND TAZOBACTAM 25 GRAM(S): 4; .5 INJECTION, POWDER, LYOPHILIZED, FOR SOLUTION INTRAVENOUS at 01:35

## 2022-01-01 RX ADMIN — Medication 5 MILLIGRAM(S): at 11:35

## 2022-01-01 RX ADMIN — ATENOLOL 25 MILLIGRAM(S): 25 TABLET ORAL at 05:20

## 2022-01-01 RX ADMIN — HEPARIN SODIUM 5000 UNIT(S): 5000 INJECTION INTRAVENOUS; SUBCUTANEOUS at 06:46

## 2022-01-01 RX ADMIN — Medication 1000 MILLIGRAM(S): at 03:12

## 2022-01-01 RX ADMIN — Medication 1000 MILLIGRAM(S): at 18:30

## 2022-01-01 RX ADMIN — Medication 5 MILLIGRAM(S): at 06:46

## 2022-01-01 RX ADMIN — OXYCODONE HYDROCHLORIDE 5 MILLIGRAM(S): 5 TABLET ORAL at 09:58

## 2022-01-01 RX ADMIN — BENZOCAINE AND MENTHOL 1 LOZENGE: 5; 1 LIQUID ORAL at 06:25

## 2022-01-01 RX ADMIN — HEPARIN SODIUM 5000 UNIT(S): 5000 INJECTION INTRAVENOUS; SUBCUTANEOUS at 05:04

## 2022-01-01 RX ADMIN — PANTOPRAZOLE SODIUM 40 MILLIGRAM(S): 20 TABLET, DELAYED RELEASE ORAL at 11:56

## 2022-01-01 RX ADMIN — Medication 400 MILLIGRAM(S): at 06:36

## 2022-01-01 RX ADMIN — MUPIROCIN 1 APPLICATION(S): 20 OINTMENT TOPICAL at 22:38

## 2022-01-01 RX ADMIN — Medication 85 MILLIMOLE(S): at 21:34

## 2022-01-01 RX ADMIN — ONDANSETRON 4 MILLIGRAM(S): 8 TABLET, FILM COATED ORAL at 21:46

## 2022-01-01 RX ADMIN — PANTOPRAZOLE SODIUM 40 MILLIGRAM(S): 20 TABLET, DELAYED RELEASE ORAL at 06:01

## 2022-01-01 RX ADMIN — HEPARIN SODIUM 5000 UNIT(S): 5000 INJECTION INTRAVENOUS; SUBCUTANEOUS at 05:21

## 2022-01-01 RX ADMIN — Medication 325 MILLIGRAM(S): at 23:06

## 2022-01-01 RX ADMIN — Medication 2 MILLIGRAM(S): at 14:50

## 2022-01-01 RX ADMIN — Medication 325 MILLIGRAM(S): at 23:31

## 2022-01-01 RX ADMIN — DEXTROSE MONOHYDRATE, SODIUM CHLORIDE, AND POTASSIUM CHLORIDE 100 MILLILITER(S): 50; .745; 4.5 INJECTION, SOLUTION INTRAVENOUS at 21:51

## 2022-01-01 RX ADMIN — PANTOPRAZOLE SODIUM 40 MILLIGRAM(S): 20 TABLET, DELAYED RELEASE ORAL at 08:22

## 2022-01-01 RX ADMIN — Medication 500000 UNIT(S): at 05:31

## 2022-01-01 RX ADMIN — Medication 10 MILLIGRAM(S): at 00:02

## 2022-01-01 RX ADMIN — Medication 100 MILLIGRAM(S): at 20:59

## 2022-01-01 RX ADMIN — PANTOPRAZOLE SODIUM 40 MILLIGRAM(S): 20 TABLET, DELAYED RELEASE ORAL at 12:10

## 2022-01-01 RX ADMIN — SODIUM CHLORIDE 1000 MILLILITER(S): 9 INJECTION INTRAMUSCULAR; INTRAVENOUS; SUBCUTANEOUS at 07:53

## 2022-01-01 RX ADMIN — ATENOLOL 25 MILLIGRAM(S): 25 TABLET ORAL at 06:53

## 2022-01-01 RX ADMIN — Medication 325 MILLIGRAM(S): at 18:58

## 2022-01-01 RX ADMIN — PIPERACILLIN AND TAZOBACTAM 25 GRAM(S): 4; .5 INJECTION, POWDER, LYOPHILIZED, FOR SOLUTION INTRAVENOUS at 10:10

## 2022-01-01 RX ADMIN — Medication 100 MILLIGRAM(S): at 13:59

## 2022-01-01 RX ADMIN — MUPIROCIN 1 APPLICATION(S): 20 OINTMENT TOPICAL at 22:09

## 2022-01-01 RX ADMIN — POTASSIUM PHOSPHATE, MONOBASIC POTASSIUM PHOSPHATE, DIBASIC 83.33 MILLIMOLE(S): 236; 224 INJECTION, SOLUTION INTRAVENOUS at 21:58

## 2022-01-01 RX ADMIN — Medication 1 TABLET(S): at 13:18

## 2022-01-01 RX ADMIN — HEPARIN SODIUM 5000 UNIT(S): 5000 INJECTION INTRAVENOUS; SUBCUTANEOUS at 13:22

## 2022-01-01 RX ADMIN — Medication 100 MILLIGRAM(S): at 05:21

## 2022-01-01 RX ADMIN — Medication 100 MILLIGRAM(S): at 20:48

## 2022-01-01 RX ADMIN — Medication 81 MILLIGRAM(S): at 11:33

## 2022-01-01 RX ADMIN — Medication 500000 UNIT(S): at 23:07

## 2022-01-01 RX ADMIN — Medication 650 MILLIGRAM(S): at 09:04

## 2022-01-01 RX ADMIN — MUPIROCIN 1 APPLICATION(S): 20 OINTMENT TOPICAL at 14:17

## 2022-01-01 RX ADMIN — IRON SUCROSE 110 MILLIGRAM(S): 20 INJECTION, SOLUTION INTRAVENOUS at 17:11

## 2022-01-01 RX ADMIN — Medication 2 MILLIGRAM(S): at 15:18

## 2022-01-01 RX ADMIN — Medication 325 MILLIGRAM(S): at 11:01

## 2022-01-01 RX ADMIN — Medication 5 MILLIGRAM(S): at 06:01

## 2022-01-01 RX ADMIN — Medication 100 MILLIGRAM(S): at 06:51

## 2022-01-01 RX ADMIN — Medication 1 SPRAY(S): at 05:58

## 2022-01-01 RX ADMIN — MUPIROCIN 1 APPLICATION(S): 20 OINTMENT TOPICAL at 05:44

## 2022-01-01 RX ADMIN — HEPARIN SODIUM 5000 UNIT(S): 5000 INJECTION INTRAVENOUS; SUBCUTANEOUS at 16:21

## 2022-01-01 RX ADMIN — ONDANSETRON 4 MILLIGRAM(S): 8 TABLET, FILM COATED ORAL at 19:42

## 2022-01-01 RX ADMIN — PIPERACILLIN AND TAZOBACTAM 25 GRAM(S): 4; .5 INJECTION, POWDER, LYOPHILIZED, FOR SOLUTION INTRAVENOUS at 15:04

## 2022-01-01 RX ADMIN — AMLODIPINE BESYLATE 10 MILLIGRAM(S): 2.5 TABLET ORAL at 06:46

## 2022-01-01 RX ADMIN — AMLODIPINE BESYLATE 10 MILLIGRAM(S): 2.5 TABLET ORAL at 17:31

## 2022-01-01 RX ADMIN — Medication 100 MILLIEQUIVALENT(S): at 16:40

## 2022-01-01 RX ADMIN — MUPIROCIN 1 APPLICATION(S): 20 OINTMENT TOPICAL at 19:15

## 2022-01-01 RX ADMIN — Medication 500000 UNIT(S): at 12:49

## 2022-01-01 RX ADMIN — HEPARIN SODIUM 5000 UNIT(S): 5000 INJECTION INTRAVENOUS; SUBCUTANEOUS at 07:00

## 2022-01-01 RX ADMIN — Medication 25 MILLIGRAM(S): at 14:57

## 2022-01-01 RX ADMIN — ATENOLOL 25 MILLIGRAM(S): 25 TABLET ORAL at 05:48

## 2022-01-01 RX ADMIN — DEXTROSE MONOHYDRATE, SODIUM CHLORIDE, AND POTASSIUM CHLORIDE 75 MILLILITER(S): 50; .745; 4.5 INJECTION, SOLUTION INTRAVENOUS at 23:05

## 2022-01-01 RX ADMIN — Medication 100 MILLIGRAM(S): at 14:25

## 2022-01-01 RX ADMIN — Medication 1 TABLET(S): at 14:25

## 2022-01-01 RX ADMIN — MUPIROCIN 1 APPLICATION(S): 20 OINTMENT TOPICAL at 13:25

## 2022-01-01 RX ADMIN — Medication 500000 UNIT(S): at 17:57

## 2022-01-01 RX ADMIN — Medication 100 MILLIEQUIVALENT(S): at 08:58

## 2022-01-01 RX ADMIN — HEPARIN SODIUM 5000 UNIT(S): 5000 INJECTION INTRAVENOUS; SUBCUTANEOUS at 05:57

## 2022-01-01 RX ADMIN — HEPARIN SODIUM 5000 UNIT(S): 5000 INJECTION INTRAVENOUS; SUBCUTANEOUS at 21:34

## 2022-01-01 RX ADMIN — AMLODIPINE BESYLATE 5 MILLIGRAM(S): 2.5 TABLET ORAL at 06:43

## 2022-01-01 RX ADMIN — CHLORHEXIDINE GLUCONATE 1 APPLICATION(S): 213 SOLUTION TOPICAL at 11:22

## 2022-01-01 RX ADMIN — HEPARIN SODIUM 5000 UNIT(S): 5000 INJECTION INTRAVENOUS; SUBCUTANEOUS at 14:37

## 2022-01-01 RX ADMIN — OXYCODONE HYDROCHLORIDE 5 MILLIGRAM(S): 5 TABLET ORAL at 04:26

## 2022-01-01 RX ADMIN — Medication 1 TABLET(S): at 12:10

## 2022-01-01 RX ADMIN — OXYCODONE HYDROCHLORIDE 5 MILLIGRAM(S): 5 TABLET ORAL at 12:10

## 2022-01-01 RX ADMIN — HYDROMORPHONE HYDROCHLORIDE 0.5 MILLIGRAM(S): 2 INJECTION INTRAMUSCULAR; INTRAVENOUS; SUBCUTANEOUS at 15:45

## 2022-01-01 RX ADMIN — Medication 500000 UNIT(S): at 06:20

## 2022-01-01 RX ADMIN — AMLODIPINE BESYLATE 10 MILLIGRAM(S): 2.5 TABLET ORAL at 06:21

## 2022-01-01 RX ADMIN — CHLORHEXIDINE GLUCONATE 1 APPLICATION(S): 213 SOLUTION TOPICAL at 11:46

## 2022-01-01 RX ADMIN — PIPERACILLIN AND TAZOBACTAM 200 GRAM(S): 4; .5 INJECTION, POWDER, LYOPHILIZED, FOR SOLUTION INTRAVENOUS at 18:32

## 2022-01-01 RX ADMIN — Medication 1 TABLET(S): at 12:03

## 2022-01-01 RX ADMIN — Medication 5 MILLIGRAM(S): at 11:15

## 2022-01-01 RX ADMIN — HEPARIN SODIUM 5000 UNIT(S): 5000 INJECTION INTRAVENOUS; SUBCUTANEOUS at 23:15

## 2022-01-01 RX ADMIN — CHLORHEXIDINE GLUCONATE 1 APPLICATION(S): 213 SOLUTION TOPICAL at 11:47

## 2022-01-01 RX ADMIN — Medication 500000 UNIT(S): at 12:04

## 2022-01-01 RX ADMIN — Medication 1 TABLET(S): at 00:30

## 2022-01-01 RX ADMIN — MUPIROCIN 1 APPLICATION(S): 20 OINTMENT TOPICAL at 05:51

## 2022-01-01 RX ADMIN — Medication 25 MILLIGRAM(S): at 06:41

## 2022-01-01 RX ADMIN — Medication 25 MILLIGRAM(S): at 05:21

## 2022-01-01 RX ADMIN — ATENOLOL 25 MILLIGRAM(S): 25 TABLET ORAL at 17:22

## 2022-01-01 RX ADMIN — Medication 100 MILLIGRAM(S): at 12:23

## 2022-01-01 RX ADMIN — Medication 0.27 MILLIGRAM(S): at 16:13

## 2022-01-01 RX ADMIN — PANTOPRAZOLE SODIUM 40 MILLIGRAM(S): 20 TABLET, DELAYED RELEASE ORAL at 11:33

## 2022-01-01 RX ADMIN — Medication 10 MILLIGRAM(S): at 11:49

## 2022-01-01 RX ADMIN — CHLORHEXIDINE GLUCONATE 1 APPLICATION(S): 213 SOLUTION TOPICAL at 12:11

## 2022-01-01 RX ADMIN — HEPARIN SODIUM 5000 UNIT(S): 5000 INJECTION INTRAVENOUS; SUBCUTANEOUS at 06:41

## 2022-01-01 RX ADMIN — PANTOPRAZOLE SODIUM 40 MILLIGRAM(S): 20 TABLET, DELAYED RELEASE ORAL at 11:32

## 2022-01-01 RX ADMIN — MUPIROCIN 1 APPLICATION(S): 20 OINTMENT TOPICAL at 15:14

## 2022-01-01 RX ADMIN — AMLODIPINE BESYLATE 5 MILLIGRAM(S): 2.5 TABLET ORAL at 13:03

## 2022-01-01 RX ADMIN — HEPARIN SODIUM 5000 UNIT(S): 5000 INJECTION INTRAVENOUS; SUBCUTANEOUS at 06:44

## 2022-01-01 RX ADMIN — HEPARIN SODIUM 5000 UNIT(S): 5000 INJECTION INTRAVENOUS; SUBCUTANEOUS at 05:20

## 2022-01-01 RX ADMIN — Medication 5 MILLIGRAM(S): at 13:21

## 2022-01-01 RX ADMIN — HEPARIN SODIUM 5000 UNIT(S): 5000 INJECTION INTRAVENOUS; SUBCUTANEOUS at 14:41

## 2022-01-01 RX ADMIN — Medication 650 MILLIGRAM(S): at 12:30

## 2022-01-01 RX ADMIN — Medication 5 MILLIGRAM(S): at 23:06

## 2022-01-01 RX ADMIN — Medication 81 MILLIGRAM(S): at 12:03

## 2022-01-01 RX ADMIN — Medication 500000 UNIT(S): at 17:30

## 2022-01-01 RX ADMIN — SODIUM CHLORIDE 1000 MILLILITER(S): 9 INJECTION, SOLUTION INTRAVENOUS at 12:30

## 2022-01-01 RX ADMIN — HEPARIN SODIUM 5000 UNIT(S): 5000 INJECTION INTRAVENOUS; SUBCUTANEOUS at 06:47

## 2022-01-01 RX ADMIN — BENZOCAINE AND MENTHOL 1 LOZENGE: 5; 1 LIQUID ORAL at 10:37

## 2022-01-01 RX ADMIN — PIPERACILLIN AND TAZOBACTAM 25 GRAM(S): 4; .5 INJECTION, POWDER, LYOPHILIZED, FOR SOLUTION INTRAVENOUS at 23:04

## 2022-01-01 RX ADMIN — Medication 500000 UNIT(S): at 11:46

## 2022-01-01 RX ADMIN — Medication 2 PACKET(S): at 11:10

## 2022-01-01 RX ADMIN — PIPERACILLIN AND TAZOBACTAM 25 GRAM(S): 4; .5 INJECTION, POWDER, LYOPHILIZED, FOR SOLUTION INTRAVENOUS at 17:26

## 2022-01-01 RX ADMIN — HYDROMORPHONE HYDROCHLORIDE 0.5 MILLIGRAM(S): 2 INJECTION INTRAMUSCULAR; INTRAVENOUS; SUBCUTANEOUS at 01:08

## 2022-01-01 RX ADMIN — MUPIROCIN 1 APPLICATION(S): 20 OINTMENT TOPICAL at 14:50

## 2022-01-01 RX ADMIN — MUPIROCIN 1 APPLICATION(S): 20 OINTMENT TOPICAL at 17:11

## 2022-01-01 RX ADMIN — HEPARIN SODIUM 5000 UNIT(S): 5000 INJECTION INTRAVENOUS; SUBCUTANEOUS at 14:10

## 2022-01-01 RX ADMIN — MUPIROCIN 1 APPLICATION(S): 20 OINTMENT TOPICAL at 06:52

## 2022-01-01 RX ADMIN — AMLODIPINE BESYLATE 10 MILLIGRAM(S): 2.5 TABLET ORAL at 07:45

## 2022-01-01 RX ADMIN — MUPIROCIN 1 APPLICATION(S): 20 OINTMENT TOPICAL at 06:22

## 2022-01-01 RX ADMIN — MUPIROCIN 1 APPLICATION(S): 20 OINTMENT TOPICAL at 06:42

## 2022-01-01 RX ADMIN — ONDANSETRON 4 MILLIGRAM(S): 8 TABLET, FILM COATED ORAL at 19:05

## 2022-01-01 RX ADMIN — Medication 5 MILLIGRAM(S): at 18:27

## 2022-01-01 RX ADMIN — AMLODIPINE BESYLATE 5 MILLIGRAM(S): 2.5 TABLET ORAL at 06:52

## 2022-01-01 RX ADMIN — Medication 400 MILLIGRAM(S): at 23:20

## 2022-01-01 RX ADMIN — PANTOPRAZOLE SODIUM 40 MILLIGRAM(S): 20 TABLET, DELAYED RELEASE ORAL at 13:00

## 2022-01-01 RX ADMIN — Medication 100 MILLIGRAM(S): at 05:56

## 2022-01-01 RX ADMIN — SODIUM CHLORIDE 500 MILLILITER(S): 9 INJECTION INTRAMUSCULAR; INTRAVENOUS; SUBCUTANEOUS at 18:45

## 2022-01-01 RX ADMIN — SODIUM CHLORIDE 2000 MILLILITER(S): 9 INJECTION INTRAMUSCULAR; INTRAVENOUS; SUBCUTANEOUS at 06:39

## 2022-01-01 RX ADMIN — IRON SUCROSE 110 MILLIGRAM(S): 20 INJECTION, SOLUTION INTRAVENOUS at 17:17

## 2022-01-01 RX ADMIN — Medication 100 MILLIGRAM(S): at 12:02

## 2022-01-01 RX ADMIN — HEPARIN SODIUM 5000 UNIT(S): 5000 INJECTION INTRAVENOUS; SUBCUTANEOUS at 12:43

## 2022-01-01 RX ADMIN — Medication 400 MILLIGRAM(S): at 12:01

## 2022-01-01 RX ADMIN — HYDROMORPHONE HYDROCHLORIDE 0.5 MILLIGRAM(S): 2 INJECTION INTRAMUSCULAR; INTRAVENOUS; SUBCUTANEOUS at 00:53

## 2022-01-01 RX ADMIN — PANTOPRAZOLE SODIUM 40 MILLIGRAM(S): 20 TABLET, DELAYED RELEASE ORAL at 14:49

## 2022-01-01 RX ADMIN — BENZOCAINE AND MENTHOL 1 LOZENGE: 5; 1 LIQUID ORAL at 05:05

## 2022-01-01 RX ADMIN — Medication 25 MILLIGRAM(S): at 00:55

## 2022-01-01 RX ADMIN — POTASSIUM PHOSPHATE, MONOBASIC POTASSIUM PHOSPHATE, DIBASIC 62.5 MILLIMOLE(S): 236; 224 INJECTION, SOLUTION INTRAVENOUS at 17:30

## 2022-01-01 RX ADMIN — ATENOLOL 25 MILLIGRAM(S): 25 TABLET ORAL at 23:58

## 2022-01-01 RX ADMIN — Medication 500000 UNIT(S): at 17:15

## 2022-01-01 RX ADMIN — ATENOLOL 25 MILLIGRAM(S): 25 TABLET ORAL at 14:26

## 2022-01-01 RX ADMIN — HEPARIN SODIUM 5000 UNIT(S): 5000 INJECTION INTRAVENOUS; SUBCUTANEOUS at 05:34

## 2022-01-01 RX ADMIN — SODIUM CHLORIDE 500 MILLILITER(S): 9 INJECTION INTRAMUSCULAR; INTRAVENOUS; SUBCUTANEOUS at 19:33

## 2022-01-01 RX ADMIN — Medication 400 MILLIGRAM(S): at 02:12

## 2022-01-01 RX ADMIN — AMLODIPINE BESYLATE 10 MILLIGRAM(S): 2.5 TABLET ORAL at 17:12

## 2022-01-01 RX ADMIN — Medication 25 MILLIGRAM(S): at 05:35

## 2022-01-01 RX ADMIN — SODIUM CHLORIDE 100 MILLILITER(S): 9 INJECTION INTRAMUSCULAR; INTRAVENOUS; SUBCUTANEOUS at 03:44

## 2022-01-01 RX ADMIN — Medication 100 MILLIGRAM(S): at 22:40

## 2022-01-01 RX ADMIN — Medication 25 MILLIGRAM(S): at 21:36

## 2022-01-01 RX ADMIN — Medication 100 MILLIGRAM(S): at 05:46

## 2022-01-01 RX ADMIN — Medication 1 TABLET(S): at 12:34

## 2022-01-01 RX ADMIN — OXYCODONE HYDROCHLORIDE 5 MILLIGRAM(S): 5 TABLET ORAL at 23:08

## 2022-01-01 RX ADMIN — OXYCODONE HYDROCHLORIDE 5 MILLIGRAM(S): 5 TABLET ORAL at 17:49

## 2022-01-01 RX ADMIN — AMLODIPINE BESYLATE 5 MILLIGRAM(S): 2.5 TABLET ORAL at 14:26

## 2022-01-01 RX ADMIN — PANTOPRAZOLE SODIUM 40 MILLIGRAM(S): 20 TABLET, DELAYED RELEASE ORAL at 06:47

## 2022-01-01 RX ADMIN — Medication 5 MILLIGRAM(S): at 05:34

## 2022-01-01 RX ADMIN — Medication 500000 UNIT(S): at 12:03

## 2022-01-01 RX ADMIN — Medication 1000 MILLIGRAM(S): at 23:50

## 2022-01-01 RX ADMIN — FAMOTIDINE 20 MILLIGRAM(S): 10 INJECTION INTRAVENOUS at 11:45

## 2022-01-01 RX ADMIN — Medication 5 MILLIGRAM(S): at 22:20

## 2022-01-01 RX ADMIN — PANTOPRAZOLE SODIUM 40 MILLIGRAM(S): 20 TABLET, DELAYED RELEASE ORAL at 14:05

## 2022-01-01 RX ADMIN — MUPIROCIN 1 APPLICATION(S): 20 OINTMENT TOPICAL at 06:28

## 2022-01-01 RX ADMIN — ONDANSETRON 4 MILLIGRAM(S): 8 TABLET, FILM COATED ORAL at 18:04

## 2022-01-01 RX ADMIN — Medication 500000 UNIT(S): at 18:40

## 2022-01-01 RX ADMIN — Medication 85 MILLIMOLE(S): at 12:01

## 2022-01-01 RX ADMIN — Medication 1 SPRAY(S): at 06:06

## 2022-01-01 RX ADMIN — ATENOLOL 25 MILLIGRAM(S): 25 TABLET ORAL at 07:43

## 2022-01-01 RX ADMIN — ATENOLOL 25 MILLIGRAM(S): 25 TABLET ORAL at 17:12

## 2022-01-01 RX ADMIN — Medication 2 MILLIGRAM(S): at 03:26

## 2022-01-01 RX ADMIN — Medication 25 GRAM(S): at 16:13

## 2022-01-01 RX ADMIN — HEPARIN SODIUM 5000 UNIT(S): 5000 INJECTION INTRAVENOUS; SUBCUTANEOUS at 17:23

## 2022-01-01 RX ADMIN — Medication 500000 UNIT(S): at 22:01

## 2022-01-01 RX ADMIN — AMLODIPINE BESYLATE 5 MILLIGRAM(S): 2.5 TABLET ORAL at 05:21

## 2022-01-01 RX ADMIN — OXYCODONE HYDROCHLORIDE 5 MILLIGRAM(S): 5 TABLET ORAL at 23:59

## 2022-01-01 RX ADMIN — Medication 500000 UNIT(S): at 00:23

## 2022-01-01 RX ADMIN — Medication 650 MILLIGRAM(S): at 23:04

## 2022-01-01 RX ADMIN — Medication 25 GRAM(S): at 09:28

## 2022-01-01 RX ADMIN — OXYCODONE HYDROCHLORIDE 5 MILLIGRAM(S): 5 TABLET ORAL at 05:15

## 2022-01-01 RX ADMIN — CHLORHEXIDINE GLUCONATE 1 APPLICATION(S): 213 SOLUTION TOPICAL at 12:14

## 2022-01-01 RX ADMIN — Medication 10 MILLIGRAM(S): at 14:06

## 2022-01-01 RX ADMIN — MUPIROCIN 1 APPLICATION(S): 20 OINTMENT TOPICAL at 23:49

## 2022-01-01 RX ADMIN — PIPERACILLIN AND TAZOBACTAM 25 GRAM(S): 4; .5 INJECTION, POWDER, LYOPHILIZED, FOR SOLUTION INTRAVENOUS at 09:35

## 2022-01-01 RX ADMIN — PIPERACILLIN AND TAZOBACTAM 25 GRAM(S): 4; .5 INJECTION, POWDER, LYOPHILIZED, FOR SOLUTION INTRAVENOUS at 02:09

## 2022-01-01 RX ADMIN — Medication 1000 MILLIGRAM(S): at 12:25

## 2022-01-01 RX ADMIN — PANTOPRAZOLE SODIUM 40 MILLIGRAM(S): 20 TABLET, DELAYED RELEASE ORAL at 12:41

## 2022-01-01 RX ADMIN — HEPARIN SODIUM 5000 UNIT(S): 5000 INJECTION INTRAVENOUS; SUBCUTANEOUS at 13:31

## 2022-01-01 RX ADMIN — PANTOPRAZOLE SODIUM 40 MILLIGRAM(S): 20 TABLET, DELAYED RELEASE ORAL at 05:05

## 2022-01-01 RX ADMIN — CHLORHEXIDINE GLUCONATE 1 APPLICATION(S): 213 SOLUTION TOPICAL at 14:13

## 2022-01-01 RX ADMIN — Medication 325 MILLIGRAM(S): at 13:52

## 2022-04-29 NOTE — CONSULT NOTE ADULT - SUBJECTIVE AND OBJECTIVE BOX
Reason for consult: gi mass    HPI:       PAST MEDICAL & SURGICAL HISTORY:  Anal cancer  Had Chemo and Radiation 4 years ago    No significant past surgical history        FAMILY HISTORY:      Alochol: Denied  Smoking: Nonsmoker  Drug Use: Denied  Marital Status:         Allergies    No Known Allergies    Intolerances        MEDICATIONS  (STANDING):    MEDICATIONS  (PRN):      ROS:     General:  No wt loss, fevers, chills, night sweats, fatigue,   Eyes:  Good vision, no reported pain  ENT:  No sore throat, pain, runny nose, dysphagia  CV:  No pain, palpitations, hypo/hypertension  Resp:  No dyspnea, cough, tachypnea, wheezing  GI:  See HPI   :  No pain, bleeding, incontinence, nocturia  Muscle:  No pain, weakness  Neuro:  No weakness, tingling, memory problems  Psych:  No fatigue, insomnia, mood problems, depression  Endocrine:  No polyuria, polydipsia, cold/heat intolerance  Heme:  No petechiae, ecchymosis, easy bruisability  Skin:  No rash, tattoos, scars, edema      PHYSICAL EXAM:     GENERAL:  Appears stated age, well-groomed  HEENT:  NC/AT,  conjunctivae clear and pink  CHEST:  Full & symmetric excursion, no increased effort, breath sounds clear  HEART:  Regular rhythm, S1, S2, no murmur/rub/S3/S4  ABDOMEN:  Soft, non-tender, non-distended  EXTEREMITIES:  no cyanosis,clubbing or edema  SKIN:  No rash/erythema/ecchymoses  NEURO:  Alert, oriented, no asterixis  LN: no palpable Lymphadenopathy                           10.9   6.65  )-----------( 262      ( 29 Apr 2022 11:21 )             33.5       04-29    138  |  94<L>  |  24<H>  ----------------------------<  121<H>  3.9   |  31  |  1.66<H>    Ca    10.4      29 Apr 2022 11:21  Mg     2.6     04-29    TPro  6.3  /  Alb  4.5  /  TBili  0.5  /  DBili  x   /  AST  10  /  ALT  8<L>  /  AlkPhos  71  04-29   Reason for consult:    HPI:                                                                                              General Surgery Consult  Consulting surgical team: [facility]  Consulting attending: Dr. Avilez    HPI:  76M anal cancer s/p chemo/rad , presented for follow up at Dr. Siddiqui office w/ worsening abdominal discomfort over the past month .      surgery consulted  nonsmoker  last c  scope and egd 2 years ago reportedly normal  denies family hx of cancer  wbc 6.6, cr 1.66, tbili 0.5, ast/alt 10/8, lipase 153  CT shows annular mass of pancreas with duodenal obstruction    of note, patient's chart reveals anal cancer s/p chemo/radiation in , followed by Dr. Adrian as outpatient last seen in  for radiation ulcer bleeding that resolved with silvadene  patient denied personal cancer history, said he had bleeding per rectum that resolved after using a cream    PAST MEDICAL HISTORY:  Anal cancer        PAST SURGICAL HISTORY:  No significant past surgical history        MEDICATIONS:      ALLERGIES:  No Known Allergies      VITALS & I/Os:  Vital Signs Last 24 Hrs  T(C): 36.8 (2022 16:10), Max: 36.8 (2022 16:10)  T(F): 98.3 (2022 16:10), Max: 98.3 (2022 16:10)  HR: 65 (2022 16:10) (65 - 83)  BP: 198/70 (2022 16:10) (190/89 - 198/70)  BP(mean): --  RR: 18 (2022 16:10) (18 - 18)  SpO2: 99% (2022 16:10) (99% - 99%)    I&O's Summary      PHYSICAL EXAM:  General: No acute distress  Respiratory: Nonlabored  Cardiovascular: appears well perfused  Abdominal: Soft, nondistended, mild epigastric ttp. No rebound or guarding. No organomegaly, no palpable mass.  Extremities: Warm    LABS:                        10.9   6.65  )-----------( 262      ( 2022 11:21 )             33.5     04-    138  |  94<L>  |  24<H>  ----------------------------<  121<H>  3.9   |  31  |  1.66<H>    Ca    10.4      2022 11:21  Mg     2.6         TPro  6.3  /  Alb  4.5  /  TBili  0.5  /  DBili  x   /  AST  10  /  ALT  8<L>  /  AlkPhos  71      Lactate:   @ 11:21  0.8    PT/INR - ( 2022 11:21 )   PT: 13.0 sec;   INR: 1.12 ratio         PTT - ( 2022 11:21 )  PTT:28.5 sec          Urinalysis Basic - ( 2022 14:03 )    Color: Light Yellow / Appearance: Clear / S.025 / pH: x  Gluc: x / Ketone: Negative  / Bili: Negative / Urobili: Negative   Blood: x / Protein: 30 mg/dL / Nitrite: Negative   Leuk Esterase: Negative / RBC: 1 /hpf / WBC 0 /HPF   Sq Epi: x / Non Sq Epi: 0 /hpf / Bacteria: Negative        IMAGING:    ACC: [id number] EXAM:  CT ABDOMEN AND PELVIS IC                          PROCEDURE DATE:  2022          INTERPRETATION:  CLINICAL INFORMATION: Periumbilical abdominal pain.   Hematemesis. Black stool.    COMPARISON: March 3, 2020.    CONTRAST/COMPLICATIONS:  IV Contrast: Omnipaque 300  90 cc administered   10 cc discarded  Oral Contrast: NONE  Complications: None reported at time of study completion    PROCEDURE:  CT of the Abdomen and Pelvis was performed.  Sagittal and coronal reformats were performed.    FINDINGS:  LOWER CHEST: Calcified granuloma in the right lower lobe.    LIVER: Within normal limits.  BILE DUCTS: Mild intrahepatic and extrahepatic biliary ductal dilatation   to the level of an obstructing mass in the region of the pancreatic   uncinate process.  GALLBLADDER: Within normal limits.  SPLEEN: Within normal limits.  PANCREAS: Ill-defined mass in the region of the uncinate process of the   pancreas, which appears to extend from the third portion of the duodenum.  ADRENALS: Within normal limits.  KIDNEYS/URETERS: No hydronephrosis. Nonobstructing bilateral renal   calculi, measuring up to 0.3 cm in the right upper pole. Subcentimeter   hypodense bilateral renal lesions, too small to characterize.    BLADDER: Within normal limits.  REPRODUCTIVE ORGANS: Prostate is enlarged.    BOWEL: Duodenal obstruction at the level of the third portion, where   there is an annular mass which extends into the uncinate process of the   pancreas, measuring approximately 3.8 x 2.2 cm (series 2, image 44). Mass   appears to abut this superior mesenteric vein.  PERITONEUM: No ascites.  VESSELS: Atherosclerotic changes. Moderate stenosis proximal superior   mesenteric artery.  RETROPERITONEUM/LYMPH NODES: No lymphadenopathy.  ABDOMINAL WALL: Small fat-containing umbilical hernia.  BONES: Age-indeterminate mild compression of the L1 superior endplate.   Degenerative changes. Left hip replacement.    IMPRESSION:  Duodenal obstruction at the level of the third portion, where there is an   annular mass which extends into the uncinate process of the pancreas and   obstructed distal common bile duct.    Age indeterminate mild compression of the L1 superior endplate    --- End of Report ---            ANDRIA GORDILLO MD; Attending Radiologist  This document has been electronically signed. 2022  1:05PM   (2022 17:29)      PAST MEDICAL & SURGICAL HISTORY:  Anal cancer  Had Chemo and Radiation 4 years ago    No significant past surgical history        FAMILY HISTORY:      Alochol: Denied  Smoking: Nonsmoker  Drug Use: Denied  Marital Status:         Allergies    No Known Allergies    Intolerances        MEDICATIONS  (STANDING):  lactated ringers. 1000 milliLiter(s) (125 mL/Hr) IV Continuous <Continuous>  pantoprazole  Injectable 40 milliGRAM(s) IV Push two times a day    MEDICATIONS  (PRN):      ROS:     General:  No wt loss, fevers, chills, night sweats, fatigue,   Eyes:  Good vision, no reported pain  ENT:  No sore throat, pain, runny nose, dysphagia  CV:  No pain, palpitations, hypo/hypertension  Resp:  No dyspnea, cough, tachypnea, wheezing  GI:  See HPI   :  No pain, bleeding, incontinence, nocturia  Muscle:  No pain, weakness  Neuro:  No weakness, tingling, memory problems  Psych:  No fatigue, insomnia, mood problems, depression  Endocrine:  No polyuria, polydipsia, cold/heat intolerance  Heme:  No petechiae, ecchymosis, easy bruisability  Skin:  No rash, tattoos, scars, edema      PHYSICAL EXAM:     GENERAL:  Appears stated age, well-groomed  HEENT:  NC/AT,  conjunctivae clear and pink  CHEST:  Full & symmetric excursion, no increased effort, breath sounds clear  HEART:  Regular rhythm, S1, S2, no murmur/rub/S3/S4  ABDOMEN:  Soft, non-tender, non-distended  EXTEREMITIES:  no cyanosis,clubbing or edema  SKIN:  No rash/erythema/ecchymoses  NEURO:  Alert, oriented, no asterixis  LN: no palpable Lymphadenopathy                           10.9   6.65  )-----------( 262      ( 2022 11:21 )             33.5           138  |  94<L>  |  24<H>  ----------------------------<  121<H>  3.9   |  31  |  1.66<H>    Ca    10.4      2022 11:21  Mg     2.6         TPro  6.3  /  Alb  4.5  /  TBili  0.5  /  DBili  x   /  AST  10  /  ALT  8<L>  /  AlkPhos  71

## 2022-04-29 NOTE — ED ADULT NURSE REASSESSMENT NOTE - NS ED NURSE REASSESS COMMENT FT1
Report received from RADHA Heard. Pt A/O x4. NG tube attached to suction and working appropriate- dark green fluids exiting tube. Safety maintained. Visitor at bedside.

## 2022-04-29 NOTE — CONSULT NOTE ADULT - ATTENDING COMMENTS
Please note modifications to resident/fellow's note and plan below     76M with HTN, history of anal/rectal Ca (stable) who presents with 2 weeks of epigastric pain and associated substernal pain. Pt reports that sx started 2 weeks ago and over past 2 days he has also noted dark vomitus. He reports pain in the stomach region and reports pain (pressure and burning) in the chest region (no specific location, non radiating) whenever he has the stomach/epigastric pain. He denies isolated chest pain and not association with exertion. He states that he has recently been diagnosed with HTN but no meds were prescribed, otherwise denies any cardiac hx. He denies smoking/etoh/illicits. No trauma to chest. No FHx CAD. He reports being functional and very functional at baseline (per niece). No recent immobilization/travel. Currently no cp/sob/palpitations.     PHYSICAL EXAM:  General: No acute distress  HEENT: EOMI  Neck:  No JVD  Lungs: Clear to auscultation bilaterally; No rales or wheezing  Heart: Regular rate and rhythm; No murmurs, rubs, or gallops  Abdomen: soft, non tender, non distended   Extremities: warm, no edema   Nervous system:  Alert & Oriented X3  Psychiatric: Normal affect    Labs notable for trop negative x2, Hgb 10.9     ECG [my interpretation]: SR LVH with repol abnormalities. nonspecific ST changes V1-V2    ECHO: pending     76M with HTN, history of anal/rectal Ca (stable) who presents with 2 weeks of epigastric pain and associated substernal pain with associated dark emesis concerning for GIB found to have duodenal obstruction due to annular mass. He describes his chest pain as pressure and burning associated with his epigastric pain, it is non exertional. ECG reviewed with Interventionalist on call and no concern for acute ACS. Troponins are negative. He has no recent immobilization/travel and O2 sat is wnl and he is not tachycardic to suggest PE. Pain is not characteristic of dissection. Ischemic etiology of cp is less likely and is more likely related to GI etiology. He currently denies chest pain.   - f/u official TTE read, on my preliminary review he has LVH with normal LV and RV function and no aortic stenosis.    - recheck BP, likely has long standing HTN as evidenced by LVH on echo and ECG    - mgmt of GIB per GI  - recommend renal c/s  - start a statin   - tele monitoring
As above.    Impression:    #1.  Consulted for duodenal obstruction at level of third portion of the duodenum, from annular duodenal mass, less likely from pancreatic uncinate mass, involving superior mesenteric vein    #2. Normal LFTs, no significant biliary dilatation    Recommendations:    #1.   NG tube decompression    #2. Plan a EGD with duodenal stent on Monday    #3. Will discuss with surgical oncology with regards to ERCP/biliary stenting

## 2022-04-29 NOTE — PATIENT PROFILE ADULT - FALL HARM RISK - RISK INTERVENTIONS
Assistance OOB with selected safe patient handling equipment/Assistance with ambulation/Communicate Fall Risk and Risk Factors to all staff, patient, and family/Monitor gait and stability/Reinforce activity limits and safety measures with patient and family/Use of alarms - bed, chair and/or voice tab/Visual Cue: Yellow wristband/Bed in lowest position, wheels locked, appropriate side rails in place/Call bell, personal items and telephone in reach/Instruct patient to call for assistance before getting out of bed or chair/Non-slip footwear when patient is out of bed/[place] to call system/Physically safe environment - no spills, clutter or unnecessary equipment/Purposeful Proactive Rounding/Room/bathroom lighting operational, light cord in reach

## 2022-04-29 NOTE — CONSULT NOTE ADULT - ASSESSMENT
Impression:  # Duodenal obstruction secondary to annular mass which extends into the uncinate process of the pancreas and obstructed distal CBD. ( Dilated proximal CBD with normal liver enzymes).  # Coffee ground emesis / GI bleed likely secondary to above   # Anal CA s/p chemo and radiation (in remission)     Recommendations:  - Insert NG tube for decompression.  - Surgery consult - ( used to see dr. Adrian)   - IV PPI 40 mg bid  - NPO  - Monitor liver enzymes daily   - Will discuss his further management with surgical and medical oncologist - may need Duodenal and biliary stenting        Miranda Echeverria MD  Gastroenterology Fellow  Pager: [phone]  Please call answering service [phone] / on-call GI fellow after 5pm and before 8am, and on weekends. Impression:  # Duodenal obstruction secondary to annular mass which extends into the uncinate process of the pancreas and obstructed distal CBD. ( Dilated proximal CBD with normal liver enzymes).  # Coffee ground emesis / GI bleed likely secondary to above   # Abnormal EGD  # Anal CA s/p chemo and radiation (in remission)     Recommendations:  - Insert NG tube for decompression.  - Surgery consult - ( used to see dr. Adrian)   - IV PPI 40 mg bid  - NPO  - Monitor liver enzymes daily   - Cardiology clearance   - Will discuss his further management with surgical and medical oncologist - may need Duodenal and biliary stenting       Miranda Echeverria MD  Gastroenterology Fellow  Pager: [phone]  Please call answering service [phone] / on-call GI fellow after 5pm and before 8am, and on weekends. Impression:  # Duodenal obstruction secondary to annular mass which extends into the uncinate process of the pancreas and obstructed distal CBD. ( Dilated proximal CBD with normal liver enzymes).  # Coffee ground emesis / GI bleed likely secondary to above   # Abnormal EKG  # Anal CA s/p chemo and radiation (in remission)     Recommendations:  - Insert NG tube for decompression.  - Surgery consult - ( used to see dr. Adrian)   - IV PPI 40 mg bid  - NPO  - Monitor liver enzymes daily   - Cardiology clearance   - Will discuss his further management with surgical and medical oncologist - may need Duodenal and biliary stenting       Miranda Echeverria MD  Gastroenterology Fellow  Pager: [phone]  Please call answering service [phone] / on-call GI fellow after 5pm and before 8am, and on weekends.

## 2022-04-29 NOTE — CONSULT NOTE ADULT - ASSESSMENT
76M history of anal/rectal Ca (stable) who presents with 2 says of intermittent mid-sternal CP with abnormal ECG. Initial troponin negative. Lipase elevated, will be admitted to telemetry.     #Chest pain with #Abnormal ECG  -Possibly cardiac. Early repol V2, ST abnormality in V3, TWI in I, avL. Serial ECGs unchanged.   -Initial troponin negative and symptoms can be explained by ?pancreatitis  -Repeat troponin, if negative, do not repeat again.   -Obtain formal TTE.   -workup of potential pancreatitis per primary team  -Would admit to telemetry

## 2022-04-29 NOTE — CONSULT NOTE ADULT - ASSESSMENT
1. new mass in ucinate process of pancreas , resulting in obstruction. advanced gi consultation recommended    2. send ca 19-9.

## 2022-04-29 NOTE — CONSULT NOTE ADULT - SUBJECTIVE AND OBJECTIVE BOX
Patient seen and evaluated at bedside    Chief Complaint: chest pain x2 days     HPI:  76M history of anal/rectal Ca (stable) who presents with 2 says of intermittent mid-sternal CP (no radiation, no associated symptoms) and dark stools. No trauma, fever, chills. SOB, palpitations, nausea, vomiting. In the ED, patient with improved CP but ECG with abnormal ST changes in V2-3 c/f GRAY along with TWI in i, avL. Serial ECGs showing early repolarization in V2. Occult stool+. Initial trop negative, lipase elevated.     Of note, patient had normal stress test in 2015.     PMHx:   Anal cancer        PSHx:   No significant past surgical history        Allergies:  No Known Allergies      Home Meds: As per admission medication reconcilliation.     Current Medications:   Per med rec.     FAMILY HISTORY: No early CVD.       Social History: No tobacco, etoh, illicit drug use.     REVIEW OF SYSTEMS:  Constitutional:     [x ] negative [ ] fevers [ ] chills [ ] weight loss [ ] weight gain  HEENT:                  [x ] negative [ ] dry eyes [ ] eye irritation [ ] postnasal drip [ ] nasal congestion  CV:                         [ ] negative  [x ] chest pain [ ] orthopnea [ ] palpitations [ ] murmur  Resp:                     [x ] negative [ ] cough [ ] shortness of breath [ ] dyspnea [ ] wheezing [ ] sputum [ ]hemoptysis  GI:                          [ x] negative [ ] nausea [ ] vomiting [ ] diarrhea [ ] constipation [ ] abd pain [ ] dysphagia   :                        [ x] negative [ ] dysuria [ ] nocturia [ ] hematuria [ ] increased urinary frequency  Musculoskeletal: [x ] negative [ ] back pain [ ] myalgias [ ] arthralgias [ ] fracture  Skin:                       [ x] negative [ ] rash [ ] itch  Neurological:        [ x] negative [ ] headache [ ] dizziness [ ] syncope [ ] weakness [ ] numbness  Psychiatric:           [ x] negative [ ] anxiety [ ] depression  Endocrine:            [ x] negative [ ] diabetes [ ] thyroid problem  Heme/Lymph:      [ x] negative [ ] anemia [ ] bleeding problem  Allergic/Immune: [ x] negative [ ] itchy eyes [ ] nasal discharge [ ] hives [ ] angioedema    [ x] All other systems negative  [ ] Unable to assess ROS due to      Physical Exam:  T(F): 98.1 (04-29), Max: 98.1 (04-29)  HR: 83 (04-29) (83 - 83)  BP: 190/89 (04-29) (190/89 - 190/89)  RR: 18 (04-29)  SpO2: 99% (04-29)  General: Alert, no acute distress, appears comfortable   HEENT: No scleral icterus, EOMI, no facial dysmorphia, no external ear lesions   Cardiac: Regular rate and rhythm, no murmurs, no rubs, no gallops   Pulmonary: Clear breath sounds throughout, no wheezing, no stridor, no crackles   Abdomen: Nondistended, nontender, appears soft   Skin: no obvious rash or lesions   Extremities: no LE edema  Neurological: Moving all 4 extremities, no overt focal deficits noted   Psych: normal mood and affect     Cardiovascular Diagnostic Testing:    ECG: Personally reviewed:  Early repolarization V2. Nonspecific ST abnormality in V3. TWI in I, avL.     Echo: Personally reviewed:  None on record.     Stress Testing:    Cath:    Imaging:    CXR: Personally reviewed    Labs: Personally reviewed                        10.9   6.65  )-----------( 262      ( 29 Apr 2022 11:21 )             33.5     04-29    138  |  94<L>  |  24<H>  ----------------------------<  121<H>  3.9   |  31  |  1.66<H>    Ca    10.4      29 Apr 2022 11:21  Mg     2.6     04-29    TPro  6.3  /  Alb  4.5  /  TBili  0.5  /  DBili  x   /  AST  10  /  ALT  8<L>  /  AlkPhos  71  04-29    PT/INR - ( 29 Apr 2022 11:21 )   PT: 13.0 sec;   INR: 1.12 ratio         PTT - ( 29 Apr 2022 11:21 )  PTT:28.5 sec

## 2022-04-29 NOTE — H&P ADULT - ASSESSMENT
76M ***    Plan:  -admit to Dr. Avilez  -NPO/NGT/IVF  -CEA, CA 19-9 f/u  -CT chest staging ordered    Discussed with Dr. Avilez    RED SURGERY  [id number] 76M hx of anal cancer 2012 s/p chemo radiation here with 2 weeks of epigastric pain found to have new annular mass involving pancreas +duodenal obstruction third portion    Plan:  -admit to Dr. Avilez  -NPO/NGT/IVF  -CEA, CA 19-9 f/u  -CT chest staging ordered    Discussed with Dr. Avilez    RED SURGERY  [id number] 76M hx of anal cancer 2012 s/p chemo radiation here with 2 weeks of epigastric pain found to have new annular mass involving pancreas +duodenal obstruction third portion    Plan:  -admit to Dr. Avilez  -NPO/NGT/IVF  -CEA, CA 19-9 f/u  -CT chest staging ordered  -appreciate GI and onc recommendations    Discussed with Dr. Avilez    RED SURGERY  [id number] 76M hx of anal cancer 2012 s/p chemo radiation here with 2 weeks of epigastric pain found to have new annular mass involving pancreas +duodenal obstruction third portion    Plan:  -admit to Dr. Avilez  -NPO/NGT/IVF  -CEA, CA 19-9 f/u  -CT chest staging ordered  -appreciate GI and onc recommendations  per GI note, plan for egd with duo stent on monday, to be discussed with Dr. Avilez    Discussed with Dr. Avilez    RED SURGERY  [id number] 76M hx of anal cancer 2012 s/p chemo radiation here with 2 weeks of epigastric pain found to have new annular mass involving pancreas +duodenal obstruction third portion  +FOBT in ED    Plan:  -admit to Dr. Avilez  -NPO/NGT/IVF  -CEA, CA 19-9 f/u  -CT chest staging ordered  -appreciate GI and onc recommendations  per GI note, plan for egd with duo stent on monday, to be discussed with Dr. Avilez  -ppi bid and holding chem vte ppx i/s/o melena    Discussed with Dr. Avilez    RED SURGERY  [id number]

## 2022-04-29 NOTE — ED PROVIDER NOTE - OBJECTIVE STATEMENT
75 yo male with PMHx of anal CA s/p chemo and radiation (in remission) p/w abd pain.  Patient reports that he has had vague generalized intermittent abdominal pain for 2 weeks.  Abd pain associated with loose black stools over the same time frame.  Over the past few days, abdominal pain has worsened and is now associated with several episode of "vomiting." Patient also endorsing "burning" chest discomfort over the past few days.  Denies fevers/chills, SOB, dysuria.  No hx of GI bleed.  Currently taking ASA 81mgs daily.

## 2022-04-29 NOTE — ED ADULT NURSE NOTE - OBJECTIVE STATEMENT
Patient with history of anal CA, presents to ED for diffuse abdominal pain for 2 weeks that's been associated with black stools and now with 3 days of vomiting. Patient also reports intermittent mid chest burning sensation for past few days. Does not radiate anywhere. Denies dizziness/lightheadedness, denies palpitations, denies SOB, denies numbness/tingling to extremities, reports 2 episodes of "black" vomit today, denies urinary complaints. Denies fevers/chills.

## 2022-04-29 NOTE — ED ADULT NURSE NOTE - PRIMARY CARE PROVIDER
Assessment and Plan


Sepsis due to UTI


Acute pylonephritis and cystitis


- cont abx, UA positive for gm -ve rods, negative blood cx


- cont rocephin, follow urine cx final report





HTN, stable on current meds





Overactive bladder


- cont myrbetriq





Osteoarthritis, cont celebrex





hyperlipidemia, cont fenofibtrate and zetia





DVt, with lovenox





Brief History:


Patient presented to the ED complaining of left flank pain, nausea, vomiting 

for the past 2 days.








Radiological data:


CT abdomen/pelvis:


Mild bladder wall thickening is nonspecific. Findings could be related to 

cystitis. Renal findings are suspicious for reflux and pyelonephritis. Please 

correlate clinically. Fatty liver. Nonspecific small amount of free fluid in 

the pelvis. Differential diagnosis includes fluid related to cystitis or 

physiological fluid.





CXR: No acute cardiopulmonary findings. 











Subjective


Date of service: 09/24/18


Interval history: 





Pt seen and examined


c/o nausea, no vomiting


still has lumber back pain








Objective





- Constitutional


Vitals: 


 Vital Signs - 12hr











  09/24/18 09/24/18 09/24/18





  05:09 08:13 09:35


 


Temperature 99.3 F 98.8 F 


 


Pulse Rate 66 77 77


 


Respiratory 20 18 





Rate   


 


Blood Pressure 143/67  139/62


 


Blood Pressure  139/62 





[Right]   


 


O2 Sat by Pulse 95 95 





Oximetry   














  09/24/18 09/24/18





  10:00 13:11


 


Temperature  98.2 F


 


Pulse Rate 79 67


 


Respiratory  18





Rate  


 


Blood Pressure  


 


Blood Pressure  109/54





[Right]  


 


O2 Sat by Pulse  96





Oximetry  











General appearance: Present: no acute distress, well-nourished





- EENT


Eyes: PERRL, EOM intact


ENT: hearing intact, clear oral mucosa


Ears: bilateral: normal





- Neck


Neck: supple, normal ROM





- Respiratory


Respiratory effort: normal


Respiratory: bilateral: CTA





- Cardiovascular


Rhythm: regular


Heart Sounds: Present: S1 & S2.  Absent: gallop, rub


Extremities: pulses intact, No edema, normal color, Full ROM





- Gastrointestinal


General gastrointestinal: Present: soft, non-distended, normal bowel sounds





- Integumentary


Integumentary: clear, warm, dry





- Musculoskeletal


Musculoskeletal: 1, strength equal bilaterally





- Neurologic


Neurologic: moves all extremities





- Psychiatric


Psychiatric: memory intact, appropriate mood/affect, intact judgment & insight





- Labs


CBC & Chem 7: 


 09/24/18 04:55





 09/24/18 04:55


Labs: 


 Abnormal lab results











  09/23/18 09/24/18 09/24/18 Range/Units





  17:41 04:55 04:55 


 


Plt Count   101 L   (140-440)  K/mm3


 


Mono % (Auto)   10.0 H   (0.0-7.3)  %


 


Mono #   1.1 H   (0.0-0.8)  K/mm3


 


Potassium    3.1 L  (3.6-5.0)  mmol/L


 


BUN    6 L  (7-17)  mg/dL


 


Creatinine    0.5 L  (0.7-1.2)  mg/dL


 


Glucose    120 H  ()  mg/dL


 


Hemoglobin A1c  7.3 H    (4-6)  %


 


Total Protein    5.8 L  (6.3-8.2)  g/dL


 


Albumin    2.9 L  (3.9-5)  g/dL PCP

## 2022-04-29 NOTE — ED PROVIDER NOTE - CLINICAL SUMMARY MEDICAL DECISION MAKING FREE TEXT BOX
ATTG: : chest pain / abd pain with dark stools, concern for gi bleed / cardiac and other causes, check labs, check cardiac work up and xray and re eval for dispo

## 2022-04-29 NOTE — H&P ADULT - HISTORY OF PRESENT ILLNESS
General Surgery Consult  Consulting surgical team: [facility]  Consulting attending: Dr. Avilez    HPI:  ***    PAST MEDICAL HISTORY:  Anal cancer        PAST SURGICAL HISTORY:  No significant past surgical history        MEDICATIONS:      ALLERGIES:  No Known Allergies      VITALS & I/Os:  Vital Signs Last 24 Hrs  T(C): 36.8 (2022 16:10), Max: 36.8 (2022 16:10)  T(F): 98.3 (2022 16:10), Max: 98.3 (2022 16:10)  HR: 65 (2022 16:10) (65 - 83)  BP: 198/70 (2022 16:10) (190/89 - 198/70)  BP(mean): --  RR: 18 (2022 16:10) (18 - 18)  SpO2: 99% (2022 16:10) (99% - 99%)    I&O's Summary      PHYSICAL EXAM:  General: No acute distress  Respiratory: Nonlabored  Cardiovascular: appears well perfused  Abdominal: Soft, nondistended, mild epigastric ttp. No rebound or guarding. No organomegaly, no palpable mass.  Extremities: Warm    LABS:                        10.9   6.65  )-----------( 262      ( 2022 11:21 )             33.5         138  |  94<L>  |  24<H>  ----------------------------<  121<H>  3.9   |  31  |  1.66<H>    Ca    10.4      2022 11:21  Mg     2.6         TPro  6.3  /  Alb  4.5  /  TBili  0.5  /  DBili  x   /  AST  10  /  ALT  8<L>  /  AlkPhos  71      Lactate:   @ 11:21  0.8    PT/INR - ( 2022 11:21 )   PT: 13.0 sec;   INR: 1.12 ratio         PTT - ( 2022 11:21 )  PTT:28.5 sec          Urinalysis Basic - ( 2022 14:03 )    Color: Light Yellow / Appearance: Clear / S.025 / pH: x  Gluc: x / Ketone: Negative  / Bili: Negative / Urobili: Negative   Blood: x / Protein: 30 mg/dL / Nitrite: Negative   Leuk Esterase: Negative / RBC: 1 /hpf / WBC 0 /HPF   Sq Epi: x / Non Sq Epi: 0 /hpf / Bacteria: Negative        IMAGING:    ACC: [id number] EXAM:  CT ABDOMEN AND PELVIS IC                          PROCEDURE DATE:  2022          INTERPRETATION:  CLINICAL INFORMATION: Periumbilical abdominal pain.   Hematemesis. Black stool.    COMPARISON: March 3, 2020.    CONTRAST/COMPLICATIONS:  IV Contrast: Omnipaque 300  90 cc administered   10 cc discarded  Oral Contrast: NONE  Complications: None reported at time of study completion    PROCEDURE:  CT of the Abdomen and Pelvis was performed.  Sagittal and coronal reformats were performed.    FINDINGS:  LOWER CHEST: Calcified granuloma in the right lower lobe.    LIVER: Within normal limits.  BILE DUCTS: Mild intrahepatic and extrahepatic biliary ductal dilatation   to the level of an obstructing mass in the region of the pancreatic   uncinate process.  GALLBLADDER: Within normal limits.  SPLEEN: Within normal limits.  PANCREAS: Ill-defined mass in the region of the uncinate process of the   pancreas, which appears to extend from the third portion of the duodenum.  ADRENALS: Within normal limits.  KIDNEYS/URETERS: No hydronephrosis. Nonobstructing bilateral renal   calculi, measuring up to 0.3 cm in the right upper pole. Subcentimeter   hypodense bilateral renal lesions, too small to characterize.    BLADDER: Within normal limits.  REPRODUCTIVE ORGANS: Prostate is enlarged.    BOWEL: Duodenal obstruction at the level of the third portion, where   there is an annular mass which extends into the uncinate process of the   pancreas, measuring approximately 3.8 x 2.2 cm (series 2, image 44). Mass   appears to abut this superior mesenteric vein.  PERITONEUM: No ascites.  VESSELS: Atherosclerotic changes. Moderate stenosis proximal superior   mesenteric artery.  RETROPERITONEUM/LYMPH NODES: No lymphadenopathy.  ABDOMINAL WALL: Small fat-containing umbilical hernia.  BONES: Age-indeterminate mild compression of the L1 superior endplate.   Degenerative changes. Left hip replacement.    IMPRESSION:  Duodenal obstruction at the level of the third portion, where there is an   annular mass which extends into the uncinate process of the pancreas and   obstructed distal common bile duct.    Age indeterminate mild compression of the L1 superior endplate    --- End of Report ---            ANDRIA GORDILLO MD; Attending Radiologist  This document has been electronically signed. 2022  1:05PM                                                                                               General Surgery Consult  Consulting surgical team: [facility]  Consulting attending: Dr. Avilez    HPI:  76M anal cancer s/p chemo/rad , denies psh here with 2 weeks of epigastric pain and few days of n/v, 3-4 lb weight loss, new ECG changes with trop x 2 normal, cardiology low suspicion for ACS  surgery consulted    nonsmoker  last cscope and egd 2 years ago reportedly normal  denies family hx of cancer  wbc 6.6, cr 1.66, tbili 0.5, ast/alt 10/8, lipase 153  CT shows annular mass of pancreas with duodenal obstruction    of note, patient's chart reveals anal cancer s/p chemo/radiation in , followed by Dr. Adrian as outpatient last seen in  for radiation ulcer bleeding that resolved with silvadene  patient denied personal cancer history, said he had bleeding per rectum that resolved after using a cream    PAST MEDICAL HISTORY:  Anal cancer        PAST SURGICAL HISTORY:  No significant past surgical history        MEDICATIONS:      ALLERGIES:  No Known Allergies      VITALS & I/Os:  Vital Signs Last 24 Hrs  T(C): 36.8 (2022 16:10), Max: 36.8 (2022 16:10)  T(F): 98.3 (2022 16:10), Max: 98.3 (2022 16:10)  HR: 65 (2022 16:10) (65 - 83)  BP: 198/70 (2022 16:10) (190/89 - 198/70)  BP(mean): --  RR: 18 (2022 16:10) (18 - 18)  SpO2: 99% (2022 16:10) (99% - 99%)    I&O's Summary      PHYSICAL EXAM:  General: No acute distress  Respiratory: Nonlabored  Cardiovascular: appears well perfused  Abdominal: Soft, nondistended, mild epigastric ttp. No rebound or guarding. No organomegaly, no palpable mass.  Extremities: Warm    LABS:                        10.9   6.65  )-----------( 262      ( 2022 11:21 )             33.5     -    138  |  94<L>  |  24<H>  ----------------------------<  121<H>  3.9   |  31  |  1.66<H>    Ca    10.4      2022 11:21  Mg     2.6         TPro  6.3  /  Alb  4.5  /  TBili  0.5  /  DBili  x   /  AST  10  /  ALT  8<L>  /  AlkPhos  71      Lactate:   @ 11:21  0.8    PT/INR - ( 2022 11:21 )   PT: 13.0 sec;   INR: 1.12 ratio         PTT - ( 2022 11:21 )  PTT:28.5 sec          Urinalysis Basic - ( 2022 14:03 )    Color: Light Yellow / Appearance: Clear / S.025 / pH: x  Gluc: x / Ketone: Negative  / Bili: Negative / Urobili: Negative   Blood: x / Protein: 30 mg/dL / Nitrite: Negative   Leuk Esterase: Negative / RBC: 1 /hpf / WBC 0 /HPF   Sq Epi: x / Non Sq Epi: 0 /hpf / Bacteria: Negative        IMAGING:    ACC: [id number] EXAM:  CT ABDOMEN AND PELVIS IC                          PROCEDURE DATE:  2022          INTERPRETATION:  CLINICAL INFORMATION: Periumbilical abdominal pain.   Hematemesis. Black stool.    COMPARISON: March 3, 2020.    CONTRAST/COMPLICATIONS:  IV Contrast: Omnipaque 300  90 cc administered   10 cc discarded  Oral Contrast: NONE  Complications: None reported at time of study completion    PROCEDURE:  CT of the Abdomen and Pelvis was performed.  Sagittal and coronal reformats were performed.    FINDINGS:  LOWER CHEST: Calcified granuloma in the right lower lobe.    LIVER: Within normal limits.  BILE DUCTS: Mild intrahepatic and extrahepatic biliary ductal dilatation   to the level of an obstructing mass in the region of the pancreatic   uncinate process.  GALLBLADDER: Within normal limits.  SPLEEN: Within normal limits.  PANCREAS: Ill-defined mass in the region of the uncinate process of the   pancreas, which appears to extend from the third portion of the duodenum.  ADRENALS: Within normal limits.  KIDNEYS/URETERS: No hydronephrosis. Nonobstructing bilateral renal   calculi, measuring up to 0.3 cm in the right upper pole. Subcentimeter   hypodense bilateral renal lesions, too small to characterize.    BLADDER: Within normal limits.  REPRODUCTIVE ORGANS: Prostate is enlarged.    BOWEL: Duodenal obstruction at the level of the third portion, where   there is an annular mass which extends into the uncinate process of the   pancreas, measuring approximately 3.8 x 2.2 cm (series 2, image 44). Mass   appears to abut this superior mesenteric vein.  PERITONEUM: No ascites.  VESSELS: Atherosclerotic changes. Moderate stenosis proximal superior   mesenteric artery.  RETROPERITONEUM/LYMPH NODES: No lymphadenopathy.  ABDOMINAL WALL: Small fat-containing umbilical hernia.  BONES: Age-indeterminate mild compression of the L1 superior endplate.   Degenerative changes. Left hip replacement.    IMPRESSION:  Duodenal obstruction at the level of the third portion, where there is an   annular mass which extends into the uncinate process of the pancreas and   obstructed distal common bile duct.    Age indeterminate mild compression of the L1 superior endplate    --- End of Report ---            ANDRIA GORDILLO MD; Attending Radiologist  This document has been electronically signed. 2022  1:05PM                                                                                               General Surgery Consult  Consulting surgical team: [facility]  Consulting attending: Dr. Avilez    HPI:  76M anal cancer s/p chemo/rad , denies psh here with 2 weeks of epigastric pain and few days of n/v, melena, 3-4 lb weight loss  in ED, new ECG changes with trop x 2 normal, cardiology consulted low suspicion for ACS    surgery consulted  nonsmoker  last cscope and egd 2 years ago reportedly normal  denies family hx of cancer  wbc 6.6, cr 1.66, tbili 0.5, ast/alt 10/8, lipase 153  CT shows annular mass of pancreas with duodenal obstruction    of note, patient's chart reveals anal cancer s/p chemo/radiation in , followed by Dr. Adrian as outpatient last seen in  for radiation ulcer bleeding that resolved with silvadene  patient denied personal cancer history, said he had bleeding per rectum that resolved after using a cream    PAST MEDICAL HISTORY:  Anal cancer        PAST SURGICAL HISTORY:  No significant past surgical history        MEDICATIONS:      ALLERGIES:  No Known Allergies      VITALS & I/Os:  Vital Signs Last 24 Hrs  T(C): 36.8 (2022 16:10), Max: 36.8 (2022 16:10)  T(F): 98.3 (2022 16:10), Max: 98.3 (2022 16:10)  HR: 65 (2022 16:10) (65 - 83)  BP: 198/70 (2022 16:10) (190/89 - 198/70)  BP(mean): --  RR: 18 (2022 16:10) (18 - 18)  SpO2: 99% (2022 16:10) (99% - 99%)    I&O's Summary      PHYSICAL EXAM:  General: No acute distress  Respiratory: Nonlabored  Cardiovascular: appears well perfused  Abdominal: Soft, nondistended, mild epigastric ttp. No rebound or guarding. No organomegaly, no palpable mass.  Extremities: Warm    LABS:                        10.9   6.65  )-----------( 262      ( 2022 11:21 )             33.5     04-    138  |  94<L>  |  24<H>  ----------------------------<  121<H>  3.9   |  31  |  1.66<H>    Ca    10.4      2022 11:21  Mg     2.6         TPro  6.3  /  Alb  4.5  /  TBili  0.5  /  DBili  x   /  AST  10  /  ALT  8<L>  /  AlkPhos  71      Lactate:   @ 11:21  0.8    PT/INR - ( 2022 11:21 )   PT: 13.0 sec;   INR: 1.12 ratio         PTT - ( 2022 11:21 )  PTT:28.5 sec          Urinalysis Basic - ( 2022 14:03 )    Color: Light Yellow / Appearance: Clear / S.025 / pH: x  Gluc: x / Ketone: Negative  / Bili: Negative / Urobili: Negative   Blood: x / Protein: 30 mg/dL / Nitrite: Negative   Leuk Esterase: Negative / RBC: 1 /hpf / WBC 0 /HPF   Sq Epi: x / Non Sq Epi: 0 /hpf / Bacteria: Negative        IMAGING:    ACC: [id number] EXAM:  CT ABDOMEN AND PELVIS IC                          PROCEDURE DATE:  2022          INTERPRETATION:  CLINICAL INFORMATION: Periumbilical abdominal pain.   Hematemesis. Black stool.    COMPARISON: March 3, 2020.    CONTRAST/COMPLICATIONS:  IV Contrast: Omnipaque 300  90 cc administered   10 cc discarded  Oral Contrast: NONE  Complications: None reported at time of study completion    PROCEDURE:  CT of the Abdomen and Pelvis was performed.  Sagittal and coronal reformats were performed.    FINDINGS:  LOWER CHEST: Calcified granuloma in the right lower lobe.    LIVER: Within normal limits.  BILE DUCTS: Mild intrahepatic and extrahepatic biliary ductal dilatation   to the level of an obstructing mass in the region of the pancreatic   uncinate process.  GALLBLADDER: Within normal limits.  SPLEEN: Within normal limits.  PANCREAS: Ill-defined mass in the region of the uncinate process of the   pancreas, which appears to extend from the third portion of the duodenum.  ADRENALS: Within normal limits.  KIDNEYS/URETERS: No hydronephrosis. Nonobstructing bilateral renal   calculi, measuring up to 0.3 cm in the right upper pole. Subcentimeter   hypodense bilateral renal lesions, too small to characterize.    BLADDER: Within normal limits.  REPRODUCTIVE ORGANS: Prostate is enlarged.    BOWEL: Duodenal obstruction at the level of the third portion, where   there is an annular mass which extends into the uncinate process of the   pancreas, measuring approximately 3.8 x 2.2 cm (series 2, image 44). Mass   appears to abut this superior mesenteric vein.  PERITONEUM: No ascites.  VESSELS: Atherosclerotic changes. Moderate stenosis proximal superior   mesenteric artery.  RETROPERITONEUM/LYMPH NODES: No lymphadenopathy.  ABDOMINAL WALL: Small fat-containing umbilical hernia.  BONES: Age-indeterminate mild compression of the L1 superior endplate.   Degenerative changes. Left hip replacement.    IMPRESSION:  Duodenal obstruction at the level of the third portion, where there is an   annular mass which extends into the uncinate process of the pancreas and   obstructed distal common bile duct.    Age indeterminate mild compression of the L1 superior endplate    --- End of Report ---            ANDRIA GORDILLO MD; Attending Radiologist  This document has been electronically signed. 2022  1:05PM

## 2022-04-29 NOTE — ED PROVIDER NOTE - PROGRESS NOTE DETAILS
ATTG: : Cardiology consulted after initial ekg with st changes in leads V2/3 and v4-5. no prior to compare to. Initial trop 30.  CTAP showing duodenal obstruction 2/2 pancreatic mass.  Surgery consulted.  -Aston Cueto PA-C

## 2022-04-29 NOTE — CONSULT NOTE ADULT - SUBJECTIVE AND OBJECTIVE BOX
HPI:  75 yo male with PMHx of anal CA s/p chemo and radiation (in remission) p/w abd pain.  Patient reports that he has had vague generalized intermittent abdominal pain for 2 weeks.  Abd pain associated with loose black stools over the same time frame.  Over the past few days, abdominal pain has worsened and is now associated with several episode of "vomiting." Patient also endorsing "burning" chest discomfort over the past few days.    Denies fevers/chills, SOB, dysuria.  No hx of GI bleed.    Currently taking ASA 81mgs daily.  Was noted to have Duodenal obstruction at the level of the third portion, where there is an annular mass which extends into the uncinate process of the pancreas and obstructed distal common bile duct.  Advanced GI was called for the same.     Allergies:  No Known Allergies    Home Medications:  acetaminophen 500 mg oral tablet: 2 tab(s) orally every 8 hours x 14 days (30 Oct 2017 08:46)  docusate sodium 100 mg oral capsule: 1 cap(s) orally 3 times a day as needed (30 Oct 2017 08:46)  polyethylene glycol 3350 oral powder for reconstitution: 17 gram(s) orally once a day, As needed, Constipation (30 Oct 2017 08:46)  senna oral tablet: 2 tab(s) orally once a day (at bedtime) as needed (30 Oct 2017 08:46)      Hospital Medications:      PMHX/PSHX:  Anal cancer    No significant past surgical history        Family history:      Social History: no smoking    ROS:   General:  No fevers, chills or night sweats  ENT:  No sore throat or dysphagia  CV:  No pain or palpitations  Resp:  No dyspnea, cough or  wheezing  GI:  as above  Skin:  No rash or edema  Neuro: no weakness   Hematologic: no bleeding  Musculoskeletal: no muscle pain or join pain  Psych: no agitation     : no dysuria      PHYSICAL EXAM:   GENERAL:  NAD, Appears stated age  HEENT:  NC/AT,  conjunctivae clear and pink, sclera -anicteric  CHEST:  CTA B/L, Normal effort  HEART:  RRR S1/S2,  ABDOMEN:  Soft, non-tender, non-distended,  no masses   EXTREMITIES:  No cyanosis or Edema  SKIN:  Warm & Dry. No rash or erythema  NEURO:  Alert, oriented, no focal deficit    Vital Signs:  Vital Signs Last 24 Hrs  T(C): 36.7 (2022 10:03), Max: 36.7 (2022 10:03)  T(F): 98.1 (2022 10:03), Max: 98.1 (2022 10:03)  HR: 83 (2022 10:03) (83 - 83)  BP: 190/89 (2022 10:03) (190/89 - 190/89)  BP(mean): --  RR: 18 (2022 10:03) (18 - 18)  SpO2: 99% (2022 10:03) (99% - 99%)  Daily Height in cm: 167.64 (2022 10:03)    Daily     LABS:                        10.9   6.65  )-----------( 262      ( 2022 11:21 )             33.5     Mean Cell Volume: 84.0 fl (- @ 11:21)        138  |  94<L>  |  24<H>  ----------------------------<  121<H>  3.9   |  31  |  1.66<H>    Ca    10.4      2022 11:21  Mg     2.6         TPro  6.3  /  Alb  4.5  /  TBili  0.5  /  DBili  x   /  AST  10  /  ALT  8<L>  /  AlkPhos  71      LIVER FUNCTIONS - ( 2022 11:21 )  Alb: 4.5 g/dL / Pro: 6.3 g/dL / ALK PHOS: 71 U/L / ALT: 8 U/L / AST: 10 U/L / GGT: x           PT/INR - ( 2022 11:21 )   PT: 13.0 sec;   INR: 1.12 ratio         PTT - ( 2022 11:21 )  PTT:28.5 sec  Urinalysis Basic - ( 2022 14:03 )    Color: Light Yellow / Appearance: Clear / S.025 / pH: x  Gluc: x / Ketone: Negative  / Bili: Negative / Urobili: Negative   Blood: x / Protein: 30 mg/dL / Nitrite: Negative   Leuk Esterase: Negative / RBC: 1 /hpf / WBC 0 /HPF   Sq Epi: x / Non Sq Epi: 0 /hpf / Bacteria: Negative      Amylase Serum--      Lipase [id number]       Ammonia--                          10.9   6.65  )-----------( 262      ( 2022 11:21 )             33.5     Imaging:  < from: CT Abdomen and Pelvis w/ IV Cont (22 @ 12:13) >    ACC: [id number] EXAM:  CT ABDOMEN AND PELVIS IC                          PROCEDURE DATE:  2022          INTERPRETATION:  CLINICAL INFORMATION: Periumbilical abdominal pain.   Hematemesis. Black stool.    COMPARISON: March 3, 2020.    CONTRAST/COMPLICATIONS:  IV Contrast: Omnipaque 300  90 cc administered   10 cc discarded  Oral Contrast: NONE  Complications: None reported at time of study completion    PROCEDURE:  CT of the Abdomen and Pelvis was performed.  Sagittal and coronal reformats were performed.    FINDINGS:  LOWER CHEST: Calcified granuloma in the right lower lobe.    LIVER: Within normal limits.  BILE DUCTS: Mild intrahepatic and extrahepatic biliary ductal dilatation   to the level of an obstructing mass in the region of thepancreatic   uncinate process.  GALLBLADDER: Within normal limits.  SPLEEN: Within normal limits.  PANCREAS: Ill-defined mass in the region of the uncinate process of the   pancreas, which appears to extend from the third portion of the duodenum.  ADRENALS: Within normal limits.  KIDNEYS/URETERS: No hydronephrosis. Nonobstructing bilateral renal   calculi, measuring up to 0.3 cm in the right upper pole. Subcentimeter   hypodense bilateral renal lesions, too small to characterize.    BLADDER: Within normal limits.  REPRODUCTIVE ORGANS: Prostate is enlarged.    BOWEL: Duodenal obstruction at the level of the third portion, where   there is an annular mass which extends into the uncinate process of the   pancreas, measuring approximately 3.8 x 2.2 cm (series 2, image 44). Mass   appears to abut this superior mesenteric vein.  PERITONEUM: No ascites.  VESSELS: Atherosclerotic changes. Moderate stenosis proximal superior   mesenteric artery.  RETROPERITONEUM/LYMPH NODES: No lymphadenopathy.  ABDOMINAL WALL: Small fat-containing umbilical hernia.  BONES: Age-indeterminate mild compression of the L1 superior endplate.   Degenerative changes. Left hip replacement.    IMPRESSION:  Duodenal obstruction at the level of the third portion, where there is an   annular mass which extends into the uncinate process of the pancreas and   obstructed distal common bile duct.    Age indeterminate mild compression of the L1 superior endplate    --- End of Report ---            < end of copied text >

## 2022-04-29 NOTE — ED PROVIDER NOTE - ATTENDING APP SHARED VISIT CONTRIBUTION OF CARE
77 y/o m with pmhx HTN HLD anal Ca, presents for abd pain and chest pain. felt onset of abd pain approx 1 weeks ago and dark stool approx 2 weeks ago. no fever or chills. mild chest pressure / discomfort. no diaphoresis. no back pain. no urinary complaints. last stress test was in 2015. does not recall any recent echo or angio. no vomiting no diarrhea. no shortness of breath. no other bleeding or easy bruising  PMD Dr. Chinedu Mccauley in [place]  Gen.  no acute distress, well appearing male  HEENT:  perrl eomi  Lungs:  b/l bs  CVS: S1S2   Abd;  soft no tender no distention no guarding   Ext: no pitting edema no erythema  Neuro: aaox3 no focal deficits  MSK: strength 5/5 b/l upper and lower ext.

## 2022-04-29 NOTE — ED PROVIDER NOTE - NS ED ATTENDING STATEMENT MOD
This was a shared visit with the KULDEEP. I reviewed and verified the documentation and independently performed the documented:

## 2022-04-30 NOTE — PROGRESS NOTE ADULT - ASSESSMENT
76M hx of anal cancer 2012 s/p chemo radiation here with 2 weeks of epigastric pain found to have new annular mass involving pancreas +duodenal obstruction third portion  +FOBT in ED    Plan:  -NPO/NGT/IVF  -CEA, CA 19-9 f/u  -CT chest staging ordered  -appreciate GI and onc recommendations  per GI note, plan for egd with duo stent on monday, to be discussed with Dr. Avilez  - appreciate cardiology recs: f/u on HTN   -ppi bid and holding chem vte ppx i/s/o melena      RED SURGERY  [id number]   76M hx of anal cancer 2012 s/p chemo radiation here with 2 weeks of epigastric pain found to have new annular mass involving pancreas +duodenal obstruction third portion  +FOBT in ED    Plan:  -NPO/NGT/IVF  -CEA, CA 19-9 f/u  -CT chest staging ordered  -appreciate GI and onc recommendations  per GI note, plan for egd with duo stent on monday, to be discussed with Dr. Avilez  - appreciate cardiology recs: f/u on HTN   -ppi bid and holding chem vte ppx i/s/o melena  - Cards clearance for surgery         RED SURGERY  [id number]

## 2022-04-30 NOTE — PROGRESS NOTE ADULT - SUBJECTIVE AND OBJECTIVE BOX
Chief Complaint:  Patient is a 76y old  Male who presents with a chief complaint of duodenal obstruction 2/2 mass (2022 07:29)      Interval Events: feels better with NGT decompression  - no n/v ,abd pain      Hospital Medications:  benzocaine 15 mG/menthol 3.6 mG Lozenge 1 Lozenge Oral two times a day  benzocaine 15 mG/menthol 3.6 mG Lozenge 1 Lozenge Oral every 6 hours PRN  heparin   Injectable 5000 Unit(s) SubCutaneous every 8 hours  lactated ringers. 1000 milliLiter(s) IV Continuous <Continuous>  pantoprazole  Injectable 40 milliGRAM(s) IV Push two times a day      PMHX/PSHX:  Anal cancer    No significant past surgical history            ROS:     General:  No weight loss, fevers, chills, night sweats, fatigue   Eyes:  No vision changes  ENT:  No sore throat, pain, runny nose  CV:  No chest pain, palpitations, dizziness   Resp:  No SOB, cough, wheezing  GI:  See HPI  :  No burning with urination, hematuria  Muscle:  No pain, weakness  Neuro:  No weakness/tingling, memory problems  Psych:  No fatigue, insomnia, mood problems, depression  Heme:  No easy bruisability  Skin:  No rash, edema      PHYSICAL EXAM:     GENERAL:  Well developed, no distress  HEENT:  NC/AT,  conjunctivae clear, sclera anicteric  CHEST:  Full & symmetric excursion, no increased effort w/ respirations  HEART:  Regular rhythm & rate  ABDOMEN:  Soft, non-tender, non-distended  EXTREMITIES:  no LE  edema  SKIN:  No rash/erythema/ecchymoses/petechiae/wounds/jaundice  NEURO:  Alert, oriented    Vital Signs:  Vital Signs Last 24 Hrs  T(C): 37.4 (2022 09:06), Max: 37.4 (2022 01:03)  T(F): 99.4 (2022 09:06), Max: 99.4 (2022 09:06)  HR: 86 (2022 09:06) (65 - 94)  BP: 153/72 (2022 09:06) (152/64 - 212/83)  BP(mean): --  RR: 18 (2022 09:06) (16 - 20)  SpO2: 98% (2022 09:06) (96% - 100%)  Daily Height in cm: 157.48 (2022 22:00)    Daily     LABS:                        10.1   6.98  )-----------( 212      ( 2022 07:29 )             31.8     04-30    137  |  100  |  19  ----------------------------<  86  3.8   |  22  |  1.40<H>    Ca    9.1      2022 07:27  Phos  2.6     04-30  Mg     2.0     30    TPro  6.3  /  Alb  4.5  /  TBili  0.5  /  DBili  x   /  AST  10  /  ALT  8<L>  /  AlkPhos  71  0429    LIVER FUNCTIONS - ( 2022 11:21 )  Alb: 4.5 g/dL / Pro: 6.3 g/dL / ALK PHOS: 71 U/L / ALT: 8 U/L / AST: 10 U/L / GGT: x           PT/INR - ( 2022 11:21 )   PT: 13.0 sec;   INR: 1.12 ratio         PTT - ( 2022 11:21 )  PTT:28.5 sec  Urinalysis Basic - ( 2022 14:03 )    Color: Light Yellow / Appearance: Clear / S.025 / pH: x  Gluc: x / Ketone: Negative  / Bili: Negative / Urobili: Negative   Blood: x / Protein: 30 mg/dL / Nitrite: Negative   Leuk Esterase: Negative / RBC: 1 /hpf / WBC 0 /HPF   Sq Epi: x / Non Sq Epi: 0 /hpf / Bacteria: Negative          Imaging:

## 2022-04-30 NOTE — PROGRESS NOTE ADULT - SUBJECTIVE AND OBJECTIVE BOX
RED Surgery Progress Note    SUBJECTIVE:   Patient seen and examined at bedside with surgical team.   HTN during the day and overnight with /90. hydralazine 10 mg x2 given. patient asymptomatic, denies headache, blurry vision, CP, SOB.     OBJECTIVE:    Vital Signs Last 24 Hrs  T(C): 37.4 (30 Apr 2022 01:03), Max: 37.4 (30 Apr 2022 01:03)  T(F): 99.3 (30 Apr 2022 01:03), Max: 99.3 (30 Apr 2022 01:03)  HR: 94 (30 Apr 2022 01:40) (65 - 94)  BP: 168/67 (30 Apr 2022 01:40) (163/77 - 212/83)  BP(mean): --  RR: 18 (30 Apr 2022 01:03) (18 - 20)  SpO2: 100% (30 Apr 2022 01:03) (98% - 100%)    MEDICATIONS  (STANDING):  benzocaine 15 mG/menthol 3.6 mG Lozenge 1 Lozenge Oral two times a day  lactated ringers. 1000 milliLiter(s) (125 mL/Hr) IV Continuous <Continuous>  pantoprazole  Injectable 40 milliGRAM(s) IV Push two times a day    MEDICATIONS  (PRN):      PHYSICAL EXAM:  Constitutional:  NAD  Respiratory: Unlabored breathing  Abdomen: Soft, nondistended, NTTP. No rebound or guarding.  Extremities: WWP, LAGUERRE spontaneously    LABS:                        10.9   6.65  )-----------( 262      ( 29 Apr 2022 11:21 )             33.5     04-29    138  |  94<L>  |  24<H>  ----------------------------<  121<H>  3.9   |  31  |  1.66<H>    Ca    10.4      29 Apr 2022 11:21  Mg     2.6     04-29    TPro  6.3  /  Alb  4.5  /  TBili  0.5  /  DBili  x   /  AST  10  /  ALT  8<L>  /  AlkPhos  71  04-29    PT/INR - ( 29 Apr 2022 11:21 )   PT: 13.0 sec;   INR: 1.12 ratio         PTT - ( 29 Apr 2022 11:21 )  PTT:28.5 sec  LIVER FUNCTIONS - ( 29 Apr 2022 11:21 )  Alb: 4.5 g/dL / Pro: 6.3 g/dL / ALK PHOS: 71 U/L / ALT: 8 U/L / AST: 10 U/L / GGT: x

## 2022-04-30 NOTE — PROGRESS NOTE ADULT - SUBJECTIVE AND OBJECTIVE BOX
Patient seen and examined at bedside.    Overnight Events:   no events o/n   denies cp or sob     REVIEW OF SYSTEMS:  Constitutional:     [x ] negative [ ] fevers [ ] chills [ ] weight loss [ ] weight gain  HEENT:                  [x ] negative [ ] dry eyes [ ] eye irritation [ ] postnasal drip [ ] nasal congestion  CV:                         [ x] negative  [ ] chest pain [ ] orthopnea [ ] palpitations [ ] murmur  Resp:                     [ x] negative [ ] cough [ ] shortness of breath [ ] dyspnea [ ] wheezing [ ] sputum [ ]hemoptysis  GI:                          [ x] negative [ ] nausea [ ] vomiting [ ] diarrhea [ ] constipation [ ] abd pain [ ] dysphagia   :                        [ x] negative [ ] dysuria [ ] nocturia [ ] hematuria [ ] increased urinary frequency  Musculoskeletal: [ x] negative [ ] back pain [ ] myalgias [ ] arthralgias [ ] fracture  Skin:                       [ x] negative [ ] rash [ ] itch  Neurological:        [x ] negative [ ] headache [ ] dizziness [ ] syncope [ ] weakness [ ] numbness  Psychiatric:           [ x] negative [ ] anxiety [ ] depression  Endocrine:            [ x] negative [ ] diabetes [ ] thyroid problem  Heme/Lymph:      [ x] negative [ ] anemia [ ] bleeding problem  Allergic/Immune: [ x] negative [ ] itchy eyes [ ] nasal discharge [ ] hives [ ] angioedema    [ x] All other systems negative  [ ] Unable to assess ROS due to    Current Meds:  benzocaine 15 mG/menthol 3.6 mG Lozenge 1 Lozenge Oral two times a day  heparin   Injectable 5000 Unit(s) SubCutaneous every 8 hours  lactated ringers. 1000 milliLiter(s) IV Continuous <Continuous>  pantoprazole  Injectable 40 milliGRAM(s) IV Push two times a day      PAST MEDICAL & SURGICAL HISTORY:  Anal cancer  Had Chemo and Radiation 4 years ago    No significant past surgical history        Vitals:  T(F): 98.9 (04-30), Max: 99.3 (04-30)  HR: 92 (04-30) (65 - 94)  BP: 157/65 (04-30) (152/64 - 212/83)  RR: 16 (04-30)  SpO2: 96% (04-30)  I&O's Summary    29 Apr 2022 07:01  -  30 Apr 2022 07:00  --------------------------------------------------------  IN: 0 mL / OUT: 775 mL / NET: -775 mL        Physical Exam:  General: Alert, no acute distress, appears comfortable   HEENT: No scleral icterus, EOMI, no facial dysmorphia, no external ear lesions   Cardiac: Regular rate and rhythm, no murmurs, no rubs, no gallops   Pulmonary: Clear breath sounds throughout, no wheezing, no stridor, no crackles   Abdomen: Nondistended, nontender, appears soft   Skin: no obvious rash or lesions   Extremities: no LE edema  Neurological: Moving all 4 extremities, no overt focal deficits noted   Psych: normal mood and affect     Cardiovascular Diagnostic Testing:    ECG: Personally reviewed:  Early repolarization V2. Nonspecific ST abnormality in V3. TWI in I, avL.     Echo: Personally reviewed:  None on record.                          10.9   6.65  )-----------( 262      ( 29 Apr 2022 11:21 )             33.5     04-29    138  |  94<L>  |  24<H>  ----------------------------<  121<H>  3.9   |  31  |  1.66<H>    Ca    10.4      29 Apr 2022 11:21  Mg     2.6     04-29    TPro  6.3  /  Alb  4.5  /  TBili  0.5  /  DBili  x   /  AST  10  /  ALT  8<L>  /  AlkPhos  71  04-29    PT/INR - ( 29 Apr 2022 11:21 )   PT: 13.0 sec;   INR: 1.12 ratio         PTT - ( 29 Apr 2022 11:21 )  PTT:28.5 sec              Cardiovascular Testings:       Interpretation of Telemetry:

## 2022-04-30 NOTE — PROGRESS NOTE ADULT - ASSESSMENT
Impression:  # Duodenal obstruction secondary to annular mass which extends into the uncinate process of the pancreas and obstructed distal CBD. ( Dilated proximal CBD with normal liver enzymes).  # Coffee ground emesis / GI bleed likely secondary to above   # Abnormal EKG  # Anal CA s/p chemo and radiation (in remission)     Recommendations:  - IV PPI 40 mg bid  - NPO  - Monitor liver enzymes daily   - Cardiology clearance - pending TTE  - if cleared, can plan for EGD/stenting Monday; will need to discuss with patient's niece as well as she was refusing yesterday    Note not finalized until signed by attending.    Keila Lund PGY-5  Gastroenterology Fellow  Pager #[phone] ([facility]) or [phone] ([facility])  Available on [employer] Teams.  Please contact on-call GI fellow via answering service ([phone]) after 5pm and before 8am, and on weekends.            Impression:  # Duodenal obstruction secondary to annular mass which extends into the uncinate process of the pancreas and obstructed distal CBD. ( Dilated proximal CBD with normal liver enzymes).  # Coffee ground emesis / GI bleed likely secondary to above   # Abnormal EKG  # Anal CA s/p chemo and radiation (in remission)     Recommendations:  - IV PPI 40 mg bid  - NPO  - Monitor liver enzymes daily   - Cardiology clearance - pending TTE  - if cleared, can plan for EGD/stenting Monday; will need to discuss with patient's niece (GI physician) as well as she did not want stenting as of yesterday    Note not finalized until signed by attending.    Keila Lund PGY-5  Gastroenterology Fellow  Pager #[phone] ([facility]) or [phone] (NS)  Available on [employer] Teams.  Please contact on-call GI fellow via answering service ([phone]) after 5pm and before 8am, and on weekends.

## 2022-04-30 NOTE — PROGRESS NOTE ADULT - ASSESSMENT
76M with HTN, history of anal/rectal Ca p/w 2 weeks of atypical cp a/w n/v found to have duodenal obstruction 2/2 anular mass. Cardiology c/s for abnormal EKG.     Abnormal EKG- repolarization change     -management of GI pathology per surg onc/GI team   -risk stratification/cardiac clearance pending TTE result   -TTE read pending   -Can d/c telemetry monitoring   -NPO currently     Thank you for allowing us to participate in the care of your patient. If you have any questions or concerns please do not hesitate to contact us 24/7.   All Cardiology service information can be found 24/7 on [email or web address], password: [email or web address] Milner MD  PGY-5 Cardiology Fellow, [facility]

## 2022-05-01 NOTE — PROGRESS NOTE ADULT - SUBJECTIVE AND OBJECTIVE BOX
SURGERY PROGRESS NOTE  Hospital Day #2      SUBJECTIVE  Pt seen and examined at bedside. No complaints.  Overnight, hypertensive- asymptomatic. Given hydralazine and resumed metoprolol (on home BB).       OBJECTIVE:    PHYSICAL EXAM   General Appearance: Appears well, NAD  Resp: Patent airway, non-labored breathing  CV: RRR  Abdomen: Soft, Nontender, Nondistended      Vital Signs Last 24 Hrs  T(C): 36.9 (01 May 2022 05:15), Max: 37.7 (2022 18:41)  T(F): 98.5 (01 May 2022 05:15), Max: 99.8 (2022 18:41)  HR: 72 (01 May 2022 05:15) (68 - 88)  BP: 176/63 (01 May 2022 05:15) (151/74 - 189/66)  BP(mean): --  RR: 18 (01 May 2022 05:15) (16 - 18)  SpO2: 96% (01 May 2022 05:15) (95% - 100%)      I's & O's    22 @ 07:01  -  22 @ 07:00  --------------------------------------------------------  IN:  Total IN: 0 mL    OUT:    Nasogastric/Oral tube (mL): 250 mL    Voided (mL): 300 mL    Voided (mL): 225 mL  Total OUT: 775 mL    Total NET: -775 mL      22 @ 07:01  -  22 @ 06:51  --------------------------------------------------------  IN:    dextrose 5% + sodium chloride 0.9% w/ Additives: 1100 mL    Lactated Ringers: 1375 mL  Total IN: 2475 mL    OUT:    Nasogastric/Oral tube (mL): 950 mL    Oral Fluid: 0 mL    Voided (mL): 975 mL  Total OUT: 1925 mL    Total NET: 550 mL        LAB/STUDIES:                        10.1   6.98  )-----------( 212      ( 2022 07:29 )             31.8     137  |  100  |  19  ----------------------------<  86  3.8   |  22  |  1.40<H>    Ca    9.1      2022 07:27  Phos  2.6     04-30  Mg     2.0     04-30    TPro  6.3  /  Alb  4.5  /  TBili  0.5  /  DBili  x   /  AST  10  /  ALT  8<L>  /  AlkPhos  71  04-29    PT/INR - ( 2022 11:21 )   PT: 13.0 sec;   INR: 1.12 ratio    PTT - ( 2022 11:21 )  PTT:28.5 sec    LIVER FUNCTIONS - ( 2022 11:21 )  Alb: 4.5 g/dL / Pro: 6.3 g/dL / ALK PHOS: 71 U/L / ALT: 8 U/L / AST: 10 U/L / GGT: x           Urinalysis Basic - ( 2022 14:03 )    Color: Light Yellow / Appearance: Clear / S.025 / pH: x  Gluc: x / Ketone: Negative  / Bili: Negative / Urobili: Negative   Blood: x / Protein: 30 mg/dL / Nitrite: Negative   Leuk Esterase: Negative / RBC: 1 /hpf / WBC 0 /HPF   Sq Epi: x / Non Sq Epi: 0 /hpf / Bacteria: Negative       SURGERY PROGRESS NOTE  Hospital Day #2      SUBJECTIVE  Pt seen and examined at bedside. No complaints. NGT repositioned.   Overnight, hypertensive- asymptomatic. Given hydralazine and resumed metoprolol (on home BB).       OBJECTIVE:    PHYSICAL EXAM   General Appearance: Appears well, NAD  Resp: Patent airway, non-labored breathing  CV: RRR  Abdomen: Soft, Nontender, Nondistended      Vital Signs Last 24 Hrs  T(C): 36.9 (01 May 2022 05:15), Max: 37.7 (2022 18:41)  T(F): 98.5 (01 May 2022 05:15), Max: 99.8 (2022 18:41)  HR: 72 (01 May 2022 05:15) (68 - 88)  BP: 176/63 (01 May 2022 05:15) (151/74 - 189/66)  BP(mean): --  RR: 18 (01 May 2022 05:15) (16 - 18)  SpO2: 96% (01 May 2022 05:15) (95% - 100%)      I's & O's    22 @ 07:  -  22 @ 07:00  --------------------------------------------------------  IN:  Total IN: 0 mL    OUT:    Nasogastric/Oral tube (mL): 250 mL    Voided (mL): 300 mL    Voided (mL): 225 mL  Total OUT: 775 mL    Total NET: -775 mL      22 @ 07:01  -  22 @ 06:51  --------------------------------------------------------  IN:    dextrose 5% + sodium chloride 0.9% w/ Additives: 1100 mL    Lactated Ringers: 1375 mL  Total IN: 2475 mL    OUT:    Nasogastric/Oral tube (mL): 950 mL    Oral Fluid: 0 mL    Voided (mL): 975 mL  Total OUT: 1925 mL    Total NET: 550 mL        LAB/STUDIES:                        10.1   6.98  )-----------( 212      ( 2022 07:29 )             31.8     137  |  100  |  19  ----------------------------<  86  3.8   |  22  |  1.40<H>    Ca    9.1      2022 07:27  Phos  2.6     04-30  Mg     2.0         TPro  6.3  /  Alb  4.5  /  TBili  0.5  /  DBili  x   /  AST  10  /  ALT  8<L>  /  AlkPhos  71  04-    PT/INR - ( 2022 11:21 )   PT: 13.0 sec;   INR: 1.12 ratio    PTT - ( 2022 11:21 )  PTT:28.5 sec    LIVER FUNCTIONS - ( 2022 11:21 )  Alb: 4.5 g/dL / Pro: 6.3 g/dL / ALK PHOS: 71 U/L / ALT: 8 U/L / AST: 10 U/L / GGT: x           Urinalysis Basic - ( 2022 14:03 )    Color: Light Yellow / Appearance: Clear / S.025 / pH: x  Gluc: x / Ketone: Negative  / Bili: Negative / Urobili: Negative   Blood: x / Protein: 30 mg/dL / Nitrite: Negative   Leuk Esterase: Negative / RBC: 1 /hpf / WBC 0 /HPF   Sq Epi: x / Non Sq Epi: 0 /hpf / Bacteria: Negative       SURGERY PROGRESS NOTE  Hospital Day #2      SUBJECTIVE  Pt seen and examined at bedside. No complaints. Reports passing flatus but no bm.   Overnight, hypertensive- asymptomatic. Given hydralazine and resumed metoprolol (on home BB).       OBJECTIVE:    PHYSICAL EXAM   General Appearance: Appears well, NAD  Resp: Patent airway, non-labored breathing  CV: RRR  Abdomen: Soft, Nontender, Nondistended      Vital Signs Last 24 Hrs  T(C): 36.9 (01 May 2022 05:15), Max: 37.7 (2022 18:41)  T(F): 98.5 (01 May 2022 05:15), Max: 99.8 (2022 18:41)  HR: 72 (01 May 2022 05:15) (68 - 88)  BP: 176/63 (01 May 2022 05:15) (151/74 - 189/66)  BP(mean): --  RR: 18 (01 May 2022 05:15) (16 - 18)  SpO2: 96% (01 May 2022 05:15) (95% - 100%)      I's & O's    22 @ 07:  -  22 @ 07:00  --------------------------------------------------------  IN:  Total IN: 0 mL    OUT:    Nasogastric/Oral tube (mL): 250 mL    Voided (mL): 300 mL    Voided (mL): 225 mL  Total OUT: 775 mL    Total NET: -775 mL      22 @ 07:01  -  22 @ 06:51  --------------------------------------------------------  IN:    dextrose 5% + sodium chloride 0.9% w/ Additives: 1100 mL    Lactated Ringers: 1375 mL  Total IN: 2475 mL    OUT:    Nasogastric/Oral tube (mL): 950 mL    Oral Fluid: 0 mL    Voided (mL): 975 mL  Total OUT: 1925 mL    Total NET: 550 mL        LAB/STUDIES:                        10.1   6.98  )-----------(       ( 2022 07:29 )             31.8     137  |  100  |  19  ----------------------------<  86  3.8   |  22  |  1.40<H>    Ca    9.1      2022 07:27  Phos  2.6     04-30  Mg     2.0     -30    TPro  6.3  /  Alb  4.5  /  TBili  0.5  /  DBili  x   /  AST  10  /  ALT  8<L>  /  AlkPhos  71  04-29    PT/INR - ( 2022 11:21 )   PT: 13.0 sec;   INR: 1.12 ratio    PTT - ( 2022 11:21 )  PTT:28.5 sec    LIVER FUNCTIONS - ( 2022 11:21 )  Alb: 4.5 g/dL / Pro: 6.3 g/dL / ALK PHOS: 71 U/L / ALT: 8 U/L / AST: 10 U/L / GGT: x           Urinalysis Basic - ( 2022 14:03 )    Color: Light Yellow / Appearance: Clear / S.025 / pH: x  Gluc: x / Ketone: Negative  / Bili: Negative / Urobili: Negative   Blood: x / Protein: 30 mg/dL / Nitrite: Negative   Leuk Esterase: Negative / RBC: 1 /hpf / WBC 0 /HPF   Sq Epi: x / Non Sq Epi: 0 /hpf / Bacteria: Negative

## 2022-05-01 NOTE — PROGRESS NOTE ADULT - ASSESSMENT
76M hx of anal cancer 2012 s/p chemo radiation here with 2 weeks of epigastric pain found to have new annular mass involving pancreas +duodenal obstruction third portion  +FOBT in ED    Plan:  -NPO/NGT/IVF  -CEA, CA 19-9 f/u  -CT chest staging ordered  -appreciate GI and onc recommendations  per GI note, plan for egd with duo stent on monday, to be discussed with Dr. Avilez  - appreciate cardiology recs: f/u on HTN   -ppi bid and holding chem vte ppx i/s/o melena  - Cards clearance for surgery       RED  [id number]  76M hx of anal cancer 2012 s/p chemo radiation here with 2 weeks of epigastric pain found to have new annular mass involving pancreas +duodenal obstruction third portion  +FOBT in ED    Plan:  -NPO/NGT/IVF  -CEA, CA 19-9 f/u  -CT chest staging ordered  -appreciate GI and onc recommendations  - per GI note, plan for egd with duo stent on monday, to be discussed with Dr. Avilez  - appreciate cardiology recs: f/u on HTN   -ppi bid and holding chem vte ppx i/s/o melena  - Cards clearance for surgery       RED  [id number]  76M hx of anal cancer 2012 s/p chemo radiation here with 2 weeks of epigastric pain found to have new annular mass involving pancreas +duodenal obstruction third portion  +FOBT in ED    Plan:  -NPO/IVF  -NGT removed due to low output  -CEA, CA 19-9 f/u  -CT chest staging ordered  -appreciate GI and onc recommendations  - GI: EGD with duo stent on Monday  - appreciate cardiology recs  -ppi bid and holding chem vte ppx i/s/o melena  - Cards clearance for surgery       RED  [id number]

## 2022-05-02 NOTE — PROGRESS NOTE ADULT - ASSESSMENT
76M history of anal/rectal Ca (stable) who presents with 2 says of intermittent mid-sternal CP with abnormal ECG. Initial troponin negative. Lipase elevated, admitted to telemetry. Now plan for EGD.     #Preoperative Optimization/Risk Stratification   -Patient without CP, SOB. No evidence of dysrhythmia No evidence of acute ischemic event. Compensated and well perfused from hemodynamic standpoint. No active cardiac valve pathology. RCRI 0, low risk for low risk procedure. No further cardiac diagnostic or intervention indicated prior to EGD that would make patient a better candidate for procedure from cardiac standpoint.     #Chest pain with #Abnormal ECG  -Early repol V2, ST abnormality in V3, TWI in I, avL. Serial ECGs unchanged.   -Initial troponin negative and symptoms can be explained by ?pancreatitis or SBO  -TTE unremarkable

## 2022-05-02 NOTE — PROGRESS NOTE ADULT - ASSESSMENT
Mass of pancreas.   --Recommendation: 1. Pancreas mass,  mass in the ucincate proccess with compression of duodenum resulting in outlet obstruction.   the mass encases the smv.  patient has decreased oral intake that has worsening over the past month.  -- [facility] consulted  --Plan to do EGD and duodenal stent placement  --obtain further imaging if necessary (ie - mrcp)  --Ca 19-9 42; CEA 5.4  --monitor bilirubin  --Surgery has been consulted for consideration of resection  --Awaiting Cardiology clearance    Hx of anal cancer, s/p treatment  finding in pancreas is most likely to be a new primary.    Anemia  --Most likely from chronic disease  --Also was Guaic positive  --Will check iron, B12, folate levels  --Please transfuse PRBC's for Hgb <7.0 grams    After discharge the patient may resume care with Dr. Shantanu Hahn of [facility].    Thank you for the opportunity to participate in Mr. Pascal's care.    Jah Gallegos PA-C  Hematology/Oncology  [facility]   [phone] (office)  [phone] (alt office)  Evenings and weekends please call MD on call or office         Mass of pancreas.   --Recommendation: 1. Pancreas mass,  mass in the ucincate proccess with compression of duodenum resulting in outlet obstruction.   the mass encases the smv.  patient has decreased oral intake that has worsening over the past month.  -- [facility] consulted  --Plan to do EGD and duodenal stent placement  --obtain further imaging if necessary (ie - mrcp)  --Ca 19-9 42; CEA 5.4  --monitor bilirubin  --Surgery has been consulted for consideration of resection  --Awaiting Cardiology clearance    Hx of anal cancer, s/p treatment  finding in pancreas is most likely to be a new primary.    Anemia  --Most likely from chronic disease  --Also was Guaic positive  --Will check iron, B12, folate levels  --Please transfuse PRBC's for Hgb <7.0 grams    After discharge the patient may resume care with Dr. Shantanu Hahn of [facility].    Thank you for the opportunity to participate in Mr. Pascal's care.    Jah Gallegos PA-C  Hematology/Oncology  [facility]   [phone] (office)  [phone] (alt office)  Evenings and weekends please call MD on call or office       Patient seen with Jah Gallegos. Agree documentation and plan with above   Rosy Sandhu D.O  Hematology Oncology   [facility]  [phone] ( Office)   Evenings and weekends please call MD on call or office

## 2022-05-02 NOTE — PROVIDER CONTACT NOTE (OTHER) - ASSESSMENT
Pt is AOx4, VS as in flowsheet. Pt's voice noted with hoarseness. Pt c/o pain in the "deep" part of the right ear w/pain in the back of the throat (denies a feeling of scratchiness). Pt skin presents as hot to the touch, temp 100F. RN observed white/yellow patch (possible sore) in the back of the throat, otherwise throat is not red and does not appear inflamed.

## 2022-05-02 NOTE — PRE PROCEDURE NOTE - PRE PROCEDURE EVALUATION
Attending Physician:    Dr. Pisano                        Procedure:   EGD with possible stent placement    Indication for Procedure:   Suspected Gastric Outlet Obstruction  ________________________________________________________  PAST MEDICAL & SURGICAL HISTORY:    Anal cancer-Had Chemo and Radiation 4 years ago    No significant past surgical history      ALLERGIES:  No Known Allergies    HOME MEDICATIONS:  acetaminophen 500 mg oral tablet: 2 tab(s) orally every 8 hours x 14 days  docusate sodium 100 mg oral capsule: 1 cap(s) orally 3 times a day as needed  polyethylene glycol 3350 oral powder for reconstitution: 17 gram(s) orally once a day, As needed, Constipation  senna oral tablet: 2 tab(s) orally once a day (at bedtime) as needed    AICD/PPM: [ ] yes   [X ] no    PERTINENT LAB DATA:                        9.2    6.68  )-----------( 190      ( 02 May 2022 07:36 )             30.2     05-02    136  |  107  |  21  ----------------------------<  115<H>  3.8   |  21<L>  |  1.46<H>    Ca    8.7      02 May 2022 07:38  Phos  1.8     05-02  Mg     1.7     05-02  TPro  4.9<L>  /  Alb  2.9<L>  /  TBili  0.3  /  DBili  x   /  AST  12  /  ALT  7<L>  /  AlkPhos  50  05-02  PT/INR - ( 02 May 2022 07:38 )   PT: 16.2 sec;   INR: 1.40 ratio    PTT - ( 02 May 2022 07:38 )  PTT:35.3 sec    PHYSICAL EXAMINATION:    Height (cm): 157.5  Weight (kg): 73.9  BMI (kg/m2): 29.8  BSA (m2): 1.75T(C): 36.7  HR: 87  BP: 199/72  RR: 30  SpO2: 97%    Constitutional: NAD    Neck:  No JVD  Respiratory: CTAB/L  Cardiovascular: S1 and S2  Gastrointestinal: BS+, soft, NT/ND  Extremities: No peripheral edema  Neurological: A/O x 4      COMMENTS:    The patient is a suitable candidate for the planned procedure unless box checked [ ]  No, explain:

## 2022-05-02 NOTE — PROGRESS NOTE ADULT - SUBJECTIVE AND OBJECTIVE BOX
Patient seen and examined at bedside.    Overnight Events:   No events. Patient asymptomatic. Denies CP, SOB, palpitations. Pending EGD today.     REVIEW OF SYSTEMS:  Constitutional:     [x ] negative [ ] fevers [ ] chills [ ] weight loss [ ] weight gain  HEENT:                  [x ] negative [ ] dry eyes [ ] eye irritation [ ] postnasal drip [ ] nasal congestion  CV:                         [ ] negative  [x ] chest pain [ ] orthopnea [ ] palpitations [ ] murmur  Resp:                     [x ] negative [ ] cough [ ] shortness of breath [ ] dyspnea [ ] wheezing [ ] sputum [ ]hemoptysis  GI:                          [ x] negative [ ] nausea [ ] vomiting [ ] diarrhea [ ] constipation [ ] abd pain [ ] dysphagia   :                        [ x] negative [ ] dysuria [ ] nocturia [ ] hematuria [ ] increased urinary frequency  Musculoskeletal: [x ] negative [ ] back pain [ ] myalgias [ ] arthralgias [ ] fracture  Skin:                       [ x] negative [ ] rash [ ] itch  Neurological:        [ x] negative [ ] headache [ ] dizziness [ ] syncope [ ] weakness [ ] numbness  Psychiatric:           [ x] negative [ ] anxiety [ ] depression  Endocrine:            [ x] negative [ ] diabetes [ ] thyroid problem  Heme/Lymph:      [ x] negative [ ] anemia [ ] bleeding problem  Allergic/Immune: [ x] negative [ ] itchy eyes [ ] nasal discharge [ ] hives [ ] angioedema    [ x] All other systems negative         Current Meds:  amLODIPine   Tablet 5 milliGRAM(s) Oral daily  benzocaine 15 mG/menthol 3.6 mG Lozenge 1 Lozenge Oral two times a day  benzocaine 15 mG/menthol 3.6 mG Lozenge 1 Lozenge Oral every 6 hours PRN  dextrose 5% + sodium chloride 0.9% with potassium chloride 20 mEq/L 1000 milliLiter(s) IV Continuous <Continuous>  heparin   Injectable 5000 Unit(s) SubCutaneous every 8 hours  metoprolol tartrate Injectable 5 milliGRAM(s) IV Push every 6 hours  ondansetron Injectable 4 milliGRAM(s) IV Push every 6 hours PRN  pantoprazole  Injectable 40 milliGRAM(s) IV Push two times a day  tetracaine/benzocaine/butamben Spray 1 Spray(s) Topical daily PRN      Vitals:  T(F): 98.4 (05-02), Max: 100.7 (05-02)  HR: 68 (05-02) (64 - 84)  BP: 184/72 (05-02) (123/61 - 184/72)  RR: 18 (05-02)  SpO2: 97% (05-02)  I&O's Summary    01 May 2022 07:01  -  02 May 2022 07:00  --------------------------------------------------------  IN: 2100 mL / OUT: 1025 mL / NET: 1075 mL    02 May 2022 07:01  -  02 May 2022 11:31  --------------------------------------------------------  IN: 500 mL / OUT: 0 mL / NET: 500 mL        Physical Exam:  Appearance: No acute distress; well appearing  Eyes: EOMI, no scleral icterus   HEENT: Normal oral mucosa  Cardiovascular: RRR, S1, S2, no murmurs, rubs, or gallops; no edema; no JVD  Respiratory: Clear to auscultation bilaterally, no auditory stridor   Gastrointestinal: soft, non-tender, non-distended with normal bowel sounds  Musculoskeletal: No clubbing; no joint deformity   Neurologic: Non-focal  Psychiatry: AAOx3, mood & affect appropriate  Skin: No rashes, ecchymoses, or cyanosis                          9.2    6.68  )-----------( 190      ( 02 May 2022 07:36 )             30.2     05-02    136  |  107  |  21  ----------------------------<  115<H>  3.8   |  21<L>  |  1.46<H>    Ca    8.7      02 May 2022 07:38  Phos  1.8     05-02  Mg     1.7     05-02    TPro  4.9<L>  /  Alb  2.9<L>  /  TBili  0.3  /  DBili  x   /  AST  12  /  ALT  7<L>  /  AlkPhos  50  05-02    PT/INR - ( 02 May 2022 07:38 )   PT: 16.2 sec;   INR: 1.40 ratio         PTT - ( 02 May 2022 07:38 )  PTT:35.3 sec  CARDIAC MARKERS ( 29 Apr 2022 17:01 )  24 ng/L / x     / x     / x     / x     / x      CARDIAC MARKERS ( 29 Apr 2022 11:21 )  30 ng/L / x     / x     / x     / x     / x                  New ECG(s): Personally reviewed    Echo:  CONCLUSIONS:  1. Normal left ventricular size with mild assymmetric  septal hypertrophy.  2. Normal left ventricular systolic function. No segmental  wall motion abnormalities.  The LVEF= 60-65%.  3. Normal right ventricular size and function.  *** No previous Echo exam.  ------------------------------------------------------------------------    Stress Testing:     Cath:    Imaging:    Interpretation of Telemetry:

## 2022-05-02 NOTE — PROGRESS NOTE ADULT - SUBJECTIVE AND OBJECTIVE BOX
Plastic Surgery Progress Note (pg [facility]: [id number], [facility]: [phone])    SUBJECTIVE  The patient was seen and examined. Low grade fever overnight, that resolved with tylenol. passing flatus but no bowel movement. denies nausea or vomiting.     OBJECTIVE  ___________________________________________________  VITAL SIGNS / I&O's   Vital Signs Last 24 Hrs  T(C): 37 (02 May 2022 05:02), Max: 38.2 (02 May 2022 01:17)  T(F): 98.6 (02 May 2022 05:02), Max: 100.7 (02 May 2022 01:17)  HR: 66 (02 May 2022 05:02) (64 - 84)  BP: 164/80 (02 May 2022 05:02) (123/61 - 211/72)  BP(mean): 81 (02 May 2022 01:17) (81 - 81)  RR: 17 (02 May 2022 05:02) (17 - 18)  SpO2: 98% (02 May 2022 05:02) (96% - 98%)      01 May 2022 07:01  -  02 May 2022 07:00  --------------------------------------------------------  IN:    dextrose 5% + sodium chloride 0.9% w/ Additives: 2100 mL  Total IN: 2100 mL    OUT:    Nasogastric/Oral tube (mL): 75 mL    Oral Fluid: 0 mL    Voided (mL): 950 mL  Total OUT: 1025 mL    Total NET: 1075 mL      02 May 2022 07:01  -  02 May 2022 09:15  --------------------------------------------------------  IN:    IV PiggyBack: 500 mL  Total IN: 500 mL    OUT:  Total OUT: 0 mL    Total NET: 500 mL        ___________________________________________________  Physical Exam:  General Appearance: Appears well, NAD  Resp: Patent airway, non-labored breathing  CV: RRR  Abdomen: Soft, Nontender, Nondistended  ___________________________________________________  LABS                        9.2    6.68  )-----------( 190      ( 02 May 2022 07:36 )             30.2     02 May 2022 07:38    136    |  107    |  21     ----------------------------<  115    3.8     |  21     |  1.46     Ca    8.7        02 May 2022 07:38  Phos  1.8       02 May 2022 07:38  Mg     1.7       02 May 2022 07:38    TPro  4.9    /  Alb  2.9    /  TBili  0.3    /  DBili  x      /  AST  12     /  ALT  7      /  AlkPhos  50     02 May 2022 07:38    PT/INR - ( 02 May 2022 07:38 )   PT: 16.2 sec;   INR: 1.40 ratio         PTT - ( 02 May 2022 07:38 )  PTT:35.3 sec  CAPILLARY BLOOD GLUCOSE              ___________________________________________________  MICRO  Recent Cultures:    ___________________________________________________  MEDICATIONS  (STANDING):  amLODIPine   Tablet 5 milliGRAM(s) Oral daily  benzocaine 15 mG/menthol 3.6 mG Lozenge 1 Lozenge Oral two times a day  dextrose 5% + sodium chloride 0.9% with potassium chloride 20 mEq/L 1000 milliLiter(s) (100 mL/Hr) IV Continuous <Continuous>  heparin   Injectable 5000 Unit(s) SubCutaneous every 8 hours  metoprolol tartrate Injectable 5 milliGRAM(s) IV Push every 6 hours  pantoprazole  Injectable 40 milliGRAM(s) IV Push two times a day    MEDICATIONS  (PRN):  benzocaine 15 mG/menthol 3.6 mG Lozenge 1 Lozenge Oral every 6 hours PRN Sore Throat  ondansetron Injectable 4 milliGRAM(s) IV Push every 6 hours PRN Nausea and/or Vomiting  tetracaine/benzocaine/butamben Spray 1 Spray(s) Topical daily PRN sore throat

## 2022-05-02 NOTE — PROGRESS NOTE ADULT - SUBJECTIVE AND OBJECTIVE BOX
Surgery Progress Note     Subjective/24hour Events:   Patient seen and examined.       Vital Signs:  Vital Signs Last 24 Hrs  T(C): 38.2 (02 May 2022 01:17), Max: 38.2 (02 May 2022 01:17)  T(F): 100.7 (02 May 2022 01:17), Max: 100.7 (02 May 2022 01:17)  HR: 64 (02 May 2022 01:17) (64 - 84)  BP: 123/61 (02 May 2022 01:17) (123/61 - 211/72)  BP(mean): 81 (02 May 2022 01:17) (81 - 81)  RR: 17 (02 May 2022 01:17) (17 - 18)  SpO2: 96% (02 May 2022 01:17) (96% - 98%)    CAPILLARY BLOOD GLUCOSE          I&O's Detail    30 Apr 2022 07:01  -  01 May 2022 07:00  --------------------------------------------------------  IN:    dextrose 5% + sodium chloride 0.9% w/ Additives: 1100 mL    Lactated Ringers: 1375 mL  Total IN: 2475 mL    OUT:    Nasogastric/Oral tube (mL): 550 mL    Oral Fluid: 0 mL    Voided (mL): 975 mL  Total OUT: 1525 mL    Total NET: 950 mL      01 May 2022 07:01  -  02 May 2022 01:27  --------------------------------------------------------  IN:    dextrose 5% + sodium chloride 0.9% w/ Additives: 900 mL  Total IN: 900 mL    OUT:    Nasogastric/Oral tube (mL): 75 mL    Oral Fluid: 0 mL    Voided (mL): 575 mL  Total OUT: 650 mL    Total NET: 250 mL          MEDICATIONS  (STANDING):  amLODIPine   Tablet 5 milliGRAM(s) Oral daily  benzocaine 15 mG/menthol 3.6 mG Lozenge 1 Lozenge Oral two times a day  dextrose 5% + sodium chloride 0.9% with potassium chloride 20 mEq/L 1000 milliLiter(s) (100 mL/Hr) IV Continuous <Continuous>  heparin   Injectable 5000 Unit(s) SubCutaneous every 8 hours  metoprolol tartrate Injectable 5 milliGRAM(s) IV Push every 6 hours  pantoprazole  Injectable 40 milliGRAM(s) IV Push two times a day    MEDICATIONS  (PRN):  benzocaine 15 mG/menthol 3.6 mG Lozenge 1 Lozenge Oral every 6 hours PRN Sore Throat  ondansetron Injectable 4 milliGRAM(s) IV Push every 6 hours PRN Nausea and/or Vomiting  tetracaine/benzocaine/butamben Spray 1 Spray(s) Topical daily PRN sore throat      Physical Exam:  General Appearance: Appears well, NAD  Resp: Patent airway, non-labored breathing  CV: RRR  Abdomen: Soft, Nontender, Nondistended      Labs:    05-01    136  |  104  |  21  ----------------------------<  101<H>  3.9   |  22  |  1.37<H>    Ca    8.8      01 May 2022 07:24  Phos  2.3     05-01  Mg     1.9     05-01                              9.5    6.29  )-----------( 198      ( 01 May 2022 07:28 )             30.7

## 2022-05-02 NOTE — PROVIDER CONTACT NOTE (OTHER) - ACTION/TREATMENT ORDERED:
MD Hernandez aware, ordered Tylenol for the pain. MD states he will inform the day team of the updates to the pt's condition. No new orders at this time otherwise.

## 2022-05-02 NOTE — PROGRESS NOTE ADULT - ASSESSMENT
76M hx of anal cancer 2012 s/p chemo radiation here with 2 weeks of epigastric pain found to have new annular mass involving pancreas +duodenal obstruction third portion  +FOBT in ED    Plan:  -NPO/IVF  -NGT removed due to low output  -CEA, CA 19-9 f/u  -CT chest staging ordered  -appreciate GI and onc recommendations  - GI: EGD with duo stent on Monday  - appreciate cardiology recs  -ppi bid and holding chem vte ppx i/s/o melena  - Cards clearance for surgery       [facility]  [id number]

## 2022-05-02 NOTE — PROGRESS NOTE ADULT - SUBJECTIVE AND OBJECTIVE BOX
Patient is a 76y old  Male who presents with a chief complaint of duodenal obstruction 2/2 mass (02 May 2022 09:14)    Patient seen this morning. Was sleeping comfortably in bed but per prior notes, had low grade fever to100.2 with sore throat and hoarseness this morning.    MEDICATIONS  (STANDING):  amLODIPine   Tablet 5 milliGRAM(s) Oral daily  benzocaine 15 mG/menthol 3.6 mG Lozenge 1 Lozenge Oral two times a day  dextrose 5% + sodium chloride 0.9% with potassium chloride 20 mEq/L 1000 milliLiter(s) (100 mL/Hr) IV Continuous <Continuous>  heparin   Injectable 5000 Unit(s) SubCutaneous every 8 hours  metoprolol tartrate Injectable 5 milliGRAM(s) IV Push every 6 hours  pantoprazole  Injectable 40 milliGRAM(s) IV Push two times a day    MEDICATIONS  (PRN):  benzocaine 15 mG/menthol 3.6 mG Lozenge 1 Lozenge Oral every 6 hours PRN Sore Throat  ondansetron Injectable 4 milliGRAM(s) IV Push every 6 hours PRN Nausea and/or Vomiting  tetracaine/benzocaine/butamben Spray 1 Spray(s) Topical daily PRN sore throat      ROS - Unable to obtain - patient asleep - ROS taken from prior notes  Ffever to 100.2  Sore throat and hoarseness    Vital Signs Last 24 Hrs  T(C): 37 (02 May 2022 05:02), Max: 38.2 (02 May 2022 01:17)  T(F): 98.6 (02 May 2022 05:02), Max: 100.7 (02 May 2022 01:17)  HR: 66 (02 May 2022 05:02) (64 - 84)  BP: 164/80 (02 May 2022 05:02) (123/61 - 174/62)  BP(mean): 81 (02 May 2022 01:17) (81 - 81)  RR: 17 (02 May 2022 05:02) (17 - 18)  SpO2: 98% (02 May 2022 05:02) (96% - 98%)    PE  NAD  Sleeping  Anicteric, MMM  No rash grossly                          9.2    6.68  )-----------( 190      ( 02 May 2022 07:36 )             30.2       05-02    136  |  107  |  21  ----------------------------<  115<H>  3.8   |  21<L>  |  1.46<H>    Ca    8.7      02 May 2022 07:38  Phos  1.8     05-02  Mg     1.7     05-02    TPro  4.9<L>  /  Alb  2.9<L>  /  TBili  0.3  /  DBili  x   /  AST  12  /  ALT  7<L>  /  AlkPhos  50  05-02

## 2022-05-02 NOTE — PROGRESS NOTE ADULT - ASSESSMENT
76M hx of anal cancer 2012 s/p chemo radiation here with 2 weeks of epigastric pain found to have new annular mass involving pancreas +duodenal obstruction third portion  +FOBT in ED    Plan:  - NPO/IVF  - NGT removed due to low output  - CT chest staging w/ few indeterminate pulmonary nodules   - appreciate GI and onc recommendations  - f/u GI re EUS/FNA   - appreciate cardiology recs  - ppi bid and holding chem vte ppx i/s/o melena  - Cards clearance for surgery       [facility]  [id number]

## 2022-05-03 NOTE — PROGRESS NOTE ADULT - SUBJECTIVE AND OBJECTIVE BOX
Patient seen and examined at bedside.    Overnight Events:   Procedure on hold yesterday due to fever. This AM, patient denies fever, chills, cough, diarrhea, abd pain, nausea, vomiting, chest pain, palpitations, SOB. Endorsing 2 BMs this AM.     REVIEW OF SYSTEMS:  Constitutional:     [x ] negative [ ] fevers [ ] chills [ ] weight loss [ ] weight gain  HEENT:                  [x ] negative [ ] dry eyes [ ] eye irritation [ ] postnasal drip [ ] nasal congestion  CV:                         [ x] negative  [ ] chest pain [ ] orthopnea [ ] palpitations [ ] murmur  Resp:                     [x ] negative [ ] cough [ ] shortness of breath [ ] dyspnea [ ] wheezing [ ] sputum [ ]hemoptysis  GI:                          [ ] negative [ x] nausea [ ] vomiting [ ] diarrhea [ ] constipation [ ] abd pain [ ] dysphagia   :                        [ x] negative [ ] dysuria [ ] nocturia [ ] hematuria [ ] increased urinary frequency  Musculoskeletal: [x ] negative [ ] back pain [ ] myalgias [ ] arthralgias [ ] fracture  Skin:                       [ x] negative [ ] rash [ ] itch  Neurological:        [ x] negative [ ] headache [ ] dizziness [ ] syncope [ ] weakness [ ] numbness  Psychiatric:           [ x] negative [ ] anxiety [ ] depression  Endocrine:            [ x] negative [ ] diabetes [ ] thyroid problem  Heme/Lymph:      [ x] negative [ ] anemia [ ] bleeding problem  Allergic/Immune: [ x] negative [ ] itchy eyes [ ] nasal discharge [ ] hives [ ] angioedema    [ x] All other systems negative          Current Meds:  acetaminophen   IVPB .. 1000 milliGRAM(s) IV Intermittent every 6 hours  amLODIPine   Tablet 5 milliGRAM(s) Oral daily  benzocaine 15 mG/menthol 3.6 mG Lozenge 1 Lozenge Oral two times a day  benzocaine 15 mG/menthol 3.6 mG Lozenge 1 Lozenge Oral every 6 hours PRN  dextrose 5% + sodium chloride 0.9% with potassium chloride 20 mEq/L 1000 milliLiter(s) IV Continuous <Continuous>  fluticasone propionate 50 MICROgram(s)/spray Nasal Spray 1 Spray(s) Both Nostrils every 12 hours  heparin   Injectable 5000 Unit(s) SubCutaneous every 8 hours  metoprolol tartrate Injectable 5 milliGRAM(s) IV Push every 6 hours  ondansetron Injectable 4 milliGRAM(s) IV Push every 6 hours PRN  pantoprazole  Injectable 40 milliGRAM(s) IV Push two times a day  sodium phosphate IVPB 30 milliMole(s) IV Intermittent every 6 hours  tetracaine/benzocaine/butamben Spray 1 Spray(s) Topical daily PRN      Vitals:  T(F): 100.1 (05-03), Max: 101.4 (05-02)  HR: 78 (05-03) (65 - 89)  BP: 159/78 (05-03) (129/61 - 202/72)  RR: 18 (05-03)  SpO2: 97% (05-03)  I&O's Summary    02 May 2022 07:01  -  03 May 2022 07:00  --------------------------------------------------------  IN: 1750 mL / OUT: 1200 mL / NET: 550 mL        Physical Exam:  Appearance: No acute distress; well appearing  Eyes: EOMI, no scleral icterus   HEENT: Normal oral mucosa  Cardiovascular: RRR, S1, S2, no murmurs, rubs, or gallops; no edema; no JVD  Respiratory: Clear to auscultation bilaterally, no auditory stridor   Gastrointestinal: soft, non-tender, non-distended with normal bowel sounds  Musculoskeletal: No clubbing; no joint deformity   Neurologic: Non-focal  Psychiatry: AAOx3, mood & affect appropriate  Skin: No rashes, ecchymoses, or cyanosis                          9.3    6.42  )-----------( 198      ( 03 May 2022 07:24 )             29.2     05-03    138  |  108  |  17  ----------------------------<  120<H>  3.9   |  19<L>  |  1.21    Ca    8.6      03 May 2022 07:24  Phos  1.8     05-03  Mg     1.7     05-03    TPro  5.4<L>  /  Alb  3.3  /  TBili  0.4  /  DBili  x   /  AST  15  /  ALT  6<L>  /  AlkPhos  55  05-03    PT/INR - ( 02 May 2022 07:38 )   PT: 16.2 sec;   INR: 1.40 ratio         PTT - ( 02 May 2022 07:38 )  PTT:35.3 sec  CARDIAC MARKERS ( 29 Apr 2022 17:01 )  24 ng/L / x     / x     / x     / x     / x      CARDIAC MARKERS ( 29 Apr 2022 11:21 )  30 ng/L / x     / x     / x     / x     / x                  New ECG(s): Personally reviewed    Echo:  CONCLUSIONS:  1. Normal left ventricular size with mild assymmetric  septal hypertrophy.  2. Normal left ventricular systolic function. No segmental  wall motion abnormalities.  The LVEF= 60-65%.  3. Normal right ventricular size and function.  *** No previous Echo exam.    Stress Testing:     Cath:    Imaging:    Interpretation of Telemetry: SR 70s

## 2022-05-03 NOTE — CONSULT NOTE ADULT - PROBLEM SELECTOR RECOMMENDATION 4
DVT ppx - change to Lovenox  Diet: NPO except meds    Rest of management per primary team  Discussed in detail with patient and niece.

## 2022-05-03 NOTE — PROVIDER CONTACT NOTE (OTHER) - ACTION/TREATMENT ORDERED:
Provider ordered repeat blood pressure. Provider ordered labetalol and metropolol to be administered early.

## 2022-05-03 NOTE — DIETITIAN INITIAL EVALUATION ADULT - REASON FOR ADMISSION
Chart reviewed, events noted. This is a "76M hx of anal cancer 2012 s/p chemo radiation here with 2 weeks of epigastric pain found to have new annular mass involving pancreas +duodenal obstruction third portion." Pt was planned for EGD yesterday with possible PEG-J, however procedure cancelled while pt in endoscopy due to  worsening respiratory status and new fever.

## 2022-05-03 NOTE — DIETITIAN INITIAL EVALUATION ADULT - PERTINENT LABORATORY DATA
05-03    138  |  108  |  17  ----------------------------<  120<H>  3.9   |  19<L>  |  1.21    Ca    8.6      03 May 2022 07:24  Phos  1.8     05-03  Mg     1.7     05-03    TPro  5.4<L>  /  Alb  3.3  /  TBili  0.4  /  DBili  x   /  AST  15  /  ALT  6<L>  /  AlkPhos  55  05-03

## 2022-05-03 NOTE — PROGRESS NOTE ADULT - ASSESSMENT
Impression:  # fever - unclear etiology; continues to have fever, off abx. Would benefit from full infectious workup  # Duodenal obstruction secondary to annular mass which extends into the uncinate process of the pancreas and obstructed distal CBD. ( Dilated proximal CBD with normal liver enzymes). After discussion w/ patient and niece, no plans for duodenal stent. Will attempt endoscopic PEG/PEJ if able to bypass the obstruction once optimized  # Abnormal EKG - now improved, cleared from cardiology  # Anal CA s/p chemo and radiation (in remission)     Recommendations:  - infectious workup including blood cultures, UA, CXR, abdominal imaging  - would recommend empiric abx at this time  - trend liver enzymes  - once optimized from infectious standpoint, can re-attempt PEG-J tube w/ possible EUS for biopsies (for diagnosis). Will reassess on a daily basis    GI will continue to follow.     All recommendations are tentative until note is attested by attending.     Chapincito Urbina, PGY5  Gastroenterology/Hepatology Fellow  Available on [employer] Teams  [phone] ([employer] Short Range Pager)  [phone] (Long Range Pager)    After 5pm, please contact the on-call GI fellow. [phone]

## 2022-05-03 NOTE — PROGRESS NOTE ADULT - SUBJECTIVE AND OBJECTIVE BOX
Surgery Progress Note     Subjective/24hour Events:   Patient seen and examined.       Vital Signs:  Vital Signs Last 24 Hrs  T(C): 37.7 (03 May 2022 00:03), Max: 38.6 (02 May 2022 14:50)  T(F): 99.9 (03 May 2022 00:03), Max: 101.4 (02 May 2022 14:50)  HR: 86 (03 May 2022 00:03) (64 - 89)  BP: 166/63 (03 May 2022 00:03) (123/61 - 199/72)  BP(mean): 81 (02 May 2022 01:17) (81 - 81)  RR: 18 (03 May 2022 00:03) (13 - 30)  SpO2: 96% (03 May 2022 00:03) (95% - 98%)    CAPILLARY BLOOD GLUCOSE          I&O's Detail    01 May 2022 07:01  -  02 May 2022 07:00  --------------------------------------------------------  IN:    dextrose 5% + sodium chloride 0.9% w/ Additives: 2100 mL  Total IN: 2100 mL    OUT:    Nasogastric/Oral tube (mL): 75 mL    Oral Fluid: 0 mL    Voided (mL): 950 mL  Total OUT: 1025 mL    Total NET: 1075 mL      02 May 2022 07:01  -  03 May 2022 01:00  --------------------------------------------------------  IN:    dextrose 5% + sodium chloride 0.9% w/ Additives: 900 mL    IV PiggyBack: 600 mL    Sodium Chloride 0.9% Bolus: 250 mL  Total IN: 1750 mL    OUT:    Oral Fluid: 0 mL    Voided (mL): 1200 mL  Total OUT: 1200 mL    Total NET: 550 mL          MEDICATIONS  (STANDING):  amLODIPine   Tablet 5 milliGRAM(s) Oral daily  benzocaine 15 mG/menthol 3.6 mG Lozenge 1 Lozenge Oral two times a day  dextrose 5% + sodium chloride 0.9% with potassium chloride 20 mEq/L 1000 milliLiter(s) (100 mL/Hr) IV Continuous <Continuous>  fluticasone propionate 50 MICROgram(s)/spray Nasal Spray 1 Spray(s) Both Nostrils every 12 hours  heparin   Injectable 5000 Unit(s) SubCutaneous every 8 hours  metoprolol tartrate Injectable 5 milliGRAM(s) IV Push every 6 hours  pantoprazole  Injectable 40 milliGRAM(s) IV Push two times a day    MEDICATIONS  (PRN):  benzocaine 15 mG/menthol 3.6 mG Lozenge 1 Lozenge Oral every 6 hours PRN Sore Throat  ondansetron Injectable 4 milliGRAM(s) IV Push every 6 hours PRN Nausea and/or Vomiting  tetracaine/benzocaine/butamben Spray 1 Spray(s) Topical daily PRN sore throat      Physical Exam:  General Appearance: Appears well, NAD  Resp: Patent airway, non-labored breathing  CV: RRR  Abdomen: Soft, Nontender, Nondistended        Labs:    05-02    136  |  106  |  17  ----------------------------<  114<H>  4.2   |  19<L>  |  1.22    Ca    8.9      02 May 2022 17:17  Phos  2.4     05-02  Mg     1.8     05-02    TPro  4.9<L>  /  Alb  2.9<L>  /  TBili  0.3  /  DBili  x   /  AST  12  /  ALT  7<L>  /  AlkPhos  50  05-02    LIVER FUNCTIONS - ( 02 May 2022 07:38 )  Alb: 2.9 g/dL / Pro: 4.9 g/dL / ALK PHOS: 50 U/L / ALT: 7 U/L / AST: 12 U/L / GGT: x                                 9.6    7.45  )-----------( 200      ( 02 May 2022 17:17 )             30.5     PT/INR - ( 02 May 2022 07:38 )   PT: 16.2 sec;   INR: 1.40 ratio         PTT - ( 02 May 2022 07:38 )  PTT:35.3 sec     Surgery Progress Note       Patient seen and examined at bedside. He was laying in bed comfortably. He states he had a BM. His pain is controlled. He became febrile overnight. He denies any CP, SOB, n/v/d.        Vital Signs:  Vital Signs Last 24 Hrs  T(C): 37.7 (03 May 2022 00:03), Max: 38.6 (02 May 2022 14:50)  T(F): 99.9 (03 May 2022 00:03), Max: 101.4 (02 May 2022 14:50)  HR: 86 (03 May 2022 00:03) (64 - 89)  BP: 166/63 (03 May 2022 00:03) (123/61 - 199/72)  BP(mean): 81 (02 May 2022 01:17) (81 - 81)  RR: 18 (03 May 2022 00:03) (13 - 30)  SpO2: 96% (03 May 2022 00:03) (95% - 98%)    CAPILLARY BLOOD GLUCOSE          I&O's Detail    01 May 2022 07:01  -  02 May 2022 07:00  --------------------------------------------------------  IN:    dextrose 5% + sodium chloride 0.9% w/ Additives: 2100 mL  Total IN: 2100 mL    OUT:    Nasogastric/Oral tube (mL): 75 mL    Oral Fluid: 0 mL    Voided (mL): 950 mL  Total OUT: 1025 mL    Total NET: 1075 mL      02 May 2022 07:01  -  03 May 2022 01:00  --------------------------------------------------------  IN:    dextrose 5% + sodium chloride 0.9% w/ Additives: 900 mL    IV PiggyBack: 600 mL    Sodium Chloride 0.9% Bolus: 250 mL  Total IN: 1750 mL    OUT:    Oral Fluid: 0 mL    Voided (mL): 1200 mL  Total OUT: 1200 mL    Total NET: 550 mL          MEDICATIONS  (STANDING):  amLODIPine   Tablet 5 milliGRAM(s) Oral daily  benzocaine 15 mG/menthol 3.6 mG Lozenge 1 Lozenge Oral two times a day  dextrose 5% + sodium chloride 0.9% with potassium chloride 20 mEq/L 1000 milliLiter(s) (100 mL/Hr) IV Continuous <Continuous>  fluticasone propionate 50 MICROgram(s)/spray Nasal Spray 1 Spray(s) Both Nostrils every 12 hours  heparin   Injectable 5000 Unit(s) SubCutaneous every 8 hours  metoprolol tartrate Injectable 5 milliGRAM(s) IV Push every 6 hours  pantoprazole  Injectable 40 milliGRAM(s) IV Push two times a day    MEDICATIONS  (PRN):  benzocaine 15 mG/menthol 3.6 mG Lozenge 1 Lozenge Oral every 6 hours PRN Sore Throat  ondansetron Injectable 4 milliGRAM(s) IV Push every 6 hours PRN Nausea and/or Vomiting  tetracaine/benzocaine/butamben Spray 1 Spray(s) Topical daily PRN sore throat      Physical Exam:  General Appearance: Appears well, NAD  Resp: Patent airway, non-labored breathing  Abdomen: Soft, Nontender, Nondistended, no guarding or rebound tenderness   Extremities: warm, well perfused, movement x4 limbs b/l        Labs:    05-02    136  |  106  |  17  ----------------------------<  114<H>  4.2   |  19<L>  |  1.22    Ca    8.9      02 May 2022 17:17  Phos  2.4     05-02  Mg     1.8     05-02    TPro  4.9<L>  /  Alb  2.9<L>  /  TBili  0.3  /  DBili  x   /  AST  12  /  ALT  7<L>  /  AlkPhos  50  05-02    LIVER FUNCTIONS - ( 02 May 2022 07:38 )  Alb: 2.9 g/dL / Pro: 4.9 g/dL / ALK PHOS: 50 U/L / ALT: 7 U/L / AST: 12 U/L / GGT: x                                 9.6    7.45  )-----------( 200      ( 02 May 2022 17:17 )             30.5     PT/INR - ( 02 May 2022 07:38 )   PT: 16.2 sec;   INR: 1.40 ratio         PTT - ( 02 May 2022 07:38 )  PTT:35.3 sec

## 2022-05-03 NOTE — PROGRESS NOTE ADULT - ASSESSMENT
76M hx of anal cancer 2012 s/p chemo radiation here with 2 weeks of epigastric pain found to have new annular mass involving pancreas +duodenal obstruction third portion  +FOBT in ED    Plan:  - NPO/IVF  - NGT removed due to low output  - CT chest staging w/ few indeterminate pulmonary nodules   - appreciate GI and onc recommendations  - f/u GI re EUS/FNA   - appreciate cardiology recs  - ppi bid and holding chem vte ppx i/s/o melena  - Cards clearance for surgery       [facility]  [id number] 76M hx of anal cancer 2012 s/p chemo radiation here with 2 weeks of epigastric pain found to have new annular mass involving pancreas +duodenal obstruction third portion. +FOBT in ED. He is doing better this am     Plan:  - DVT ppx  - NPO with ice chips/IVF  - CT chest staging w/ few indeterminate pulmonary nodules   - appreciate GI and onc recommendations  - GI re-attempt EUS/FNA once optimized from infectious standpoint  - Appreciate cardiology recs, clearance for surgery  -F/u blood cultures  -CT chest non con f/u   -F/U Viral Panel   -Medicine consult     [facility]  [id number]

## 2022-05-03 NOTE — DIETITIAN INITIAL EVALUATION ADULT - OTHER INFO
Weights: Weights per [facility] HIE: 164lbs (4/29/22), dosing weight noted as 162lbs (5/3).

## 2022-05-03 NOTE — PROGRESS NOTE ADULT - SUBJECTIVE AND OBJECTIVE BOX
Gastroenterology/Hepatology Progress Note      Interval Events:   - patient planned for EGD yesterday, however in endoscopy having worsening resp status and new fever, so procedure cancelled  - overnight continues to have fever (T max 101); no abx started  - 2 bowel movements this morning    Allergies:  No Known Allergies      Hospital Medications:  acetaminophen   IVPB .. 1000 milliGRAM(s) IV Intermittent every 6 hours  amLODIPine   Tablet 5 milliGRAM(s) Oral daily  benzocaine 15 mG/menthol 3.6 mG Lozenge 1 Lozenge Oral two times a day  benzocaine 15 mG/menthol 3.6 mG Lozenge 1 Lozenge Oral every 6 hours PRN  dextrose 5% + sodium chloride 0.9% with potassium chloride 20 mEq/L 1000 milliLiter(s) IV Continuous <Continuous>  fluticasone propionate 50 MICROgram(s)/spray Nasal Spray 1 Spray(s) Both Nostrils every 12 hours  heparin   Injectable 5000 Unit(s) SubCutaneous every 8 hours  metoprolol tartrate Injectable 5 milliGRAM(s) IV Push every 6 hours  ondansetron Injectable 4 milliGRAM(s) IV Push every 6 hours PRN  pantoprazole  Injectable 40 milliGRAM(s) IV Push two times a day  sodium phosphate IVPB 30 milliMole(s) IV Intermittent every 6 hours  tetracaine/benzocaine/butamben Spray 1 Spray(s) Topical daily PRN      ROS: 14 point ROS negative unless otherwise state in subjective    PHYSICAL EXAM:   Vital Signs:  Vital Signs Last 24 Hrs  T(C): 37.6 (03 May 2022 08:51), Max: 38.6 (02 May 2022 14:50)  T(F): 99.7 (03 May 2022 08:51), Max: 101.4 (02 May 2022 14:50)  HR: 82 (03 May 2022 08:51) (65 - 89)  BP: 146/58 (03 May 2022 08:51) (129/61 - 202/72)  BP(mean): --  RR: 20 (03 May 2022 04:15) (13 - 30)  SpO2: 96% (03 May 2022 04:15) (95% - 98%)  Daily Height in cm: 157.48 (02 May 2022 14:33)    Daily     GENERAL:  No acute distress  HEENT:  NCAT, no scleral icterus  CHEST: no resp distress  HEART:  RRR  ABDOMEN:  Soft, non-tender, non-distended, normoactive bowel sounds, no masses  EXTREMITIES:  No cyanosis, clubbing, or edema  SKIN:  No rash/erythema/ecchymoses/petechiae/wounds/abscess/warm/dry  NEURO:  Alert and oriented x 3, no asterixis, no tremor    LABS:                        9.3    6.42  )-----------( 198      ( 03 May 2022 07:24 )             29.2     Mean Cell Volume: 84.9 fl (22 @ 07:24)        138  |  108  |  17  ----------------------------<  120<H>  3.9   |  19<L>  |  1.21    Ca    8.6      03 May 2022 07:24  Phos  1.8     05-03  Mg     1.7     05-    TPro  5.4<L>  /  Alb  3.3  /  TBili  0.4  /  DBili  x   /  AST  15  /  ALT  6<L>  /  AlkPhos  55  05-03    LIVER FUNCTIONS - ( 03 May 2022 07:24 )  Alb: 3.3 g/dL / Pro: 5.4 g/dL / ALK PHOS: 55 U/L / ALT: 6 U/L / AST: 15 U/L / GGT: x           PT/INR - ( 02 May 2022 07:38 )   PT: 16.2 sec;   INR: 1.40 ratio         PTT - ( 02 May 2022 07:38 )  PTT:35.3 sec  Urinalysis Basic - ( 03 May 2022 00:15 )    Color: Light Yellow / Appearance: Clear / S.014 / pH: x  Gluc: x / Ketone: Negative  / Bili: Negative / Urobili: Negative   Blood: x / Protein: 30 mg/dL / Nitrite: Negative   Leuk Esterase: Negative / RBC: 1 /hpf / WBC 0 /HPF   Sq Epi: x / Non Sq Epi: 1 /hpf / Bacteria: Negative            Imaging:

## 2022-05-03 NOTE — DIETITIAN INITIAL EVALUATION ADULT - PERTINENT MEDS FT
MEDICATIONS  (STANDING):  acetaminophen   IVPB .. 1000 milliGRAM(s) IV Intermittent every 6 hours  amLODIPine   Tablet 5 milliGRAM(s) Oral daily  benzocaine 15 mG/menthol 3.6 mG Lozenge 1 Lozenge Oral two times a day  dextrose 5% + sodium chloride 0.9% with potassium chloride 20 mEq/L 1000 milliLiter(s) (100 mL/Hr) IV Continuous <Continuous>  fluticasone propionate 50 MICROgram(s)/spray Nasal Spray 1 Spray(s) Both Nostrils every 12 hours  heparin   Injectable 5000 Unit(s) SubCutaneous every 8 hours  metoprolol tartrate Injectable 5 milliGRAM(s) IV Push every 6 hours  pantoprazole  Injectable 40 milliGRAM(s) IV Push two times a day  sodium phosphate IVPB 30 milliMole(s) IV Intermittent every 6 hours    MEDICATIONS  (PRN):  benzocaine 15 mG/menthol 3.6 mG Lozenge 1 Lozenge Oral every 6 hours PRN Sore Throat  ondansetron Injectable 4 milliGRAM(s) IV Push every 6 hours PRN Nausea and/or Vomiting  tetracaine/benzocaine/butamben Spray 1 Spray(s) Topical daily PRN sore throat

## 2022-05-03 NOTE — DIETITIAN INITIAL EVALUATION ADULT - ADD RECOMMEND
1) Medical team to advance diet when medically feasible. 2) Consider addition of multivitamin, thiamine supplement as feasible. 3) Malnutrition alert placed in EMR.

## 2022-05-03 NOTE — DIETITIAN INITIAL EVALUATION ADULT - ENERGY INTAKE
Pt NPO since admission, NGT was placed for decompression, placed on 4/29, discontinued on 5/1.  x 5 days (since 4/29)/NPO

## 2022-05-03 NOTE — DIETITIAN INITIAL EVALUATION ADULT - ORAL INTAKE PTA/DIET HISTORY
Pt sleeping during multiple attempts, information obtained from thorough chart review. Pt noted with abdominal pain for the last 2 weeks, with nausea and emesis for a few days PTA.

## 2022-05-03 NOTE — PROGRESS NOTE ADULT - SUBJECTIVE AND OBJECTIVE BOX
Patient is a 76y old  Male who presents with a chief complaint of duodenal obstruction 2/2 mass (03 May 2022 10:39)    Patient seen this morning. Other than ongoing fevers, he has no complaints.    MEDICATIONS  (STANDING):  acetaminophen   IVPB .. 1000 milliGRAM(s) IV Intermittent every 6 hours  amLODIPine   Tablet 5 milliGRAM(s) Oral daily  benzocaine 15 mG/menthol 3.6 mG Lozenge 1 Lozenge Oral two times a day  dextrose 5% + sodium chloride 0.9% with potassium chloride 20 mEq/L 1000 milliLiter(s) (100 mL/Hr) IV Continuous <Continuous>  fluticasone propionate 50 MICROgram(s)/spray Nasal Spray 1 Spray(s) Both Nostrils every 12 hours  heparin   Injectable 5000 Unit(s) SubCutaneous every 8 hours  metoprolol tartrate Injectable 5 milliGRAM(s) IV Push every 6 hours  pantoprazole  Injectable 40 milliGRAM(s) IV Push two times a day  sodium phosphate IVPB 30 milliMole(s) IV Intermittent every 6 hours    MEDICATIONS  (PRN):  benzocaine 15 mG/menthol 3.6 mG Lozenge 1 Lozenge Oral every 6 hours PRN Sore Throat  ondansetron Injectable 4 milliGRAM(s) IV Push every 6 hours PRN Nausea and/or Vomiting  tetracaine/benzocaine/butamben Spray 1 Spray(s) Topical daily PRN sore throat      ROS  Fevers - tmax 101.4  No epistaxis, HA, sore throat  No CP, SOB, cough, sputum  No n/v/d, abd pain, melena, hematochezia  No edema  No rash  No anxiety  No back pain, joint pain  No bleeding, bruising  No dysuria, hematuria    Vital Signs Last 24 Hrs  T(C): 37.8 (03 May 2022 09:44), Max: 38.6 (02 May 2022 14:50)  T(F): 100.1 (03 May 2022 09:44), Max: 101.4 (02 May 2022 14:50)  HR: 78 (03 May 2022 09:44) (65 - 89)  BP: 159/78 (03 May 2022 09:44) (129/61 - 202/72)  BP(mean): --  RR: 18 (03 May 2022 09:44) (13 - 30)  SpO2: 97% (03 May 2022 09:44) (95% - 98%)    PE  NAD  Awake, alert  Anicteric, MMM  No c/c/e  No rash grossly                            9.3    6.42  )-----------( 198      ( 03 May 2022 07:24 )             29.2       05-03    138  |  108  |  17  ----------------------------<  120<H>  3.9   |  19<L>  |  1.21    Ca    8.6      03 May 2022 07:24  Phos  1.8     05-03  Mg     1.7     05-03    TPro  5.4<L>  /  Alb  3.3  /  TBili  0.4  /  DBili  x   /  AST  15  /  ALT  6<L>  /  AlkPhos  55  05-03      ACC: [id number] EXAM:  CT CHEST                          PROCEDURE DATE:  05/03/2022          INTERPRETATION:  INDICATION: Fever, rule out pneumonia, pancreatic mass    TECHNIQUE: Volumetric images of the chest without intravenous contrast.   Maximum intensity projection images were generated.    COMPARISON: CT chest 4/29/2022.    FINDINGS:    LUNGS/AIRWAYS/PLEURA: Patent trachea and bronchi. New trace bilateral   interlobular septal thickening. Right lower lobe calcified granuloma.   Stable few bilateral sub-5 mm nodules, for example, the superior right   lower lobe (6-49) and the apical right upper lobe (6-25). New small   pleural effusions.    LYMPH NODES/MEDIASTINUM: No enlarged lymph nodes.    HEART/VASCULATURE: Normal heart size. Mild pericardial fluid which may be   physiologic. Coronary artery calcifications. Normal caliber aorta. Right   chest port catheter tip at the superior cavoatrial junction.    UPPER ABDOMEN: Unremarkable.    BONES/SOFT TISSUES: New mild loss of height of the L1 vertebral body.      IMPRESSION:    No pneumonia.    New mild interstitial edema and small pleural effusions.    Stable few bilateral sub-5 mm nodules, but new from March 2020.  Advise   follow-up as previously recommended.    --- End of Report ---

## 2022-05-03 NOTE — PROGRESS NOTE ADULT - ASSESSMENT
Mass of pancreas.   --Recommendation: 1. Pancreas mass,  mass in the ucincate proccess with compression of duodenum resulting in outlet obstruction.   the mass encases the smv.  patient has decreased oral intake that has worsening over the past month.  -- [facility] consulted  --Plan to do EGD and duodenal stent placement  --obtain further imaging if necessary (ie - mrcp)  --Ca 19-9 42; CEA 5.4  --monitor bilirubin  --Surgery has been consulted for consideration of resection  --Awaiting Cardiology clearance    Hx of anal cancer, s/p treatment  finding in pancreas is most likely to be a new primary.    Anemia  --Most likely from chronic disease  --Also was Guaic positive  --Will check iron, B12, folate levels  --Please transfuse PRBC's for Hgb <7.0 grams    Fever  --Unclear etiology  --Blood cultures pending  --UA negative  --CT Chest negative for consolidation  --Recommend ID input, possible empiric antibiotics if they recommend    After discharge the patient may resume care with Dr. Shantanu Hahn of [facility].    Thank you for the opportunity to participate in Mr. Pascal's care.    Jah Gallegos PA-C  Hematology/Oncology  [facility]   [phone] (office)  [phone] (alt office)  Evenings and weekends please call MD on call or office

## 2022-05-03 NOTE — CONSULT NOTE ADULT - SUBJECTIVE AND OBJECTIVE BOX
Chief Complaint:    HPI:                                                                                                Consulting surgical team: [facility]  Consulting attending: Dr. Avilez    HPI:  76M anal cancer s/p chemo/rad , denies psh here with 2 weeks of epigastric pain and few days of n/v, melena, 3-4 lb weight loss  in ED, new ECG changes with trop x 2 normal, cardiology consulted low suspicion for ACS. Over the course of the past few days patient was found to have melena and was scheduled for EGD with EUS but found to be febrile at 101.4F. Internal Medicine was consulted for fever. The patient is complaining of chills. He says he was not febrile at home but he also denies fever here despite obvious chills, multiple blankets on the bed and recorded fever daily. He is receiving IV Tylenol. He has a dry cough but otherwise feels well. He also has a chemoport since  with no plans to remove it.    nonsmoker  last cscope and egd 2 years ago reportedly normal  denies family hx of cancer  wbc 6.6, cr 1.66, tbili 0.5, ast/alt 10/8, lipase 153  CT shows annular mass of pancreas with duodenal obstruction    of note, patient's chart reveals anal cancer s/p chemo/radiation in , followed by Dr. Adrian as outpatient last seen in  for radiation ulcer bleeding that resolved with silvadene  patient denied personal cancer history, said he had bleeding per rectum that resolved after using a cream    PAST MEDICAL HISTORY:  Anal cancer        PAST SURGICAL HISTORY:  No significant past surgical history        MEDICATIONS:      ALLERGIES:  No Known Allergies      VITALS & I/Os:  Vital Signs Last 24 Hrs  T(C): 36.8 (2022 16:10), Max: 36.8 (2022 16:10)  T(F): 98.3 (2022 16:10), Max: 98.3 (2022 16:10)  HR: 65 (2022 16:10) (65 - 83)  BP: 198/70 (2022 16:10) (190/89 - 198/70)  BP(mean): --  RR: 18 (2022 16:10) (18 - 18)  SpO2: 99% (2022 16:10) (99% - 99%)    I&O's Summary        LABS:                        10.9   6.65  )-----------( 262      ( 2022 11:21 )             33.5         138  |  94<L>  |  24<H>  ----------------------------<  121<H>  3.9   |  31  |  1.66<H>    Ca    10.4      2022 11:21  Mg     2.6         TPro  6.3  /  Alb  4.5  /  TBili  0.5  /  DBili  x   /  AST  10  /  ALT  8<L>  /  AlkPhos  71      Lactate:   @ 11:21  0.8    PT/INR - ( 2022 11:21 )   PT: 13.0 sec;   INR: 1.12 ratio         PTT - ( 2022 11:21 )  PTT:28.5 sec          Urinalysis Basic - ( 2022 14:03 )    Color: Light Yellow / Appearance: Clear / S.025 / pH: x  Gluc: x / Ketone: Negative  / Bili: Negative / Urobili: Negative   Blood: x / Protein: 30 mg/dL / Nitrite: Negative   Leuk Esterase: Negative / RBC: 1 /hpf / WBC 0 /HPF   Sq Epi: x / Non Sq Epi: 0 /hpf / Bacteria: Negative        IMAGING:    ACC: [id number] EXAM:  CT ABDOMEN AND PELVIS IC                          PROCEDURE DATE:  2022          INTERPRETATION:  CLINICAL INFORMATION: Periumbilical abdominal pain.   Hematemesis. Black stool.    COMPARISON: March 3, 2020.    CONTRAST/COMPLICATIONS:  IV Contrast: Omnipaque 300  90 cc administered   10 cc discarded  Oral Contrast: NONE  Complications: None reported at time of study completion    PROCEDURE:  CT of the Abdomen and Pelvis was performed.  Sagittal and coronal reformats were performed.    FINDINGS:  LOWER CHEST: Calcified granuloma in the right lower lobe.    LIVER: Within normal limits.  BILE DUCTS: Mild intrahepatic and extrahepatic biliary ductal dilatation   to the level of an obstructing mass in the region of the pancreatic   uncinate process.  GALLBLADDER: Within normal limits.  SPLEEN: Within normal limits.  PANCREAS: Ill-defined mass in the region of the uncinate process of the   pancreas, which appears to extend from the third portion of the duodenum.  ADRENALS: Within normal limits.  KIDNEYS/URETERS: No hydronephrosis. Nonobstructing bilateral renal   calculi, measuring up to 0.3 cm in the right upper pole. Subcentimeter   hypodense bilateral renal lesions, too small to characterize.    BLADDER: Within normal limits.  REPRODUCTIVE ORGANS: Prostate is enlarged.    BOWEL: Duodenal obstruction at the level of the third portion, where   there is an annular mass which extends into the uncinate process of the   pancreas, measuring approximately 3.8 x 2.2 cm (series 2, image 44). Mass   appears to abut this superior mesenteric vein.  PERITONEUM: No ascites.  VESSELS: Atherosclerotic changes. Moderate stenosis proximal superior   mesenteric artery.  RETROPERITONEUM/LYMPH NODES: No lymphadenopathy.  ABDOMINAL WALL: Small fat-containing umbilical hernia.  BONES: Age-indeterminate mild compression of the L1 superior endplate.   Degenerative changes. Left hip replacement.    IMPRESSION:  Duodenal obstruction at the level of the third portion, where there is an   annular mass which extends into the uncinate process of the pancreas and   obstructed distal common bile duct.    Age indeterminate mild compression of the L1 superior endplate    --- End of Report ---            ANDRIA GORDILLO MD; Attending Radiologist  This document has been electronically signed. 2022  1:05PM   (2022 17:29)      PAST MEDICAL & SURGICAL HISTORY:  Anal cancer  Had Chemo and Radiation 4 years ago    No significant past surgical history        Review of Systems:   CONSTITUTIONAL: fever.  EYES: No eye pain or discharge.  ENMT:  No sinus or throat pain  NECK: No pain or stiffness  RESPIRATORY: No cough, wheezing, chills or hemoptysis; No shortness of breath  CARDIOVASCULAR: No chest pain, palpitations, dizziness, or leg swelling  GASTROINTESTINAL: No abdominal or epigastric pain. No nausea, vomiting, or hematemesis; No diarrhea or constipation. No melena or hematochezia.  GENITOURINARY: No dysuria or incontinence  NEUROLOGICAL: No headaches, memory loss, loss of strength, numbness, or tremors  SKIN: No rashes.  LYMPH NODES: No enlarged glands  ENDOCRINE: No heat or cold intolerance; No hair loss  MUSCULOSKELETAL: No joint pain or swelling; No muscle, back, or extremity pain  PSYCHIATRIC: No depression, anxiety, mood swings, or difficulty sleeping  HEME/LYMPH: No easy bruising, or bleeding gums  ALLERY AND IMMUNOLOGIC: No hives or eczema    Allergies    No Known Allergies    Intolerances        Social History:     FAMILY HISTORY:      Home Medications:  acetaminophen 500 mg oral tablet: 2 tab(s) orally every 8 hours x 14 days (30 Oct 2017 08:46)  docusate sodium 100 mg oral capsule: 1 cap(s) orally 3 times a day as needed (30 Oct 2017 08:46)  polyethylene glycol 3350 oral powder for reconstitution: 17 gram(s) orally once a day, As needed, Constipation (30 Oct 2017 08:46)  senna oral tablet: 2 tab(s) orally once a day (at bedtime) as needed (30 Oct 2017 08:46)      MEDICATIONS  (STANDING):  acetaminophen   IVPB .. 1000 milliGRAM(s) IV Intermittent every 6 hours  amLODIPine   Tablet 5 milliGRAM(s) Oral daily  benzocaine 15 mG/menthol 3.6 mG Lozenge 1 Lozenge Oral two times a day  dextrose 5% + sodium chloride 0.9% with potassium chloride 20 mEq/L 1000 milliLiter(s) (100 mL/Hr) IV Continuous <Continuous>  fluticasone propionate 50 MICROgram(s)/spray Nasal Spray 1 Spray(s) Both Nostrils every 12 hours  heparin   Injectable 5000 Unit(s) SubCutaneous every 8 hours  metoprolol tartrate Injectable 5 milliGRAM(s) IV Push every 6 hours  pantoprazole  Injectable 40 milliGRAM(s) IV Push two times a day  sodium phosphate IVPB 30 milliMole(s) IV Intermittent every 6 hours    MEDICATIONS  (PRN):  benzocaine 15 mG/menthol 3.6 mG Lozenge 1 Lozenge Oral every 6 hours PRN Sore Throat  ondansetron Injectable 4 milliGRAM(s) IV Push every 6 hours PRN Nausea and/or Vomiting  tetracaine/benzocaine/butamben Spray 1 Spray(s) Topical daily PRN sore throat      CAPILLARY BLOOD GLUCOSE        I&O's Summary    02 May 2022 07:01  -  03 May 2022 07:00  --------------------------------------------------------  IN: 1750 mL / OUT: 1200 mL / NET: 550 mL    03 May 2022 07:01  -  03 May 2022 14:35  --------------------------------------------------------  IN: 50 mL / OUT: 0 mL / NET: 50 mL        PHYSICAL EXAM:  Vital Signs Last 24 Hrs  T(C): 37.9 (03 May 2022 13:47), Max: 38.6 (02 May 2022 14:50)  T(F): 100.2 (03 May 2022 13:47), Max: 101.4 (02 May 2022 14:50)  HR: 79 (03 May 2022 13:47) (72 - 89)  BP: 154/53 (03 May 2022 13:47) (129/61 - 202/72)  BP(mean): --  RR: 18 (03 May 2022 13:47) (13 - 28)  SpO2: 96% (03 May 2022 13:47) (96% - 98%)  GENERAL: NAD, fatigued elderly male in bed  HEAD:  Atraumatic, Normocephalic  EYES: EOMI, PERRLA, conjunctiva and sclera clear  NECK: Supple, No JVD  CHEST/LUNG: Clear to auscultation bilaterally; No wheeze, tachypneic  HEART: Regular rate and rhythm; No murmurs, rubs, or gallops  ABDOMEN: Soft, Nontender, Nondistended; Bowel sounds normoactive  EXTREMITIES:  2+ Peripheral Pulses, No clubbing, cyanosis, or edema  PSYCH: AAOx3  NEUROLOGY: non-focal  SKIN: No rashes or lesions, no swelling or edema, skin is warm    LABS:                        9.3    6.42  )-----------( 198      ( 03 May 2022 07:24 )             29.2     05-03    138  |  108  |  17  ----------------------------<  120<H>  3.9   |  19<L>  |  1.21    Ca    8.6      03 May 2022 07:24  Phos  1.8     05-03  Mg     1.7     05-    TPro  5.4<L>  /  Alb  3.3  /  TBili  0.4  /  DBili  x   /  AST  15  /  ALT  6<L>  /  AlkPhos  55  05-03    PT/INR - ( 02 May 2022 07:38 )   PT: 16.2 sec;   INR: 1.40 ratio         PTT - ( 02 May 2022 07:38 )  PTT:35.3 sec      Urinalysis Basic - ( 03 May 2022 00:15 )    Color: Light Yellow / Appearance: Clear / S.014 / pH: x  Gluc: x / Ketone: Negative  / Bili: Negative / Urobili: Negative   Blood: x / Protein: 30 mg/dL / Nitrite: Negative   Leuk Esterase: Negative / RBC: 1 /hpf / WBC 0 /HPF   Sq Epi: x / Non Sq Epi: 1 /hpf / Bacteria: Negative        RADIOLOGY & ADDITIONAL TESTS:    Imaging Personally Reviewed: CT Chest - did not show any infiltrate.    EKG Personally Reviewed:    Consultant(s) Notes Reviewed:  Surgery, GI, Cardiology.    Care Discussed with Consultants/Other Providers: Will give report to Surgery.

## 2022-05-03 NOTE — CONSULT NOTE ADULT - PROBLEM SELECTOR RECOMMENDATION 9
Febrile since May 2 1AM  Has cough and tachypnea, may have a viral syndrome, not hypoxic  F/u RVP, F/u Blood cultures  UA negative, CT chest negative for pneumonia  If blood cultures positive would suggest removal of chemoport and ID consult.  If blood cultures negative and RVP also negative would r/o DVT/PE. Tachycardia may be masked by Metoprolol.  Change HSQ to Lovenox as pt may have an active malignancy and is high risk for thrombosis.  Would rule out all possibility of infection before considering fever of malignancy.
1. Pancreas mass,  mass in the ucincate proccess with compression of duodenum resulting in outlet obstruction.   the mass encases the smv.  patient has decreased oral intake that has worsening over the past month.   obtain advanced gi consultation.  obtain further imaging if neccesary (mrcp if neccesary before eus).  obtain ca 19-9   monitor bilirubin  supportive care.    2. hx of anal cancer, s/p treatment  finding in pancreas is most likely to be a new primary

## 2022-05-03 NOTE — PROGRESS NOTE ADULT - ASSESSMENT
76M history of anal/rectal Ca (stable) who presents with 2 says of intermittent mid-sternal CP with abnormal ECG, not concerning for ACS. Hospital course complicated by SBO.     #Chest pain with #Abnormal ECG  -Early repol V2, ST abnormality in V3, TWI in I, avL. Serial ECGs unchanged.   -Initial troponin negative and symptoms can be explained by ?pancreatitis or SBO  -TTE unremarkable    -Not concerning for ACS    #HTN  --180s, patient denies symptoms.   -Currently NPO. Can give Labetalol 10mg IVP PRN for SBP>185

## 2022-05-03 NOTE — CONSULT NOTE ADULT - ASSESSMENT
77 y/o M with a PMH of anal cancer s/p ChemoRT 2012 with mediport still in place presented with epigastric pain, found to have melena and new pancreatic annular mass who developed Fever.

## 2022-05-04 NOTE — PRE-ANESTHESIA EVALUATION ADULT - NSANTHOSAYNRD_GEN_A_CORE
No. DIXIE screening performed.  STOP BANG Legend: 0-2 = LOW Risk; 3-4 = INTERMEDIATE Risk; 5-8 = HIGH Risk
No. DIXIE screening performed.  STOP BANG Legend: 0-2 = LOW Risk; 3-4 = INTERMEDIATE Risk; 5-8 = HIGH Risk

## 2022-05-04 NOTE — PROGRESS NOTE ADULT - SUBJECTIVE AND OBJECTIVE BOX
Surgery Progress Note     Subjective/24hour Events:   Patient seen and examined.       Vital Signs:  Vital Signs Last 24 Hrs  T(C): 37.8 (04 May 2022 01:19), Max: 38.4 (03 May 2022 05:33)  T(F): 100 (04 May 2022 01:19), Max: 101.1 (03 May 2022 05:33)  HR: 72 (04 May 2022 01:19) (60 - 88)  BP: 172/75 (04 May 2022 01:19) (144/66 - 202/72)  BP(mean): --  RR: 18 (04 May 2022 01:19) (18 - 20)  SpO2: 96% (04 May 2022 01:19) (95% - 97%)    CAPILLARY BLOOD GLUCOSE          I&O's Detail    02 May 2022 07:01  -  03 May 2022 07:00  --------------------------------------------------------  IN:    dextrose 5% + sodium chloride 0.9% w/ Additives: 900 mL    IV PiggyBack: 600 mL    Sodium Chloride 0.9% Bolus: 250 mL  Total IN: 1750 mL    OUT:    Oral Fluid: 0 mL    Voided (mL): 1200 mL  Total OUT: 1200 mL    Total NET: 550 mL      03 May 2022 07:01  -  04 May 2022 02:13  --------------------------------------------------------  IN:    dextrose 5% + sodium chloride 0.9% w/ Additives: 1200 mL    IV PiggyBack: 750 mL  Total IN: 1950 mL    OUT:    Oral Fluid: 0 mL    Voided (mL): 300 mL  Total OUT: 300 mL    Total NET: 1650 mL          MEDICATIONS  (STANDING):  amLODIPine   Tablet 5 milliGRAM(s) Oral daily  benzocaine 15 mG/menthol 3.6 mG Lozenge 1 Lozenge Oral two times a day  dextrose 5% + sodium chloride 0.9% with potassium chloride 20 mEq/L 1000 milliLiter(s) (100 mL/Hr) IV Continuous <Continuous>  fluticasone propionate 50 MICROgram(s)/spray Nasal Spray 1 Spray(s) Both Nostrils every 12 hours  heparin   Injectable 5000 Unit(s) SubCutaneous every 8 hours  metoprolol tartrate Injectable 5 milliGRAM(s) IV Push every 6 hours  pantoprazole  Injectable 40 milliGRAM(s) IV Push two times a day    MEDICATIONS  (PRN):  benzocaine 15 mG/menthol 3.6 mG Lozenge 1 Lozenge Oral every 6 hours PRN Sore Throat  ondansetron Injectable 4 milliGRAM(s) IV Push every 6 hours PRN Nausea and/or Vomiting  tetracaine/benzocaine/butamben Spray 1 Spray(s) Topical daily PRN sore throat      Physical Exam:  General Appearance: Appears well, NAD  Resp: Patent airway, non-labored breathing  Abdomen: Soft, Nontender, Nondistended, no guarding or rebound tenderness   Extremities: warm, well perfused, movement x4 limbs b/l         Labs:    05-03    138  |  108  |  17  ----------------------------<  120<H>  3.9   |  19<L>  |  1.21    Ca    8.6      03 May 2022 07:24  Phos  1.8     05-03  Mg     1.7     05-03    TPro  5.4<L>  /  Alb  3.3  /  TBili  0.4  /  DBili  x   /  AST  15  /  ALT  6<L>  /  AlkPhos  55  05-03    LIVER FUNCTIONS - ( 03 May 2022 07:24 )  Alb: 3.3 g/dL / Pro: 5.4 g/dL / ALK PHOS: 55 U/L / ALT: 6 U/L / AST: 15 U/L / GGT: x                                 9.3    6.42  )-----------( 198      ( 03 May 2022 07:24 )             29.2     PT/INR - ( 02 May 2022 07:38 )   PT: 16.2 sec;   INR: 1.40 ratio         PTT - ( 02 May 2022 07:38 )  PTT:35.3 sec     Surgery Progress Note     Patient seen and examined at bedside. He was sitting up in his chair comfortably. He states he is very weak because he has not been able to eat or drink anything. He has positive bowel function. His pain is controlled. He denies any CP, SOB, n/v/d.      Vital Signs:  Vital Signs Last 24 Hrs  T(C): 37.8 (04 May 2022 01:19), Max: 38.4 (03 May 2022 05:33)  T(F): 100 (04 May 2022 01:19), Max: 101.1 (03 May 2022 05:33)  HR: 72 (04 May 2022 01:19) (60 - 88)  BP: 172/75 (04 May 2022 01:19) (144/66 - 202/72)  BP(mean): --  RR: 18 (04 May 2022 01:19) (18 - 20)  SpO2: 96% (04 May 2022 01:19) (95% - 97%)    CAPILLARY BLOOD GLUCOSE          I&O's Detail    02 May 2022 07:01  -  03 May 2022 07:00  --------------------------------------------------------  IN:    dextrose 5% + sodium chloride 0.9% w/ Additives: 900 mL    IV PiggyBack: 600 mL    Sodium Chloride 0.9% Bolus: 250 mL  Total IN: 1750 mL    OUT:    Oral Fluid: 0 mL    Voided (mL): 1200 mL  Total OUT: 1200 mL    Total NET: 550 mL      03 May 2022 07:01  -  04 May 2022 02:13  --------------------------------------------------------  IN:    dextrose 5% + sodium chloride 0.9% w/ Additives: 1200 mL    IV PiggyBack: 750 mL  Total IN: 1950 mL    OUT:    Oral Fluid: 0 mL    Voided (mL): 300 mL  Total OUT: 300 mL    Total NET: 1650 mL          MEDICATIONS  (STANDING):  amLODIPine   Tablet 5 milliGRAM(s) Oral daily  benzocaine 15 mG/menthol 3.6 mG Lozenge 1 Lozenge Oral two times a day  dextrose 5% + sodium chloride 0.9% with potassium chloride 20 mEq/L 1000 milliLiter(s) (100 mL/Hr) IV Continuous <Continuous>  fluticasone propionate 50 MICROgram(s)/spray Nasal Spray 1 Spray(s) Both Nostrils every 12 hours  heparin   Injectable 5000 Unit(s) SubCutaneous every 8 hours  metoprolol tartrate Injectable 5 milliGRAM(s) IV Push every 6 hours  pantoprazole  Injectable 40 milliGRAM(s) IV Push two times a day    MEDICATIONS  (PRN):  benzocaine 15 mG/menthol 3.6 mG Lozenge 1 Lozenge Oral every 6 hours PRN Sore Throat  ondansetron Injectable 4 milliGRAM(s) IV Push every 6 hours PRN Nausea and/or Vomiting  tetracaine/benzocaine/butamben Spray 1 Spray(s) Topical daily PRN sore throat      Physical Exam:  General Appearance: Appears well, NAD  Resp: Patent airway, non-labored breathing, +wheezing  Abdomen: Soft, Nontender, Nondistended, no guarding or rebound tenderness   Extremities: warm, well perfused, movement x4 limbs b/l         Labs:    05-03    138  |  108  |  17  ----------------------------<  120<H>  3.9   |  19<L>  |  1.21    Ca    8.6      03 May 2022 07:24  Phos  1.8     05-03  Mg     1.7     05-03    TPro  5.4<L>  /  Alb  3.3  /  TBili  0.4  /  DBili  x   /  AST  15  /  ALT  6<L>  /  AlkPhos  55  05-03    LIVER FUNCTIONS - ( 03 May 2022 07:24 )  Alb: 3.3 g/dL / Pro: 5.4 g/dL / ALK PHOS: 55 U/L / ALT: 6 U/L / AST: 15 U/L / GGT: x                                 9.3    6.42  )-----------( 198      ( 03 May 2022 07:24 )             29.2     PT/INR - ( 02 May 2022 07:38 )   PT: 16.2 sec;   INR: 1.40 ratio         PTT - ( 02 May 2022 07:38 )  PTT:35.3 sec

## 2022-05-04 NOTE — PROGRESS NOTE ADULT - ASSESSMENT
75 y/o M with a PMH of anal cancer s/p ChemoRT 2012 with mediport still in place presented with epigastric pain, found to have melena and new pancreatic annular mass who developed Fever.

## 2022-05-04 NOTE — PROGRESS NOTE ADULT - ASSESSMENT
76M hx of anal cancer 2012 s/p chemo radiation here with 2 weeks of epigastric pain found to have new annular mass involving pancreas +duodenal obstruction third portion. +FOBT in ED. He is doing better this am     Plan:  - DVT ppx  - NPO with ice chips/IVF  - CT chest staging w/ few indeterminate pulmonary nodules   - appreciate GI and onc recommendations  - GI re-attempt EUS/FNA once optimized from infectious standpoint  - Appreciate cardiology recs, clearance for surgery  -F/u blood cultures  -CT chest non con f/u   -F/U Viral Panel   -Medicine consult       [facility]  [id number] 76M hx of anal cancer 2012 s/p chemo radiation here with 2 weeks of epigastric pain found to have new annular mass involving pancreas +duodenal obstruction third portion. +FOBT in ED. His viral panel is positive for RVP and coronavirus. His prelim blood cultures are negative. He feels weak this am and is wheezing.     Plan:  - DVT ppx  - NPO with ice chips/IVF  - CT chest staging w/ few indeterminate pulmonary nodules   - appreciate GI and onc recommendations  - GI re-attempt EUS/FNA today  - Appreciate cardiology recs, clearance for surgery  - F/u final blood cultures, Prelim are NGTD  -Appreciate medicine recommendations     [facility]  [id number]

## 2022-05-04 NOTE — PROVIDER CONTACT NOTE (OTHER) - ACTION/TREATMENT ORDERED:
MD David Sanchez made aware. Pt received 5mg Lopressor IVP at 11pm and is due again at 5am. No other interventions were ordered at this time.

## 2022-05-04 NOTE — PROGRESS NOTE ADULT - PROBLEM SELECTOR PLAN 4
interstitial edema and small pleural effusion. given fluid given, my suspicion is in setting of volume.   -recommend decreasing to 50cc/hr vs. stopping all together.

## 2022-05-04 NOTE — PROGRESS NOTE ADULT - SUBJECTIVE AND OBJECTIVE BOX
PROGRESS NOTE:   Authoted by Dr. Praveen Noriega DO  Pager [phone]     Patient is a 76y old  Male who presents with a chief complaint of duodenal obstruction 2/2 mass (04 May 2022 02:13)      SUBJECTIVE / OVERNIGHT EVENTS:  No events overnight. FEver curve downtrending. Paitent without fever, chills, chest pain, SOB, abdominal pain, n/v/d/c. No further hematemesis/hematochezia/melena.   ADDITIONAL REVIEW OF SYSTEMS:    MEDICATIONS  (STANDING):  acetaminophen   IVPB .. 1000 milliGRAM(s) IV Intermittent every 6 hours  amLODIPine   Tablet 5 milliGRAM(s) Oral daily  benzocaine 15 mG/menthol 3.6 mG Lozenge 1 Lozenge Oral two times a day  dextrose 5% + sodium chloride 0.9% with potassium chloride 20 mEq/L 1000 milliLiter(s) (100 mL/Hr) IV Continuous <Continuous>  fluticasone propionate 50 MICROgram(s)/spray Nasal Spray 1 Spray(s) Both Nostrils every 12 hours  heparin   Injectable 5000 Unit(s) SubCutaneous every 8 hours  metoprolol tartrate Injectable 5 milliGRAM(s) IV Push every 6 hours  pantoprazole  Injectable 40 milliGRAM(s) IV Push two times a day    MEDICATIONS  (PRN):  benzocaine 15 mG/menthol 3.6 mG Lozenge 1 Lozenge Oral every 6 hours PRN Sore Throat  ondansetron Injectable 4 milliGRAM(s) IV Push every 6 hours PRN Nausea and/or Vomiting  tetracaine/benzocaine/butamben Spray 1 Spray(s) Topical daily PRN sore throat      CAPILLARY BLOOD GLUCOSE        I&O's Summary    03 May 2022 07:01  -  04 May 2022 07:00  --------------------------------------------------------  IN: 3050 mL / OUT: 300 mL / NET: 2750 mL        PHYSICAL EXAM:  Vital Signs Last 24 Hrs  T(C): 37.4 (04 May 2022 10:39), Max: 37.9 (03 May 2022 13:47)  T(F): 99.3 (04 May 2022 10:39), Max: 100.2 (03 May 2022 13:47)  HR: 80 (04 May 2022 10:39) (60 - 82)  BP: 180/78 (04 May 2022 10:39) (144/66 - 188/73)  BP(mean): --  RR: 18 (04 May 2022 10:39) (18 - 18)  SpO2: 96% (04 May 2022 10:39) (95% - 97%)    CONSTITUTIONAL: NAD, well-developed  RESPIRATORY: Normal respiratory effort; lungs are clear to auscultation bilaterally. No wheeze on my exam.   CARDIOVASCULAR: Regular rate and rhythm, normal S1 and S2, no murmur/rub/gallop; No lower extremity edema; Peripheral pulses are 2+ bilaterally  ABDOMEN: Nontender to palpation, normoactive bowel sounds, no rebound/guarding; No hepatosplenomegaly  MUSCLOSKELETAL: no clubbing or cyanosis of digits; no joint swelling or tenderness to palpation  PSYCH: A+O to person, place, and time; affect appropriate    LABS:                        9.9    7.17  )-----------( 206      ( 04 May 2022 07:26 )             32.0     05-04    137  |  106  |  15  ----------------------------<  120<H>  4.3   |  19<L>  |  1.21    Ca    8.8      04 May 2022 07:25  Phos  3.3     05-04  Mg     1.9     05-04    TPro  5.7<L>  /  Alb  3.7  /  TBili  0.4  /  DBili  x   /  AST  22  /  ALT  11  /  AlkPhos  63  05-04          Urinalysis Basic - ( 03 May 2022 00:15 )    Color: Light Yellow / Appearance: Clear / S.014 / pH: x  Gluc: x / Ketone: Negative  / Bili: Negative / Urobili: Negative   Blood: x / Protein: 30 mg/dL / Nitrite: Negative   Leuk Esterase: Negative / RBC: 1 /hpf / WBC 0 /HPF   Sq Epi: x / Non Sq Epi: 1 /hpf / Bacteria: Negative      Culture Results:   No growth to date. (22 @ 22:18)      Culture - Blood (collected 02 May 2022 22:18)  Source: .Blood Blood  Preliminary Report (03 May 2022 23:02):    No growth to date.    Culture - Blood (collected 02 May 2022 22:18)  Source: .Blood Blood  Preliminary Report (03 May 2022 23:02):    No growth to date.        RADIOLOGY & ADDITIONAL TESTS:  Results Reviewed:   < from: CT Chest No Cont (22 @ 11:32) >    IMPRESSION:    No pneumonia.    New mild interstitial edema and small pleural effusions.    Stable few bilateral sub-5 mm nodules, but new from 2020.  Advise   follow-up as previously recommended.    < end of copied text >    Imaging Personally Reviewed:  Electrocardiogram Personally Reviewed:    COORDINATION OF CARE:  Care Discussed with Consultants/Other Providers [Y/N]: Surgery team #[id number]  Prior or Outpatient Records Reviewed [Y/N]:

## 2022-05-04 NOTE — PROGRESS NOTE ADULT - PROBLEM SELECTOR PLAN 2
stable. Likely stable. Likely multifactorial in settign of fluids, posisble UGIB.   -for EGD today.  -monitor.

## 2022-05-05 NOTE — PROGRESS NOTE ADULT - ASSESSMENT
Mass of pancreas.   --Recommendation: 1. Pancreas mass,  mass in the ucincate proccess with compression of duodenum resulting in outlet obstruction.   the mass encases the smv.  patient has decreased oral intake that has worsening over the past month.  -- EGD  done 5/4/2022 - FNA taken  --duodenal stent was unable to be done secondary to location of obstruction  --obtain further imaging if necessary (ie - MRCP)  --Ca 19-9 42; CEA 5.4  --monitor bilirubin  --Surgery has been consulted for consideration of resection  --Surgery planning to place jejunostomy tube    Lung nodules  --CT chest shows <5mm nodules - new since 2020 but indeterminate significance  --Pulmonary input may be of benefit to determine whether these seem to be metastatic lesions    Hx of anal cancer, s/p treatment  finding in pancreas is most likely to be a new primary.    Anemia  --Most likely from chronic disease  --Also was Guaic positive  --Patient is iron deficient  --Will order Venofer 200 mg IV daily X 3 days  --Please transfuse PRBC's for Hgb <7.0 grams    Fever  --Unclear etiology  --Blood cultures pending  --UA negative  --CT Chest negative for consolidation  --Recommend ID input, possible empiric antibiotics if they recommend    After discharge the patient may resume care with Dr. Shantanu Hahn of [facility].    Thank you for the opportunity to participate in Mr. Pascal's care.    Jah Gallegos PA-C  Hematology/Oncology  [facility]   [phone] (office)  [phone] (alt office)  Evenings and weekends please call MD on call or office

## 2022-05-05 NOTE — PROGRESS NOTE ADULT - ATTENDING COMMENTS
76 year old man with history of anal/rectal carcinoma, presents with intermittent epigastric and mid-sternal CP with abnormal ECG. Troponin negative. Lipase elevated and plan for EGD. There is no cardiovascular contraindication to procedure with sedation/analgesia which has been delayed due to fever.    To contact call Cardiology Fellow or Attending as listed on [email or web address] password: [email or web address].
76 year old man with history of anal/rectal carcinoma, presents with intermittent epigastric and mid-sternal CP with abnormal ECG. Troponin negative. Lipase elevated and plan for EGD. There is no cardiovascular contraindication to procedure with sedation/analgesia.    To contact call Cardiology Fellow or Attending as listed on [email or web address] password: [email or web address].
As above  Panc mass with duodenal obstruction  S/p EUS/FNB of mass and EGD with inability to place PEG-J due to stenosis  Follow up path  Follow up with surg vs IR for J-tube placement  Follow up med/surg onc recs    Thank you for this interesting consult.  Please call the advanced GI service with any questions or concerns.
As above  Fevers pending completion of infectious workup/abx  duodenal obstruction from panc mass - needs EUS/FNB and per niece request attempt at endoscopically placed GJ as opposed to stent  Will plan for this once more stable from ID standpoint    Thank you for this interesting consult.  Please call the advanced GI service with any questions or concerns.
Patient seen and examined. AGree with above. Reports feeling better w less abdominal pain, no NV with ngt in place. For possible EGD/stenting early next week if cleared by Cardiology for GOO from annular pancreatic mass.
Pt seen and examined with team  Doing well, but has obstructing pancreatic mass causing duodenal obstruction.  Will get GI for EUS/FNA on Monday  NGT/NPO/IVF for now  Full EOD workup  Tumor markers
PT seen and examined, discussed with team.  Also, spoke with Yasmin his niece about the plan.  GI for EUS tomorrow.

## 2022-05-05 NOTE — PROGRESS NOTE ADULT - ASSESSMENT
Impression:  # Duodenal obstruction secondary to pancreatic uncinate mass: s/p EUS and FNB. Unable to bypass or place GJ.   # Abnormal EKG - now improved, cleared from cardiology  # Anal CA s/p chemo and radiation (in remission)     Recommendations:  - sips of water only given obstruction  - surgery f/u for possible J tube placement  - plavix to be held pending possible surgery  - trend CBC and CMP  - antibiotics per primary team  - no further inpatient GI plans  - f/u path  - supportive care

## 2022-05-05 NOTE — PROGRESS NOTE ADULT - SUBJECTIVE AND OBJECTIVE BOX
Chief Complaint:  Patient is a 76y old  Male who presents with a chief complaint of duodenal obstruction 2/2 mass (05 May 2022 01:30)      Interval Events: S/p endoscopy. Denies n/v or pain.    Allergies:  No Known Allergies      Hospital Medications:  amLODIPine   Tablet 5 milliGRAM(s) Oral daily  benzocaine 15 mG/menthol 3.6 mG Lozenge 1 Lozenge Oral two times a day  benzocaine 15 mG/menthol 3.6 mG Lozenge 1 Lozenge Oral every 6 hours PRN  dextrose 5% + sodium chloride 0.9% with potassium chloride 20 mEq/L 1000 milliLiter(s) IV Continuous <Continuous>  fluticasone propionate 50 MICROgram(s)/spray Nasal Spray 1 Spray(s) Both Nostrils every 12 hours  heparin   Injectable 5000 Unit(s) SubCutaneous every 8 hours  magnesium sulfate  IVPB 2 Gram(s) IV Intermittent once  metoprolol tartrate Injectable 5 milliGRAM(s) IV Push every 6 hours  ondansetron Injectable 4 milliGRAM(s) IV Push every 6 hours PRN  pantoprazole  Injectable 40 milliGRAM(s) IV Push two times a day  potassium chloride    Tablet ER 20 milliEquivalent(s) Oral once  potassium phosphate / sodium phosphate Powder (PHOS-NaK) 2 Packet(s) Oral once  tetracaine/benzocaine/butamben Spray 1 Spray(s) Topical daily PRN      PMHX/PSHX:  Anal cancer    No significant past surgical history        Family history:      ROS: As per HPI, 14-point ROS negative otherwise.    General:  No wt loss, fevers, chills, night sweats, fatigue,   Eyes:  Good vision, no reported pain  ENT:  No sore throat, pain, runny nose, dysphagia  CV:  No pain, palpitations, hypo/hypertension  Resp:  No dyspnea, cough, tachypnea, wheezing  GI:  See HPI  :  No pain, bleeding, incontinence, nocturia  Muscle:  No pain, weakness  Neuro:  No weakness, tingling, memory problems  Psych:  No fatigue, insomnia, mood problems, depression  Endocrine:  No polyuria, polydipsia, cold/heat intolerance  Heme:  No petechiae, ecchymosis, easy bruisability  Skin:  No rash, edema      PHYSICAL EXAM:     Vital Signs:  Vital Signs Last 24 Hrs  T(C): 36.8 (05 May 2022 01:19), Max: 37.4 (04 May 2022 10:39)  T(F): 98.3 (05 May 2022 01:19), Max: 99.3 (04 May 2022 10:39)  HR: 74 (05 May 2022 01:19) (70 - 89)  BP: 146/75 (05 May 2022 01:19) (110/52 - 185/78)  BP(mean): --  RR: 18 (05 May 2022 01:19) (18 - 25)  SpO2: 96% (05 May 2022 01:) (95% - 98%)  Daily Height in cm: 157.48 (04 May 2022 17:07)    Daily Weight in k (04 May 2022 10:39)    GENERAL:  appears comfortable, no acute distress  HEENT:  NC/AT,  conjunctivae clear, sclera -anicteric  CHEST:  no increased effort  HEART:  Regular rate and rhythm  ABDOMEN:  Soft, non-tender, non-distended,  no masses ,no hepato-splenomegaly,   EXTREMITIES:  no cyanosis, clubbing or edema  SKIN:  No rash/erythema/ecchymoses/petechiae/wounds  NEURO:  Alert, oriented    LABS:                        8.5    4.25  )-----------( 184      ( 05 May 2022 07:13 )             27.5     05-05    139  |  109<H>  |  16  ----------------------------<  101<H>  3.5   |  20<L>  |  1.19    Ca    8.5      05 May 2022 07:13  Phos  2.8     05-05  Mg     1.8     05-05    TPro  5.7<L>  /  Alb  3.7  /  TBili  0.4  /  DBili  x   /  AST  22  /  ALT  11  /  AlkPhos  63  05-04    LIVER FUNCTIONS - ( 04 May 2022 07:25 )  Alb: 3.7 g/dL / Pro: 5.7 g/dL / ALK PHOS: 63 U/L / ALT: 11 U/L / AST: 22 U/L / GGT: x                   Imaging:      Findings:  Large pancreatic uncinate mass, evidence of contact with superior mesenteric vein, fine needle biopsy performed.  Friable, partially ulcerated duodenal stricture in the third portion of the duodenum causing high grade partial obstruction (mildly distended duodenum proximal to structure but no significant retained gastric or duodenal contents/fluid.  Unable to reach with upper endoscope, unable to traverse with pediatric colonoscope.  No stent or G-J-tube placed.    EBL:  Minimal    Recommendations:    May have ice chips/sips of water/oral meds from GI standpoint.  Follow CBC  Continue aspirin.  May resume Plavix in 2 days from GI point of view (but may hold for possible surgically placed feeding jejunostomy tube)

## 2022-05-05 NOTE — PROGRESS NOTE ADULT - SUBJECTIVE AND OBJECTIVE BOX
Patient is a 76y old  Male who presents with a chief complaint of duodenal obstruction 2/2 mass (05 May 2022 10:15)    Patient seen this morning. S/P EGD yesterday - FNA taken - obstruction was too distal for GI to place stent.    MEDICATIONS  (STANDING):  amLODIPine   Tablet 5 milliGRAM(s) Oral daily  benzocaine 15 mG/menthol 3.6 mG Lozenge 1 Lozenge Oral two times a day  dextrose 5% + sodium chloride 0.9% with potassium chloride 20 mEq/L 1000 milliLiter(s) (50 mL/Hr) IV Continuous <Continuous>  fluticasone propionate 50 MICROgram(s)/spray Nasal Spray 1 Spray(s) Both Nostrils every 12 hours  heparin   Injectable 5000 Unit(s) SubCutaneous every 8 hours  metoprolol tartrate Injectable 5 milliGRAM(s) IV Push every 6 hours  pantoprazole  Injectable 40 milliGRAM(s) IV Push two times a day    MEDICATIONS  (PRN):  benzocaine 15 mG/menthol 3.6 mG Lozenge 1 Lozenge Oral every 6 hours PRN Sore Throat  ondansetron Injectable 4 milliGRAM(s) IV Push every 6 hours PRN Nausea and/or Vomiting  tetracaine/benzocaine/butamben Spray 1 Spray(s) Topical daily PRN sore throat      ROS  No fever, sweats, chills  No epistaxis, HA, sore throat  No CP, SOB, cough, sputum  No n/v/d, abd pain, melena, hematochezia  NPO with ice chips  No edema  No rash  No anxiety  No back pain, joint pain  No bleeding, bruising  No dysuria, hematuria    Vital Signs Last 24 Hrs  T(C): 37.2 (05 May 2022 14:06), Max: 37.2 (05 May 2022 14:06)  T(F): 98.9 (05 May 2022 14:06), Max: 98.9 (05 May 2022 14:06)  HR: 67 (05 May 2022 11:00) (67 - 89)  BP: 159/68 (05 May 2022 11:00) (110/52 - 178/67)  BP(mean): --  RR: 18 (05 May 2022 14:06) (18 - 25)  SpO2: 99% (05 May 2022 14:06) (95% - 99%)    PE  NAD  Awake, alert  Anicteric, MMM  No c/c/e  No rash grossly                            8.5    4.25  )-----------( 184      ( 05 May 2022 07:13 )             27.5       05-05    139  |  109<H>  |  16  ----------------------------<  101<H>  3.5   |  20<L>  |  1.19    Ca    8.5      05 May 2022 07:13  Phos  2.8     05-05  Mg     1.8     05-05    TPro  5.7<L>  /  Alb  3.7  /  TBili  0.4  /  DBili  x   /  AST  22  /  ALT  11  /  AlkPhos  63  05-04

## 2022-05-05 NOTE — PROGRESS NOTE ADULT - SUBJECTIVE AND OBJECTIVE BOX
Surgery Progress Note     Subjective/24hour Events:   Patient seen and examined.         Vital Signs:  Vital Signs Last 24 Hrs  T(C): 37 (04 May 2022 20:34), Max: 37.8 (04 May 2022 05:29)  T(F): 98.6 (04 May 2022 20:34), Max: 100 (04 May 2022 05:29)  HR: 77 (04 May 2022 20:34) (72 - 89)  BP: 166/64 (04 May 2022 20:34) (110/52 - 188/73)  BP(mean): --  RR: 18 (04 May 2022 20:34) (18 - 25)  SpO2: 98% (04 May 2022 20:34) (95% - 98%)    CAPILLARY BLOOD GLUCOSE          I&O's Detail    03 May 2022 07:01  -  04 May 2022 07:00  --------------------------------------------------------  IN:    dextrose 5% + sodium chloride 0.9% w/ Additives: 2300 mL    IV PiggyBack: 750 mL  Total IN: 3050 mL    OUT:    Oral Fluid: 0 mL    Voided (mL): 300 mL  Total OUT: 300 mL    Total NET: 2750 mL      04 May 2022 07:01  -  05 May 2022 01:31  --------------------------------------------------------  IN:    dextrose 5% + sodium chloride 0.9% w/ Additives: 150 mL    dextrose 5% + sodium chloride 0.9% w/ Additives: 500 mL    IV PiggyBack: 200 mL  Total IN: 850 mL    OUT:    Oral Fluid: 0 mL  Total OUT: 0 mL    Total NET: 850 mL          MEDICATIONS  (STANDING):  acetaminophen   IVPB .. 1000 milliGRAM(s) IV Intermittent every 6 hours  amLODIPine   Tablet 5 milliGRAM(s) Oral daily  benzocaine 15 mG/menthol 3.6 mG Lozenge 1 Lozenge Oral two times a day  dextrose 5% + sodium chloride 0.9% with potassium chloride 20 mEq/L 1000 milliLiter(s) (50 mL/Hr) IV Continuous <Continuous>  fluticasone propionate 50 MICROgram(s)/spray Nasal Spray 1 Spray(s) Both Nostrils every 12 hours  heparin   Injectable 5000 Unit(s) SubCutaneous every 8 hours  metoprolol tartrate Injectable 5 milliGRAM(s) IV Push every 6 hours  pantoprazole  Injectable 40 milliGRAM(s) IV Push two times a day    MEDICATIONS  (PRN):  benzocaine 15 mG/menthol 3.6 mG Lozenge 1 Lozenge Oral every 6 hours PRN Sore Throat  ondansetron Injectable 4 milliGRAM(s) IV Push every 6 hours PRN Nausea and/or Vomiting  tetracaine/benzocaine/butamben Spray 1 Spray(s) Topical daily PRN sore throat      Physical Exam:  General Appearance: Appears well, NAD  Resp: Patent airway, non-labored breathing, +wheezing  Abdomen: Soft, Nontender, Nondistended, no guarding or rebound tenderness   Extremities: warm, well perfused, movement x4 limbs b/l        Labs:    05-04    137  |  106  |  15  ----------------------------<  120<H>  4.3   |  19<L>  |  1.21    Ca    8.8      04 May 2022 07:25  Phos  3.3     05-04  Mg     1.9     05-04    TPro  5.7<L>  /  Alb  3.7  /  TBili  0.4  /  DBili  x   /  AST  22  /  ALT  11  /  AlkPhos  63  05-04    LIVER FUNCTIONS - ( 04 May 2022 07:25 )  Alb: 3.7 g/dL / Pro: 5.7 g/dL / ALK PHOS: 63 U/L / ALT: 11 U/L / AST: 22 U/L / GGT: x                                 9.9    7.17  )-----------( 206      ( 04 May 2022 07:26 )             32.0          Surgery Progress Note     Subjective/24hour Events:   Patient seen and examined. no acute events overnight. EUS unable to reach the stricture with upper endoscope or transverse the mass with pedi colonoscope.         Vital Signs:  Vital Signs Last 24 Hrs  T(C): 37 (04 May 2022 20:34), Max: 37.8 (04 May 2022 05:29)  T(F): 98.6 (04 May 2022 20:34), Max: 100 (04 May 2022 05:29)  HR: 77 (04 May 2022 20:34) (72 - 89)  BP: 166/64 (04 May 2022 20:34) (110/52 - 188/73)  BP(mean): --  RR: 18 (04 May 2022 20:34) (18 - 25)  SpO2: 98% (04 May 2022 20:34) (95% - 98%)    CAPILLARY BLOOD GLUCOSE          I&O's Detail    03 May 2022 07:01  -  04 May 2022 07:00  --------------------------------------------------------  IN:    dextrose 5% + sodium chloride 0.9% w/ Additives: 2300 mL    IV PiggyBack: 750 mL  Total IN: 3050 mL    OUT:    Oral Fluid: 0 mL    Voided (mL): 300 mL  Total OUT: 300 mL    Total NET: 2750 mL      04 May 2022 07:01  -  05 May 2022 01:31  --------------------------------------------------------  IN:    dextrose 5% + sodium chloride 0.9% w/ Additives: 150 mL    dextrose 5% + sodium chloride 0.9% w/ Additives: 500 mL    IV PiggyBack: 200 mL  Total IN: 850 mL    OUT:    Oral Fluid: 0 mL  Total OUT: 0 mL    Total NET: 850 mL          MEDICATIONS  (STANDING):  acetaminophen   IVPB .. 1000 milliGRAM(s) IV Intermittent every 6 hours  amLODIPine   Tablet 5 milliGRAM(s) Oral daily  benzocaine 15 mG/menthol 3.6 mG Lozenge 1 Lozenge Oral two times a day  dextrose 5% + sodium chloride 0.9% with potassium chloride 20 mEq/L 1000 milliLiter(s) (50 mL/Hr) IV Continuous <Continuous>  fluticasone propionate 50 MICROgram(s)/spray Nasal Spray 1 Spray(s) Both Nostrils every 12 hours  heparin   Injectable 5000 Unit(s) SubCutaneous every 8 hours  metoprolol tartrate Injectable 5 milliGRAM(s) IV Push every 6 hours  pantoprazole  Injectable 40 milliGRAM(s) IV Push two times a day    MEDICATIONS  (PRN):  benzocaine 15 mG/menthol 3.6 mG Lozenge 1 Lozenge Oral every 6 hours PRN Sore Throat  ondansetron Injectable 4 milliGRAM(s) IV Push every 6 hours PRN Nausea and/or Vomiting  tetracaine/benzocaine/butamben Spray 1 Spray(s) Topical daily PRN sore throat      Physical Exam:  General Appearance: Appears well, NAD  Resp: Patent airway, non-labored breathing,   Abdomen: Soft, Nontender, Nondistended, no guarding or rebound tenderness   Extremities: warm, well perfused, movement x4 limbs b/l        Labs:    05-04    137  |  106  |  15  ----------------------------<  120<H>  4.3   |  19<L>  |  1.21    Ca    8.8      04 May 2022 07:25  Phos  3.3     05-04  Mg     1.9     05-04    TPro  5.7<L>  /  Alb  3.7  /  TBili  0.4  /  DBili  x   /  AST  22  /  ALT  11  /  AlkPhos  63  05-04    LIVER FUNCTIONS - ( 04 May 2022 07:25 )  Alb: 3.7 g/dL / Pro: 5.7 g/dL / ALK PHOS: 63 U/L / ALT: 11 U/L / AST: 22 U/L / GGT: x                                 9.9    7.17  )-----------( 206      ( 04 May 2022 07:26 )             32.0

## 2022-05-05 NOTE — PROGRESS NOTE ADULT - ASSESSMENT
76M hx of anal cancer 2012 s/p chemo radiation here with 2 weeks of epigastric pain found to have new annular mass involving pancreas +duodenal obstruction third portion. +FOBT in ED. His viral panel is positive for RVP and coronavirus. His prelim blood cultures are negative. He feels weak this am and is wheezing.     Plan:  - DVT ppx  - NPO with ice chips/IVF  - CT chest staging w/ few indeterminate pulmonary nodules   - appreciate GI and onc recommendations  - GI re-attempt EUS/FNA today  - Appreciate cardiology recs, clearance for surgery  - F/u final blood cultures, Prelim are NGTD  -Appreciate medicine recommendations       [facility]  [id number] 76M hx of anal cancer 2012 s/p chemo radiation here with 2 weeks of epigastric pain found to have new annular mass involving pancreas +duodenal obstruction third portion. +FOBT in ED. His viral panel is positive for RVP and coronavirus. His prelim blood cultures are negative. He feels weak this am and is wheezing.     Plan:  - DVT ppx  - NPO with ice chips/IVF  - CT chest staging w/ few indeterminate pulmonary nodules   - appreciate GI and onc recommendations  - f/u bx, will plan for a feeding jejunostomy tube   - Appreciate cardiology recs, clearance for surgery  - F/u final blood cultures, Prelim are NGTD  -Appreciate medicine recommendations       [facility]  [id number]

## 2022-05-05 NOTE — PROGRESS NOTE ADULT - NS ATTEND AMEND GEN_ALL_CORE FT
75 y/o male admitted with pancreatic mass.    - Noted EGD done yesterday. Await FNA. Stent unable to be placed due to location of obstruction.  - Surgery and gastroenterology following  - Small nodules in chest noted; likely too small to evaluate radiographically or for biopsy.  - Monitor CBC and transfuse to maintain HGB > 7.    Alexis Delacruz MD  Hematology/Oncology  O: [phone]

## 2022-05-06 NOTE — DISCHARGE NOTE PROVIDER - HOSPITAL COURSE
76M anal cancer s/p chemo/rad 2012, denies psh here with 2 weeks of epigastric pain and few days of n/v, melena, 3-4 lb weight loss  in ED, new ECG changes with trop x 2 normal, cardiology consulted low suspicion for ACS.  Surgery was consulted  last cscope and egd 2 years ago reportedly normal  denies family hx of cancer  wbc 6.6, cr 1.66, tbili 0.5, ast/alt 10/8, lipase 153  CT shows annular mass of pancreas with duodenal obstruction.  Cardiology, GI, and med onc were consulted.  NGT was placed, remained NPO with IV fluids.  GI planned for EGD/stent if cleared by cardiology.  On 5/2 pt was taken for EGD/stenting, developed fever in the procedure room.  Procedure was cancelled.  IR was consulted for venting G tube/feeding J tube but anatomy was not favorable for IR approach.  Pr remained stable, afebrile and on 5/4 underwent Upper endoscopy/endoscopic ultrasound/Fine Needle Biopsy.  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  Unable to reach with upper endoscope, unable to traverse with pediatric colonoscope.  No stent or G-J-tube placed.  NGT was removed, he was given clear liquids and was advanced to full liquids.  He will maintain full liquid diet with supplements   76M anal cancer s/p chemo/rad 2012, denies psh here with 2 weeks of epigastric pain and few days of n/v, melena, 3-4 lb weight loss  in ED, new ECG changes with trop x 2 normal, cardiology consulted low suspicion for ACS.  Surgery was consulted  last cscope and egd 2 years ago reportedly normal  denies family hx of cancer  wbc 6.6, cr 1.66, tbili 0.5, ast/alt 10/8, lipase 153  CT shows annular mass of pancreas with duodenal obstruction.  Cardiology, GI, and med onc were consulted.  NGT was placed, remained NPO with IV fluids.  GI planned for EGD/stent if cleared by cardiology.  On 5/2 pt was taken for EGD/stenting, developed fever in the procedure room.  Procedure was cancelled.  IR was consulted for venting G tube/feeding J tube but anatomy was not favorable for IR approach.  Pr remained stable, afebrile and on 5/4 underwent Upper endoscopy/endoscopic ultrasound/Fine Needle Biopsy.  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  Unable to reach with upper endoscope, unable to traverse with pediatric colonoscope.  No stent or G-J-tube placed.  NGT was removed, he was given clear liquids and was advanced to full liquids.  He will maintain clear liquid diet with supplements on discharge and will follow-up with Dr. Avilez within 1 week to discuss J tube.   76M anal cancer s/p chemo/rad 2012, denies psh here with 2 weeks of epigastric pain and few days of n/v, melena, 3-4 lb weight loss  in ED, new ECG changes with trop x 2 normal, cardiology consulted low suspicion for ACS.  Surgery was consulted  last cscope and egd 2 years ago reportedly normal  denies family hx of cancer  wbc 6.6, cr 1.66, tbili 0.5, ast/alt 10/8, lipase 153  CT shows annular mass of pancreas with duodenal obstruction.  Cardiology, GI, and med onc were consulted.  NGT was placed, remained NPO with IV fluids.  GI planned for EGD/stent if cleared by cardiology.  On 5/2 pt was taken for EGD/stenting, developed fever in the procedure room.  Procedure was cancelled.  IR was consulted for venting G tube/feeding J tube but anatomy was not favorable for IR approach.  Pr remained stable, afebrile and on 5/4 underwent Upper endoscopy/endoscopic ultrasound/Fine Needle Biopsy.  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  Unable to reach with upper endoscope, unable to traverse with pediatric colonoscope.  No stent or G-J-tube placed.  NGT was removed, he was given clear liquids and was advanced to full liquids.  He will maintain clear liquid diet with supplements on discharge and will follow-up with Dr. Avilez within 1 week to discuss J tube placement.

## 2022-05-06 NOTE — PROGRESS NOTE ADULT - ASSESSMENT
77 y/o M with a PMH of anal cancer s/p ChemoRT 2012 with mediport still in place presented with epigastric pain, found to have melena and new pancreatic annular mass now s/p EUS with biopsy with persistent HTN

## 2022-05-06 NOTE — DISCHARGE NOTE NURSING/CASE MANAGEMENT/SOCIAL WORK - PATIENT PORTAL LINK FT
You can access the FollowMyHealth Patient Portal offered by [facility] by registering at the following website: [email or web address]. By joining [facility]’s FollowMyHealth portal, you will also be able to view your health information using other applications (apps) compatible with our system.

## 2022-05-06 NOTE — PROGRESS NOTE ADULT - SUBJECTIVE AND OBJECTIVE BOX
Surgery Progress Note     Subjective/24hour Events:   Patient seen and examined.       Vital Signs:  Vital Signs Last 24 Hrs  T(C): 37.2 (06 May 2022 01:03), Max: 37.3 (05 May 2022 21:29)  T(F): 99 (06 May 2022 01:03), Max: 99.1 (05 May 2022 21:29)  HR: 75 (06 May 2022 01:03) (64 - 75)  BP: 155/80 (06 May 2022 01:03) (155/80 - 206/88)  BP(mean): --  RR: 18 (06 May 2022 01:03) (18 - 18)  SpO2: 98% (06 May 2022 01:03) (98% - 99%)    CAPILLARY BLOOD GLUCOSE          I&O's Detail    04 May 2022 07:01  -  05 May 2022 07:00  --------------------------------------------------------  IN:    dextrose 5% + sodium chloride 0.9% w/ Additives: 500 mL    dextrose 5% + sodium chloride 0.9% w/ Additives: 750 mL    IV PiggyBack: 300 mL  Total IN: 1550 mL    OUT:    Oral Fluid: 0 mL  Total OUT: 0 mL    Total NET: 1550 mL      05 May 2022 07:01  -  06 May 2022 01:43  --------------------------------------------------------  IN:    dextrose 5% + sodium chloride 0.9% w/ Additives: 150 mL    dextrose 5% + sodium chloride 0.9% w/ Additives: 450 mL    Oral Fluid: 780 mL  Total IN: 1380 mL    OUT:    Voided (mL): 600 mL  Total OUT: 600 mL    Total NET: 780 mL          MEDICATIONS  (STANDING):  amLODIPine   Tablet 5 milliGRAM(s) Oral daily  benzocaine 15 mG/menthol 3.6 mG Lozenge 1 Lozenge Oral two times a day  dextrose 5% + sodium chloride 0.9% with potassium chloride 20 mEq/L 1000 milliLiter(s) (50 mL/Hr) IV Continuous <Continuous>  fluticasone propionate 50 MICROgram(s)/spray Nasal Spray 1 Spray(s) Both Nostrils every 12 hours  heparin   Injectable 5000 Unit(s) SubCutaneous every 8 hours  iron sucrose IVPB 200 milliGRAM(s) IV Intermittent every 24 hours  metoprolol tartrate Injectable 5 milliGRAM(s) IV Push every 6 hours  pantoprazole  Injectable 40 milliGRAM(s) IV Push two times a day    MEDICATIONS  (PRN):  benzocaine 15 mG/menthol 3.6 mG Lozenge 1 Lozenge Oral every 6 hours PRN Sore Throat  ondansetron Injectable 4 milliGRAM(s) IV Push every 6 hours PRN Nausea and/or Vomiting  tetracaine/benzocaine/butamben Spray 1 Spray(s) Topical daily PRN sore throat      Physical Exam:  General Appearance: Appears well, NAD  Resp: Patent airway, non-labored breathing,   Abdomen: Soft, Nontender, Nondistended, no guarding or rebound tenderness   Extremities: warm, well perfused, movement x4 limbs b/l      Labs:    05-05    139  |  109<H>  |  16  ----------------------------<  101<H>  3.5   |  20<L>  |  1.19    Ca    8.5      05 May 2022 07:13  Phos  2.8     05-05  Mg     1.8     05-05    TPro  5.7<L>  /  Alb  3.7  /  TBili  0.4  /  DBili  x   /  AST  22  /  ALT  11  /  AlkPhos  63  05-04    LIVER FUNCTIONS - ( 04 May 2022 07:25 )  Alb: 3.7 g/dL / Pro: 5.7 g/dL / ALK PHOS: 63 U/L / ALT: 11 U/L / AST: 22 U/L / GGT: x                                 8.5    4.25  )-----------( 184      ( 05 May 2022 07:13 )             27.5

## 2022-05-06 NOTE — DISCHARGE NOTE PROVIDER - CARE PROVIDERS DIRECT ADDRESSES
[email or web address]

## 2022-05-06 NOTE — CHART NOTE - NSCHARTNOTESELECT_GEN_ALL_CORE
Brief GI Endoscopy Note/Event Note
BP medications/Event Note
Cardiology/Event Note
GI Preprocedure Reassessment/Event Note
Nutrition Services
Post Procedure Check

## 2022-05-06 NOTE — PROGRESS NOTE ADULT - SUBJECTIVE AND OBJECTIVE BOX
PROGRESS NOTE:   Authoted by Dr. Praveen Noriega DO  Pager [phone]     Patient is a 76y old  Male who presents with a chief complaint of duodenal obstruction 2/2 mass (04 May 2022 02:13)      SUBJECTIVE / OVERNIGHT EVENTS:  Called by primary team for HTN. Patient seen and examined. No chest pain, SOB, RAMOS, orthopnea. No swelling lower extreities. No headache, blurry vision  ADDITIONAL REVIEW OF SYSTEMS:    MEDICATIONS  (STANDING):  acetaminophen   IVPB .. 1000 milliGRAM(s) IV Intermittent every 6 hours  amLODIPine   Tablet 5 milliGRAM(s) Oral daily  benzocaine 15 mG/menthol 3.6 mG Lozenge 1 Lozenge Oral two times a day  dextrose 5% + sodium chloride 0.9% with potassium chloride 20 mEq/L 1000 milliLiter(s) (100 mL/Hr) IV Continuous <Continuous>  fluticasone propionate 50 MICROgram(s)/spray Nasal Spray 1 Spray(s) Both Nostrils every 12 hours  heparin   Injectable 5000 Unit(s) SubCutaneous every 8 hours  metoprolol tartrate Injectable 5 milliGRAM(s) IV Push every 6 hours  pantoprazole  Injectable 40 milliGRAM(s) IV Push two times a day    MEDICATIONS  (PRN):  benzocaine 15 mG/menthol 3.6 mG Lozenge 1 Lozenge Oral every 6 hours PRN Sore Throat  ondansetron Injectable 4 milliGRAM(s) IV Push every 6 hours PRN Nausea and/or Vomiting  tetracaine/benzocaine/butamben Spray 1 Spray(s) Topical daily PRN sore throat      CAPILLARY BLOOD GLUCOSE        I&O's Summary    03 May 2022 07:01  -  04 May 2022 07:00  --------------------------------------------------------  IN: 3050 mL / OUT: 300 mL / NET: 2750 mL        PHYSICAL EXAM:  Vital Signs Last 24 Hrs  T(C): 36.7 (06 May 2022 17:14), Max: 37.4 (06 May 2022 05:02)  T(F): 98 (06 May 2022 17:14), Max: 99.4 (06 May 2022 05:02)  HR: 74 (06 May 2022 18:53) (46 - 75)  BP: 188/71 (06 May 2022 18:53) (154/80 - 206/73)  BP(mean): --  RR: 18 (06 May 2022 17:14) (18 - 18)  SpO2: 99% (06 May 2022 17:14) (98% - 99%)    CONSTITUTIONAL: NAD, well-developed  RESPIRATORY: Normal respiratory effort; lungs are clear to auscultation bilaterally. No wheeze on my exam.   CARDIOVASCULAR: Regular rate and rhythm, normal S1 and S2, no murmur/rub/gallop; No lower extremity edema; Peripheral pulses are 2+ bilaterally  ABDOMEN: Nontender to palpation, normoactive bowel sounds, no rebound/guarding; No hepatosplenomegaly  MUSCLOSKELETAL: no clubbing or cyanosis of digits; no joint swelling or tenderness to palpation  PSYCH: A+O to person, place, and time; affect appropriate    LABS:                                   9.7    4.17  )-----------( 214      ( 06 May 2022 07:21 )             30.6   05-06    133<L>  |  102  |  13  ----------------------------<  97  3.8   |  21<L>  |  1.05    Ca    8.8      06 May 2022 07:23  Phos  3.6     05-06  Mg     2.0     05-06        Ca    8.8      04 May 2022 07:25  Phos  3.3     05-04  Mg     1.9     05-04    TPro  5.7<L>  /  Alb  3.7  /  TBili  0.4  /  DBili  x   /  AST  22  /  ALT  11  /  AlkPhos  63  05-04          Urinalysis Basic - ( 03 May 2022 00:15 )    Color: Light Yellow / Appearance: Clear / S.014 / pH: x  Gluc: x / Ketone: Negative  / Bili: Negative / Urobili: Negative   Blood: x / Protein: 30 mg/dL / Nitrite: Negative   Leuk Esterase: Negative / RBC: 1 /hpf / WBC 0 /HPF   Sq Epi: x / Non Sq Epi: 1 /hpf / Bacteria: Negative      Culture Results:   No growth to date. (22 @ 22:18)      Culture - Blood (collected 02 May 2022 22:18)  Source: .Blood Blood  Preliminary Report (03 May 2022 23:02):    No growth to date.    Culture - Blood (collected 02 May 2022 22:18)  Source: .Blood Blood  Preliminary Report (03 May 2022 23:02):    No growth to date.        RADIOLOGY & ADDITIONAL TESTS:  Results Reviewed:   < from: CT Chest No Cont (22 @ 11:32) >    IMPRESSION:    No pneumonia.    New mild interstitial edema and small pleural effusions.    Stable few bilateral sub-5 mm nodules, but new from 2020.  Advise   follow-up as previously recommended.    < end of copied text >    Imaging Personally Reviewed:  Electrocardiogram Personally Reviewed:    COORDINATION OF CARE:  Care Discussed with Consultants/Other Providers [Y/N]: Surgery team #[id number]  Prior or Outpatient Records Reviewed [Y/N]:

## 2022-05-06 NOTE — PROGRESS NOTE ADULT - PROBLEM SELECTOR PLAN 3
stable
patient above goal.  -patient currently on metoprolol IV, if ok with surgery can place on home PO atenolol and uptitrate as necessary.

## 2022-05-06 NOTE — PROGRESS NOTE ADULT - ASSESSMENT
76M hx of anal cancer 2012 s/p chemo radiation here with 2 weeks of epigastric pain found to have new annular mass involving pancreas +duodenal obstruction third portion. +FOBT in ED. His viral panel is positive for RVP and coronavirus. His prelim blood cultures are negative. He feels weak this am and is wheezing.     Plan:  - DVT ppx  - NPO with ice chips/IVF  - CT chest staging w/ few indeterminate pulmonary nodules   - appreciate GI and onc recommendations  - f/u bx, will plan for a feeding jejunostomy tube   - Appreciate cardiology recs, clearance for surgery  - F/u final blood cultures, Prelim are NGTD  -Appreciate medicine recommendations       [facility]  [id number]

## 2022-05-06 NOTE — DISCHARGE NOTE PROVIDER - NSDCMRMEDTOKEN_GEN_ALL_CORE_FT
3-n-1 Commode:   acetaminophen 500 mg oral tablet: 2 tab(s) orally every 8 hours x 14 days  aspirin 81 mg oral delayed release tablet: 2 tab(s) orally every 12 hours  atenolol 50 mg oral tablet: 1 tab(s) orally once a day  docusate sodium 100 mg oral capsule: 1 cap(s) orally 3 times a day as needed  oxyCODONE 5 mg oral tablet: 1 tab(s) orally every 4 hours, As Needed    Reference #: [id number] MDD:6  pantoprazole 40 mg oral delayed release tablet: 1 tab(s) orally once a day  polyethylene glycol 3350 oral powder for reconstitution: 17 gram(s) orally once a day, As needed, Constipation  Rolling Walker with 5 inch Wheels:   senna oral tablet: 2 tab(s) orally once a day (at bedtime) as needed   3-n-1 Commode:   acetaminophen 500 mg oral tablet: 2 tab(s) orally every 8 hours x 14 days  amLODIPine 5 mg oral tablet: 1 tab(s) orally once a day  aspirin 81 mg oral delayed release tablet: 2 tab(s) orally every 12 hours  atenolol 50 mg oral tablet: 1 tab(s) orally once a day  docusate sodium 100 mg oral capsule: 1 cap(s) orally 3 times a day as needed  oxyCODONE 5 mg oral tablet: 1 tab(s) orally every 4 hours, As Needed    Reference #: [id number] MDD:6  pantoprazole 40 mg oral delayed release tablet: 1 tab(s) orally once a day  polyethylene glycol 3350 oral powder for reconstitution: 17 gram(s) orally once a day, As needed, Constipation  Rolling Walker with 5 inch Wheels:   senna oral tablet: 2 tab(s) orally once a day (at bedtime) as needed   3-n-1 Commode:   acetaminophen 500 mg oral tablet: 2 tab(s) orally every 8 hours x 14 days  amLODIPine 10 mg oral tablet: 1 tab(s) orally once a day  aspirin 81 mg oral delayed release tablet: 2 tab(s) orally every 12 hours  atenolol 25 mg oral tablet: 1 tab(s) orally once a day  docusate sodium 100 mg oral capsule: 1 cap(s) orally 3 times a day as needed  lisinopril 5 mg oral tablet: 1 tab(s) orally once a day  oxyCODONE 5 mg oral tablet: 1 tab(s) orally every 4 hours, As Needed    Reference #: [id number] MDD:6  pantoprazole 40 mg oral delayed release tablet: 1 tab(s) orally once a day  polyethylene glycol 3350 oral powder for reconstitution: 17 gram(s) orally once a day, As needed, Constipation  Rolling Walker with 5 inch Wheels:   senna oral tablet: 2 tab(s) orally once a day (at bedtime) as needed   3-n-1 Commode:   acetaminophen 325 mg oral tablet: 2 tab(s) orally every 6 hours, As needed, Mild Pain (1 - 3), Moderate Pain (4 - 6)  amLODIPine 10 mg oral tablet: 1 tab(s) orally once a day  aspirin 81 mg oral delayed release tablet: 2 tab(s) orally every 12 hours  atenolol 25 mg oral tablet: 1 tab(s) orally once a day  docusate sodium 100 mg oral capsule: 1 cap(s) orally 3 times a day as needed  lisinopril 5 mg oral tablet: 1 tab(s) orally once a day  oxyCODONE 5 mg oral tablet: 1 tab(s) orally every 4 hours, As Needed    Reference #: [id number] MDD:6  pantoprazole 40 mg oral delayed release tablet: 1 tab(s) orally once a day  polyethylene glycol 3350 oral powder for reconstitution: 17 gram(s) orally once a day, As needed, Constipation  Rolling Walker with 5 inch Wheels:   senna oral tablet: 2 tab(s) orally once a day (at bedtime) as needed   3-n-1 Commode:   acetaminophen 325 mg oral tablet: 2 tab(s) orally every 6 hours, As needed, Mild Pain (1 - 3), Moderate Pain (4 - 6)  amLODIPine 10 mg oral tablet: 1 tab(s) orally once a day MDD:one tab  aspirin 81 mg oral delayed release tablet: 2 tab(s) orally every 12 hours  atenolol 25 mg oral tablet: 1 tab(s) orally once a day MDD:one  docusate sodium 100 mg oral capsule: 1 cap(s) orally 3 times a day as needed  lisinopril 5 mg oral tablet: 1 tab(s) orally once a day  lisinopril 5 mg oral tablet: 1 tab(s) orally once a day MDD:one tab  oxyCODONE 5 mg oral tablet: 1 tab(s) orally every 4 hours, As Needed    Reference #: [id number] MDD:6  pantoprazole 40 mg oral delayed release tablet: 1 tab(s) orally once a day  pantoprazole 40 mg oral granule, delayed release: 1 each orally once a day MDD:one tab  polyethylene glycol 3350 oral powder for reconstitution: 17 gram(s) orally once a day, As needed, Constipation  Rolling Walker with 5 inch Wheels:   senna oral tablet: 2 tab(s) orally once a day (at bedtime) as needed

## 2022-05-06 NOTE — DISCHARGE NOTE PROVIDER - CARE PROVIDER_API CALL
Dylan Avilez)  Complex General Surgical Oncology; Surgery; Surgical Oncology  [address] [place] [address]  Phone: [phone]  Fax: [phone]  Follow Up Time: 1 week

## 2022-05-06 NOTE — DISCHARGE NOTE PROVIDER - DETAILS OF MALNUTRITION DIAGNOSIS/DIAGNOSES
This patient has been assessed with a concern for Malnutrition and was treated during this hospitalization for the following Nutrition diagnosis/diagnoses:     -  05/03/2022: Moderate protein-calorie malnutrition

## 2022-05-06 NOTE — CHART NOTE - NSCHARTNOTEFT_GEN_A_CORE
89 yo M with hx of HTN, hx of anal/rectal ca p/w 2 wks of atypical chest pain and n/v found to have duodenal obstruction 2/2 anlar mass. Cardiology initially consulted for abnormal EKG, which is 2/2 repol changes. TTE showed LVH.     Cardiology called today for recommendations for HTN.    If pt unable to take PO, can use prn IV labetalol if HR > 60 or prn IV hydralazine if HR < 60.    If pt able to take PO, can start amlodipine 5 daily.
Nutrition Follow Up Note  Patient seen for:  consult for diet education prior to discharge today     Chart reviewed, events noted. This is a "76M hx of anal cancer 2012 s/p chemo radiation here with 2 weeks of epigastric pain found to have new annular mass involving pancreas +duodenal obstruction third portion." "duodenal stent was unable to be done secondary to location of obstruction, Jejunostomy tube being held as patient is tolerating PO liquids"    Source: [x] Patient       [x] EMR        [x] RN        [] Family at bedside       [x] Other: [facility] PA    -If unable to interview patient: [] Trach/Vent/BiPAP  [] Disoriented/confused/inappropriate to interview    Diet Order:   Diet, Clear Liquid:   Lacto-Ovo Veg (Accepts Milk Prod., Eggs)  Supplement Feeding Modality:  Oral  Ensure Enlive Cans or Servings Per Day:  3       Frequency:  Daily (22)     - PO intake :   [] >75%  Adequate    [x] 50-75%  Fair       [] <50%  Poor    - Nutrition-related concerns:  -Pt NPO x 5 days, advanced to clear liquid diet on 5/3, Ensure Enlive supplements addition on .   -Spoke with [facility] Team PA to confirm discharge diet, plan for patient to be discharged home on clear liquid diet + nutrition supplements. with outpatient follow-up with MD.   -Pt seen at bedside, reports good tolerance to liquid diet, no nausea, emesis. Drinking Ensure.   -Reviewed clear liquid diet with patient, written information provided.   -Pt currently receiving Ensure Enlive supplement (per surgery team), reviewed appropriate nutrition shakes (Boost, Ensure) for patient and discussed importance of consumption to help meet nutritional needs while on liquid diet.     GI:  Last BM ___.   Bowel Regimen? [x] Yes   [] No      Weights:   Daily Weight in k ()  no additional weight to assess     Nutritionally Pertinent MEDICATIONS  (STANDING):  amLODIPine   Tablet  ATENolol  Tablet  iron sucrose IVPB  pantoprazole    Tablet    Pertinent Labs:  @ 07:23: Na 133<L>, BUN 13, Cr 1.05, BG 97, K+ 3.8, Phos 1.4<L>, Mg 2.0, Alk Phos --, ALT/SGPT --, AST/SGOT --, HbA1c --      Skin per nursing documentation: free of pressure injuries per nursing flow sheets   Edema: none     Estimated Needs:   [x] no change since previous assessment  [] recalculated:     Previous Nutrition Diagnosis: Moderate Malnutrition   Nutrition Diagnosis is: [x] ongoing  [] resolved [] not applicable     New Nutrition Diagnosis: [x] Not applicable    Nutrition Care Plan:  [x] In Progress  [] Achieved  [] Not applicable    Nutrition Interventions:     Education Provided:       [x] Yes:  [] No: Clear Liquid Nutrition Therapy        Recommendations:         [x] Defer PO diet to Surgery Team, team to advance diet as feasible      [x] Continue Ensure Enlive, recommend increase to 4x daily.      [x] RD to remain available and follow-up as medically appropriate.     Monitoring and Evaluation:   Continue to monitor nutritional intake, tolerance to diet prescription, weights, labs, skin integrity      RD remains available upon request and will follow up per protocol  Alexa Eli RD, CDN, Pager # [phone]
Post Procedure Check    Patient seen after GI procedure.    Patient resting comfortably in bed. Pain controlled. No nausea/vomiting.    Abd: Soft, Nontender, Nondistended, no guarding or rebound tenderness    Continue care per plan in today's progress note.      [facility]  [id number]
Pt evaluated in preprocedure area and then procedure room    NGT removed 5/1/22  Found to have T=101.4F in procedure room.  Pt with mild tachypnea, dry cough, sore throat.  Not tachycardic, hypotensive, or hypoxic  No abd pain,n/v    PE:  VSS  mildly uncomfortable  RRR  CTA  Abd soft, nontender, nondistended.    Labs reviewed, 5/1 COVID PCR negative    CT chest from 4/29 reviewed, few tiny pulmonary nodules but no effusion/consolidation/pneumonia    Impression:  Pancreatic mass abutting SMV and causing compression/partial gastric obstruction at the level of the third portion of the duodenum  Fever, dDx includes aspiration pneumonia/pneumonitits, infectious pharyngitis, COVID-19.  Less likely sinusitis (no pain) or tumor fever.    Recommendations:  Fever workup including blood cultures, Chest X-Ray, COVID retest.  Follow CBC/CMP  Consider empiric antibiotics   EGD/EUS/FNB/possible PEG-J tube cancelled due to unexplained and worsening fever and mild respiratory problems, patient notified, discussed with surgery attending and niece over phone.  Discussed also with IR attending Dr. Chakraborty, IR placed combined venting G with feeding J-tube extension discussed, could be tried but anatomy is not favorable for IR approach.  Will reassess for endoscopic procedures on a daily basis.
Spoke with hospitalist, Dr. Noriega regarding recommendations for BP medications.  Recommending regimen as follows:  Increase amlodipine to 10mg PO QD  Decrease atenolol to 25mg PO QD  Add lisinopril 10mg PO QD    Pt will follow-up with his PMD Monday for BP checks.    LOIS Wallace PA-C , pager # [phone]
Procedure:  Upper endoscopy/endoscopic ultrasound/Fine Needle Biopsy    Indications:  Pancreatic uncinate mass, duodenal stricture    Meds:  GETA    Findings:  Large pancreatic uncinate mass, evidence of contact with superior mesenteric vein, fine needle biopsy performed.  Friable, partially ulcerated duodenal stricture in the third portion of the duodenum causing high grade partial obstruction (mildly distended duodenum proximal to structure but no significant retained gastric or duodenal contents/fluid.  Unable to reach with upper endoscope, unable to traverse with pediatric colonoscope.  No stent or G-J-tube placed.    EBL:  Minimal    Recommendations:    May have ice chips/sips of water/oral meds from GI standpoint.  Follow CBC  Continue aspirin.  May resume Plavix in 2 days from GI point of view (but may hold for possible surgically placed feeding jejunostomy tube)    Discussed with della

## 2022-05-06 NOTE — PROGRESS NOTE ADULT - SUBJECTIVE AND OBJECTIVE BOX
Patient is a 76y old  Male who presents with a chief complaint of duodenal obstruction 2/2 mass (06 May 2022 11:01)    Patient seen this morning. Tolerating liquids PO. Pending discharge today.    MEDICATIONS  (STANDING):  amLODIPine   Tablet 5 milliGRAM(s) Oral daily  ATENolol  Tablet 50 milliGRAM(s) Oral daily  benzocaine 15 mG/menthol 3.6 mG Lozenge 1 Lozenge Oral two times a day  fluticasone propionate 50 MICROgram(s)/spray Nasal Spray 1 Spray(s) Both Nostrils every 12 hours  heparin   Injectable 5000 Unit(s) SubCutaneous every 8 hours  iron sucrose IVPB 200 milliGRAM(s) IV Intermittent every 24 hours  pantoprazole    Tablet 40 milliGRAM(s) Oral before breakfast    MEDICATIONS  (PRN):  benzocaine 15 mG/menthol 3.6 mG Lozenge 1 Lozenge Oral every 6 hours PRN Sore Throat  ondansetron Injectable 4 milliGRAM(s) IV Push every 6 hours PRN Nausea and/or Vomiting  tetracaine/benzocaine/butamben Spray 1 Spray(s) Topical daily PRN sore throat      ROS  No fever, sweats, chills  No epistaxis, HA, sore throat  No CP, SOB, cough, sputum  No n/v/d, abd pain, melena, hematochezia  Tolerating liquid diet  No edema  No rash  No anxiety  No back pain, joint pain  No bleeding, bruising  No dysuria, hematuria    Vital Signs Last 24 Hrs  T(C): 37.1 (06 May 2022 09:07), Max: 37.4 (06 May 2022 05:02)  T(F): 98.8 (06 May 2022 09:07), Max: 99.4 (06 May 2022 05:02)  HR: 58 (06 May 2022 09:07) (58 - 75)  BP: 185/78 (06 May 2022 09:07) (154/80 - 206/88)  BP(mean): --  RR: 18 (06 May 2022 09:07) (18 - 18)  SpO2: 98% (06 May 2022 09:07) (98% - 99%)    PE  NAD  Awake, alert  Anicteric, MMM  No c/c/e  No rash grossly                            9.7    4.17  )-----------( 214      ( 06 May 2022 07:21 )             30.6       05-06    133<L>  |  102  |  13  ----------------------------<  97  3.8   |  21<L>  |  1.05    Ca    8.8      06 May 2022 07:23  Phos  1.4     05-06  Mg     2.0     05-06

## 2022-05-06 NOTE — PROGRESS NOTE ADULT - PROBLEM SELECTOR PLAN 1
patient above goal but assymptomatic, this is hypertesnive urgency as no symptoms and no end organ damage.   -can uptitrate amlodipine to 10mg and start low dose ace inhibitor.   -if becomes symptomatic can give hydralazine 10mg IV  -if becomes SOB would give lasix IV x1  -discussed with patient and niece. Has good follow up, can have appointment on Monday. Will have blood pressure monitor at home.   -as long as not symptomatic ok to discharge with close outpatient follow up.  -due to intermittent bradycardia ok to decrease atenolol to 25mg.
downtrending fever curve. BCx negative. CT chest negative for infection, + for coronavirus (not covid)  -no Abx, supportive care for now.   -if fever curve uptitrates or other sympotms, re culture patient.

## 2022-05-06 NOTE — PROGRESS NOTE ADULT - ASSESSMENT
Mass of pancreas.   --Recommendation: 1. Pancreas mass,  mass in the ucincate proccess with compression of duodenum resulting in outlet obstruction.   the mass encases the smv.  patient has decreased oral intake that has worsening over the past month.  -- EGD  done 5/4/2022 - FNA taken  --duodenal stent was unable to be done secondary to location of obstruction  --obtain further imaging if necessary (ie - MRCP)  --Ca 19-9 42; CEA 5.4  --Jejunostomy tube being held as patient is tolerating PO liquids    Lung nodules  --CT chest shows <5mm nodules - new since 2020 but indeterminate significance  --Pulmonary input may be of benefit to determine whether these seem to be metastatic lesions    Hx of anal cancer, s/p treatment  finding in pancreas is most likely to be a new primary.    Anemia  --Most likely from chronic disease  --Also was Guaic positive  --Patient is iron deficient  --S/P Venofer 200 mg IV daily since 5/5 - may complete course after discharge with Dr. Hahn  --Please transfuse PRBC's for Hgb <7.0 grams    Fever  --Was secondary to coronavirus (Not COVID) and has resolved    After discharge the patient may resume care with Dr. Shantanu Hahn of [facility].    Thank you for the opportunity to participate in Mr. Pascal's care.    Jah Gallegos PA-C  Hematology/Oncology  [facility]   [phone] (office)  [phone] (alt office)  Evenings and weekends please call MD on call or office

## 2022-05-06 NOTE — DISCHARGE NOTE PROVIDER - NSDCFUSCHEDAPPT_GEN_ALL_CORE_FT
[facility]  [facility] [address] [facility]   Scheduled Appointment: 05/09/2022     [facility]  [facility] [address] [facility]   Scheduled Appointment: 05/09/2022    [facility]  [facility] [address] [facility]  Scheduled Appointment: 05/10/2022

## 2022-05-06 NOTE — DISCHARGE NOTE PROVIDER - NSDCCPCAREPLAN_GEN_ALL_CORE_FT
PRINCIPAL DISCHARGE DIAGNOSIS  Diagnosis: Duodenal obstruction  Assessment and Plan of Treatment: 1.  Clear liquid diet with supplements  2.  Activity as tolerated  3.  Follow-up with Dr. Avilez within 1-2 weeks.  Please call office for appointment.  4.  Follow-up with your oncologist within 1-2 weeks.  Please call office for appointment.  Please follow up with your primary care physician.  Please schedule an appointment with your primary care provider within two weeks.      SECONDARY DISCHARGE DIAGNOSES  Diagnosis: Pancreatic mass  Assessment and Plan of Treatment:

## 2022-05-06 NOTE — DISCHARGE NOTE NURSING/CASE MANAGEMENT/SOCIAL WORK - NSDCPEFALRISK_GEN_ALL_CORE
For information on Fall & Injury Prevention, visit: [email or web address] OR  [email or web address]/[email or web address] OR  https://www.cdc.gov/steadi/patient.html

## 2022-05-07 NOTE — PROVIDER CONTACT NOTE (OTHER) - SITUATION
Pt complaining of runny nose
oral temp 100.7F
Pt /75
hypertensive
High blood pressure reading
pt woke up c/o 3/10 non-radiating left anterior chest wall pain
Pt noted with hoarseness of voice, c/o pain in the ear, and pain in the throat.

## 2022-05-07 NOTE — PROVIDER CONTACT NOTE (OTHER) - REASON
Pt /75
Pt complaining of runny nose
Pt noted with hoarseness of voice, c/o pain in the ear, and pain in the throat.
Temp 100.7F orally
hyertensive
pt c/o chest pain
High blood pressure reading & high temp

## 2022-05-07 NOTE — PROVIDER CONTACT NOTE (OTHER) - NAME OF MD/NP/PA/DO NOTIFIED:
RED
David WILLIAMSON
José Miguel Oscar MD
Laura Hernandez MD
Escobar Aguillon MD
MD Laura Hernandez
MD David Sanchez

## 2022-05-07 NOTE — PROVIDER CONTACT NOTE (OTHER) - ASSESSMENT
pt a&ox4, denies SOB, headache, dizziness, pt endorses 3/10 non-radiating left chest pain, when asked by RN to describe pain, pt stated "it's just pain." Vitals as follows /65, HR 60, RR 17 SpO2 96%, Temp 98.9F pt a&ox4, denies SOB, headache, dizziness, pt endorses 3/10 non-radiating left chest pain, when asked by RN to describe pain, pt stated "it's just pain." Vitals as follows /65, HR 60, RR 17 SpO2 96%, Temp 98.9F. pt on tele monitoring - no events noted as per tele tech

## 2022-05-07 NOTE — PROGRESS NOTE ADULT - REASON FOR ADMISSION
duodenal obstruction 2/2 mass

## 2022-05-07 NOTE — PROVIDER CONTACT NOTE (OTHER) - BACKGROUND
Pt admitted for obstruction of duodenum and pancreatic mass. PMH HTN, Anemia, Anal CA.
Pt admitted for obstruction of duodenum.
Pt admitted for obstruction of the duodenum.
Pt admitted due to obstruction of duodenum
pt admitted for epigastric pain x 2 weeks, found to have duodenal obstruction and pancreatic mass

## 2022-05-07 NOTE — PROGRESS NOTE ADULT - SUBJECTIVE AND OBJECTIVE BOX
Surgery Progress Note     Subjective/24hour Events:   Patient seen and examined.         Vital Signs:  Vital Signs Last 24 Hrs  T(C): 37.1 (06 May 2022 22:00), Max: 37.4 (06 May 2022 05:02)  T(F): 98.7 (06 May 2022 22:00), Max: 99.4 (06 May 2022 05:02)  HR: 60 (06 May 2022 22:00) (46 - 74)  BP: 158/66 (06 May 2022 22:00) (154/80 - 206/73)  BP(mean): --  RR: 18 (06 May 2022 22:00) (18 - 18)  SpO2: 97% (06 May 2022 22:00) (97% - 99%)    CAPILLARY BLOOD GLUCOSE          I&O's Detail    05 May 2022 07:01  -  06 May 2022 07:00  --------------------------------------------------------  IN:    dextrose 5% + sodium chloride 0.9% w/ Additives: 150 mL    dextrose 5% + sodium chloride 0.9% w/ Additives: 1050 mL    Oral Fluid: 900 mL  Total IN: 2100 mL    OUT:    Voided (mL): 1450 mL  Total OUT: 1450 mL    Total NET: 650 mL      06 May 2022 07:01  -  07 May 2022 01:05  --------------------------------------------------------  IN:    IV PiggyBack: 600 mL    Oral Fluid: 960 mL  Total IN: 1560 mL    OUT:    Voided (mL): 750 mL  Total OUT: 750 mL    Total NET: 810 mL          MEDICATIONS  (STANDING):  amLODIPine   Tablet 10 milliGRAM(s) Oral daily  ATENolol  Tablet 25 milliGRAM(s) Oral daily  benzocaine 15 mG/menthol 3.6 mG Lozenge 1 Lozenge Oral two times a day  fluticasone propionate 50 MICROgram(s)/spray Nasal Spray 1 Spray(s) Both Nostrils every 12 hours  heparin   Injectable 5000 Unit(s) SubCutaneous every 8 hours  iron sucrose IVPB 200 milliGRAM(s) IV Intermittent every 24 hours  lisinopril 5 milliGRAM(s) Oral daily  pantoprazole    Tablet 40 milliGRAM(s) Oral before breakfast    MEDICATIONS  (PRN):  benzocaine 15 mG/menthol 3.6 mG Lozenge 1 Lozenge Oral every 6 hours PRN Sore Throat  ondansetron Injectable 4 milliGRAM(s) IV Push every 6 hours PRN Nausea and/or Vomiting  tetracaine/benzocaine/butamben Spray 1 Spray(s) Topical daily PRN sore throat      Physical Exam:  General Appearance: Appears well, NAD  Resp: Patent airway, non-labored breathing,   Abdomen: Soft, Nontender, Nondistended, no guarding or rebound tenderness   Extremities: warm, well perfused, movement x4 limbs b/l      Labs:    05-06    133<L>  |  102  |  13  ----------------------------<  97  3.8   |  21<L>  |  1.05    Ca    8.8      06 May 2022 07:23  Phos  3.6     05-06  Mg     2.0     05-06                              9.7    4.17  )-----------( 214      ( 06 May 2022 07:21 )             30.6          Surgery Progress Note     Subjective/24hour Events:   Patient seen and examined. no acute events overnight. tolerating liquids, no nausea or vomiting.         Vital Signs:  Vital Signs Last 24 Hrs  T(C): 37.1 (06 May 2022 22:00), Max: 37.4 (06 May 2022 05:02)  T(F): 98.7 (06 May 2022 22:00), Max: 99.4 (06 May 2022 05:02)  HR: 60 (06 May 2022 22:00) (46 - 74)  BP: 158/66 (06 May 2022 22:00) (154/80 - 206/73)  BP(mean): --  RR: 18 (06 May 2022 22:00) (18 - 18)  SpO2: 97% (06 May 2022 22:00) (97% - 99%)    CAPILLARY BLOOD GLUCOSE          I&O's Detail    05 May 2022 07:01  -  06 May 2022 07:00  --------------------------------------------------------  IN:    dextrose 5% + sodium chloride 0.9% w/ Additives: 150 mL    dextrose 5% + sodium chloride 0.9% w/ Additives: 1050 mL    Oral Fluid: 900 mL  Total IN: 2100 mL    OUT:    Voided (mL): 1450 mL  Total OUT: 1450 mL    Total NET: 650 mL      06 May 2022 07:01  -  07 May 2022 01:05  --------------------------------------------------------  IN:    IV PiggyBack: 600 mL    Oral Fluid: 960 mL  Total IN: 1560 mL    OUT:    Voided (mL): 750 mL  Total OUT: 750 mL    Total NET: 810 mL          MEDICATIONS  (STANDING):  amLODIPine   Tablet 10 milliGRAM(s) Oral daily  ATENolol  Tablet 25 milliGRAM(s) Oral daily  benzocaine 15 mG/menthol 3.6 mG Lozenge 1 Lozenge Oral two times a day  fluticasone propionate 50 MICROgram(s)/spray Nasal Spray 1 Spray(s) Both Nostrils every 12 hours  heparin   Injectable 5000 Unit(s) SubCutaneous every 8 hours  iron sucrose IVPB 200 milliGRAM(s) IV Intermittent every 24 hours  lisinopril 5 milliGRAM(s) Oral daily  pantoprazole    Tablet 40 milliGRAM(s) Oral before breakfast    MEDICATIONS  (PRN):  benzocaine 15 mG/menthol 3.6 mG Lozenge 1 Lozenge Oral every 6 hours PRN Sore Throat  ondansetron Injectable 4 milliGRAM(s) IV Push every 6 hours PRN Nausea and/or Vomiting  tetracaine/benzocaine/butamben Spray 1 Spray(s) Topical daily PRN sore throat      Physical Exam:  General Appearance: Appears well, NAD  Resp: Patent airway, non-labored breathing,   Abdomen: Soft, Nontender, Nondistended, no guarding or rebound tenderness   Extremities: warm, well perfused, movement x4 limbs b/l      Labs:    05-06    133<L>  |  102  |  13  ----------------------------<  97  3.8   |  21<L>  |  1.05    Ca    8.8      06 May 2022 07:23  Phos  3.6     05-06  Mg     2.0     05-06                              9.7    4.17  )-----------( 214      ( 06 May 2022 07:21 )             30.6

## 2022-05-07 NOTE — PROGRESS NOTE ADULT - NUTRITIONAL ASSESSMENT
This patient has been assessed with a concern for Malnutrition and has been determined to have a diagnosis/diagnoses of Moderate protein-calorie malnutrition.    This patient is being managed with:   Diet NPO-  Except Medications  With Ice Chips/Sips of Water  Entered: May  3 2022 10:22AM    
This patient has been assessed with a concern for Malnutrition and has been determined to have a diagnosis/diagnoses of Moderate protein-calorie malnutrition.    This patient is being managed with:   Diet Clear Liquid-  Lacto-Ovo Veg (Accepts Milk Prod. Eggs)  Supplement Feeding Modality:  Oral  Ensure Enlive Cans or Servings Per Day:  3       Frequency:  Daily  Entered: May  5 2022  3:21PM    
This patient has been assessed with a concern for Malnutrition and has been determined to have a diagnosis/diagnoses of Moderate protein-calorie malnutrition.    This patient is being managed with:   Diet Clear Liquid-  Lacto-Ovo Veg (Accepts Milk Prod. Eggs)  Supplement Feeding Modality:  Oral  Ensure Enlive Cans or Servings Per Day:  3       Frequency:  Daily  Entered: May  5 2022  3:21PM    
This patient has been assessed with a concern for Malnutrition and has been determined to have a diagnosis/diagnoses of Moderate protein-calorie malnutrition.    This patient is being managed with:   Diet NPO-  Except Medications  With Ice Chips/Sips of Water  Entered: May  3 2022 10:22AM    
This patient has been assessed with a concern for Malnutrition and has been determined to have a diagnosis/diagnoses of Moderate protein-calorie malnutrition.    This patient is being managed with:   Diet NPO-  Except Medications  With Ice Chips/Sips of Water  Entered: May  3 2022 10:22AM    
This patient has been assessed with a concern for Malnutrition and has been determined to have a diagnosis/diagnoses of Moderate protein-calorie malnutrition.    This patient is being managed with:   Diet Clear Liquid-  Lacto-Ovo Veg (Accepts Milk Prod. Eggs)  Supplement Feeding Modality:  Oral  Ensure Enlive Cans or Servings Per Day:  3       Frequency:  Daily  Entered: May  5 2022  3:21PM

## 2022-05-07 NOTE — PROGRESS NOTE ADULT - ASSESSMENT
76M hx of anal cancer 2012 s/p chemo radiation here with 2 weeks of epigastric pain found to have new annular mass involving pancreas +duodenal obstruction third portion. +FOBT in ED. His viral panel is positive for RVP and coronavirus. His prelim blood cultures are negative. He feels weak this am and is wheezing.     Plan:  - DVT ppx  - NPO with ice chips/IVF  - CT chest staging w/ few indeterminate pulmonary nodules   - appreciate GI and onc recommendations  - f/u bx, will plan for a feeding jejunostomy tube   - Appreciate cardiology recs, clearance for surgery  - F/u final blood cultures, Prelim are NGTD  -Appreciate medicine recommendations       [facility]  [id number] 76M hx of anal cancer 2012 s/p chemo radiation here with 2 weeks of epigastric pain found to have new annular mass involving pancreas +duodenal obstruction third portion. +FOBT in ED. GI unable to transverse the mass and place a PEJ.     Plan:  - DVT ppx  - CLD  - CT chest staging w/ few indeterminate pulmonary nodules   - appreciate GI and onc recommendations  - no need for a feeding jejunostomy feeding tube at this point  - Appreciate cardiology recs, clearance for surgery  - Appreciate medicine recommendations, BP stable, will fu w/ PCP on Mon 5/9   - discharge home with clear liq diet and boosts/ensures       [facility]  [id number]

## 2022-05-07 NOTE — PROGRESS NOTE ADULT - PROVIDER SPECIALTY LIST ADULT
Heme/Onc
Surgery
Surgery
Cardiology
Gastroenterology
Gastroenterology
Heme/Onc
Heme/Onc
Surgery
Cardiology
Cardiology
Gastroenterology
Surgery
Surgery
Heme/Onc
Surgery
Hospitalist
Hospitalist

## 2022-05-07 NOTE — PROVIDER CONTACT NOTE (OTHER) - DATE AND TIME:
02-May-2022 01:38
02-May-2022 22:00
01-May-2022 23:45
07-May-2022 03:52
01-May-2022 10:00
03-May-2022 04:15
04-May-2022 02:04

## 2022-05-10 NOTE — ED ADULT NURSE NOTE - OBJECTIVE STATEMENT
PT sent to ED from Dr. Torres ([phone]  for possible bowel obstruction and duodenal mass. PT A&OX4.  PT c/o generalized abd pain and vomiting.  No ABD noted at this time.  PT ambulatory, able to move all extremities.  No SOB noted.  Resp WDL.  Bowel sounds + in all quadrants.  Skin intact.  Will continue to monitor.

## 2022-05-10 NOTE — ED PROVIDER NOTE - CLINICAL SUMMARY MEDICAL DECISION MAKING FREE TEXT BOX
75yo M with new pancreatic mass c/b gastric outlet obstruction now with persistent abd pain (none now), inability to tolerate PO. Abdomen soft, tender in epigastrium.   - Surgery consulted, aware of pt, recommend admission, requesting basic labs, preop labs  - Abdomen soft, recent imaging in system, will defer imaging per surg recs  - IV hydration

## 2022-05-10 NOTE — ED PROVIDER NOTE - PATIENT PORTAL LINK FT
You can access the FollowMyHealth Patient Portal offered by [facility] by registering at the following website: [email or web address]. By joining [employer]’s FollowMyHealth portal, you will also be able to view your health information using other applications (apps) compatible with our system.

## 2022-05-10 NOTE — H&P ADULT - NSHPLABSRESULTS_GEN_ALL_CORE
----------  LABORATORY DATA:  ----------                        10.2   9.43  )-----------( 310      ( 10 May 2022 17:45 )             32.4               05-10    133<L>  |  99  |  26<H>  ----------------------------<  86  4.9   |  25  |  1.41<H>    Ca    9.9      10 May 2022 17:45    TPro  5.5<L>  /  Alb  3.7  /  TBili  0.4  /  DBili  x   /  AST  17  /  ALT  22  /  AlkPhos  67  05-10    LIVER FUNCTIONS - ( 10 May 2022 17:45 )  Alb: 3.7 g/dL / Pro: 5.5 g/dL / ALK PHOS: 67 U/L / ALT: 22 U/L / AST: 17 U/L / GGT: x           PT/INR - ( 10 May 2022 17:45 )   PT: 13.9 sec;   INR: 1.20 ratio    PTT - ( 10 May 2022 17:45 )  PTT:30.7 sec    ----------  RADIOGRAPHIC DATA:  ---------    < from: Xray Chest 1 View- PORTABLE-Urgent (Xray Chest 1 View- PORTABLE-Urgent .) (05.10.22 @ 18:11) >    FINDINGS:  Heart/Vascular: Right-sided chest wall port catheter terminates in the   SVC. Heart size is poorly assessed on this projection  Pulmonary: The lungs are clear. No pleural effusion. No pneumothorax.  Bones: No acute osseous abnormalities    IMPRESSION:  Clear lungs    < end of copied text >

## 2022-05-10 NOTE — CHART NOTE - NSCHARTNOTEFT_GEN_A_CORE
SURGICAL ONCOLOGY CHART NOTE  THOMAS DUKE | [id number] | 05-10-22 @ 16:01     Patient known to surgical oncology service.   Patient recently admitted to [facility] hospital for abd pain and found to have GOO with concern for pancreatic annular mass.    Patient was discharged home after bx and failed duodenal stent on liquid diet while awaiting bx results.  Patient contacted outpatient office with weakness, abd pain, nausea / vomiting and concern for dehydration in the setting of limited PO tolerance. Patient recommended to ED for further management and rehydration.       Patient was seen and examined in ED waiting room by surgical oncology team at 15:35. Patient in NAD.     76M hx pancreatic mass w/ duodenal obstruction awaiting bx results now with concern for dehydration. Please obtain routine labs. Formal surgical consultation to follow when patient is in room in ED and complete examination performed.     Please contact Surgery D-Team (P: [phone]) with any questions.    Surgery, D-Team  Pager: [phone]  [facility]

## 2022-05-10 NOTE — PATIENT PROFILE ADULT - FALL HARM RISK - RISK INTERVENTIONS

## 2022-05-10 NOTE — ED PROVIDER NOTE - ATTENDING CONTRIBUTION TO CARE
75 yo male with PMH anal cancer (2012 s/p chemo radiation), recently diagnosed pancreatic cancer for evaluation of gastric outlet obstruction (unsuccessful duodenal stent), discharged this past weekend on liquid diet now returning to ED with persistent abd pain and inability to tolerate PO. PE: +epigastric ttp A/P Labs, imaging, medicate, adm

## 2022-05-10 NOTE — ED PROVIDER NOTE - PHYSICAL EXAMINATION
GENERAL: non-toxic appearing, alert, in NAD  HEENT: atraumatic, normocephalic, Vision grossly intact, no conjunctivitis or discharge, hearing grossly intact,  no nasal discharge, epistaxis   CARDIAC: RRR, normal S1S2,  no appreciable murmurs, no cyanosis, cap refill < 2 seconds  PULM: normal work of breathing, oxygen saturation on RA wnl, CTAB, no crackles, rales, rhonchi, or wheezing  GI: abdomen nondistended, soft, mildly tender in epigastrium with no guarding or rebound tenderness, no palpable masses  NEURO: awake and alert, follows commands, normal speech, PERRLA, EOMI, no focal motor or sensory deficits  MSK: spine appears normal, no joint swelling or erythema, ranging all extremities with no appreciable loss of ROM  EXT: no peripheral edema, calf tenderness, redness or swelling  SKIN: warm, dry, and intact, no rashes  PSYCH: appropriate mood and affect

## 2022-05-10 NOTE — H&P ADULT - HISTORY OF PRESENT ILLNESS
76M hx of anal cancer 2012 s/p chemo radiation here with 2 weeks of epigastric pain found to have new annular mass involving pancreas +duodenal obstruction third portion. Underwent attempted endoscopic percutaneous endoscopic jejunostomy tube however unsuccessful.  76M hx of anal cancer 2012 s/p chemo radiation here with 2 weeks of epigastric pain found to have new annular mass involving pancreas +duodenal obstruction third portion. Underwent attempted endoscopic percutaneous endoscopic jejunostomy tube however unsuccessful. Patient was discharged home on liquid diet 2d prior to ED presentation at [facility]. Per patient, he has not been able to tolerate significant PO intake as each time he does he becomes nauseated. 1 episode of emesis since discharge. Patient now presents with reduced po tolerance sent in from outpatient office with concern for dehydration. Patient has persistent GI function, +flatus. +BM.     Denies ha / cp / sob / emesis / diarrhea / melena / hematochezia / changes in bladder function.

## 2022-05-10 NOTE — ED PROVIDER NOTE - CARE PLAN
1 Principal Discharge DX:	Abdominal pain, epigastric  Secondary Diagnosis:	Gastric outlet obstruction

## 2022-05-10 NOTE — ED PROVIDER NOTE - OBJECTIVE STATEMENT
75yo M pmh anal cancer (2012 s/p chemo radiation), recently diagnosed pancreatic cancer c/b gastric outlet obstruction (unsuccessful duodenal stent), discharged this past weekend on liquid diet now returning to ED with persistent abd pain and inability to tolerate PO. Pain when present is moderate, epigastric, non-radiating, throbbing in character and with no modifying factors. Pt reports was told to come back to the ED by surgery for planned jejunostomy tube. Has not had PO in over 4 days, has not had BM in weeks, is passing gas. Has heaving whenever attempts to PO either liquids or solids. Currently has no abd pain. Denies fever/chills, n/v, chest pain.

## 2022-05-10 NOTE — ED ADULT TRIAGE NOTE - CHIEF COMPLAINT QUOTE
pt sent to ED from Dr. Torres ([phone]  for possible bowel obstruction and duodenal mass.  pt c/o generalized abd pain and vomiting

## 2022-05-10 NOTE — H&P ADULT - NSHPPHYSICALEXAM_GEN_ALL_CORE
General: No Acute Distress.  Neuro: Alert and oriented, no focal deficits, moves all extremities spontaneously.  HEENT: Extraocular movements intact. Mucosa moist.   Respiratory: Airway patent, respirations unlabored.  Cardiovascular: Pulse present.   Abdomen: Soft, nontender, nondistended.  Genitourinary: No obvious lesions.  Extremities: Warm, moves all four.  Musculoskeletal: No tenderness of extremities, mobile joints.   Integumentary: No obvious lesions. Vital Signs Last 24 Hrs  T(C): 36.9 (10 May 2022 16:40), Max: 36.9 (10 May 2022 16:40)  T(F): 98.4 (10 May 2022 16:40), Max: 98.4 (10 May 2022 16:40)  HR: 50 (10 May 2022 16:40) (47 - 50)  BP: 161/62 (10 May 2022 16:40) (137/47 - 161/62)  RR: 18 (10 May 2022 16:40) (16 - 18)  SpO2: 100% (10 May 2022 16:40) (100% - 100%)    PHYSICAL EXAM:  Gen: WD, WN, in no acute distress.  Lungs: Respirations unlabored.   Abd: Soft, nontender, nondistended.

## 2022-05-10 NOTE — ED ADULT NURSE NOTE - CHIEF COMPLAINT QUOTE
pt sent to ED from Dr. Torres ([phone]  for possible bowel obstruction and duodenal mass.  pt c/o generalized abd pain and vomiting

## 2022-05-10 NOTE — H&P ADULT - ATTENDING COMMENTS
Risks, benefits, and alternatives discussed with the patient - he expressed understanding and agrees to proceed with diagnostic laparoscopy, placement of gastrostomy and jejunostomy tube, possible open.

## 2022-05-10 NOTE — H&P ADULT - ASSESSMENT
Mr. Pascal is a 76 Year-Old Gentleman with pmh of anal cancer 2012 s/p chemo radiation here with 2 weeks of epigastric pain found to have new annular mass involving pancreas and duodenal obstruction.    - Admit to Surgical Oncology, Dr. Dylan Avilez.   - Nil Per Os, Intravenous Fluid Resuscitation Therapy.   - Plan for surgical venting gastrostomy and feeding jejunostomy tentatively Friday, 5/13.   - Will obtain Medicine evaluation for preoperative optimization.     D Team Surgical Oncology Pager #[phone]  76M hx annular pancreatic mass now p/w persistent GOO from duodenal compression and PO intolerance. Patient hemodynamically stable at present time.     - Admit to Surgical Oncology, Dr. Dylan Avilez.   - Nil Per Os, Intravenous Fluid Resuscitation Therapy.   - Plan for surgical venting gastrostomy and feeding jejunostomy tentatively Friday, 5/13.   - Will obtain Medicine evaluation for preoperative optimization.     D Team Surgical Oncology Pager #[phone]

## 2022-05-11 NOTE — CONSULT NOTE ADULT - PROBLEM SELECTOR RECOMMENDATION 2
Previously not on any anti-hypertensives and BP at outpatient visit on 5/9/22 was 130s/80s.  - BP on initial presentation this admission was 207/71  - Avoid ACE inhibitors / ARBs for now given resolving MOSES  - c/w amlodipine 10mg qd  - Would also avoid metoprolol given the bradycardia; could start hydralazine 25mg TID instead

## 2022-05-11 NOTE — CONSULT NOTE ADULT - SUBJECTIVE AND OBJECTIVE BOX
THOMAS DUKE  76y  Male      Patient is a 76y old  Male who presents with a chief complaint of GOO (11 May 2022 01:45)    HPI:  76M hx anal cancer 2012 s/p chemo radiation here with 2 weeks of epigastric pain found to have new annular mass involving pancreas +duodenal obstruction third portion. Most of history provided by daughter who is a gastroenterologist in [place] as pt could not recall all of the details. Recently at [facility] for melena s/p EUS + biopsy (squamous adenocarcinoma), discharged 5/7/22. Prior to that discharge, pt was persistently hypertensive. He was never on any anti-hypertensives previously, but was discharged on lisinopril, amlodipine, and atenolol. Pt went to his PMD on 5/9 and BP was 130s/80s off the antihypertensives as there was a miscommunication about where the medications were sent. On presentation this admission, pt's /71 and now on amlodipine 10mg qd and Lopressor 5mg IVP q6h. He has a resolving MOSES which was likely related to melena and vomiting prior to the [facility] admission. No hx CVA, TIA, ACS, or diabetes mellitus. Pt fully functional per daughter with at least 4-6 METs. Pt denies any loose teeth, orthopnea, chest pain, nausea, vomiting. Daughter reports that pt recently having a cough, but pt currently denies and says he does not need any symptomatic relief at this time.    PAST MEDICAL/SURGICAL HISTORY  PAST MEDICAL & SURGICAL HISTORY:  Anal cancer  Had Chemo and Radiation 4 years ago      No significant past surgical history    REVIEW OF SYSTEMS:  CONSTITUTIONAL: No fever, weight loss, or fatigue  EYES: No eye pain, visual disturbances, or discharge  ENMT:  No difficulty hearing, tinnitus, vertigo; No sinus or throat pain  NECK: No pain or stiffness  BREASTS: No pain, masses, or nipple discharge  RESPIRATORY: No cough, wheezing, chills or hemoptysis; No shortness of breath  CARDIOVASCULAR: No chest pain, palpitations, dizziness, or leg swelling  GASTROINTESTINAL: No abdominal or epigastric pain. No nausea, vomiting, or hematemesis; No diarrhea or constipation. No melena or hematochezia.  GENITOURINARY: No dysuria, frequency, hematuria, or incontinence  NEUROLOGICAL: No headaches, memory loss, loss of strength, numbness, or tremors  SKIN: No itching, burning, rashes, or lesions   LYMPH NODES: No enlarged glands  ENDOCRINE: No heat or cold intolerance; No hair loss  MUSCULOSKELETAL: No joint pain or swelling; No muscle, back, or extremity pain  PSYCHIATRIC: No depression, anxiety, mood swings, or difficulty sleeping  HEME/LYMPH: No easy bruising, or bleeding gums  ALLERY AND IMMUNOLOGIC: No hives or eczema    T(C): 36.8 (05-11-22 @ 05:02), Max: 36.9 (05-10-22 @ 16:40)  HR: 54 (05-11-22 @ 05:02) (47 - 62)  BP: 149/48 (05-11-22 @ 05:02) (137/47 - 207/71)  RR: 17 (05-11-22 @ 05:02) (16 - 18)  SpO2: 100% (05-11-22 @ 05:02) (98% - 100%)  Wt(kg): --Vital Signs Last 24 Hrs  T(C): 36.8 (11 May 2022 05:02), Max: 36.9 (10 May 2022 16:40)  T(F): 98.3 (11 May 2022 05:02), Max: 98.5 (10 May 2022 21:25)  HR: 54 (11 May 2022 05:02) (47 - 62)  BP: 149/48 (11 May 2022 05:02) (137/47 - 207/71)  BP(mean): --  RR: 17 (11 May 2022 05:02) (16 - 18)  SpO2: 100% (11 May 2022 05:02) (98% - 100%)    PHYSICAL EXAM:  GENERAL: NAD, well-groomed, well-developed  HEAD:  Atraumatic, Normocephalic  EYES: EOMI, PERRLA, conjunctiva and sclera clear  ENMT: No tonsillar erythema, exudates, or enlargement; Moist mucous membranes, Good dentition, No lesions  NECK: Supple, No JVD, Normal thyroid  NERVOUS SYSTEM:  Alert & Oriented X3, Good concentration; Motor Strength 5/5 B/L upper and lower extremities; DTRs 2+ intact and symmetric  CHEST/LUNG: Clear to percussion bilaterally; No rales, rhonchi, wheezing, or rubs  HEART: Regular rate and rhythm; No murmurs, rubs, or gallops  ABDOMEN: Soft, Nontender, Nondistended; Bowel sounds present  EXTREMITIES: 2+ pitting edema b/l LE up to the knees. 2+ Peripheral Pulses, No clubbing or cyanosis  LYMPH: No lymphadenopathy noted  SKIN: No rashes or lesions    Consultant(s) Notes Reviewed:  [x ] YES  [ ] NO  Care Discussed with Consultants/Other Providers [ x] YES  [ ] NO    LABS:  CBC   05-11-22 @ 05:15  Hematcorit 31.5  Hemoglobin 10.1  Mean Cell Hemoglobin 26.9  Platelet Count-Automated 308  RBC Count 3.76  Red Cell Distrib Width 14.9  Wbc Count 8.28  05-10-22 @ 17:45  Hematcorit 32.4  Hemoglobin 10.2  Mean Cell Hemoglobin 27.1  Platelet Count-Automated 310  RBC Count 3.76  Red Cell Distrib Width 15.2  Wbc Count 9.43      BMP  05-11-22 @ 05:15  Anion Gap. Serum 9  Blood Urea Nitrogen,Serm 20  Calcium, Total Serum 9.6  Carbon Dioxide, Serum 25  Chloride, Serum 99  Creatinine, Serum 1.13  eGFR in  --  eGFR in Non Afican American --  Gloucose, serum 78  Potassium, Serum 4.0  Sodium, Serum 133    PT/INR  PT/INR  05-10-22 @ 17:45  INR 1.20  Prothrombin Time Comment --  Prothrobin Time, Qtwlap17.9      Amylase/Lipase  05-10-22 @ 17:45  Amylase, Serum Total --  Lipase, Serum 176      RADIOLOGY & ADDITIONAL TESTS:  < from: Xray Chest 1 View- PORTABLE-Urgent (Xray Chest 1 View- PORTABLE-Urgent .) (05.10.22 @ 18:11) >  FINDINGS:  Heart/Vascular: Right-sided chest wall port catheter terminates in the   SVC. Heart size is poorly assessed on this projection  Pulmonary: The lungs are clear. No pleural effusion. No pneumothorax.  Bones: No acute osseous abnormalities    IMPRESSION:  Clear lungs    --- End of Report ---    KARINA ALTAMIRANO MD; Resident Radiologist  This document has been electronically signed.  JANA RICARDO MD; Attending Radiologist  This document has been electronically signed. May 10 2022  6:37PM    < end of copied text >    Imaging Personally Reviewed:  [ ] YES  [x] NO

## 2022-05-11 NOTE — PROGRESS NOTE ADULT - ASSESSMENT
76M hx annular pancreatic mass now p/w persistent GOO from duodenal compression and PO intolerance. Patient hemodynamically stable at present time.     - Admit to Surgical Oncology, Dr. Dylan Avilez.   - Nil Per Os, Intravenous Fluid Resuscitation Therapy.   - Plan for surgical venting gastrostomy and feeding jejunostomy tentatively Friday, 5/13.   - Will obtain Medicine evaluation for preoperative optimization.     D Team Surgical Oncology Pager #[phone]  76M hx annular pancreatic mass now p/w persistent GOO from duodenal compression and PO intolerance. Patient hemodynamically stable at present time.     Plan  - Nil Per Os, Intravenous Fluid Resuscitation Therapy; NGT if vomits  - Plan for surgical venting gastrostomy and feeding jejunostomy tentatively Friday, 5/13.   - Will obtain Medicine evaluation for preoperative optimization.     D Team Surgical Oncology Pager #[phone]

## 2022-05-11 NOTE — DIETITIAN INITIAL EVALUATION ADULT - PERTINENT MEDS FT
MEDICATIONS  (STANDING):  amLODIPine   Tablet 10 milliGRAM(s) Oral daily  heparin   Injectable 5000 Unit(s) SubCutaneous every 8 hours  lactated ringers. 1000 milliLiter(s) (125 mL/Hr) IV Continuous <Continuous>  metoprolol tartrate Injectable 5 milliGRAM(s) IV Push every 6 hours  pantoprazole  Injectable 40 milliGRAM(s) IV Push daily    MEDICATIONS  (PRN):

## 2022-05-11 NOTE — PROGRESS NOTE ADULT - SUBJECTIVE AND OBJECTIVE BOX
Subjective:   Patient seen and examined at bedside.   Overnight: Given 10mg hydralizing IV push for  systolic.       Objective:   Vital Signs Last 24 Hrs  T(C): 36.9 (11 May 2022 00:09), Max: 36.9 (10 May 2022 16:40)  T(F): 98.4 (11 May 2022 00:09), Max: 98.5 (10 May 2022 21:25)  HR: 60 (11 May 2022 00:09) (47 - 62)  BP: 149/48 (11 May 2022 00:09) (137/47 - 207/71)  BP(mean): --  RR: 18 (11 May 2022 00:09) (16 - 18)  SpO2: 98% (11 May 2022 00:09) (98% - 100%)  I&O's Summary      Physical Exam:  PHYSICAL EXAM:  Gen: WD, WN, in no acute distress.  Lungs: Respirations unlabored.   Abd: Soft, nontender, nondistended.    LABS:                        10.2   9.43  )-----------( 310      ( 10 May 2022 17:45 )             32.4     05-10    133<L>  |  99  |  26<H>  ----------------------------<  86  4.9   |  25  |  1.41<H>    Ca    9.9      10 May 2022 17:45    TPro  5.5<L>  /  Alb  3.7  /  TBili  0.4  /  DBili  x   /  AST  17  /  ALT  22  /  AlkPhos  67  05-10    PT/INR - ( 10 May 2022 17:45 )   PT: 13.9 sec;   INR: 1.20 ratio         PTT - ( 10 May 2022 17:45 )  PTT:30.7 sec    amLODIPine   Tablet 10  heparin   Injectable 5000  metoprolol tartrate Injectable 5      Radiology and Additional Studies:    Assessment and Plan:

## 2022-05-11 NOTE — DIETITIAN INITIAL EVALUATION ADULT - FACTORS AFF FOOD INTAKE
Pt has abdominal pain. Pt develops nausea after eating or drinking. He also experiences vomiting and diarrhea after eating or drinking./other (specify)/NPO

## 2022-05-11 NOTE — DIETITIAN INITIAL EVALUATION ADULT - OTHER INFO
Pt has a history of Anal Ca s/p chemo radiation. He has had 2 weeks of epigastric pain and was found to have new Annular mass involving pancreas and duodenal obstruction. A PEG-J was attempted but was unsuccessful. Patient was discharged home on liquid diet 2d prior to ED presentation at [facility]. Pt reports he has not been able to tolerate po intake. When he attempted to eat or drink he became nauseous and experienced vomiting and diarrhea. Pt states these symptoms began 2 month ago and have worsened to complete intolerance.   Pt reports his usual weight is 180 lb. His admission weight was 154 lbs. Pt had a 26 lb weight loss in 2 months.  Pt is currently NPO and is awaiting surgical venting gastrotomy and feeding jejunostomy on 5/13.

## 2022-05-11 NOTE — DIETITIAN INITIAL EVALUATION ADULT - OTHER CALCULATIONS
Current weight used to calculate nutrient needs. High end of ideal body weight used to calculate nutrient needs. Ideal body weight used to calculate nutrient needs.

## 2022-05-12 NOTE — CHART NOTE - NSCHARTNOTEFT_GEN_A_CORE
Team Surgery Preop Note    Patient is a 76y old  Male who presents with a chief complaint of GOO (12 May 2022 02:47)    Diagnosis: GOO 2/2 pancreas mass  Procedure: TAO  Surgeon: Fatmata Castaneda3   6.40  )-----------( 326      ( 12 May 2022 07:36 )             31.5     05-12    132<L>  |  97<L>  |  19  ----------------------------<  63<L>  4.0   |  22  |  1.22    Ca    9.1      12 May 2022 07:36  Phos  2.8     05-12  Mg     1.90     05-12    TPro  5.5<L>  /  Alb  3.7  /  TBili  0.4  /  DBili  x   /  AST  17  /  ALT  22  /  AlkPhos  67  05-10    PT/INR - ( 10 May 2022 17:45 )   PT: 13.9 sec;   INR: 1.20 ratio         PTT - ( 10 May 2022 17:45 )  PTT:30.7 sec      [X ] Type & Screen  [X ] CBC  [X] BMP  [X ] PT/PTT/INR  [ ] Urinalysis  [X ] Chest X-ray  [ ] EKG  [X ] NPO/IVF  [X ] Consent  [ ] Clearance  [ ] Added on to OR Schedule  [ ] Anti-coagulation held  [ ] MRSA/MSSA Nasal Screen

## 2022-05-12 NOTE — PROGRESS NOTE ADULT - SUBJECTIVE AND OBJECTIVE BOX
Subjective:   Patient seen and examined at bedside. No acute events overnight.       Objective:   Vital Signs Last 24 Hrs  T(C): 37.1 (11 May 2022 21:09), Max: 37.1 (11 May 2022 21:09)  T(F): 98.7 (11 May 2022 21:09), Max: 98.7 (11 May 2022 21:09)  HR: 61 (11 May 2022 21:09) (54 - 61)  BP: 160/48 (11 May 2022 21:09) (134/41 - 160/48)  BP(mean): --  RR: 18 (11 May 2022 21:09) (17 - 18)  SpO2: 99% (11 May 2022 21:09) (99% - 100%)  I&O's Summary    10 May 2022 07:01  -  11 May 2022 07:00  --------------------------------------------------------  IN: 1375 mL / OUT: 1560 mL / NET: -185 mL    11 May 2022 07:01  -  12 May 2022 02:48  --------------------------------------------------------  IN: 625 mL / OUT: 1450 mL / NET: -825 mL        Physical Exam:  PHYSICAL EXAM:  Gen: WD, WN, in no acute distress.  Lungs: Respirations unlabored.   Abd: Soft, nontender, nondistended.    LABS:                        10.1   8.28  )-----------( 308      ( 11 May 2022 05:15 )             31.5     05-11    133<L>  |  99  |  20  ----------------------------<  78  4.0   |  25  |  1.13    Ca    9.6      11 May 2022 05:15  Phos  3.0     05-11  Mg     1.60     05-11    TPro  5.5<L>  /  Alb  3.7  /  TBili  0.4  /  DBili  x   /  AST  17  /  ALT  22  /  AlkPhos  67  05-10    PT/INR - ( 10 May 2022 17:45 )   PT: 13.9 sec;   INR: 1.20 ratio         PTT - ( 10 May 2022 17:45 )  PTT:30.7 sec    amLODIPine   Tablet 10  heparin   Injectable 5000  hydrALAZINE 25      Radiology and Additional Studies:    Assessment and Plan:  Subjective:   Patient seen and examined at bedside. No acute events overnight. Denies n/v.       Objective:   Vital Signs Last 24 Hrs  T(C): 37.1 (11 May 2022 21:09), Max: 37.1 (11 May 2022 21:09)  T(F): 98.7 (11 May 2022 21:09), Max: 98.7 (11 May 2022 21:09)  HR: 61 (11 May 2022 21:09) (54 - 61)  BP: 160/48 (11 May 2022 21:09) (134/41 - 160/48)  BP(mean): --  RR: 18 (11 May 2022 21:09) (17 - 18)  SpO2: 99% (11 May 2022 21:09) (99% - 100%)  I&O's Summary    10 May 2022 07:01  -  11 May 2022 07:00  --------------------------------------------------------  IN: 1375 mL / OUT: 1560 mL / NET: -185 mL    11 May 2022 07:01  -  12 May 2022 02:48  --------------------------------------------------------  IN: 625 mL / OUT: 1450 mL / NET: -825 mL        Physical Exam:  PHYSICAL EXAM:  Gen: WD, WN, in no acute distress.  Lungs: Respirations unlabored.   Abd: Soft, nontender, nondistended.    LABS:                        10.1   8.28  )-----------( 308      ( 11 May 2022 05:15 )             31.5     05-11    133<L>  |  99  |  20  ----------------------------<  78  4.0   |  25  |  1.13    Ca    9.6      11 May 2022 05:15  Phos  3.0     05-11  Mg     1.60     05-11    TPro  5.5<L>  /  Alb  3.7  /  TBili  0.4  /  DBili  x   /  AST  17  /  ALT  22  /  AlkPhos  67  05-10    PT/INR - ( 10 May 2022 17:45 )   PT: 13.9 sec;   INR: 1.20 ratio         PTT - ( 10 May 2022 17:45 )  PTT:30.7 sec    amLODIPine   Tablet 10  heparin   Injectable 5000  hydrALAZINE 25      Radiology and Additional Studies:    Assessment and Plan:

## 2022-05-12 NOTE — PROGRESS NOTE ADULT - ASSESSMENT
76M hx annular pancreatic mass now p/w persistent GOO from duodenal compression and PO intolerance. Patient hemodynamically stable at present time.     Plan  - Nil Per Os, Intravenous Fluid Resuscitation Therapy; NGT if vomits  - Plan for surgical venting gastrostomy and feeding jejunostomy tentatively Friday, 5/13.   - Will obtain Medicine evaluation for preoperative optimization.     D Team Surgical Oncology Pager #[phone]  76M hx annular pancreatic mass now p/w persistent GOO from duodenal compression and PO intolerance. Patient hemodynamically stable at present time.     Plan  - NPO/IVF  - If patient vomits, place NGT.  - Plan for surgical venting gastrostomy and feeding jejunostomy tentatively Friday, 5/13.   - Will obtain Medicine evaluation for preoperative optimization.     D Team Surgical Oncology Pager #[phone]  76M hx annular pancreatic mass now p/w persistent GOO from duodenal compression and PO intolerance. Patient hemodynamically stable at present time.     Plan  - NPO/IVF  - If patient vomits, place NGT.  - Plan for surgical venting gastrostomy and feeding jejunostomy tentatively Friday, 5/13.   - Medically optimized for surgery tomorrow.      D Team Surgical Oncology Pager #[phone]

## 2022-05-13 NOTE — PROGRESS NOTE ADULT - ASSESSMENT
76M hx annular pancreatic mass now p/w persistent GOO from duodenal compression and PO intolerance. Patient hemodynamically stable at present time.     Plan  - NPO/IVF  - If patient vomits, place NGT.  - Plan for surgical venting gastrostomy and feeding jejunostomy tentatively Friday, 5/13.   - Medically optimized for surgery tomorrow.      D Team Surgical Oncology Pager #[phone]  76M hx annular pancreatic mass now p/w persistent GOO from duodenal compression and PO intolerance. Patient hemodynamically stable at present time.     Plan  - OR today  - NPO/IVF  - If patient vomits, place NGT  - Medically optimized for surgery tomorrow.      D Team Surgical Oncology Pager #[phone]

## 2022-05-13 NOTE — PROGRESS NOTE ADULT - SUBJECTIVE AND OBJECTIVE BOX
Subjective:   Patient seen and examined at bedside. No acute events overnight.       Objective:   Vital Signs Last 24 Hrs  T(C): 36.7 (13 May 2022 00:36), Max: 36.9 (12 May 2022 22:19)  T(F): 98 (13 May 2022 00:36), Max: 98.5 (12 May 2022 22:19)  HR: 61 (13 May 2022 00:36) (58 - 67)  BP: 149/48 (13 May 2022 00:36) (132/51 - 167/50)  BP(mean): --  RR: 16 (13 May 2022 00:36) (16 - 18)  SpO2: 100% (13 May 2022 00:36) (100% - 100%)  I&O's Summary    11 May 2022 07:01  -  12 May 2022 07:00  --------------------------------------------------------  IN: 1250 mL / OUT: 1875 mL / NET: -625 mL    12 May 2022 07:01  -  13 May 2022 02:40  --------------------------------------------------------  IN: 400 mL / OUT: 1800 mL / NET: -1400 mL        Physical Exam:    Physical Exam:  PHYSICAL EXAM:  Gen: WD, WN, in no acute distress.  Lungs: Respirations unlabored.   Abd: Soft, nontender, nondistended.        LABS:                        10.3   6.40  )-----------( 326      ( 12 May 2022 07:36 )             31.5     05-12    132<L>  |  97<L>  |  19  ----------------------------<  63<L>  4.0   |  22  |  1.22    Ca    9.1      12 May 2022 07:36  Phos  2.8     05-12  Mg     1.90     05-12          amLODIPine   Tablet 10  heparin   Injectable 5000  hydrALAZINE 25      Radiology and Additional Studies:    Assessment and Plan:

## 2022-05-13 NOTE — BRIEF OPERATIVE NOTE - OPERATION/FINDINGS
Diagnostic laparoscopy without evidence of metastatic disease. Laparoscopic assisted 20F Sy gastrostomy tube placed and pexied to anterior abdominal wall. 14F feeding red rubber Witzel jejunostomy tube placed and pexied to anterior abdominal wall. Both tubes with negative leak test. G tube with bumper at 5cm at skin.

## 2022-05-13 NOTE — CHART NOTE - NSCHARTNOTEFT_GEN_A_CORE
TEAM POST-OPERATIVE NOTE    Patient is s/p lap assisted gastrostomy and 14Fr feeding red rubber Witzel jejunostomy tube. J tube study with contrast in jejunum post-op. Recovering appropriately.     Vital Signs Last 24 Hrs  T(C): 37 (13 May 2022 12:00), Max: 37.1 (13 May 2022 06:50)  T(F): 98.6 (13 May 2022 12:00), Max: 98.8 (13 May 2022 06:50)  HR: 81 (13 May 2022 13:00) (58 - 81)  BP: 151/48 (13 May 2022 13:00) (142/54 - 187/58)  BP(mean): 75 (13 May 2022 13:00) (75 - 91)  RR: 19 (13 May 2022 13:00) (11 - 19)  SpO2: 97% (13 May 2022 13:00) (97% - 100%)  I&O's Detail    12 May 2022 07:01  -  13 May 2022 07:00  --------------------------------------------------------  IN:    dextrose 5% + sodium chloride 0.45% w/ Additives: 400 mL  Total IN: 400 mL    OUT:    Voided (mL): 2000 mL  Total OUT: 2000 mL    Total NET: -1600 mL      13 May 2022 07:01  -  13 May 2022 13:21  --------------------------------------------------------  IN:  Total IN: 0 mL    OUT:    Gastrostomy Tube (mL): 0 mL  Total OUT: 0 mL    Total NET: 0 mL        amLODIPine   Tablet 10  heparin   Injectable 5000  hydrALAZINE 25    PAST MEDICAL & SURGICAL HISTORY:  Anal cancer  Had Chemo and Radiation 4 years ago      No significant past surgical history          Physical Exam:  General: Awake, alert & fully oriented, no acute distress   Pulmonary: Respirations unlabored, clear bilaterally  Cardiovascular: s1/s2, no murmur   Abdominal: Soft, NT/ND  Extremities: WWP    LABS:                        9.8    6.03  )-----------( 277      ( 13 May 2022 04:20 )             30.0     05-13    134<L>  |  101  |  14  ----------------------------<  122<H>  3.8   |  23  |  1.22    Ca    8.9      13 May 2022 04:20  Phos  2.6     05-13  Mg     1.80     05-13    TPro  5.0<L>  /  Alb  3.2<L>  /  TBili  0.3  /  DBili  x   /  AST  16  /  ALT  11  /  AlkPhos  51  05-13    PT/INR - ( 13 May 2022 04:20 )   PT: 14.0 sec;   INR: 1.20 ratio         PTT - ( 13 May 2022 04:20 )  PTT:68.8 sec  CAPILLARY BLOOD GLUCOSE          Radiology and Additional Studies:    Assessment:  The patient is a 76y Male who is now several hours post-op from a     Plan:  - Pain control as needed  - DVT ppx  - OOB and ambulating as tolerated  - F/u AM labs TEAM POST-OPERATIVE NOTE    Patient is s/p lap assisted gastrostomy and 14Fr feeding red rubber Witzel jejunostomy tube. J tube study with contrast in xray jejunum post-op. Recovering appropriately.     Vital Signs Last 24 Hrs  T(C): 37 (13 May 2022 12:00), Max: 37.1 (13 May 2022 06:50)  T(F): 98.6 (13 May 2022 12:00), Max: 98.8 (13 May 2022 06:50)  HR: 81 (13 May 2022 13:00) (58 - 81)  BP: 151/48 (13 May 2022 13:00) (142/54 - 187/58)  BP(mean): 75 (13 May 2022 13:00) (75 - 91)  RR: 19 (13 May 2022 13:00) (11 - 19)  SpO2: 97% (13 May 2022 13:00) (97% - 100%)  I&O's Detail    12 May 2022 07:01  -  13 May 2022 07:00  --------------------------------------------------------  IN:    dextrose 5% + sodium chloride 0.45% w/ Additives: 400 mL  Total IN: 400 mL    OUT:    Voided (mL): 2000 mL  Total OUT: 2000 mL    Total NET: -1600 mL      13 May 2022 07:01  -  13 May 2022 13:21  --------------------------------------------------------  IN:  Total IN: 0 mL    OUT:    Gastrostomy Tube (mL): 0 mL  Total OUT: 0 mL    Total NET: 0 mL        amLODIPine   Tablet 10  heparin   Injectable 5000  hydrALAZINE 25    PAST MEDICAL & SURGICAL HISTORY:  Anal cancer  Had Chemo and Radiation 4 years ago      No significant past surgical history          Physical Exam:  General: Awake, alert & fully oriented, no acute distress   Pulmonary: Respirations unlabored, clear bilaterally  Cardiovascular: s1/s2, no murmur   Abdominal: Soft, NT/ND. Gastrostomy to open to gravity bag. jejunostomy red rubber.   Extremities: WWP    LABS:                        9.8    6.03  )-----------( 277      ( 13 May 2022 04:20 )             30.0     05-13    134<L>  |  101  |  14  ----------------------------<  122<H>  3.8   |  23  |  1.22    Ca    8.9      13 May 2022 04:20  Phos  2.6     05-13  Mg     1.80     05-13    TPro  5.0<L>  /  Alb  3.2<L>  /  TBili  0.3  /  DBili  x   /  AST  16  /  ALT  11  /  AlkPhos  51  05-13    PT/INR - ( 13 May 2022 04:20 )   PT: 14.0 sec;   INR: 1.20 ratio         PTT - ( 13 May 2022 04:20 )  PTT:68.8 sec  CAPILLARY BLOOD GLUCOSE          Radiology and Additional Studies:    Assessment:  The patient is a 76y Male who is now several hours post-op from a lap assisted gastrostomy and 14Fr feeding red rubber Witzel jejunostomy tube    Plan:  - keep gastrostomy open to bag  - Start TF via jejunostomy @ 10cc/hr.   - IVF  - Pain control as needed  - DVT ppx  - OOB and ambulating as tolerated  - F/u AM labs

## 2022-05-13 NOTE — BRIEF OPERATIVE NOTE - NSICDXBRIEFPREOP_GEN_ALL_CORE_FT
PRE-OP DIAGNOSIS:  Pancreatic mass 13-May-2022 10:28:25  Ashlie Benites  Duodenal obstruction 13-May-2022 10:28:32  Ashlie Benites

## 2022-05-13 NOTE — BRIEF OPERATIVE NOTE - NSICDXBRIEFPROCEDURE_GEN_ALL_CORE_FT
PROCEDURES:  Diagnostic laparoscopy 13-May-2022 10:25:11  Ashlie Benites  Open placement of gastrostomy tube 13-May-2022 10:26:06 Laparoscopic assisted Ashlie Benites  Jejunostomy feeding tube placement 13-May-2022 10:27:34 Laparoscopic assisted Ashlie Benites

## 2022-05-13 NOTE — CHART NOTE - NSCHARTNOTEFT_GEN_A_CORE
NUTRITION FOLLOW-UP    Pt seen for Nutrition Consult for recommendations for tube feeding. Pt was not able to tolerate po intake.  Pt is s/p gastric tube and jejunostomy feeding tue placement.     Weight: 70 kg     Labs: Na-134, Glu-122, Pro-5, Alb-3.2    Current Diet: Diet, NPO:   Except Medications (05-10-22 @ 18:19)    Edema: Nedema noted    Skin: Intact    Malnutrition Status: Ongoing severe malnutrition    Enteral Recommendations: Jevity 1.2 via J tube 1345 ml in 24 hours. Start feeding at 10 ml/hour and increase feeding by 10ml/hour every 4 hours until goal rate of 56 ml/hour is reached.    RD to Remain Available: Rani Villarreal MS, RDN, CDN pager [phone]     Additional Recommendations:   1) Initiate Jevity 1.2  via J tube   2) Monitor weight, labs, tube feeding tolerance and skin integrity.

## 2022-05-14 NOTE — PROGRESS NOTE ADULT - SUBJECTIVE AND OBJECTIVE BOX
ANESTHESIA POSTOP CHECK    76y Male POSTOP DAY 1 S/P     Vital Signs Last 24 Hrs  T(C): 37.1 (14 May 2022 05:20), Max: 37.2 (13 May 2022 19:54)  T(F): 98.7 (14 May 2022 05:20), Max: 99 (14 May 2022 00:35)  HR: 74 (14 May 2022 05:20) (64 - 82)  BP: 139/47 (14 May 2022 05:20) (119/54 - 187/58)  BP(mean): 75 (13 May 2022 13:00) (74 - 91)  RR: 16 (14 May 2022 05:20) (11 - 19)  SpO2: 99% (14 May 2022 05:20) (95% - 100%)  I&O's Summary    13 May 2022 07:01  -  14 May 2022 07:00  --------------------------------------------------------  IN: 475 mL / OUT: 810 mL / NET: -335 mL        [X ] NO APPARENT ANESTHESIA COMPLICATIONS      Comments:   Pt complaining of pain in stomach from surgical site, worse with coughing.

## 2022-05-14 NOTE — PROGRESS NOTE ADULT - ASSESSMENT
The patient is a 76y Male who is now POD 1  from a lap assisted gastrostomy and 14Fr feeding red rubber Witzel jejunostomy tube    Plan:  - keep gastrostomy open to bag  - Start TF via jejunostomy @ 10cc/hr.   - IVF  - Pain control as needed  - DVT ppx  - OOB and ambulating as tolerated  - F/u AM labs. The patient is a 76y Male who is now POD 1  from a lap assisted gastrostomy and 14Fr feeding red rubber Witzel jejunostomy tube    Plan:    - keep gastrostomy open to bag  - Increase TF via jejunostomy @ 20cc/hr.   - IVF  - Pain control as needed  - DVT ppx  - OOB and ambulating as tolerated      D Team Surgery  #[id number]    Seen at bedside with Dr. Edwards.

## 2022-05-14 NOTE — PROGRESS NOTE ADULT - SUBJECTIVE AND OBJECTIVE BOX
Subjective:   Patient seen and examined at bedside. No acute events overnight.       Objective:   Vital Signs Last 24 Hrs  T(C): 37.2 (13 May 2022 19:54), Max: 37.2 (13 May 2022 19:54)  T(F): 98.9 (13 May 2022 19:54), Max: 98.9 (13 May 2022 19:54)  HR: 64 (13 May 2022 21:30) (62 - 82)  BP: 121/45 (13 May 2022 21:30) (119/54 - 187/58)  BP(mean): 75 (13 May 2022 13:00) (74 - 91)  RR: 18 (13 May 2022 21:30) (11 - 19)  SpO2: 95% (13 May 2022 21:30) (95% - 100%)  I&O's Summary    12 May 2022 07:01  -  13 May 2022 07:00  --------------------------------------------------------  IN: 400 mL / OUT: 2000 mL / NET: -1600 mL    13 May 2022 07:01  -  14 May 2022 03:35  --------------------------------------------------------  IN: 475 mL / OUT: 250 mL / NET: 225 mL        Physical Exam:  General: Awake, alert & fully oriented, no acute distress   Pulmonary: Respirations unlabored, clear bilaterally  Cardiovascular: s1/s2, no murmur   Abdominal: Soft, NT/ND. Gastrostomy to open to gravity bag. jejunostomy red rubber.   Extremities: [facility]      LABS:                        9.8    6.03  )-----------( 277      ( 13 May 2022 04:20 )             30.0     05-13    134<L>  |  101  |  14  ----------------------------<  122<H>  3.8   |  23  |  1.22    Ca    8.9      13 May 2022 04:20  Phos  2.6     05-13  Mg     1.80     05-13    TPro  5.0<L>  /  Alb  3.2<L>  /  TBili  0.3  /  DBili  x   /  AST  16  /  ALT  11  /  AlkPhos  51  05-13    PT/INR - ( 13 May 2022 04:20 )   PT: 14.0 sec;   INR: 1.20 ratio         PTT - ( 13 May 2022 04:20 )  PTT:68.8 sec    amLODIPine   Tablet 10  heparin   Injectable 5000  hydrALAZINE 25      Radiology and Additional Studies:    Assessment and Plan:  Subjective:   Patient seen and examined at bedside. POD 1. No acute events overnight. Family on phone with questions. G tube draining bilious dark content      Objective:   Vital Signs Last 24 Hrs  T(C): 37.2 (13 May 2022 19:54), Max: 37.2 (13 May 2022 19:54)  T(F): 98.9 (13 May 2022 19:54), Max: 98.9 (13 May 2022 19:54)  HR: 64 (13 May 2022 21:30) (62 - 82)  BP: 121/45 (13 May 2022 21:30) (119/54 - 187/58)  BP(mean): 75 (13 May 2022 13:00) (74 - 91)  RR: 18 (13 May 2022 21:30) (11 - 19)  SpO2: 95% (13 May 2022 21:30) (95% - 100%)  I&O's Summary    12 May 2022 07:01  -  13 May 2022 07:00  --------------------------------------------------------  IN: 400 mL / OUT: 2000 mL / NET: -1600 mL    13 May 2022 07:01  -  14 May 2022 03:35  --------------------------------------------------------  IN: 475 mL / OUT: 250 mL / NET: 225 mL        Physical Exam:  General: Awake, alert & fully oriented, no acute distress   Pulmonary: Respirations unlabored, clear bilaterally  Cardiovascular: s1/s2, no murmur   Abdominal: Soft, NT/ND. Gastrostomy to open to gravity bag. jejunostomy red rubber.   Extremities: [facility]      LABS:                        9.8    6.03  )-----------( 277      ( 13 May 2022 04:20 )             30.0     05-13    134<L>  |  101  |  14  ----------------------------<  122<H>  3.8   |  23  |  1.22    Ca    8.9      13 May 2022 04:20  Phos  2.6     05-13  Mg     1.80     05-13    TPro  5.0<L>  /  Alb  3.2<L>  /  TBili  0.3  /  DBili  x   /  AST  16  /  ALT  11  /  AlkPhos  51  05-13    PT/INR - ( 13 May 2022 04:20 )   PT: 14.0 sec;   INR: 1.20 ratio         PTT - ( 13 May 2022 04:20 )  PTT:68.8 sec    amLODIPine   Tablet 10  heparin   Injectable 5000  hydrALAZINE 25      Radiology and Additional Studies:    Assessment and Plan:

## 2022-05-15 NOTE — PROVIDER CONTACT NOTE (CHANGE IN STATUS NOTIFICATION) - SITUATION
patient takes Flonase for nasal drip at home; RN requesting order from surg D team; patient c/o of 7/10 abdominal pain when cough, Tylenol given as ordered, further intervention?

## 2022-05-15 NOTE — PROGRESS NOTE ADULT - ASSESSMENT
The patient is a 76y Male who is now POD 1  from a lap assisted gastrostomy and 14Fr feeding red rubber Witzel jejunostomy tube    Plan  - DVT ppx: SQH   - GI ppx: PRTNX   - mIVF   - Pain: Dilaudid  - PO Home Meds: Amlodipine, Hydralazine  - keep gastrostomy open to bag  - Increase TF via jejunostomy @ 20cc/hr.   - OOB and ambulating as tolerated    D Team Surgery  #[id number] The patient is a 76y Male who is now POD 1  from a lap assisted gastrostomy and 14Fr feeding red rubber Witzel jejunostomy tube    Plan    - CXR, f/u results  - DVT ppx: SQH   - GI ppx: PRTNX   - mIVF NS given hyponatremia  - Pain: Dilaudid  - PO Home Meds: Amlodipine, Hydralazine  - keep gastrostomy open to bag  - Increase TF via jejunostomy @ 30cc/hr.   - OOB and ambulating as tolerated    D Team Surgery  #[id number]

## 2022-05-15 NOTE — PROGRESS NOTE ADULT - SUBJECTIVE AND OBJECTIVE BOX
SURGERY PROGRESS NOTE  Hospital Day #05-10-22 (5d)  Procedure/Dx: Diagnostic laparoscopy, Open placement of gastrostomy tube, Jejunostomy feeding tube placement    Yesterday in the late afternoon patient had a momentary episode of epigastric vs chest pain. EKG showed unchanged LVH from the time of admission. Patient symptoms resolved without intervention.   Overnight, no acute events. Pt seen and examined at bedside.  Gtube is vented. Jtubes with feeds     Vital Signs   T(C): 37.7 (15 May 2022 00:24), Max: 37.7 (15 May 2022 00:24)  T(F): 99.8 (15 May 2022 00:24), Max: 99.8 (15 May 2022 00:24)  HR: 73 (15 May 2022 00:24) (62 - 75)  BP: 144/42 (15 May 2022 00:24) (127/37 - 144/42)  RR: 16 (15 May 2022 00:24) (16 - 18)  SpO2: 99% (15 May 2022 00:24) (95% - 99%)    Physical Exam:  General: Awake, alert & fully oriented, no acute distress   Pulmonary: Respirations unlabored, clear bilaterally  Cardiovascular: s1/s2, no murmur   Abdominal: Soft, NT/ND. Gastrostomy to open to gravity bag. jejunostomy red rubber.   Extremities: WWP    MEDICATIONS:   DVT PROPHYLAXIS: heparin   Injectable 5000 Unit(s)  GI PROPHYLAXIS: pantoprazole  Injectable 40 milliGRAM(s)    LAB/STUDIES:             9.9    19.89 )-----------( 282      ( 14 May 2022 14:54 )             30.0   129<L>  |  100  |  16  ----------------------------<  128<H>  4.4   |  21<L>  |  1.31<H>  Ca    9.2      14 May 2022 14:54  Phos  2.4     05-14  Mg     1.80     05-14  TPro  5.0<L>  /  Alb  3.2<L>  /  TBili  0.3  /  DBili  x   /  AST  16  /  ALT  11  /  AlkPhos  51  05-13  PT/INR - ( 13 May 2022 04:20 )   PT: 14.0 sec;   INR: 1.20 ratio     PTT - ( 13 May 2022 04:20 )  PTT:68.8 sec LIVER FUNCTIONS - ( 13 May 2022 04:20 )  Alb: 3.2 g/dL / Pro: 5.0 g/dL / ALK PHOS: 51 U/L / ALT: 11 U/L / AST: 16 U/L / GGT: x      SURGERY PROGRESS NOTE  Hospital Day #05-10-22 (5d)  Procedure/Dx: Diagnostic laparoscopy, Open placement of gastrostomy tube, Jejunostomy feeding tube placement    Yesterday in the late afternoon patient had a momentary episode of epigastric vs chest pain. EKG showed unchanged LVH from the time of admission. Patient symptoms resolved without intervention.   Overnight, no acute events. Pt seen and examined at bedside.  Gtube is vented. Jtubes with feeds. Patient endorses mild cough. Hyponatremia on labs. MOSES resolving    Vital Signs   T(C): 37.7 (15 May 2022 00:24), Max: 37.7 (15 May 2022 00:24)  T(F): 99.8 (15 May 2022 00:24), Max: 99.8 (15 May 2022 00:24)  HR: 73 (15 May 2022 00:24) (62 - 75)  BP: 144/42 (15 May 2022 00:24) (127/37 - 144/42)  RR: 16 (15 May 2022 00:24) (16 - 18)  SpO2: 99% (15 May 2022 00:24) (95% - 99%)    Physical Exam:  General: Awake, alert & fully oriented, no acute distress   Pulmonary: Respirations unlabored, clear bilaterally  Cardiovascular: s1/s2, no murmur   Abdominal: Soft, NT/ND. Gastrostomy to open to gravity bag. jejunostomy red rubber.   Extremities: WWP    MEDICATIONS:   DVT PROPHYLAXIS: heparin   Injectable 5000 Unit(s)  GI PROPHYLAXIS: pantoprazole  Injectable 40 milliGRAM(s)    LAB/STUDIES:             9.9    19.89 )-----------( 282      ( 14 May 2022 14:54 )             30.0   129<L>  |  100  |  16  ----------------------------<  128<H>  4.4   |  21<L>  |  1.31<H>  Ca    9.2      14 May 2022 14:54  Phos  2.4     05-14  Mg     1.80     05-14  TPro  5.0<L>  /  Alb  3.2<L>  /  TBili  0.3  /  DBili  x   /  AST  16  /  ALT  11  /  AlkPhos  51  05-13  PT/INR - ( 13 May 2022 04:20 )   PT: 14.0 sec;   INR: 1.20 ratio     PTT - ( 13 May 2022 04:20 )  PTT:68.8 sec LIVER FUNCTIONS - ( 13 May 2022 04:20 )  Alb: 3.2 g/dL / Pro: 5.0 g/dL / ALK PHOS: 51 U/L / ALT: 11 U/L / AST: 16 U/L / GGT: x

## 2022-05-16 NOTE — PROGRESS NOTE ADULT - SUBJECTIVE AND OBJECTIVE BOX
Surgical Oncology    SUBJECTIVE: Pt seen and examined on rounds with team. No acute events overnight.        OBJECTIVE    PHYSICAL EXAM:   General: NAD, Lying in bed   Neuro: Awake and alert  Extremities:   GI/Abd: Soft, NT/ND,       VITALS  T(C): 36.7 (05-15-22 @ 21:26), Max: 37.7 (05-15-22 @ 00:24)  HR: 67 (05-15-22 @ 21:26) (63 - 74)  BP: 145/49 (05-15-22 @ 21:26) (141/50 - 152/72)  RR: 18 (05-15-22 @ 21:26) (16 - 18)  SpO2: 97% (05-15-22 @ 21:26) (96% - 100%)  CAPILLARY BLOOD GLUCOSE          Is/Os    05-14 @ 07:01  -  05-15 @ 07:00  --------------------------------------------------------  IN:    Oral Fluid: 50 mL  Total IN: 50 mL    OUT:    Gastrostomy Tube (mL): 750 mL    Voided (mL): 1000 mL  Total OUT: 1750 mL    Total NET: -1700 mL      05-15 @ 07:01  -  05-16 @ 00:16  --------------------------------------------------------  IN:  Total IN: 0 mL    OUT:    Gastrostomy Tube (mL): 225 mL    Voided (mL): 700 mL  Total OUT: 925 mL    Total NET: -925 mL          MEDICATIONS (STANDING): acetaminophen    Suspension .. 325 milliGRAM(s) Oral every 6 hours  amLODIPine   Tablet 10 milliGRAM(s) Oral daily  dextrose 5% + sodium chloride 0.9% with potassium chloride 20 mEq/L 1000 milliLiter(s) IV Continuous <Continuous>  heparin   Injectable 5000 Unit(s) SubCutaneous every 8 hours  hydrALAZINE 25 milliGRAM(s) Oral three times a day  pantoprazole  Injectable 40 milliGRAM(s) IV Push daily    MEDICATIONS (PRN):    LABS  CBC (05-15 @ 08:29)                              9.8<L>                         13.30<H>  )----------------(  297        --    % Neutrophils, --    % Lymphocytes, ANC: --                                  30.7<L>  CBC (05-14 @ 14:54)                              9.9<L>                         19.89<H>  )----------------(  282        --    % Neutrophils, --    % Lymphocytes, ANC: --                                  30.0<L>    BMP (05-15 @ 08:29)             130<L>  |  98      |  12    		Ca++ --      Ca 9.1                ---------------------------------( 97    		Mg 1.80               4.3     |  22      |  1.27  			Ph 1.9<L>  BMP (05-14 @ 14:54)             129<L>  |  100     |  16    		Ca++ --      Ca 9.2                ---------------------------------( 128<H>		Mg 1.80               4.4     |  21<L>   |  1.31<H>			Ph 2.4<L>                IMAGING STUDIES     Surgical Oncology    SUBJECTIVE: Pt seen and examined on rounds with team. No acute events overnight.  Pt tolerating TF, rate 30cc/hr during morning rounds, will cont titrate to goal (40cc/hr). Pt +bowel function after receiving suppository yesterday       OBJECTIVE    Physical Exam:  General: Awake, alert & fully oriented, no acute distress   Pulmonary: Respirations unlabored, clear bilaterally  Cardiovascular: s1/s2, no murmur   Abdominal: Soft, NT/ND. Gastrostomy to open to gravity bag. jejunostomy red rubber.   Extremities: WWP        VITALS  T(C): 36.7 (05-15-22 @ 21:26), Max: 37.7 (05-15-22 @ 00:24)  HR: 67 (05-15-22 @ 21:26) (63 - 74)  BP: 145/49 (05-15-22 @ 21:26) (141/50 - 152/72)  RR: 18 (05-15-22 @ 21:26) (16 - 18)  SpO2: 97% (05-15-22 @ 21:26) (96% - 100%)  CAPILLARY BLOOD GLUCOSE          Is/Os    05-14 @ 07:01  -  05-15 @ 07:00  --------------------------------------------------------  IN:    Oral Fluid: 50 mL  Total IN: 50 mL    OUT:    Gastrostomy Tube (mL): 750 mL    Voided (mL): 1000 mL  Total OUT: 1750 mL    Total NET: -1700 mL      05-15 @ 07:01  -  05-16 @ 00:16  --------------------------------------------------------  IN:  Total IN: 0 mL    OUT:    Gastrostomy Tube (mL): 225 mL    Voided (mL): 700 mL  Total OUT: 925 mL    Total NET: -925 mL          MEDICATIONS (STANDING): acetaminophen    Suspension .. 325 milliGRAM(s) Oral every 6 hours  amLODIPine   Tablet 10 milliGRAM(s) Oral daily  dextrose 5% + sodium chloride 0.9% with potassium chloride 20 mEq/L 1000 milliLiter(s) IV Continuous <Continuous>  heparin   Injectable 5000 Unit(s) SubCutaneous every 8 hours  hydrALAZINE 25 milliGRAM(s) Oral three times a day  pantoprazole  Injectable 40 milliGRAM(s) IV Push daily    MEDICATIONS (PRN):    LABS  CBC (05-15 @ 08:29)                              9.8<L>                         13.30<H>  )----------------(  297        --    % Neutrophils, --    % Lymphocytes, ANC: --                                  30.7<L>  CBC (05-14 @ 14:54)                              9.9<L>                         19.89<H>  )----------------(  282        --    % Neutrophils, --    % Lymphocytes, ANC: --                                  30.0<L>    BMP (05-15 @ 08:29)             130<L>  |  98      |  12    		Ca++ --      Ca 9.1                ---------------------------------( 97    		Mg 1.80               4.3     |  22      |  1.27  			Ph 1.9<L>  BMP (05-14 @ 14:54)             129<L>  |  100     |  16    		Ca++ --      Ca 9.2                ---------------------------------( 128<H>		Mg 1.80               4.4     |  21<L>   |  1.31<H>			Ph 2.4<L>                IMAGING STUDIES     Surgical Oncology    SUBJECTIVE: Pt seen and examined on rounds with team. No acute events overnight.  Pt tolerating TF, rate at 30cc/hr. Pt +bowel function after receiving suppository yesterday       OBJECTIVE    Physical Exam:  General: Awake, alert & fully oriented, no acute distress   Pulmonary: Respirations unlabored, clear bilaterally  Cardiovascular: s1/s2, no murmur   Abdominal: Soft, NT/ND. Gastrostomy to open to gravity bag. jejunostomy red rubber.   Extremities: WWP        VITALS  T(C): 36.7 (05-15-22 @ 21:26), Max: 37.7 (05-15-22 @ 00:24)  HR: 67 (05-15-22 @ 21:26) (63 - 74)  BP: 145/49 (05-15-22 @ 21:26) (141/50 - 152/72)  RR: 18 (05-15-22 @ 21:26) (16 - 18)  SpO2: 97% (05-15-22 @ 21:26) (96% - 100%)  CAPILLARY BLOOD GLUCOSE          Is/Os    05-14 @ 07:01  -  05-15 @ 07:00  --------------------------------------------------------  IN:    Oral Fluid: 50 mL  Total IN: 50 mL    OUT:    Gastrostomy Tube (mL): 750 mL    Voided (mL): 1000 mL  Total OUT: 1750 mL    Total NET: -1700 mL      05-15 @ 07:01  -  05-16 @ 00:16  --------------------------------------------------------  IN:  Total IN: 0 mL    OUT:    Gastrostomy Tube (mL): 225 mL    Voided (mL): 700 mL  Total OUT: 925 mL    Total NET: -925 mL          MEDICATIONS (STANDING): acetaminophen    Suspension .. 325 milliGRAM(s) Oral every 6 hours  amLODIPine   Tablet 10 milliGRAM(s) Oral daily  dextrose 5% + sodium chloride 0.9% with potassium chloride 20 mEq/L 1000 milliLiter(s) IV Continuous <Continuous>  heparin   Injectable 5000 Unit(s) SubCutaneous every 8 hours  hydrALAZINE 25 milliGRAM(s) Oral three times a day  pantoprazole  Injectable 40 milliGRAM(s) IV Push daily    MEDICATIONS (PRN):    LABS  CBC (05-15 @ 08:29)                              9.8<L>                         13.30<H>  )----------------(  297        --    % Neutrophils, --    % Lymphocytes, ANC: --                                  30.7<L>  CBC (05-14 @ 14:54)                              9.9<L>                         19.89<H>  )----------------(  282        --    % Neutrophils, --    % Lymphocytes, ANC: --                                  30.0<L>    BMP (05-15 @ 08:29)             130<L>  |  98      |  12    		Ca++ --      Ca 9.1                ---------------------------------( 97    		Mg 1.80               4.3     |  22      |  1.27  			Ph 1.9<L>  BMP (05-14 @ 14:54)             129<L>  |  100     |  16    		Ca++ --      Ca 9.2                ---------------------------------( 128<H>		Mg 1.80               4.4     |  21<L>   |  1.31<H>			Ph 2.4<L>                IMAGING STUDIES

## 2022-05-16 NOTE — PROGRESS NOTE ADULT - ASSESSMENT
The patient is a 76y Male s/p a lap assisted gastrostomy and 14Fr feeding red rubber Witzel jejunostomy tube    Plan    - CXR, f/u results  - DVT ppx: SQH   - GI ppx: PRTNX   - mIVF NS given hyponatremia  - Pain: Dilaudid  - PO Home Meds: Amlodipine, Hydralazine  - keep gastrostomy open to bag  - Increase TF via jejunostomy @ 30cc/hr.   - OOB and ambulating as tolerated    D Team Surgery  #[id number] The patient is a 76y Male s/p a lap assisted gastrostomy and 14Fr feeding red rubber Witzel jejunostomy tube    Plan    - DVT ppx: SQH   - GI ppx: PRTNX   - mIVF NS given hyponatremia  - Pain: Dilaudid  - PO Home Meds: Amlodipine, Hydralazine  - clamp gastrostomy  - Increase TF via jejunostomy to goal, 40cc/hr, once tolerating with transition to nocturnal feeds  - OOB and ambulating as tolerated    D Team Surgery  #[id number] The patient is a 76y Male s/p a lap assisted gastrostomy and 14Fr feeding red rubber Witzel jejunostomy tube    Plan    - DVT ppx: SQH   - GI ppx: PRTNX   - mIVF NS given hyponatremia  - Pain: Dilaudid  - PO Home Meds: Amlodipine, Hydralazine  - clamp gastrostomy  - Increase TF via jejunostomy to goal,  once tolerating with transition to nocturnal feeds  - OOB and ambulating as tolerated    D Team Surgery  #[id number]

## 2022-05-17 NOTE — PROVIDER CONTACT NOTE (OTHER) - SITUATION
Pt's latest BP of 170/52, T-99.1, HR-59, R-18, [id number]%. No complaints. Pt is refusing APAP PO as he is not in pain.

## 2022-05-17 NOTE — PHYSICAL THERAPY INITIAL EVALUATION ADULT - GENERAL OBSERVATIONS, REHAB EVAL
Patient received semi-supine in bed, +drain, +IV, A+Ox4, comfortable and in NAD. Pt agreeable to participate in PT evaluation.

## 2022-05-17 NOTE — OCCUPATIONAL THERAPY INITIAL EVALUATION ADULT - DIAGNOSIS, OT EVAL
s/p diagnostic laparoscopy, Open placement of gastrostomy tube, Jejunostomy feeding tube placement; Decreased functional mobility; Decreased ADL management

## 2022-05-17 NOTE — OCCUPATIONAL THERAPY INITIAL EVALUATION ADULT - GENERAL OBSERVATIONS, REHAB EVAL
Pt. received sitting in chair next to bed. No acute distress. Patient agreed to evaluation from Occupational Therapist. +Heplock, +Urethral Catheter, +Feeding Tube.

## 2022-05-17 NOTE — PROVIDER CONTACT NOTE (OTHER) - ASSESSMENT
A&Ox4. Calm and cooperative. LSCTA. No cough. No SOB. Denies chest pain. GJ tube in place. Tube feeding continued. Tolerated well. Tenderness on abdomen noted. Denies pain.

## 2022-05-17 NOTE — CHART NOTE - NSCHARTNOTEFT_GEN_A_CORE
NUTRITION FOLLOW-UP    Pt seen for Nutrition follow up.   Pt is s/p J tube placement 5/13/22. Recommendations were made for 1345 ml of Jevity 1.2 in 24 hours at goal rate of 56 ml/ hour for 24 hours. The feeding provides 1614 kcal and 75gm of protein which would meet Pt's nutritional needs. The order was written for 720 ml of Jevity 1.2 in 24 hours at a rate of 60 ml/12 hours which provides 864 kcal and 40 gm of protein which is slightly more than half his needs. The Pt's feeding was changed to Vital HP which would provide 1200 kcal and  105 gm of protein which is lower than his calorie need and higher than his protein needs. Spoke with PA who said the formula was changed because the team wanted a more concentrated formula.   Recommend changing formula to Two Nabil formula at 800 ml in 24 hours. This formula will provide 1600 kcal and 66.8 gm of protein. See recommendation below    Weight: 70 kg  5/13/22    Labs: Na-130, Glu-134    Current Diet: Diet, Clear Liquid:   Tube Feeding Modality: Jejunostomy  Vital High Protein (VITALHP)  Total Volume for 24 Hours (mL): 1200  Continuous  Starting Tube Feed Rate {mL per Hour}: 25  Increase Tube Feed Rate by (mL): 20     Every 4 hours  Until Goal Tube Feed Rate (mL per Hour): 50  Tube Feed Duration (in Hours): 24  Tube Feed Start Time: 09:00 (05-17-22 @ 08:44)        Enteral Recommendations:    RD to Remain Available:    Additional Recommendations:   1)   2)   3) NUTRITION FOLLOW-UP    Pt seen for Nutrition follow up.   Pt is s/p J tube placement 5/13/22. Recommendations were made for 1345 ml of Jevity 1.2 in 24 hours at goal rate of 56 ml/ hour for 24 hours. The feeding provides 1614 kcal and 75gm of protein which would meet Pt's nutritional needs. The order was written for 720 ml of Jevity 1.2 in 24 hours at a rate of 60 ml/12 hours which provides 864 kcal and 40 gm of protein which is slightly more than half his needs. Today the Pt's feeding was changed to Vital HP which would provide 1200 kcal and 105 gm of protein which is lower than his calorie need and higher than his protein needs. Spoke with PA who said the formula was changed because a more concentrated formula was wanted.   Pt reports he is tolerating tube feeding well. He had no complaints of GI distress.  Recommend changing formula to Two Nabil formula at 800 ml in 24 hours. This formula will provide 1600 kcal and 66.8 gm of protein. See recommendation below    Weight: 70 kg  5/13/22    Labs: Na-130, Glu-134    Current Diet: Diet, Clear Liquid:   Tube Feeding Modality: Jejunostomy  Vital High Protein (VITALHP)  Total Volume for 24 Hours (mL): 1200  Continuous  Starting Tube Feed Rate {mL per Hour}: 25  Increase Tube Feed Rate by (mL): 20     Every 4 hours  Until Goal Tube Feed Rate (mL per Hour): 50  Tube Feed Duration (in Hours): 24  Tube Feed Start Time: 09:00 (05-17-22 @ 08:44)    Edema: No edema noted    Skin: surgical incision left upper abdomen    Malnutrition Status:  Ongoing severe malnutrition    Enteral Recommendations: Two Nabil tube feeding  800 ml in 24 hours. Start feeding at 25 ml/hour and increase to goal rate of 34 ml/hr as tolerated.     RD to Remain Available: Rani Villarreal MS, RDN, CDN pager [phone]     Additional Recommendations:   1) Two Nabil tube feeding at goal rate of 34ml/hr for 24 hours  2) Monitor weight, labs, tube feeding tolerance and skin integrity.

## 2022-05-17 NOTE — PHYSICAL THERAPY INITIAL EVALUATION ADULT - PERTINENT HX OF CURRENT PROBLEM, REHAB EVAL
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 Patient hemodynamically stable at present time. S/p Jejunostomy feeding tube placement, open placement of gastrostomy tube

## 2022-05-17 NOTE — PROGRESS NOTE ADULT - ASSESSMENT
76y Male s/p a lap assisted gastrostomy and 14Fr feeding red rubber Witzel jejunostomy tube    Plan    - DVT ppx: SQH   - GI ppx: PRTNX   - mIVF NS given hyponatremia  - Pain: Dilaudid  - PO Home Meds: Amlodipine, Hydralazine  - clamp gastrostomy  -  nocturnal feeds  - OOB and ambulating as tolerated    D Team Surgery  #[id number]

## 2022-05-17 NOTE — PROGRESS NOTE ADULT - SUBJECTIVE AND OBJECTIVE BOX
Surgical Oncology    SUBJECTIVE: Pt seen and examined on rounds with team. No acute events overnight.        OBJECTIVE    PHYSICAL EXAM:   General: NAD, Lying in bed   Neuro: Awake and alert  Extremities:   GI/Abd: Soft, NT/ND,       VITALS  T(C): 37.4 (05-16-22 @ 21:26), Max: 37.4 (05-16-22 @ 21:26)  HR: 61 (05-16-22 @ 21:26) (61 - 73)  BP: 156/45 (05-16-22 @ 21:26) (150/56 - 172/50)  RR: 18 (05-16-22 @ 21:26) (18 - 18)  SpO2: 99% (05-16-22 @ 21:26) (98% - 99%)  CAPILLARY BLOOD GLUCOSE          Is/Os    05-15 @ 07:01  -  05-16 @ 07:00  --------------------------------------------------------  IN:  Total IN: 0 mL    OUT:    Gastrostomy Tube (mL): 525 mL    Voided (mL): 950 mL  Total OUT: 1475 mL    Total NET: -1475 mL      05-16 @ 07:01  -  05-17 @ 00:17  --------------------------------------------------------  IN:    dextrose 5% + sodium chloride 0.9% w/ Additives: 1100 mL  Total IN: 1100 mL    OUT:    Gastrostomy Tube (mL): 100 mL    Voided (mL): 800 mL  Total OUT: 900 mL    Total NET: 200 mL          MEDICATIONS (STANDING): acetaminophen    Suspension .. 325 milliGRAM(s) Oral every 6 hours  amLODIPine   Tablet 10 milliGRAM(s) Oral daily  ATENolol  Tablet 25 milliGRAM(s) Oral daily  dextrose 5% + sodium chloride 0.9% with potassium chloride 20 mEq/L 1000 milliLiter(s) IV Continuous <Continuous>  heparin   Injectable 5000 Unit(s) SubCutaneous every 8 hours  hydrALAZINE 25 milliGRAM(s) Oral three times a day  pantoprazole    Tablet 40 milliGRAM(s) Oral before breakfast    MEDICATIONS (PRN):    LABS  CBC (05-16 @ 05:40)                              10.5<L>                         8.66    )----------------(  296        --    % Neutrophils, --    % Lymphocytes, ANC: --                                  32.1<L>    BMP (05-16 @ 05:40)             128<L>  |  99      |  13    		Ca++ --      Ca 9.4                ---------------------------------( 109<H>		Mg 2.10               4.5     |  21<L>   |  1.17  			Ph 3.0                   IMAGING STUDIES

## 2022-05-17 NOTE — PROVIDER CONTACT NOTE (OTHER) - BACKGROUND
Admitted due to epigastric pain S/P Abdominal surgery. PMH of anal cancer, and Intestinal obstruction.

## 2022-05-17 NOTE — OCCUPATIONAL THERAPY INITIAL EVALUATION ADULT - ADDITIONAL COMMENTS
Pt. reports he owns a shower chair, however, pt explains he was not using it prior to hospitalization.

## 2022-05-17 NOTE — OCCUPATIONAL THERAPY INITIAL EVALUATION ADULT - MD ORDER
Occupational Therapy (OT) to evaluate and treat. Ambulate as Tolerated. Per RADHA Lang, pt is okay to participate in OT evaluation and perform activity as tolerated.

## 2022-05-17 NOTE — PHYSICAL THERAPY INITIAL EVALUATION ADULT - ADDITIONAL COMMENTS
Patient lives in a private house with his wife, was independent with all ADLs and functional mobility prior to admission.     Patient left seated in recliner at bedside, all lines intact, call bell within reach, in NAD. RADHA Lang present and aware of session.

## 2022-05-17 NOTE — OCCUPATIONAL THERAPY INITIAL EVALUATION ADULT - LIVES WITH, PROFILE
Pt. reports he lives with his wife in a house with 4 steps to enter. Once inside, pt. reports bedroom and bathroom are located on the main level. Per pt., he has a bathtub in his bathroom.

## 2022-05-17 NOTE — OCCUPATIONAL THERAPY INITIAL EVALUATION ADULT - PERTINENT HX OF CURRENT PROBLEM, REHAB EVAL
Pt is a 76 year old male with hx of anal cancer 2012 s/p chemo radiation and new annular mass involving pancreas. Pt presented to [facility] [place] on 5/10/22 with reduced PO intake and nausea. Pt with dx of Pancreatic mass & Duodenal obstruction. Pt is now s/p diagnostic laparoscopy, Open placement of gastrostomy tube, Jejunostomy feeding tube placement on 5/13/22.

## 2022-05-18 NOTE — CHART NOTE - NSCHARTNOTEFT_GEN_A_CORE
NUTRITION FOLLOW-UP    Pt seen for follow up care.   Two Nabil tube feeding via J tube was started today (22) at 34 ml/hr for 24 hours. PA requested recommendations for nocturnal feedings.  Recommend Pt be fed at current rate (34 ml/hour for 24 hours) to determine Pt's tolerance to feeding. Once tolerance is determined nocturnal feeds of 50 ml/hour for 16 hours for a total of 800 ml/24 hours.     Weight: 70 kg    Labs: Glu-113    Current Diet: Diet, Clear Liquid:   Tube Feeding Modality: Jejunostomy  TwoCal HN (TWOCALHNRTH)  Total Volume for 24 Hours (mL): 816  Continuous  Starting Tube Feed Rate {mL per Hour}: 25  Increase Tube Feed Rate by (mL): 20     Every 4 hours  Until Goal Tube Feed Rate (mL per Hour): 34  Tube Feed Duration (in Hours): 24  Tube Feed Start Time: 16:00 (22 @ 15:30)    Edema: No edema noted as per RN flowsheet    Skin: surgical incision left upper abd    Enteral Recommendations: Nocturnal feedin ml Two Nabil/24 hours at 50 ml/hour for 16 hours.    Malnutrition Status: Ongoing severe malnutrition    RD to Remain Available: Rani Villarreal MS, RDN, CDN pager [phone]     Additional Recommendations:   1) Initiate nocturnal feeds after tolerance to Two Nabil is established  2) Monitor weight, labs, po intake and enteral feed tolerance and skin integrity.

## 2022-05-18 NOTE — PROGRESS NOTE ADULT - ASSESSMENT
76y Male s/p a lap assisted gastrostomy and 14Fr feeding red rubber Witzel jejunostomy tube    Plan    - DVT ppx: SQH   - GI ppx: PRTNX   - mIVF NS given hyponatremia  - Pain: Dilaudid  - PO Home Meds: Amlodipine, Hydralazine  - clamp gastrostomy  -  nocturnal feeds  - OOB and ambulating as tolerated    D Team Surgery  #[id number] 76y Male s/p a lap assisted gastrostomy and 14Fr feeding red rubber Witzel jejunostomy tube    Plan    - DVT ppx: SQH   - GI ppx: PRTNX   - mIVF NS given hyponatremia  - Pain: Dilaudid  - PO Home Meds: Amlodipine, Hydralazine  - clamp gastrostomy  -  nocturnal feeds. Keep G tube open.   - home once set up on Tube feeds .   - OOB and ambulating as tolerated    D Team Surgery  #[id number]

## 2022-05-18 NOTE — PROGRESS NOTE ADULT - SUBJECTIVE AND OBJECTIVE BOX
Surgical Oncology    SUBJECTIVE: Pt seen and examined on rounds with team. No acute events overnight.        OBJECTIVE    PHYSICAL EXAM:   General: NAD, Lying in bed   Neuro: Awake and alert  Extremities:   GI/Abd: Soft, NT/ND,       VITALS  T(C): 37.1 (05-17-22 @ 21:22), Max: 37.3 (05-17-22 @ 00:10)  HR: 58 (05-17-22 @ 21:22) (58 - 62)  BP: 139/50 (05-17-22 @ 21:22) (139/50 - 170/52)  RR: 18 (05-17-22 @ 21:22) (17 - 18)  SpO2: 100% (05-17-22 @ 21:22) (100% - 100%)  CAPILLARY BLOOD GLUCOSE          Is/Os    05-16 @ 07:01  -  05-17 @ 07:00  --------------------------------------------------------  IN:    dextrose 5% + sodium chloride 0.9% w/ Additives: 2400 mL    Jevity 1.2: 590 mL    Oral Fluid: 360 mL  Total IN: 3350 mL    OUT:    Gastrostomy Tube (mL): 100 mL    Voided (mL): 1500 mL  Total OUT: 1600 mL    Total NET: 1750 mL      05-17 @ 07:01  -  05-18 @ 00:06  --------------------------------------------------------  IN:    Jevity 1.2: 238 mL  Total IN: 238 mL    OUT:    Gastrostomy Tube (mL): 2430 mL    Voided (mL): 700 mL  Total OUT: 3130 mL    Total NET: -2892 mL          MEDICATIONS (STANDING): acetaminophen    Suspension .. 325 milliGRAM(s) Oral every 6 hours  amLODIPine   Tablet 10 milliGRAM(s) Oral daily  ATENolol  Tablet 25 milliGRAM(s) Oral daily  heparin   Injectable 5000 Unit(s) SubCutaneous every 8 hours  hydrALAZINE 25 milliGRAM(s) Oral three times a day  pantoprazole    Tablet 40 milliGRAM(s) Oral before breakfast    MEDICATIONS (PRN):    LABS  CBC (05-17 @ 06:47)                              10.7<L>                         9.05    )----------------(  308        --    % Neutrophils, --    % Lymphocytes, ANC: --                                  33.2<L>    BMP (05-17 @ 06:47)             130<L>  |  102     |  13    		Ca++ --      Ca 9.3                ---------------------------------( 134<H>		Mg 1.80               4.8     |  19<L>   |  1.00  			Ph 2.5                   IMAGING STUDIES     Surgical Oncology    SUBJECTIVE: Pt seen and examined on rounds with team. No acute events overnight.        OBJECTIVE    PHYSICAL EXAM:   General: Awake, alert & fully oriented, no acute distress   Pulmonary: Respirations unlabored, clear bilaterally  Cardiovascular: s1/s2, no murmur   Abdominal: Soft, NT/ND. Gastrostomy to open to gravity bag.    VITALS  T(C): 37.1 (05-17-22 @ 21:22), Max: 37.3 (05-17-22 @ 00:10)  HR: 58 (05-17-22 @ 21:22) (58 - 62)  BP: 139/50 (05-17-22 @ 21:22) (139/50 - 170/52)  RR: 18 (05-17-22 @ 21:22) (17 - 18)  SpO2: 100% (05-17-22 @ 21:22) (100% - 100%)  CAPILLARY BLOOD GLUCOSE          Is/Os    05-16 @ 07:01  -  05-17 @ 07:00  --------------------------------------------------------  IN:    dextrose 5% + sodium chloride 0.9% w/ Additives: 2400 mL    Jevity 1.2: 590 mL    Oral Fluid: 360 mL  Total IN: 3350 mL    OUT:    Gastrostomy Tube (mL): 100 mL    Voided (mL): 1500 mL  Total OUT: 1600 mL    Total NET: 1750 mL      05-17 @ 07:01  -  05-18 @ 00:06  --------------------------------------------------------  IN:    Jevity 1.2: 238 mL  Total IN: 238 mL    OUT:    Gastrostomy Tube (mL): 2430 mL    Voided (mL): 700 mL  Total OUT: 3130 mL    Total NET: -2892 mL          MEDICATIONS (STANDING): acetaminophen    Suspension .. 325 milliGRAM(s) Oral every 6 hours  amLODIPine   Tablet 10 milliGRAM(s) Oral daily  ATENolol  Tablet 25 milliGRAM(s) Oral daily  heparin   Injectable 5000 Unit(s) SubCutaneous every 8 hours  hydrALAZINE 25 milliGRAM(s) Oral three times a day  pantoprazole    Tablet 40 milliGRAM(s) Oral before breakfast    MEDICATIONS (PRN):    LABS  CBC (05-17 @ 06:47)                              10.7<L>                         9.05    )----------------(  308        --    % Neutrophils, --    % Lymphocytes, ANC: --                                  33.2<L>    BMP (05-17 @ 06:47)             130<L>  |  102     |  13    		Ca++ --      Ca 9.3                ---------------------------------( 134<H>		Mg 1.80               4.8     |  19<L>   |  1.00  			Ph 2.5                   IMAGING STUDIES

## 2022-05-18 NOTE — CHART NOTE - NSCHARTNOTEFT_GEN_A_CORE
76M y/o male with hx of annular pancreatic mass now p/w persistent GOO from duodenal compression and PO intolerance. Now s/p a lap assisted gastrostomy and 14Fr feeding red rubber Witzel jejunostomy tube. Patient will be discharged on regular diet during the day for comfort with open g tube but will need tube feeds via J tube as his source of nutritional support. There are no other alternatives. The need is absolute. Failure of patient to have tube feeds for nutrition can lead to several complications, prolonged healing, prolonged hospitalization and potentially death. Length of need is indeterminate at this time. Feeds must be given continuously cycled at night on pump, cannot be bolus feeds due to jejunostomy tube.

## 2022-05-19 NOTE — PROGRESS NOTE ADULT - ASSESSMENT
76y Male s/p a lap assisted gastrostomy and 14Fr feeding red rubber Witzel jejunostomy tube    Plan    - DVT ppx: SQH   - GI ppx: PRTNX   - mIVF NS given hyponatremia  - Pain: Dilaudid  - PO Home Meds: Amlodipine, Hydralazine  - clamp gastrostomy  -  nocturnal feeds. Keep G tube open.   - home once set up on Tube feeds .   - OOB and ambulating as tolerated    D Team Surgery  #[id number] 76y Male s/p a lap assisted gastrostomy and 14Fr feeding red rubber Witzel jejunostomy tube    Plan    - DVT ppx: SQH   - GI ppx: PRTNX   - Pain: Dilaudid  - PO Home Meds: Amlodipine, Hydralazine  - keep gastrostomy open  - start nocturnal feeds.   - home w/ tf possibly tomorrow  - OOB and ambulating as tolerated    D Team Surgery  #[id number]

## 2022-05-19 NOTE — PROGRESS NOTE ADULT - SUBJECTIVE AND OBJECTIVE BOX
Surgical Oncology    SUBJECTIVE: Pt seen and examined on rounds with team. No acute events overnight.        OBJECTIVE    PHYSICAL EXAM:   General: NAD, Lying in bed   Neuro: Awake and alert  Extremities:   GI/Abd: Soft, NT/ND,       VITALS  T(C): 37.4 (05-18-22 @ 23:34), Max: 37.4 (05-18-22 @ 23:34)  HR: 58 (05-18-22 @ 23:34) (54 - 63)  BP: 149/55 (05-18-22 @ 23:34) (136/51 - 187/68)  RR: 18 (05-18-22 @ 23:34) (17 - 18)  SpO2: 100% (05-18-22 @ 23:34) (98% - 100%)  CAPILLARY BLOOD GLUCOSE          Is/Os    05-17 @ 07:01  -  05-18 @ 07:00  --------------------------------------------------------  IN:    Jevity 1.2: 510 mL  Total IN: 510 mL    OUT:    Gastrostomy Tube (mL): 2830 mL    Voided (mL): 1100 mL  Total OUT: 3930 mL    Total NET: -3420 mL      05-18 @ 07:01  -  05-19 @ 01:09  --------------------------------------------------------  IN:    TwoCal HN: 544 mL  Total IN: 544 mL    OUT:    Gastrostomy Tube (mL): 2200 mL  Total OUT: 2200 mL    Total NET: -1656 mL          MEDICATIONS (STANDING): acetaminophen    Suspension .. 325 milliGRAM(s) Oral every 6 hours  amLODIPine   Tablet 10 milliGRAM(s) Oral daily  ATENolol  Tablet 25 milliGRAM(s) Oral daily  heparin   Injectable 5000 Unit(s) SubCutaneous every 8 hours  hydrALAZINE 25 milliGRAM(s) Oral three times a day  pantoprazole    Tablet 40 milliGRAM(s) Oral before breakfast    MEDICATIONS (PRN):    LABS  CBC (05-18 @ 05:21)                              10.0<L>                         6.75    )----------------(  290        --    % Neutrophils, --    % Lymphocytes, ANC: --                                  30.8<L>    BMP (05-18 @ 05:21)             135     |  103     |  20    		Ca++ --      Ca 9.5                ---------------------------------( 113<H>		Mg 1.80               4.8     |  21<L>   |  1.23  			Ph 3.3                   IMAGING STUDIES     Surgical Oncology    SUBJECTIVE: Pt seen and examined on rounds with team. No acute events overnight. Tolerating twocal hn tf.       OBJECTIVE    PHYSICAL EXAM:   General: NAD, Lying in bed   Neuro: Awake and alert  Extremities: wwp  GI/Abd: Soft, NT/ND, G tube open to gravity bag for drainage. J tube.      VITALS  T(C): 37.4 (05-18-22 @ 23:34), Max: 37.4 (05-18-22 @ 23:34)  HR: 58 (05-18-22 @ 23:34) (54 - 63)  BP: 149/55 (05-18-22 @ 23:34) (136/51 - 187/68)  RR: 18 (05-18-22 @ 23:34) (17 - 18)  SpO2: 100% (05-18-22 @ 23:34) (98% - 100%)  CAPILLARY BLOOD GLUCOSE          Is/Os    05-17 @ 07:01  -  05-18 @ 07:00  --------------------------------------------------------  IN:    Jevity 1.2: 510 mL  Total IN: 510 mL    OUT:    Gastrostomy Tube (mL): 2830 mL    Voided (mL): 1100 mL  Total OUT: 3930 mL    Total NET: -3420 mL      05-18 @ 07:01  -  05-19 @ 01:09  --------------------------------------------------------  IN:    TwoCal HN: 544 mL  Total IN: 544 mL    OUT:    Gastrostomy Tube (mL): 2200 mL  Total OUT: 2200 mL    Total NET: -1656 mL          MEDICATIONS (STANDING): acetaminophen    Suspension .. 325 milliGRAM(s) Oral every 6 hours  amLODIPine   Tablet 10 milliGRAM(s) Oral daily  ATENolol  Tablet 25 milliGRAM(s) Oral daily  heparin   Injectable 5000 Unit(s) SubCutaneous every 8 hours  hydrALAZINE 25 milliGRAM(s) Oral three times a day  pantoprazole    Tablet 40 milliGRAM(s) Oral before breakfast    MEDICATIONS (PRN):    LABS  CBC (05-18 @ 05:21)                              10.0<L>                         6.75    )----------------(  290        --    % Neutrophils, --    % Lymphocytes, ANC: --                                  30.8<L>    BMP (05-18 @ 05:21)             135     |  103     |  20    		Ca++ --      Ca 9.5                ---------------------------------( 113<H>		Mg 1.80               4.8     |  21<L>   |  1.23  			Ph 3.3                   IMAGING STUDIES

## 2022-05-19 NOTE — CHART NOTE - NSCHARTNOTEFT_GEN_A_CORE
NUTRITION FOLLOW-UP    Pt seen for Nutrition follow up.   PA requested recommendations for cycling Pt to nocturnal feeds. Pt tolerated TwoCal at 34 ml/hr for 24 hours. Pt tolerated the feeding for 24 hours. Feedings were stopped this morning and will be restarted later. suggest feedings start at 16:00 and run to 8:00 at rate of 50 ml/hour over the 16 hours.    Weight: 70 kg    Labs: Na-130, BUN-31, Glu-122     Current Diet: Diet, Clear Liquid (05-19-22 @ 11:00)    Edema: No edema noted    Skin: surgical incision left abdomen    Enteral Recommendations: TwoCal to run from 16:00 to 8:00 at 50 ml/hr for 16  hours.    RD to Remain Available: Rani Villarreal MS, RDN, CDN pager [phone]     Additional Recommendations:   1) Monitor weight, labs, tolerance to tube feeding regimen and skin integrity.

## 2022-05-20 NOTE — DISCHARGE NOTE PROVIDER - NSDCCPCAREPLAN_GEN_ALL_CORE_FT
PRINCIPAL DISCHARGE DIAGNOSIS  Diagnosis: Abdominal pain, epigastric  Assessment and Plan of Treatment: WOUND CARE:  Keep incision clean, dry and intact. Do not rub or scrub wound, pat dry.  BATHING: Please do not submerge wound underwater. You may shower and/or sponge bathe.  ACTIVITY: No heavy lifting or straining. Otherwise, you may return to your usual level of physical activity. If you are taking narcotic pain medication (such as Percocet) DO NOT drive a car, operate machinery or make important decisions.  DIET: Return to your usual diet.  NOTIFY YOUR SURGEON IF: You have any bleeding that does not stop, any pus draining from your wound(s), any fever (over 100.4 F) or chills, persistent nausea/vomiting, persistent diarrhea, or if your pain is not controlled on your discharge pain medications.  FOLLOW-UP: Please follow up with your primary care physician in one week regarding your hospitalization      SECONDARY DISCHARGE DIAGNOSES  Diagnosis: Gastric outlet obstruction  Assessment and Plan of Treatment:

## 2022-05-20 NOTE — DISCHARGE NOTE PROVIDER - HOSPITAL COURSE
76M hx of anal cancer 2012 s/p chemo radiation here with 2 weeks of epigastric pain found to have new annular mass involving pancreas +duodenal obstruction third portion. Underwent attempted endoscopic percutaneous endoscopic jejunostomy tube however unsuccessful. Patient was discharged home on liquid diet 2d prior to ED presentation at [facility]. Per patient, he has not been able to tolerate significant PO intake as each time he does he becomes nauseated. 1 episode of emesis since discharge. Patient now presents with reduced po tolerance sent in from outpatient office with concern for dehydration. Patient has persistent GI function, +flatus. +BM.   Patient admitted to surgical oncology service. Medically optimized for OR.  Brought to OR by Dr. Avilez 5/13 where he underwent diagnositc laparotomy and placement of open gastrostomy, placement of feeding jejunostomy. Patient tolerated procedure well without complication.  Transferred to surgical floor post-op.  Tube feeds advanced to goal as tolerated, G tube to remain open to gravity for venting.  Patient currently tolerating tube feeds at goal, voiding, ambulating at baseline, and pain is well controlled.  Per team and attending patient stable for discharge home with tube feeds via Jejunostomy and gastrostomy open to gravity, follow up with Dr. Levi in one week. 76M hx of anal cancer 2012 s/p chemo radiation here with 2 weeks of epigastric pain found to have new annular mass involving pancreas +duodenal obstruction third portion. Underwent attempted endoscopic percutaneous endoscopic jejunostomy tube however unsuccessful. Patient was discharged home on liquid diet 2d prior to ED presentation at [facility]. Per patient, he has not been able to tolerate significant PO intake as each time he does he becomes nauseated. 1 episode of emesis since discharge. Patient now presents with reduced po tolerance sent in from outpatient office with concern for dehydration. Patient has persistent GI function, +flatus. +BM.   Patient admitted to surgical oncology service. Medically optimized for OR.  Brought to OR by Dr. Avilez 5/13 where he underwent diagnositc laparotomy and placement of open gastrostomy, placement of feeding jejunostomy. Patient tolerated procedure well without complication.  Transferred to surgical floor post-op.  Tube feeds advanced to goal as tolerated, G tube to be clamped. Unclamp if develops nausea or abdominal distension and connect to gravity drain bag for venting.  Patient currently tolerating tube feeds at goal, voiding, ambulating at baseline, and pain is well controlled.  Per team and attending patient stable for discharge home with tube feeds via Jejunostomy and gastrostomy open to gravity, follow up with Dr. Levi in one week.

## 2022-05-20 NOTE — DISCHARGE NOTE PROVIDER - NSDCMRMEDTOKEN_GEN_ALL_CORE_FT
3-n-1 Commode:   acetaminophen 325 mg oral tablet: 2 tab(s) orally every 6 hours, As needed, Mild Pain (1 - 3), Moderate Pain (4 - 6)  amLODIPine 10 mg oral tablet: 1 tab(s) orally once a day MDD:one tab  aspirin 81 mg oral delayed release tablet: 2 tab(s) orally every 12 hours  atenolol 25 mg oral tablet: 1 tab(s) orally once a day MDD:one  docusate sodium 100 mg oral capsule: 1 cap(s) orally 3 times a day as needed  Feeding Tube: Equipment, Dispense one tube feeding pole  Equipment, Dispense one tube feeding pump  Tube Feeding, Type: Two Nabil HN, Rate 58cc/hr, Duration 14 hours on, 10 hours off, 17:00-07:00 Nocturnal.  Jejunostomy Tube Care, Flush Jejunostomy tube with 10cc sterile water PRE and POST tube feed administration  lisinopril 5 mg oral tablet: 1 tab(s) orally once a day MDD:one tab  lisinopril 5 mg oral tablet: 1 tab(s) orally once a day  oxyCODONE 5 mg oral tablet: 1 tab(s) orally every 4 hours, As Needed    Reference #: [id number] MDD:6  pantoprazole 40 mg oral delayed release tablet: 1 tab(s) orally once a day  pantoprazole 40 mg oral granule, delayed release: 1 each orally once a day MDD:one tab  polyethylene glycol 3350 oral powder for reconstitution: 17 gram(s) orally once a day, As needed, Constipation  Rolling Walker with 5 inch Wheels:   senna oral tablet: 2 tab(s) orally once a day (at bedtime) as needed   3-n-1 Commode:   acetaminophen 325 mg oral tablet: 2 tab(s) orally every 6 hours, As needed, Mild Pain (1 - 3), Moderate Pain (4 - 6)  amLODIPine 10 mg oral tablet: 1 tab(s) orally once a day MDD:one tab  aspirin 81 mg oral delayed release tablet: 2 tab(s) orally every 12 hours  atenolol 25 mg oral tablet: 1 tab(s) orally once a day MDD:one  docusate sodium 100 mg oral capsule: 1 cap(s) orally 3 times a day as needed  Feeding Tube: Equipment, Dispense one tube feeding pole  Equipment, Dispense one tube feeding pump  Tube Feeding, Type: Two Nabil HN, Rate 58cc/hr, Duration 14 hours on, 10 hours off, 17:00-07:00 Nocturnal.  Jejunostomy Tube Care, Flush Jejunostomy tube with 10cc sterile water PRE and POST tube feed administration  lisinopril 5 mg oral tablet: 1 tab(s) orally once a day MDD:one tab  Multiple Vitamins oral tablet, chewable: 1 tab(s) orally once a day  oxyCODONE 5 mg oral tablet: 1 tab(s) orally every 4 hours, As Needed    Reference #: [id number] MDD:6  pantoprazole 40 mg oral delayed release tablet: 1 tab(s) orally once a day  polyethylene glycol 3350 oral powder for reconstitution: 17 gram(s) orally once a day, As needed, Constipation  Rolling Walker with 5 inch Wheels:   senna oral tablet: 2 tab(s) orally once a day (at bedtime) as needed

## 2022-05-20 NOTE — DISCHARGE NOTE PROVIDER - NSDCFUADDINST_GEN_ALL_CORE_FT
Patient educated on gastrostomy tube. Gastrostomy to be clamped. If patient develops nausea or has abdominal distension unclamp gastrostomy tube and connect to gravity drain bag.  Advised to continue to follow up with oncologist Dr. Hahn weekly will monitor fluid status outpatient in office, if fluid needed will administer infusions via mediport.    Jejunostomy tube feeds to be started upon arrival home with assistane of visiting nurse, TwoCal tube feeds @50cc/hr for 16 hrs starting daily at 4pm.

## 2022-05-20 NOTE — DISCHARGE NOTE PROVIDER - CARE PROVIDER_API CALL
Dylan Avilez)  Complex General Surgical Oncology; Surgery; Surgical Oncology  [address] [place] [address]  Phone: [phone]  Fax: [phone]  Follow Up Time: 1 week   Dylan Avilez)  Complex General Surgical Oncology; Surgery; Surgical Oncology  [address] [place] [address]  Phone: [phone]  Fax: [phone]  Follow Up Time: 1 week    Shantanu Hahn)  Hematology; Medical Oncology  [address] [place] [address]  Phone: [phone]  Fax: [phone]  Follow Up Time: 1 week

## 2022-05-20 NOTE — PROGRESS NOTE ADULT - SUBJECTIVE AND OBJECTIVE BOX
Surgical Oncology    SUBJECTIVE: Pt seen and examined on rounds with team. TF started overnight      OBJECTIVE    PHYSICAL EXAM:   General: NAD, Lying in bed   Neuro: Awake and alert  GI/Abd: Soft, NT/ND,       VITALS  T(C): 36.8 (05-19-22 @ 22:44), Max: 37.3 (05-19-22 @ 05:15)  HR: 53 (05-19-22 @ 22:44) (53 - 61)  BP: 131/40 (05-19-22 @ 22:44) (124/43 - 146/66)  RR: 16 (05-19-22 @ 22:44) (16 - 18)  SpO2: 100% (05-19-22 @ 22:44) (99% - 100%)  CAPILLARY BLOOD GLUCOSE          Is/Os    05-18 @ 07:01  -  05-19 @ 07:00  --------------------------------------------------------  IN:    TwoCal HN: 544 mL  Total IN: 544 mL    OUT:    Gastrostomy Tube (mL): 2625 mL    Voided (mL): 325 mL  Total OUT: 2950 mL    Total NET: -2406 mL      05-19 @ 07:01  -  05-20 @ 00:37  --------------------------------------------------------  IN:    TwoCal HN: 150 mL  Total IN: 150 mL    OUT:    Gastrostomy Tube (mL): 1950 mL  Total OUT: 1950 mL    Total NET: -1800 mL          MEDICATIONS (STANDING): acetaminophen    Suspension .. 325 milliGRAM(s) Oral every 6 hours  amLODIPine   Tablet 10 milliGRAM(s) Oral daily  ATENolol  Tablet 25 milliGRAM(s) Oral daily  heparin   Injectable 5000 Unit(s) SubCutaneous every 8 hours  hydrALAZINE 25 milliGRAM(s) Oral three times a day  pantoprazole    Tablet 40 milliGRAM(s) Oral before breakfast    MEDICATIONS (PRN):    LABS  CBC (05-19 @ 05:28)                              10.9<L>                         7.51    )----------------(  293        --    % Neutrophils, --    % Lymphocytes, ANC: --                                  34.1<L>    BMP (05-19 @ 05:28)             130<L>  |  99      |  31<H> 		Ca++ --      Ca 9.7                ---------------------------------( 122<H>		Mg 2.00               4.2     |  19<L>   |  1.28  			Ph 3.6                   IMAGING STUDIES     Surgical Oncology    SUBJECTIVE: Pt seen and examined on rounds with team. TF started overnight, tolerating.    PHYSICAL EXAM:   General: NAD, Lying in bed   Neuro: Awake and alert  Extremities: wwp  GI/Abd: Soft, NT/ND, G tube open to gravity bag for drainage. J tube.      VITALS  T(C): 36.8 (05-19-22 @ 22:44), Max: 37.3 (05-19-22 @ 05:15)  HR: 53 (05-19-22 @ 22:44) (53 - 61)  BP: 131/40 (05-19-22 @ 22:44) (124/43 - 146/66)  RR: 16 (05-19-22 @ 22:44) (16 - 18)  SpO2: 100% (05-19-22 @ 22:44) (99% - 100%)  CAPILLARY BLOOD GLUCOSE          Is/Os    05-18 @ 07:01  -  05-19 @ 07:00  --------------------------------------------------------  IN:    TwoCal HN: 544 mL  Total IN: 544 mL    OUT:    Gastrostomy Tube (mL): 2625 mL    Voided (mL): 325 mL  Total OUT: 2950 mL    Total NET: -2406 mL      05-19 @ 07:01  -  05-20 @ 00:37  --------------------------------------------------------  IN:    TwoCal HN: 150 mL  Total IN: 150 mL    OUT:    Gastrostomy Tube (mL): 1950 mL  Total OUT: 1950 mL    Total NET: -1800 mL          MEDICATIONS (STANDING): acetaminophen    Suspension .. 325 milliGRAM(s) Oral every 6 hours  amLODIPine   Tablet 10 milliGRAM(s) Oral daily  ATENolol  Tablet 25 milliGRAM(s) Oral daily  heparin   Injectable 5000 Unit(s) SubCutaneous every 8 hours  hydrALAZINE 25 milliGRAM(s) Oral three times a day  pantoprazole    Tablet 40 milliGRAM(s) Oral before breakfast    MEDICATIONS (PRN):    LABS  CBC (05-19 @ 05:28)                              10.9<L>                         7.51    )----------------(  293        --    % Neutrophils, --    % Lymphocytes, ANC: --                                  34.1<L>    BMP (05-19 @ 05:28)             130<L>  |  99      |  31<H> 		Ca++ --      Ca 9.7                ---------------------------------( 122<H>		Mg 2.00               4.2     |  19<L>   |  1.28  			Ph 3.6                   IMAGING STUDIES

## 2022-05-20 NOTE — DISCHARGE NOTE PROVIDER - PROVIDER TOKENS
PROVIDER:[TOKEN:[[id number]],FOLLOWUP:[1 week]] PROVIDER:[TOKEN:[[id number]],FOLLOWUP:[1 week]],PROVIDER:[TOKEN:[[id number]],FOLLOWUP:[1 week]]

## 2022-05-20 NOTE — PROGRESS NOTE ADULT - ASSESSMENT
76y Male s/p a lap assisted gastrostomy and 14Fr feeding red rubber Witzel jejunostomy tube    Plan    - DVT ppx: SQH   - GI ppx: PRTNX   - Pain: Dilaudid  - PO Home Meds: Amlodipine, Hydralazine  - keep gastrostomy open  - start nocturnal feeds.   - home w/ tf possibly tomorrow  - OOB and ambulating as tolerated    D Team Surgery  #[id number] 76y Male s/p a lap assisted gastrostomy and 14Fr feeding red rubber Witzel jejunostomy tube    Plan    - DVT ppx: SQH   - GI ppx: PRTNX   - Pain: Dilaudid  - PO Home Meds: Amlodipine, Hydralazine  - Clamp gastrostomy. NPO w/ sips.  - c/w nocturnal tube feeds. flush w/ free water 250ml q6hr  - suppository  - OOB and ambulating as tolerated  - possible discharge home w/ tf      D Team Surgery  #[id number]

## 2022-05-20 NOTE — DISCHARGE NOTE PROVIDER - NSDCCPTREATMENT_GEN_ALL_CORE_FT
PRINCIPAL PROCEDURE  Procedure: Diagnostic laparoscopy  Findings and Treatment:       SECONDARY PROCEDURE  Procedure: Open placement of gastrostomy tube  Findings and Treatment:     Procedure: Tube jejunostomy  Findings and Treatment:

## 2022-05-20 NOTE — DISCHARGE NOTE PROVIDER - CARE PROVIDERS DIRECT ADDRESSES
,[email or web address] of [email or web address]

## 2022-05-21 NOTE — CONSULT NOTE ADULT - SUBJECTIVE AND OBJECTIVE BOX
DATE OF SERVICE:05-21-22 @ 13:40    Patient was seen,examined and evaluated  by me.ER evaluation, Labs and Hospital course was reviewed,    CHIEF COMPLAINT:    HPI:HPI:  76M hx of anal cancer 2012 s/p chemo radiation here with 2 weeks of epigastric pain found to have new annular mass involving pancreas +duodenal obstruction third portion. Underwent attempted endoscopic percutaneous endoscopic jejunostomy tube however unsuccessful. Patient was discharged home on liquid diet 2d prior to ED presentation at [facility]. Per patient, he has not been able to tolerate significant PO intake as each time he does he becomes nauseated. 1 episode of emesis since discharge. Patient now presents with reduced po tolerance sent in from outpatient office with concern for dehydration. Patient has persistent GI function, +flatus. +BM.     Denies ha / cp / sob / emesis / diarrhea / melena / hematochezia / changes in bladder function.  (10 May 2022 18:06)      PAST MEDICAL & SURGICAL HISTORY:  Anal cancer  Had Chemo and Radiation 4 years ago      No significant past surgical history          MEDICATIONS  (STANDING):  acetaminophen    Suspension .. 325 milliGRAM(s) Oral every 6 hours  amLODIPine   Tablet 10 milliGRAM(s) Oral daily  ATENolol  Tablet 25 milliGRAM(s) Oral daily  heparin   Injectable 5000 Unit(s) SubCutaneous every 8 hours  hydrALAZINE 25 milliGRAM(s) Oral three times a day  pantoprazole    Tablet 40 milliGRAM(s) Oral before breakfast    MEDICATIONS  (PRN):  benzocaine 20% Spray 1 Spray(s) Topical three times a day PRN sore throat  bisacodyl Suppository 10 milliGRAM(s) Rectal daily PRN Constipation      FAMILY HISTORY:    No family history of premature coronary artery disease or sudden cardiac death    SOCIAL HISTORY:  Smoking-[ ] Active  [ ] Former [ ] Non Smoker  Alcohol-[ ] Denies [ ] Social [ ] Daily  Ilicit Drug use-[ ] Denies [ ] Active user    REVIEW OF SYSTEMS:  Constitutional: [ ] fever, [ ]weight loss, [ ]fatigue   Activity [ ] Bedbound,[ ] Ambulates [ ] Unassisted[ ] Cane/Walker [ ] Assistence.  Effort tolerance:[ ] Excellent [ ] Good [ ] Fair [ ] Poor [ ]  Eyes: [ ] visual changes  Respiratory: [ ]shortness of breath;  [ ] cough, [ ]wheezing, [ ]chills, [ ]hemoptysis  Cardiovascular: [ ] chest pain, [ ]palpitations, [ ]dizziness,  [ ]leg swelling[ ]orthopnea [ ]PND  Gastrointestinal: [ ] abdominal pain, [ ]nausea, [ ]vomiting,  [ ]diarrhea,[ ]constipation  Genitourinary: [ ] dysuria, [ ] hematuria  Neurologic: [ ] headaches [ ] tremors[ ] weakness  Skin: [ ] itching, [ ]burning, [ ] rashes  Endocrine: [ ] heat or cold intolerance  Musculoskeletal: [ ] joint pain or swelling; [ ] muscle, back, or extremity pain  Psychiatric: [ ] depression, [ ]anxiety, [ ]mood swings, or [ ]difficulty sleeping  Hematologic: [ ] easy bruising, [ ] bleeding gums       [ x] All others negative	  [ ] Unable to obtain    Vital Signs Last 24 Hrs  T(C): 36.4 (21 May 2022 09:17), Max: 37.2 (20 May 2022 16:02)  T(F): 97.6 (21 May 2022 09:17), Max: 99 (20 May 2022 16:02)  HR: 76 (21 May 2022 09:17) (59 - 76)  BP: 114/56 (21 May 2022 09:17) (114/56 - 149/51)  BP(mean): --  RR: 18 (21 May 2022 09:17) (16 - 18)  SpO2: 94% (21 May 2022 09:17) (94% - 100%)  I&O's Summary    20 May 2022 07:01  -  21 May 2022 07:00  --------------------------------------------------------  IN: 0 mL / OUT: 900 mL / NET: -900 mL        PHYSICAL EXAM:  General: No acute distress BMI-  HEENT: EOMI, PERRL[ ] Icteric  Neck: Supple, No JVD  Lungs: Equal air entry bilaterally; [ ] Rales [ ] Rhonchi [ ] Wheezing  Heart: Regular rate and rhythm;[ ] Murmurs-   /6 [ ] Systolic [ ] Diastolic [ ] Radiation,No rubs, or gallops  Abdomen: Nontender, bowel sounds present  Extremities: No clubbing, cyanosis, or edema[ ] Calf tenderness  Nervous system:  Alert & Oriented X3, no focal deficits  Psychiatric: Normal affect  Skin: No rashes or lesions      LABS:  05-21    132<L>  |  101  |  54<H>  ----------------------------<  142<H>  4.4   |  19<L>  |  1.49<H>    Ca    9.9      21 May 2022 06:24  Phos  3.5     05-21  Mg     2.10     05-21      Creatinine Trend: 1.49<--, 1.49<--, 1.28<--, 1.23<--, 1.00<--, 1.17<--                        11.2   7.29  )-----------( 279      ( 21 May 2022 06:24 )             34.7         Lipid Panel:   Cardiac Enzymes:           RADIOLOGY:    ECG [my interpretation]:    TELEMETRY:    ECHO:    STRESS TEST:    CATHETERIZATION: DATE OF SERVICE:05-21-22 @ 13:40    Patient was seen,examined and evaluated  by me.ER evaluation, Labs and Hospital course was reviewed,    CHIEF COMPLAINT:Mass    HPI:HPI:  76M hx of anal cancer 2012 s/p chemo radiation here with 2 weeks of epigastric pain found to have new annular mass involving pancreas +duodenal obstruction third portion. Underwent attempted endoscopic percutaneous endoscopic jejunostomy tube however unsuccessful. Patient was discharged home on liquid diet 2d prior to ED presentation at [facility]. Per patient, he has not been able to tolerate significant PO intake as each time he does he becomes nauseated. 1 episode of emesis since discharge. Patient now presents with reduced po tolerance sent in from outpatient office with concern for dehydration. Patient has persistent GI function, +flatus. +BM.     Denies ha / cp / sob / emesis / diarrhea / melena / hematochezia / changes in bladder function.  (10 May 2022 18:06)      PAST MEDICAL & SURGICAL HISTORY:  Anal cancer  Had Chemo and Radiation 4 years ago      No significant past surgical history          MEDICATIONS  (STANDING):  acetaminophen    Suspension .. 325 milliGRAM(s) Oral every 6 hours  amLODIPine   Tablet 10 milliGRAM(s) Oral daily  ATENolol  Tablet 25 milliGRAM(s) Oral daily  heparin   Injectable 5000 Unit(s) SubCutaneous every 8 hours  hydrALAZINE 25 milliGRAM(s) Oral three times a day  pantoprazole    Tablet 40 milliGRAM(s) Oral before breakfast    MEDICATIONS  (PRN):  benzocaine 20% Spray 1 Spray(s) Topical three times a day PRN sore throat  bisacodyl Suppository 10 milliGRAM(s) Rectal daily PRN Constipation      FAMILY HISTORY:    No family history of premature coronary artery disease or sudden cardiac death    SOCIAL HISTORY:  Smoking-[ ] Active  [ ] Former [x ] Non Smoker  Alcohol-[x ] Denies [ ] Social [ ] Daily  Ilicit Drug use-[x ] Denies [ ] Active user    REVIEW OF SYSTEMS:  Constitutional: [ ] fever, [ ]weight loss, [ ]fatigue   Activity [ ] Bedbound,[x ] Ambulates [ x] Unassisted[ ] Cane/Walker [ ] Assistence.  Effort tolerance:[ ] Excellent [ ] Good [x ] Fair [ ] Poor [ ]  Eyes: [ ] visual changes  Respiratory: [ ]shortness of breath;  [ ] cough, [ ]wheezing, [ ]chills, [ ]hemoptysis  Cardiovascular: [ ] chest pain, [ ]palpitations, [ ]dizziness,  [ ]leg swelling[ ]orthopnea [ ]PND  Gastrointestinal: [x ] abdominal pain, [x ]nausea, [ ]vomiting,  [ ]diarrhea,[ ]constipation  Genitourinary: [ ] dysuria, [ ] hematuria  Neurologic: [ ] headaches [ ] tremors[ ] weakness  Skin: [ ] itching, [ ]burning, [ ] rashes  Endocrine: [ ] heat or cold intolerance  Musculoskeletal: [ ] joint pain or swelling; [ ] muscle, back, or extremity pain  Psychiatric: [ ] depression, [ ]anxiety, [ ]mood swings, or [ ]difficulty sleeping  Hematologic: [ ] easy bruising, [ ] bleeding gums       [ x] All others negative	  [ ] Unable to obtain    Vital Signs Last 24 Hrs  T(C): 36.4 (21 May 2022 09:17), Max: 37.2 (20 May 2022 16:02)  T(F): 97.6 (21 May 2022 09:17), Max: 99 (20 May 2022 16:02)  HR: 76 (21 May 2022 09:17) (59 - 76)  BP: 114/56 (21 May 2022 09:17) (114/56 - 149/51)  RR: 18 (21 May 2022 09:17) (16 - 18)  SpO2: 94% (21 May 2022 09:17) (94% - 100%)  I&O's Summary    20 May 2022 07:01  -  21 May 2022 07:00  --------------------------------------------------------  IN: 0 mL / OUT: 900 mL / NET: -900 mL        PHYSICAL EXAM:  General: No acute distress BMI-  HEENT: EOMI, PERRL[ ] Icteric  Neck: Supple, No JVD  Lungs: Equal air entry bilaterally; [ ] Rales [ ] Rhonchi [ ] Wheezing  Heart: Regular rate and rhythm;[x] Murmurs-  2/6 [x] Systolic [ ] Diastolic [ ] Radiation,No rubs, or gallops  Abdomen: Nontender, bowel sounds present  Extremities: No clubbing, cyanosis, or edema[ ] Calf tenderness  Nervous system:  Alert & Oriented X3, no focal deficits  Psychiatric: Normal affect  Skin: No rashes or lesions      LABS:  05-21    132<L>  |  101  |  54<H>  ----------------------------<  142<H>  4.4   |  19<L>  |  1.49<H>    Ca    9.9      21 May 2022 06:24  Phos  3.5     05-21  Mg     2.10     05-21      Creatinine Trend: 1.49<--, 1.49<--, 1.28<--, 1.23<--, 1.00<--, 1.17<--                        11.2   7.29  )-----------( 279      ( 21 May 2022 06:24 )             34.7         Lipid Panel:   Cardiac Enzymes:

## 2022-05-21 NOTE — PROGRESS NOTE ADULT - SUBJECTIVE AND OBJECTIVE BOX
Surgical Oncology    SUBJECTIVE: Pt seen and examined on rounds with team. Receiving TF overnight, g clamped      OBJECTIVE    PHYSICAL EXAM:   General: NAD, Lying in bed   Neuro: Awake and alert  Extremities:   GI/Abd: Soft, NT/ND,       VITALS  T(C): 36.6 (05-21-22 @ 00:01), Max: 37.2 (05-20-22 @ 05:30)  HR: 63 (05-21-22 @ 00:01) (59 - 64)  BP: 128/43 (05-21-22 @ 00:01) (124/37 - 140/48)  RR: 18 (05-21-22 @ 00:01) (16 - 18)  SpO2: 100% (05-21-22 @ 00:01) (98% - 100%)  CAPILLARY BLOOD GLUCOSE      POCT Blood Glucose.: 150 mg/dL (20 May 2022 11:18)      Is/Os    05-19 @ 07:01  -  05-20 @ 07:00  --------------------------------------------------------  IN:    TwoCal HN: 150 mL  Total IN: 150 mL    OUT:    Gastrostomy Tube (mL): 2200 mL    Voided (mL): 500 mL  Total OUT: 2700 mL    Total NET: -2550 mL      05-20 @ 07:01  -  05-21 @ 00:58  --------------------------------------------------------  IN:  Total IN: 0 mL    OUT:    Voided (mL): 300 mL  Total OUT: 300 mL    Total NET: -300 mL          MEDICATIONS (STANDING): acetaminophen    Suspension .. 325 milliGRAM(s) Oral every 6 hours  amLODIPine   Tablet 10 milliGRAM(s) Oral daily  ATENolol  Tablet 25 milliGRAM(s) Oral daily  heparin   Injectable 5000 Unit(s) SubCutaneous every 8 hours  hydrALAZINE 25 milliGRAM(s) Oral three times a day  pantoprazole    Tablet 40 milliGRAM(s) Oral before breakfast    MEDICATIONS (PRN):bisacodyl Suppository 10 milliGRAM(s) Rectal daily PRN Constipation      LABS  CBC (05-20 @ 11:40)                              12.4<L>                         10.65<H>  )----------------(  284        --    % Neutrophils, --    % Lymphocytes, ANC: --                                  38.2<L>    BMP (05-20 @ 06:30)             133<L>  |  99      |  41<H> 		Ca++ --      Ca 10.3               ---------------------------------( 115<H>		Mg 2.10               4.2     |  19<L>   |  1.49<H>			Ph 4.1                   IMAGING STUDIES     Surgical Oncology    overnight: Pt seen and examined on rounds with team. Receiving TF overnight, g clamped  Subjective: Patient seen and examined at bedside. No nausea or vomiting. Passing flatus. complains of sore throat.       OBJECTIVE    PHYSICAL EXAM:   General: NAD, Lying in bed   Neuro: Awake and alert  Extremities: wwp  GI/Abd: Soft, NT/ND, G tube open to gravity bag for drainage. J tube.        VITALS  T(C): 36.6 (05-21-22 @ 00:01), Max: 37.2 (05-20-22 @ 05:30)  HR: 63 (05-21-22 @ 00:01) (59 - 64)  BP: 128/43 (05-21-22 @ 00:01) (124/37 - 140/48)  RR: 18 (05-21-22 @ 00:01) (16 - 18)  SpO2: 100% (05-21-22 @ 00:01) (98% - 100%)  CAPILLARY BLOOD GLUCOSE      POCT Blood Glucose.: 150 mg/dL (20 May 2022 11:18)      Is/Os    05-19 @ 07:01  -  05-20 @ 07:00  --------------------------------------------------------  IN:    TwoCal HN: 150 mL  Total IN: 150 mL    OUT:    Gastrostomy Tube (mL): 2200 mL    Voided (mL): 500 mL  Total OUT: 2700 mL    Total NET: -2550 mL      05-20 @ 07:01  -  05-21 @ 00:58  --------------------------------------------------------  IN:  Total IN: 0 mL    OUT:    Voided (mL): 300 mL  Total OUT: 300 mL    Total NET: -300 mL          MEDICATIONS (STANDING): acetaminophen    Suspension .. 325 milliGRAM(s) Oral every 6 hours  amLODIPine   Tablet 10 milliGRAM(s) Oral daily  ATENolol  Tablet 25 milliGRAM(s) Oral daily  heparin   Injectable 5000 Unit(s) SubCutaneous every 8 hours  hydrALAZINE 25 milliGRAM(s) Oral three times a day  pantoprazole    Tablet 40 milliGRAM(s) Oral before breakfast    MEDICATIONS (PRN):bisacodyl Suppository 10 milliGRAM(s) Rectal daily PRN Constipation      LABS  CBC (05-20 @ 11:40)                              12.4<L>                         10.65<H>  )----------------(  284        --    % Neutrophils, --    % Lymphocytes, ANC: --                                  38.2<L>    BMP (05-20 @ 06:30)             133<L>  |  99      |  41<H> 		Ca++ --      Ca 10.3               ---------------------------------( 115<H>		Mg 2.10               4.2     |  19<L>   |  1.49<H>			Ph 4.1                   IMAGING STUDIES

## 2022-05-21 NOTE — CONSULT NOTE ADULT - TIME BILLING
- Review of records, telemetry, vital signs and daily labs.   - General and cardiovascular physical examination.  - Generation of cardiovascular treatment plan.  - Coordination of care.      Patient was seen and examined by me on 05/21/2022,interim events noted,labs and radiology studies reviewed.  Trevor De La Garza MD,FACC.  [address] [place], [place] [id number].  [phone]

## 2022-05-21 NOTE — CHART NOTE - NSCHARTNOTEFT_GEN_A_CORE
Spoke to Cardiologist Dr. De La Garza regarding patients elevated Cr. Recommended Started 1/2 NS at 76.

## 2022-05-21 NOTE — PROGRESS NOTE ADULT - ASSESSMENT
76y Male s/p a lap assisted gastrostomy and 14Fr feeding red rubber Witzel jejunostomy tube    Plan    - DVT ppx: SQH   - GI ppx: PRTNX   - Pain: Dilaudid  - PO Home Meds: Amlodipine, Hydralazine  - Clamp gastrostomy.  - c/w nocturnal tube feeds. flush w/ free water 250ml q6hr  - suppository  - OOB and ambulating as tolerated  - possible discharge home w/ tf      D Team Surgery  #[id number]  76y Male s/p a lap assisted gastrostomy and 14Fr feeding red rubber Witzel jejunostomy tube    Plan  - DVT ppx: SQH   - GI ppx: PRTNX   - Pain: Dilaudid  - PO Home Meds: Amlodipine, Hydralazine  - Clamp gastrostomy. advanced to CLD.   - c/w nocturnal tube feeds. flush w/ free water 250ml q6hr  - suppository  - OOB and ambulating as tolerated  - possible discharge home w/ tf .   - Medicine  consult for rising MOSES.     D Team Surgery      Plan    - DVT ppx: SQH   - GI ppx: PRTNX   - Pain: Dilaudid  - PO Home Meds: Amlodipine, Hydralazine  - Clamp gastrostomy.  - c/w nocturnal tube feeds. flush w/ free water 250ml q6hr  - suppository  - OOB and ambulating as tolerated  - possible discharge home w/ tf      D Team Surgery  #[id number]

## 2022-05-22 NOTE — PROGRESS NOTE ADULT - SUBJECTIVE AND OBJECTIVE BOX
Subjective:   Patient seen and examined at bedside. No acute events overnight.       Objective:   Vital Signs Last 24 Hrs  T(C): 37.1 (22 May 2022 00:40), Max: 37.2 (21 May 2022 16:37)  T(F): 98.8 (22 May 2022 00:40), Max: 98.9 (21 May 2022 16:37)  HR: 56 (22 May 2022 00:40) (53 - 76)  BP: 139/42 (22 May 2022 00:40) (114/56 - 153/50)  BP(mean): --  RR: 18 (22 May 2022 00:40) (16 - 18)  SpO2: 98% (22 May 2022 00:40) (94% - 100%)  I&O's Summary    20 May 2022 07:01  -  21 May 2022 07:00  --------------------------------------------------------  IN: 0 mL / OUT: 900 mL / NET: -900 mL    21 May 2022 07:01  -  22 May 2022 03:43  --------------------------------------------------------  IN: 2765 mL / OUT: 475 mL / NET: 2290 mL        Physical Exam:  General: NAD, Lying in bed   Neuro: Awake and alert  Extremities: wwp  GI/Abd: Soft, NT/ND, G tube open to gravity bag for drainage. J tube.      LABS:                        11.2   7.29  )-----------( 279      ( 21 May 2022 06:24 )             34.7     05-21    129<L>  |  98  |  51<H>  ----------------------------<  120<H>  4.6   |  22  |  1.59<H>    Ca    9.9      21 May 2022 15:42  Phos  3.3     05-21  Mg     2.20     05-21          amLODIPine   Tablet 10  ATENolol  Tablet 25  heparin   Injectable 5000      Radiology and Additional Studies:    Assessment and Plan:  Subjective:   Patient seen and examined at bedside. Overnight G- tube unclamped. 4-5 episodes of emesis despite patient taking in little fluid by mouth. Hydralazine d/c. This AM, J tube reportedely not flushing      Objective:   Vital Signs Last 24 Hrs  T(C): 37.1 (22 May 2022 00:40), Max: 37.2 (21 May 2022 16:37)  T(F): 98.8 (22 May 2022 00:40), Max: 98.9 (21 May 2022 16:37)  HR: 56 (22 May 2022 00:40) (53 - 76)  BP: 139/42 (22 May 2022 00:40) (114/56 - 153/50)  BP(mean): --  RR: 18 (22 May 2022 00:40) (16 - 18)  SpO2: 98% (22 May 2022 00:40) (94% - 100%)  I&O's Summary    20 May 2022 07:01  -  21 May 2022 07:00  --------------------------------------------------------  IN: 0 mL / OUT: 900 mL / NET: -900 mL    21 May 2022 07:01  -  22 May 2022 03:43  --------------------------------------------------------  IN: 2765 mL / OUT: 475 mL / NET: 2290 mL        Physical Exam:  General: NAD, Lying in bed   Neuro: Awake and alert  Extremities: wwp  GI/Abd: Soft, NT/ND, G tube open to gravity bag for drainage. J tube.      LABS:                        11.2   7.29  )-----------( 279      ( 21 May 2022 06:24 )             34.7     05-21    129<L>  |  98  |  51<H>  ----------------------------<  120<H>  4.6   |  22  |  1.59<H>    Ca    9.9      21 May 2022 15:42  Phos  3.3     05-21  Mg     2.20     05-21          amLODIPine   Tablet 10  ATENolol  Tablet 25  heparin   Injectable 5000      Radiology and Additional Studies:    Assessment and Plan:

## 2022-05-22 NOTE — PROGRESS NOTE ADULT - SUBJECTIVE AND OBJECTIVE BOX
PATIENT SEEN AND EXAMINED ON :- 5/22/22  DATE OF SERVICE: 5/22/22            Interim events noted,Labs ,Radiological studies and Cardiology tests reviewed .     HOSPITAL COURSE: HPI:  76M hx of anal cancer 2012 s/p chemo radiation here with 2 weeks of epigastric pain found to have new annular mass involving pancreas +duodenal obstruction third portion. Underwent attempted endoscopic percutaneous endoscopic jejunostomy tube however unsuccessful. Patient was discharged home on liquid diet 2d prior to ED presentation at [facility]. Per patient, he has not been able to tolerate significant PO intake as each time he does he becomes nauseated. 1 episode of emesis since discharge. Patient now presents with reduced po tolerance sent in from outpatient office with concern for dehydration. Patient has persistent GI function, +flatus. +BM.     Denies ha / cp / sob / emesis / diarrhea / melena / hematochezia / changes in bladder function.  (10 May 2022 18:06)      INTERIM EVENTS:Patient seen at bedside ,interim events noted.      PMH -reviewed admission note, no change since admission  HEART FAILURE: Acute[ ]Chronic[ ] Systolic[ ] Diastolic[ ] Combined Systolic and Diastolic[ ]  CAD[ ] CABG[ ] PCI[ ]  DEVICES[ ] PPM[ ] ICD[ ] ILR[ ]  ATRIAL FIBRILLATION[ ] Paroxysmal[ ] Permanent[ ]  MOSES[ ] CKD1[ ] CKD2[ ] CKD3[ ] CKD4[ ] ESRD[ ]  COPD[ ] HTN[ ]   DM[ ] Type1[ ] Type 2[ ]   CVA[ ] Paresis[ ]    AMBULATION: Assisted[ ] Cane/walker[ ] Independent[ ]    MEDICATIONS  (STANDING):  acetaminophen   IVPB .. 1000 milliGRAM(s) IV Intermittent every 6 hours  amLODIPine   Tablet 10 milliGRAM(s) Oral daily  ATENolol  Tablet 25 milliGRAM(s) Oral daily  heparin   Injectable 5000 Unit(s) SubCutaneous every 8 hours  pantoprazole    Tablet 40 milliGRAM(s) Oral before breakfast  sodium chloride 0.45%. 1000 milliLiter(s) (75 mL/Hr) IV Continuous <Continuous>  Viokace (10,440/39,150/39,150) tablet 1 Tablet(s) 1 Tablet(s) Enteral Tube once    MEDICATIONS  (PRN):  benzocaine 20% Spray 1 Spray(s) Topical three times a day PRN sore throat            REVIEW OF SYSTEMS:  Constitutional: [ ] fever, [ ]weight loss,  [ ]fatigue  Eyes: [ ] visual changes  Respiratory: [ ]shortness of breath;  [ ] cough, [ ]wheezing, [ ]chills, [ ]hemoptysis  Cardiovascular: [ ] chest pain, [ ]palpitations, [ ]dizziness,  [ ]leg swelling[ ]orthopnea[ ]PND  Gastrointestinal: [ ] abdominal pain, [ ]nausea, [ ]vomiting,  [ ]diarrhea [ ]Constipation [ ]Melena  Genitourinary: [ ] dysuria, [ ] hematuria [ ]Celaya  Neurologic: [ ] headaches [ ] tremors[ ]weakness [ ]Paralysis Right[ ] Left[ ]  Skin: [ ] itching, [ ]burning, [ ] rashes  Endocrine: [ ] heat or cold intolerance  Musculoskeletal: [ ] joint pain or swelling; [ ] muscle, back, or extremity pain  Psychiatric: [ ] depression, [ ]anxiety, [ ]mood swings, or [ ]difficulty sleeping  Hematologic: [ ] easy bruising, [ ] bleeding gums    [ ] All remaining systems negative except as per above.   [ ]Unable to obtain.  [x] No change in ROS since admission      Vital Signs Last 24 Hrs  T(C): 36.9 (22 May 2022 16:12), Max: 37.2 (22 May 2022 09:44)  T(F): 98.4 (22 May 2022 16:12), Max: 99 (22 May 2022 09:44)  HR: 67 (22 May 2022 16:12) (56 - 75)  BP: 157/49 (22 May 2022 16:12) (139/42 - 157/49)  BP(mean): --  RR: 18 (22 May 2022 16:12) (17 - 18)  SpO2: 100% (22 May 2022 16:12) (98% - 100%)  I&O's Summary    21 May 2022 07:01  -  22 May 2022 07:00  --------------------------------------------------------  IN: 3390 mL / OUT: 675 mL / NET: 2715 mL    22 May 2022 07:01  -  22 May 2022 19:09  --------------------------------------------------------  IN: 1820 mL / OUT: 2200 mL / NET: -380 mL        PHYSICAL EXAM:  General: No acute distress BMI-  HEENT: EOMI, PERRL  Neck: Supple, [ ] JVD  Lungs: Equal air entry bilaterally; [ ] rales [ ] wheezing [ ] rhonchi  Heart: Regular rate and rhythm; [x ] murmur   2/6 [ x] systolic [ ] diastolic [ ] radiation[ ] rubs [ ]  gallops  Abdomen: Nontender, bowel sounds present  Extremities: No clubbing, cyanosis, [ ] edema [ ]Pulses  equal and intact  Nervous system:  Alert & Oriented X3, no focal deficits  Psychiatric: Normal affect  Skin: No rashes or lesions    LABS:  05-22    134<L>  |  104  |  60<H>  ----------------------------<  159<H>  4.6   |  17<L>  |  1.55<H>    Ca    9.7      22 May 2022 05:31  Phos  3.0     05-22  Mg     2.10     05-22      Creatinine Trend: 1.55<--, 1.59<--, 1.49<--, 1.49<--, 1.28<--, 1.23<--                        10.8   8.09  )-----------( 279      ( 22 May 2022 05:31 )             32.8                PATIENT SEEN AND EXAMINED ON :- 5/22/22  DATE OF SERVICE: 5/22/22            Interim events noted,Labs ,Radiological studies and Cardiology tests reviewed .     HOSPITAL COURSE: HPI:  76M hx of anal cancer 2012 s/p chemo radiation here with 2 weeks of epigastric pain found to have new annular mass involving pancreas +duodenal obstruction third portion. Underwent attempted endoscopic percutaneous endoscopic jejunostomy tube however unsuccessful. Patient was discharged home on liquid diet 2d prior to ED presentation at [facility]. Per patient, he has not been able to tolerate significant PO intake as each time he does he becomes nauseated. 1 episode of emesis since discharge. Patient now presents with reduced po tolerance sent in from outpatient office with concern for dehydration. Patient has persistent GI function, +flatus. +BM.     Denies ha / cp / sob / emesis / diarrhea / melena / hematochezia / changes in bladder function.  (10 May 2022 18:06)      INTERIM EVENTS:Patient seen at bedside ,interim events noted.Awake alert tolerating fluids no dyspnea voiding      PMH -reviewed admission note, no change since admission  HEART FAILURE: Acute[ ]Chronic[ ] Systolic[ ] Diastolic[ ] Combined Systolic and Diastolic[ ]  CAD[ ] CABG[ ] PCI[ ]  DEVICES[ ] PPM[ ] ICD[ ] ILR[ ]  ATRIAL FIBRILLATION[ ] Paroxysmal[ ] Permanent[ ]  MOSES[ ] CKD1[ ] CKD2[ ] CKD3[ ] CKD4[ ] ESRD[ ]  COPD[ ] HTN[ ]   DM[ ] Type1[ ] Type 2[ ]   CVA[ ] Paresis[ ]    AMBULATION: Assisted[ ] Cane/walker[ ] Independent[ ]    MEDICATIONS  (STANDING):  acetaminophen   IVPB .. 1000 milliGRAM(s) IV Intermittent every 6 hours  amLODIPine   Tablet 10 milliGRAM(s) Oral daily  ATENolol  Tablet 25 milliGRAM(s) Oral daily  heparin   Injectable 5000 Unit(s) SubCutaneous every 8 hours  pantoprazole    Tablet 40 milliGRAM(s) Oral before breakfast  sodium chloride 0.45%. 1000 milliLiter(s) (75 mL/Hr) IV Continuous <Continuous>  Viokace (10,440/39,150/39,150) tablet 1 Tablet(s) 1 Tablet(s) Enteral Tube once    MEDICATIONS  (PRN):  benzocaine 20% Spray 1 Spray(s) Topical three times a day PRN sore throat            REVIEW OF SYSTEMS:  Constitutional: [ ] fever, [ ]weight loss,  [ ]fatigue  Eyes: [ ] visual changes  Respiratory: [ ]shortness of breath;  [ ] cough, [ ]wheezing, [ ]chills, [ ]hemoptysis  Cardiovascular: [ ] chest pain, [ ]palpitations, [ ]dizziness,  [ ]leg swelling[ ]orthopnea[ ]PND  Gastrointestinal: [ ] abdominal pain, [ ]nausea, [ ]vomiting,  [ ]diarrhea [ ]Constipation [ ]Melena  Genitourinary: [ ] dysuria, [ ] hematuria [ ]Celaya  Neurologic: [ ] headaches [ ] tremors[ ]weakness [ ]Paralysis Right[ ] Left[ ]  Skin: [ ] itching, [ ]burning, [ ] rashes  Endocrine: [ ] heat or cold intolerance  Musculoskeletal: [ ] joint pain or swelling; [ ] muscle, back, or extremity pain  Psychiatric: [ ] depression, [ ]anxiety, [ ]mood swings, or [ ]difficulty sleeping  Hematologic: [ ] easy bruising, [ ] bleeding gums    [ ] All remaining systems negative except as per above.   [ ]Unable to obtain.  [x] No change in ROS since admission      Vital Signs Last 24 Hrs  T(C): 36.9 (22 May 2022 16:12), Max: 37.2 (22 May 2022 09:44)  T(F): 98.4 (22 May 2022 16:12), Max: 99 (22 May 2022 09:44)  HR: 67 (22 May 2022 16:12) (56 - 75)  BP: 157/49 (22 May 2022 16:12) (139/42 - 157/49)  BP(mean): --  RR: 18 (22 May 2022 16:12) (17 - 18)  SpO2: 100% (22 May 2022 16:12) (98% - 100%)  I&O's Summary    21 May 2022 07:01  -  22 May 2022 07:00  --------------------------------------------------------  IN: 3390 mL / OUT: 675 mL / NET: 2715 mL    22 May 2022 07:01  -  22 May 2022 19:09  --------------------------------------------------------  IN: 1820 mL / OUT: 2200 mL / NET: -380 mL        PHYSICAL EXAM:  General: No acute distress BMI-  HEENT: EOMI, PERRL  Neck: Supple, [ ] JVD  Lungs: Equal air entry bilaterally; [ ] rales [ ] wheezing [ ] rhonchi  Heart: Regular rate and rhythm; [x ] murmur   2/6 [ x] systolic [ ] diastolic [ ] radiation[ ] rubs [ ]  gallops  Abdomen: Nontender, bowel sounds present  Extremities: No clubbing, cyanosis, [ ] edema [ ]Pulses  equal and intact  Nervous system:  Alert & Oriented X3, no focal deficits  Psychiatric: Normal affect  Skin: No rashes or lesions    LABS:  05-22    134<L>  |  104  |  60<H>  ----------------------------<  159<H>  4.6   |  17<L>  |  1.55<H>    Ca    9.7      22 May 2022 05:31  Phos  3.0     05-22  Mg     2.10     05-22      Creatinine Trend: 1.55<--, 1.59<--, 1.49<--, 1.49<--, 1.28<--, 1.23<--                        10.8   8.09  )-----------( 279      ( 22 May 2022 05:31 )             32.8

## 2022-05-22 NOTE — CONSULT NOTE ADULT - PROBLEM SELECTOR RECOMMENDATION 9
Eval prior to venting gastrostomy and feeding jejunostomy tentatively planned for 5/13.  - Per daughter's description, pt with at least 4-6 METs.  - Good dentition; no loose teeth.  - No chest pain, dyspnea, orthopnea. Breathing comfortably on RA with SpO2 100%  - TTE 4/29: Normal LV size and function, normal RV size and function. EF 60-65%  - EKG 5/7: Sinus rudy at 51bpm, no ST elevations/depressions, QTc 431ms  - RCRI score 1 given intraperitoneal procedure; no hx CVA/TIA, ACS, or DM; SCr < 2 (30-day emma-operative mortality of 6%)  - No further cardiac workup required; patient optimized for venting gastrostomy and feeding jejunostomy
Pt with MOSES in the setting of decreased po intake, high G-tube output. Exact duration of MOSES however unknown. Upon review of [facility] HIE/[employer] baseline Cr ~1.0-1.2mg/dl. On admission with MOSES that resolved with IVF. Now having another episode of MOSES  noted with elevated Cr on 5/20 to 1.49 and today at 1.55mg/dl. No episodes of hypotension. G -tube with ~1L output. Started on IVF today.      Please send UA, urine electrolytes, spot urine TP/CR. Strict I/Os. Agree with IVF for now. Check Us kidney/bladder. please check post-void bladder scan to r/o urinary retention. Monitor labs and urine output. Avoid NSAIDs, ACEI/ARBS, RCA and nephrotoxins. Dose medications as per eGFR.

## 2022-05-22 NOTE — CONSULT NOTE ADULT - ATTENDING COMMENTS
Patient seen and evaluated.  MOSES has now resolved.  spoke with Dr Hahn and patient's niece who is a gastroenterologist.  will need to monitor fluid balance in patient and outpatient.  Dr. Hahn has ability to infuse fluids and check labs daily as an outpatient.  With regards to hyponatremia recommend that patient not take in additional free water by mouth if it can be avoided.  recommend use ensure or some other protein supplement as needed.
76 y.o. 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 Surg/Onc planning on placing venting gastrostomy and feeding jejunostomy on 5/13. Patient is low risk for moderate risk procedure. No objection to proceeding to OR w/o further testing.

## 2022-05-22 NOTE — CONSULT NOTE ADULT - SUBJECTIVE AND OBJECTIVE BOX
[facility] DIVISION OF KIDNEY DISEASES AND HYPERTENSION -- [phone]  -- INITIAL CONSULT NOTE  --------------------------------------------------------------------------------  If any questions, please feel free to contact me  NS pager: [phone], [facility]: [phone]  Filipe Thomason M.D.  Nephrology Fellow  --------------------------------------------------------------------------------    HPI:  76M hx of anal cancer 2012 s/p chemo radiation, found to have new annular mass involving pancreas + duodenal obstruction third portion. Presented for concerns of dehydration initially. he was not been able to tolerate significant PO intake as each time he does he becomes nauseated. On presentation with MOSES Cr was at 1.4 (5/10), baseline Cr seems to be around 1.0-1.2, improved with IV fluids. On may 13th he underwent diagnostic laparoscopy and had Gastrostomy and Jejunostomy tubes placed. Currently patient reports that is tolerating po fluids, has decreased appetite, noted with elevated Cr on 5/20 to 1.49 and today at 1.55mg/dl. No episodes of hypotension. G -tube with ~1L output. Started on IVF today. Nephrology consulted for MOSES.        PAST HISTORY  --------------------------------------------------------------------------------  PAST MEDICAL & SURGICAL HISTORY:  Anal cancer  Had Chemo and Radiation 4 years ago      No significant past surgical history        FAMILY HISTORY:  non contributory     PAST SOCIAL HISTORY:  no smoking, no alcohol    ALLERGIES & MEDICATIONS  --------------------------------------------------------------------------------  Allergies    No Known Allergies    Intolerances      Standing Inpatient Medications  acetaminophen   IVPB .. 1000 milliGRAM(s) IV Intermittent every 6 hours  amLODIPine   Tablet 10 milliGRAM(s) Oral daily  ATENolol  Tablet 25 milliGRAM(s) Oral daily  heparin   Injectable 5000 Unit(s) SubCutaneous every 8 hours  pantoprazole    Tablet 40 milliGRAM(s) Oral before breakfast  sodium chloride 0.45%. 1000 milliLiter(s) IV Continuous <Continuous>  Viokace (10,440/39,150/39,150) tablet 1 Tablet(s) 1 Tablet(s) Enteral Tube once    PRN Inpatient Medications  benzocaine 20% Spray 1 Spray(s) Topical three times a day PRN      REVIEW OF SYSTEMS  --------------------------------------------------------------------------------  Gen: +fatigue  No fevers/chills  Skin: No rashes  Respiratory: No dyspnea, cough  CV: No chest pain  GI: + abdominal pain  : No dysuria, hematuria  MSK: No  edema    All other systems were reviewed and are negative, except as noted.    VITALS/PHYSICAL EXAM  --------------------------------------------------------------------------------  T(C): 36.6 (05-22-22 @ 13:53), Max: 37.2 (05-21-22 @ 16:37)  HR: 64 (05-22-22 @ 13:53) (53 - 75)  BP: 149/58 (05-22-22 @ 13:53) (139/42 - 153/50)  RR: 18 (05-22-22 @ 13:53) (17 - 18)  SpO2: 100% (05-22-22 @ 13:53) (98% - 100%)  Wt(kg): --        05-21-22 @ 07:01  -  05-22-22 @ 07:00  --------------------------------------------------------  IN: 3390 mL / OUT: 675 mL / NET: 2715 mL    05-22-22 @ 07:01  -  05-22-22 @ 14:14  --------------------------------------------------------  IN: 695 mL / OUT: 1200 mL / NET: -505 mL      Physical Exam:  	Gen: NAD, cachetic, chronically ill appearing   	Pulm: CTA B/L  	CV: S1S2  	Abd: Soft, +BS, +G tube  +J tube  	Ext: No LE edema B/L  	Neuro: Awake  	Skin: Warm and dry    LABS/STUDIES  --------------------------------------------------------------------------------              10.8   8.09  >-----------<  279      [05-22-22 @ 05:31]              32.8     134  |  104  |  60  ----------------------------<  159      [05-22-22 @ 05:31]  4.6   |  17  |  1.55        Ca     9.7     [05-22-22 @ 05:31]      Mg     2.10     [05-22-22 @ 05:31]      Phos  3.0     [05-22-22 @ 05:31]            Creatinine Trend:  SCr 1.55 [05-22 @ 05:31]  SCr 1.59 [05-21 @ 15:42]  SCr 1.49 [05-21 @ 06:24]  SCr 1.49 [05-20 @ 06:30]  SCr 1.28 [05-19 @ 05:28]    Urinalysis - [05-03-22 @ 00:15]      Color Light Yellow / Appearance Clear / SG 1.014 / pH 7.0      Gluc Trace / Ketone Negative  / Bili Negative / Urobili Negative       Blood Negative / Protein 30 mg/dL / Leuk Est Negative / Nitrite Negative      RBC 1 / WBC 0 / Hyaline 1 / Gran  / Sq Epi  / Non Sq Epi 1 / Bacteria Negative      Iron 12, TIBC 291, %sat 4      [05-03-22 @ 07:24]  Ferritin 48      [05-03-22 @ 07:24]    HCV 0.05, Nonreact      [04-30-22 @ 01:14]     [facility] DIVISION OF KIDNEY DISEASES AND HYPERTENSION -- [phone]  -- INITIAL CONSULT NOTE  --------------------------------------------------------------------------------  If any questions, please feel free to contact me  NS pager: [phone], [facility]: [phone]  Filipe Thomason M.D.  Nephrology Fellow  --------------------------------------------------------------------------------    HPI:  76M hx of anal cancer 2012 s/p chemo radiation, found to have new annular mass involving pancreas + duodenal obstruction third portion. Presented for concerns of dehydration initially. he was not been able to tolerate significant PO intake as each time he does he becomes nauseated. On presentation with MOSES Cr was at 1.4 (5/10), baseline Cr seems to be around 1.0-1.2, improved with IV fluids. On may 13th he underwent diagnostic laparoscopy and had Gastrostomy and Jejunostomy tubes placed. Currently patient reports that is tolerating only fluids, has decreased appetite, noted with elevated Cr on 5/20 to 1.49 and today at 1.55mg/dl. No episodes of hypotension. G -tube with ~1L output. Started on IVF today. Nephrology consulted for MOSES.        PAST HISTORY  --------------------------------------------------------------------------------  PAST MEDICAL & SURGICAL HISTORY:  Anal cancer  Had Chemo and Radiation 4 years ago      No significant past surgical history        FAMILY HISTORY:  non contributory     PAST SOCIAL HISTORY:  no smoking, no alcohol    ALLERGIES & MEDICATIONS  --------------------------------------------------------------------------------  Allergies    No Known Allergies    Intolerances      Standing Inpatient Medications  acetaminophen   IVPB .. 1000 milliGRAM(s) IV Intermittent every 6 hours  amLODIPine   Tablet 10 milliGRAM(s) Oral daily  ATENolol  Tablet 25 milliGRAM(s) Oral daily  heparin   Injectable 5000 Unit(s) SubCutaneous every 8 hours  pantoprazole    Tablet 40 milliGRAM(s) Oral before breakfast  sodium chloride 0.45%. 1000 milliLiter(s) IV Continuous <Continuous>  Viokace (10,440/39,150/39,150) tablet 1 Tablet(s) 1 Tablet(s) Enteral Tube once    PRN Inpatient Medications  benzocaine 20% Spray 1 Spray(s) Topical three times a day PRN      REVIEW OF SYSTEMS  --------------------------------------------------------------------------------  Gen: +fatigue  No fevers/chills  Skin: No rashes  Respiratory: No dyspnea, cough  CV: No chest pain  GI: + abdominal pain  : No dysuria, hematuria  MSK: No  edema    All other systems were reviewed and are negative, except as noted.    VITALS/PHYSICAL EXAM  --------------------------------------------------------------------------------  T(C): 36.6 (05-22-22 @ 13:53), Max: 37.2 (05-21-22 @ 16:37)  HR: 64 (05-22-22 @ 13:53) (53 - 75)  BP: 149/58 (05-22-22 @ 13:53) (139/42 - 153/50)  RR: 18 (05-22-22 @ 13:53) (17 - 18)  SpO2: 100% (05-22-22 @ 13:53) (98% - 100%)  Wt(kg): --        05-21-22 @ 07:01  -  05-22-22 @ 07:00  --------------------------------------------------------  IN: 3390 mL / OUT: 675 mL / NET: 2715 mL    05-22-22 @ 07:01  -  05-22-22 @ 14:14  --------------------------------------------------------  IN: 695 mL / OUT: 1200 mL / NET: -505 mL      Physical Exam:  	Gen: NAD, cachetic, chronically ill appearing   	Pulm: CTA B/L  	CV: S1S2  	Abd: Soft, +BS, +G tube  +J tube  	Ext: No LE edema B/L  	Neuro: Awake  	Skin: Warm and dry    LABS/STUDIES  --------------------------------------------------------------------------------              10.8   8.09  >-----------<  279      [05-22-22 @ 05:31]              32.8     134  |  104  |  60  ----------------------------<  159      [05-22-22 @ 05:31]  4.6   |  17  |  1.55        Ca     9.7     [05-22-22 @ 05:31]      Mg     2.10     [05-22-22 @ 05:31]      Phos  3.0     [05-22-22 @ 05:31]            Creatinine Trend:  SCr 1.55 [05-22 @ 05:31]  SCr 1.59 [05-21 @ 15:42]  SCr 1.49 [05-21 @ 06:24]  SCr 1.49 [05-20 @ 06:30]  SCr 1.28 [05-19 @ 05:28]    Urinalysis - [05-03-22 @ 00:15]      Color Light Yellow / Appearance Clear / SG 1.014 / pH 7.0      Gluc Trace / Ketone Negative  / Bili Negative / Urobili Negative       Blood Negative / Protein 30 mg/dL / Leuk Est Negative / Nitrite Negative      RBC 1 / WBC 0 / Hyaline 1 / Gran  / Sq Epi  / Non Sq Epi 1 / Bacteria Negative      Iron 12, TIBC 291, %sat 4      [05-03-22 @ 07:24]  Ferritin 48      [05-03-22 @ 07:24]    HCV 0.05, Nonreact      [04-30-22 @ 01:14]

## 2022-05-22 NOTE — PROGRESS NOTE ADULT - ASSESSMENT
76y Male s/p a lap assisted gastrostomy and 14Fr feeding red rubber Witzel jejunostomy tube. Now with rising serum creatinine since 5/20.     Plan  - DVT ppx: SQH   - GI ppx: PRTNX   - Pain: Dilaudid  - PO Home Meds: Amlodipine, Hydralazine  -CLD  - Gastrostomy tube  - c/w nocturnal tube feeds. flush w/ free water 250ml q6hr  - OOB and ambulating as tolerated  - possible discharge home w/ tf .   - Medicine  consult for rising MOSES: recommmend 1/2 NS at 76    D Team Surgery 76y Male s/p a lap assisted gastrostomy and 14Fr feeding red rubber Witzel jejunostomy tube. Now with rising serum creatinine since 5/20.     Plan  - DVT ppx: SQH   - GI ppx: PRTNX   - Pain: Dilaudid  - PO Home Meds: Amlodipine, Hydralazine  - CLD  - Gastrostomy tube to clamp  - c/w nocturnal tube feeds. flush w/ free water 250ml q6hr  - Viokase/Bicarb combo ordered  - OOB and ambulating as tolerated  - possible discharge home w/ tf .   - Medicine  consult for rising MOSES: recommmend 1/2 NS at 76    D Team Surgery 76y Male s/p a lap assisted gastrostomy and 14Fr feeding red rubber Witzel jejunostomy tube. Now with rising serum creatinine since 5/20.     Plan  - DVT ppx: SQH   - GI ppx: PRTNX   - Pain: Dilaudid  - PO Home Meds: Amlodipine, Hydralazine  - CLD  - Gastrostomy tube to clamp  - c/w nocturnal tube feeds. flush w/ free water 250ml q6hr  - Viokase/Bicarb combo ordered  - Renal consult given rising Cr  - Midline placement for home IVF  - OOB and ambulating as tolerated  - possible discharge home w/ tf .   - Medicine  consult for rising MOSES: recommmend 1/2 NS at 76    D Team Surgery

## 2022-05-22 NOTE — PROGRESS NOTE ADULT - ASSESSMENT
76y Male s/p a lap assisted gastrostomy and 14Fr feeding red rubber Witzel jejunostomy tube. Now with rising serum creatinine since 5/20.      76y Male s/p a lap assisted gastrostomy and 14Fr feeding red rubber Witzel jejunostomy tube. Now with rising serum creatinine since 5/20.     -Creatinine improving continue IVF  BP stable    Plan  - DVT ppx: SQH   - GI ppx: PRTNX   - Pain: Dilaudid  - PO Home Meds: Amlodipine, Hydralazine  - CLD  - Gastrostomy tube to clamp  - c/w nocturnal tube feeds. flush w/ free water 250ml q6hr  - Viokase/Bicarb combo ordered  - Renal consult given rising Cr  - Midline placement for home IVF  - OOB and ambulating as tolerated

## 2022-05-22 NOTE — CHART NOTE - NSCHARTNOTEFT_GEN_A_CORE
Per nurse, tube able to be flushed s/p injection of [id number] injection into j tube    petr nicole md Per nurse, tube able to be flushed, and open, s/p injection of [id number] injection into j tube    petr nicole md

## 2022-05-23 NOTE — PROGRESS NOTE ADULT - ASSESSMENT
76y Male s/p a lap assisted gastrostomy and 14Fr feeding red rubber Witzel jejunostomy tube. Now with rising serum creatinine since 5/20.      76M hx of anal cancer 2012 s/p chemo radiation here with 2 weeks of epigastric pain found to have new annular mass involving pancreas +duodenal obstruction third portion. Underwent attempted endoscopic percutaneous endoscopic jejunostomy tube however unsuccessful. Patient was discharged home on liquid diet 2d prior to ED presentation at [facility].    Pancreas, duodenal obstruction  -- underwent G tube and J tube placement.  -- Management per surgery    Anemia  -- Hgb 10.3  -- Most likely from chronic disease, with history of BISMARK  -- Under care of Dr. Hahn  -- Please transfuse PRBCs for hemoglobin < 7.0    History of Anal Cancer  -- Metastatic  -- In remission, s/p treatment    MOSES  -- Nephrology consult appreciated  -- Cr 1.21  -Improved    HTN  BP stable

## 2022-05-23 NOTE — PROGRESS NOTE ADULT - SUBJECTIVE AND OBJECTIVE BOX
PATIENT SEEN AND EXAMINED ON :-5/23/22  DATE OF SERVICE:    5/23/22         Interim events noted,Labs ,Radiological studies and Cardiology tests reviewed .       HOSPITAL COURSE: HPI:  76M hx of anal cancer 2012 s/p chemo radiation here with 2 weeks of epigastric pain found to have new annular mass involving pancreas +duodenal obstruction third portion. Underwent attempted endoscopic percutaneous endoscopic jejunostomy tube however unsuccessful. Patient was discharged home on liquid diet 2d prior to ED presentation at [facility]. Per patient, he has not been able to tolerate significant PO intake as each time he does he becomes nauseated. 1 episode of emesis since discharge. Patient now presents with reduced po tolerance sent in from outpatient office with concern for dehydration. Patient has persistent GI function, +flatus. +BM.     Denies ha / cp / sob / emesis / diarrhea / melena / hematochezia / changes in bladder function.  (10 May 2022 18:06)      INTERIM EVENTS:Patient seen at bedside ,interim events noted.      PMH -reviewed admission note, no change since admission  HEART FAILURE: Acute[ ]Chronic[ ] Systolic[ ] Diastolic[ ] Combined Systolic and Diastolic[ ]  CAD[ ] CABG[ ] PCI[ ]  DEVICES[ ] PPM[ ] ICD[ ] ILR[ ]  ATRIAL FIBRILLATION[ ] Paroxysmal[ ] Permanent[ ]  MOSES[ ] CKD1[ ] CKD2[ ] CKD3[ ] CKD4[ ] ESRD[ ]  COPD[ ] HTN[ ]   DM[ ] Type1[ ] Type 2[ ]   CVA[ ] Paresis[ ]    AMBULATION: Assisted[ ] Cane/walker[ ] Independent[ ]    MEDICATIONS  (STANDING):  amLODIPine   Tablet 10 milliGRAM(s) Oral daily  ATENolol  Tablet 25 milliGRAM(s) Oral daily  heparin   Injectable 5000 Unit(s) SubCutaneous every 8 hours  pantoprazole    Tablet 40 milliGRAM(s) Oral before breakfast  potassium phosphate / sodium phosphate Tablet (K-PHOS No. 2) 1 Tablet(s) Oral four times a day  Viokace (10,440/39,150/39,150) tablet 1 Tablet(s) 1 Tablet(s) Enteral Tube once    MEDICATIONS  (PRN):  benzocaine 20% Spray 1 Spray(s) Topical three times a day PRN sore throat            REVIEW OF SYSTEMS:  Constitutional: [ ] fever, [ ]weight loss,  [ ]fatigue  Eyes: [ ] visual changes  Respiratory: [ ]shortness of breath;  [ ] cough, [ ]wheezing, [ ]chills, [ ]hemoptysis  Cardiovascular: [ ] chest pain, [ ]palpitations, [ ]dizziness,  [ ]leg swelling[ ]orthopnea[ ]PND  Gastrointestinal: [ ] abdominal pain, [ ]nausea, [ ]vomiting,  [ ]diarrhea [ ]Constipation [ ]Melena  Genitourinary: [ ] dysuria, [ ] hematuria [ ]Celaya  Neurologic: [ ] headaches [ ] tremors[ ]weakness [ ]Paralysis Right[ ] Left[ ]  Skin: [ ] itching, [ ]burning, [ ] rashes  Endocrine: [ ] heat or cold intolerance  Musculoskeletal: [ ] joint pain or swelling; [ ] muscle, back, or extremity pain  Psychiatric: [ ] depression, [ ]anxiety, [ ]mood swings, or [ ]difficulty sleeping  Hematologic: [ ] easy bruising, [ ] bleeding gums    [ ] All remaining systems negative except as per above.   [ ]Unable to obtain.  [x] No change in ROS since admission      Vital Signs Last 24 Hrs  T(C): 36.8 (23 May 2022 11:55), Max: 36.8 (23 May 2022 04:55)  T(F): 98.3 (23 May 2022 11:55), Max: 98.3 (23 May 2022 04:55)  HR: 51 (23 May 2022 11:55) (51 - 97)  BP: 140/51 (23 May 2022 11:55) (129/48 - 161/62)  BP(mean): --  RR: 16 (23 May 2022 11:55) (16 - 18)  SpO2: 100% (23 May 2022 11:55) (100% - 100%)  I&O's Summary    22 May 2022 07:01  -  23 May 2022 07:00  --------------------------------------------------------  IN: 3850 mL / OUT: 3100 mL / NET: 750 mL    23 May 2022 07:01  -  23 May 2022 17:29  --------------------------------------------------------  IN: 625 mL / OUT: 1300 mL / NET: -675 mL        PHYSICAL EXAM:  General: No acute distress BMI-  HEENT: EOMI, PERRL  Neck: Supple, [ ] JVD  Lungs: Equal air entry bilaterally; [ ] rales [ ] wheezing [ ] rhonchi  Heart: Regular rate and rhythm; [x ] murmur   2/6 [ x] systolic [ ] diastolic [ ] radiation[ ] rubs [ ]  gallops  Abdomen: Nontender, bowel sounds present  Extremities: No clubbing, cyanosis, [ ] edema [ ]Pulses  equal and intact  Nervous system:  Alert & Oriented X3, no focal deficits  Psychiatric: Normal affect  Skin: No rashes or lesions    LABS:  05-23    130<L>  |  104  |  57<H>  ----------------------------<  137<H>  4.3   |  16<L>  |  1.21    Ca    9.2      23 May 2022 05:29  Phos  2.7     05-23  Mg     1.90     05-23      Creatinine Trend: 1.21<--, 1.55<--, 1.59<--, 1.49<--, 1.49<--, 1.28<--                        10.3   7.42  )-----------( 251      ( 23 May 2022 05:29 )             31.5                PATIENT SEEN AND EXAMINED ON :-5/23/22  DATE OF SERVICE:    5/23/22         Interim events noted,Labs ,Radiological studies and Cardiology tests reviewed .       HOSPITAL COURSE: HPI:  76M hx of anal cancer 2012 s/p chemo radiation here with 2 weeks of epigastric pain found to have new annular mass involving pancreas +duodenal obstruction third portion. Underwent attempted endoscopic percutaneous endoscopic jejunostomy tube however unsuccessful. Patient was discharged home on liquid diet 2d prior to ED presentation at [facility]. Per patient, he has not been able to tolerate significant PO intake as each time he does he becomes nauseated. 1 episode of emesis since discharge. Patient now presents with reduced po tolerance sent in from outpatient office with concern for dehydration. Patient has persistent GI function, +flatus. +BM.     Denies ha / cp / sob / emesis / diarrhea / melena / hematochezia / changes in bladder function.  (10 May 2022 18:06)      INTERIM EVENTS:Patient seen at bedside ,interim events noted.Feels better ambulating no nausea Renal function improved      PMH -reviewed admission note, no change since admission  HEART FAILURE: Acute[ ]Chronic[ ] Systolic[ ] Diastolic[ ] Combined Systolic and Diastolic[ ]  CAD[ ] CABG[ ] PCI[ ]  DEVICES[ ] PPM[ ] ICD[ ] ILR[ ]  ATRIAL FIBRILLATION[ ] Paroxysmal[ ] Permanent[ ]  MOSES[ ] CKD1[ ] CKD2[ ] CKD3[ ] CKD4[ ] ESRD[ ]  COPD[ ] HTN[ ]   DM[ ] Type1[ ] Type 2[ ]   CVA[ ] Paresis[ ]    AMBULATION: Assisted[ ] Cane/walker[ ] Independent[ ]    MEDICATIONS  (STANDING):  amLODIPine   Tablet 10 milliGRAM(s) Oral daily  ATENolol  Tablet 25 milliGRAM(s) Oral daily  heparin   Injectable 5000 Unit(s) SubCutaneous every 8 hours  pantoprazole    Tablet 40 milliGRAM(s) Oral before breakfast  potassium phosphate / sodium phosphate Tablet (K-PHOS No. 2) 1 Tablet(s) Oral four times a day  Viokace (10,440/39,150/39,150) tablet 1 Tablet(s) 1 Tablet(s) Enteral Tube once    MEDICATIONS  (PRN):  benzocaine 20% Spray 1 Spray(s) Topical three times a day PRN sore throat            REVIEW OF SYSTEMS:  Constitutional: [ ] fever, [ ]weight loss,  [ ]fatigue  Eyes: [ ] visual changes  Respiratory: [ ]shortness of breath;  [ ] cough, [ ]wheezing, [ ]chills, [ ]hemoptysis  Cardiovascular: [ ] chest pain, [ ]palpitations, [ ]dizziness,  [ ]leg swelling[ ]orthopnea[ ]PND  Gastrointestinal: [ ] abdominal pain, [ ]nausea, [ ]vomiting,  [ ]diarrhea [ ]Constipation [ ]Melena  Genitourinary: [ ] dysuria, [ ] hematuria [ ]Celaya  Neurologic: [ ] headaches [ ] tremors[ ]weakness [ ]Paralysis Right[ ] Left[ ]  Skin: [ ] itching, [ ]burning, [ ] rashes  Endocrine: [ ] heat or cold intolerance  Musculoskeletal: [ ] joint pain or swelling; [ ] muscle, back, or extremity pain  Psychiatric: [ ] depression, [ ]anxiety, [ ]mood swings, or [ ]difficulty sleeping  Hematologic: [ ] easy bruising, [ ] bleeding gums    [ ] All remaining systems negative except as per above.   [ ]Unable to obtain.  [x] No change in ROS since admission      Vital Signs Last 24 Hrs  T(C): 36.8 (23 May 2022 11:55), Max: 36.8 (23 May 2022 04:55)  T(F): 98.3 (23 May 2022 11:55), Max: 98.3 (23 May 2022 04:55)  HR: 51 (23 May 2022 11:55) (51 - 97)  BP: 140/51 (23 May 2022 11:55) (129/48 - 161/62)  BP(mean): --  RR: 16 (23 May 2022 11:55) (16 - 18)  SpO2: 100% (23 May 2022 11:55) (100% - 100%)  I&O's Summary    22 May 2022 07:01  -  23 May 2022 07:00  --------------------------------------------------------  IN: 3850 mL / OUT: 3100 mL / NET: 750 mL    23 May 2022 07:01  -  23 May 2022 17:29  --------------------------------------------------------  IN: 625 mL / OUT: 1300 mL / NET: -675 mL        PHYSICAL EXAM:  General: No acute distress BMI-  HEENT: EOMI, PERRL  Neck: Supple, [ ] JVD  Lungs: Equal air entry bilaterally; [ ] rales [ ] wheezing [ ] rhonchi  Heart: Regular rate and rhythm; [x ] murmur   2/6 [ x] systolic [ ] diastolic [ ] radiation[ ] rubs [ ]  gallops  Abdomen: Nontender, bowel sounds present  Extremities: No clubbing, cyanosis, [ ] edema [ ]Pulses  equal and intact  Nervous system:  Alert & Oriented X3, no focal deficits  Psychiatric: Normal affect  Skin: No rashes or lesions    LABS:  05-23    130<L>  |  104  |  57<H>  ----------------------------<  137<H>  4.3   |  16<L>  |  1.21    Ca    9.2      23 May 2022 05:29  Phos  2.7     05-23  Mg     1.90     05-23      Creatinine Trend: 1.21<--, 1.55<--, 1.59<--, 1.49<--, 1.49<--, 1.28<--                        10.3   7.42  )-----------( 251      ( 23 May 2022 05:29 )             31.5

## 2022-05-23 NOTE — PROGRESS NOTE ADULT - ASSESSMENT
76y Male s/p a lap assisted gastrostomy and 14Fr feeding red rubber Witzel jejunostomy tube. Now with rising serum creatinine since 5/20.     Plan  - DVT ppx: SQH   - GI ppx: PRTNX   - Pain: Dilaudid  - PO Home Meds: Amlodipine, Hydralazine  - CLD  - Gastrostomy tube to clamp  - c/w nocturnal tube feeds. flush w/ free water 250ml q6hr  - Viokase/Bicarb combo ordered  - F/u urine labs  - Midline placement for home IVF  - OOB and ambulating as tolerated  - possible discharge home w/ tf .   - Medicine  consult for rising MOSES: recommmend 1/2 NS at 76    D Team Surgery 76y Male s/p a lap assisted gastrostomy and 14Fr feeding red rubber Witzel jejunostomy tube. Now with rising serum creatinine since 5/20.     Plan  - DVT ppx: SQH   - GI ppx: PRTNX   - PO Home Meds: Amlodipine, Hydralazine  - CLD  - Gastrostomy tube to clamp  - c/w nocturnal tube feeds. flush w/ free water 250ml q6hr  - Viokase/Bicarb combo as needed  - F/u urine labs  - Midline placement for home IVF  - OOB and ambulating as tolerated  - possible discharge home w/ tf   - Medicine  consult for rising MOSES: recommmend 1/2 NS at 76  - F/u urine lytes, UA, appreciate Nephro recs    D Team Surgery

## 2022-05-23 NOTE — CONSULT NOTE ADULT - ASSESSMENT
Patient with newly diagnosed pancreatic mass and duodenal obstruction, underwent G tube and J tube placement. Now with MOSES episode. 
76M hx of anal cancer 2012 s/p chemo radiation here with 2 weeks of epigastric pain found to have new annular mass involving pancreas +duodenal obstruction third portion. Underwent attempted endoscopic percutaneous endoscopic jejunostomy tube however unsuccessful. Patient was discharged home on liquid diet 2d prior to ED presentation at [facility]. Per patient, he has not been able to tolerate significant PO intake as each time he does he becomes nauseated. 1 episode of emesis since discharge. Patient now presents with reduced po tolerance sent in from outpatient office with concern for dehydration. Patient has persistent GI function, +flatus. +BM. Denies ha / cp / sob / emesis / diarrhea / melena / hematochezia / changes in bladder function.  (10 May 2022 18:06)    Hematology/Oncology consulted on this 76 year old male with a PMHx of metastatic anal ca to distant lymph nodes, in remission for >6 years (last chemo was march, 2013) and anemia. Patient is under care of Dr. Hahn of [facility], last seen on 5/10/2022. Patient was previously admitted to [facility] due to a duodenal mass. During his prior admission,   patient was evaluated for pancreatic mass, duodenal mass and had EUS with Bx.     Pancreas, duodenal obstruction  -- underwent G tube and J tube placement.  -- Management per surgery    Anemia  -- Hgb 10.3  -- Most likely from chronic disease, with history of BISMARK  -- Under care of Dr. Hahn  -- Please transfuse PRBCs for hemoglobin < 7.0    History of Anal Cancer  -- Metastatic  -- In remission, s/p treatment    MOSES  -- Nephrology consult appreciated  -- Cr 1.21    Will continue to follow. Upon dicharge, patient may resume care with Dr. Hahn of [facility].    Galdino Bañuelos PA-C  Hematology/Oncology  [facility]  [phone] (office)  [phone] (alt office)  Evenings and weekends please call MD on call or office
 76y Male s/p a lap assisted gastrostomy and 14Fr feeding red rubber Witzel jejunostomy tube    Plan  - DVT ppx: SQH   - GI ppx: PRTNX   - Pain: Dilaudid  - PO Home Meds: Amlodipine, Hydralazine  - Clamp gastrostomy. advanced to CLD.   - c/w nocturnal tube feeds. flush w/ free water 250ml q6hr  - suppository    
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 Plan for venting gastrostomy and feeding jejunostomy on 5/13. Internal medicine consulted for pre-operative optimization.

## 2022-05-23 NOTE — CONSULT NOTE ADULT - SUBJECTIVE AND OBJECTIVE BOX
Reason for consult: Anemia, history of anal cancer    HPI:  76M hx of anal cancer 2012 s/p chemo radiation here with 2 weeks of epigastric pain found to have new annular mass involving pancreas +duodenal obstruction third portion. Underwent attempted endoscopic percutaneous endoscopic jejunostomy tube however unsuccessful. Patient was discharged home on liquid diet 2d prior to ED presentation at [facility]. Per patient, he has not been able to tolerate significant PO intake as each time he does he becomes nauseated. 1 episode of emesis since discharge. Patient now presents with reduced po tolerance sent in from outpatient office with concern for dehydration. Patient has persistent GI function, +flatus. +BM. Denies ha / cp / sob / emesis / diarrhea / melena / hematochezia / changes in bladder function.  (10 May 2022 18:06)    Hematology/Oncology consulted on this 76 year old male with a PMHx of metastatic anal ca to distant lymph nodes, in remission for >6 years (last chemo was march, 2013) and anemia. Patient is under care of Dr. Hahn of [facility], last seen on 5/10/2022. Patient was previously admitted to [facility] due to a duodenal mass. During his prior admission,   patient was evaluated for pancreatic mass, duodenal mass and had EUS with Bx.         PAST MEDICAL & SURGICAL HISTORY:  Anal cancer  Had Chemo and Radiation 4 years ago      No significant past surgical history          FAMILY HISTORY:    Allergies    No Known Allergies    Intolerances        MEDICATIONS  (STANDING):  amLODIPine   Tablet 10 milliGRAM(s) Oral daily  ATENolol  Tablet 25 milliGRAM(s) Oral daily  heparin   Injectable 5000 Unit(s) SubCutaneous every 8 hours  pantoprazole    Tablet 40 milliGRAM(s) Oral before breakfast  potassium phosphate / sodium phosphate Tablet (K-PHOS No. 2) 1 Tablet(s) Oral four times a day  sodium chloride 0.9%. 1000 milliLiter(s) (75 mL/Hr) IV Continuous <Continuous>  Viokace (10,440/39,150/39,150) tablet 1 Tablet(s) 1 Tablet(s) Enteral Tube once    MEDICATIONS  (PRN):  benzocaine 20% Spray 1 Spray(s) Topical three times a day PRN sore throat      ROS  No fever, sweats, chills  No epistaxis, HA, sore throat  No CP, SOB, cough, sputum  No n/v/d, abd pain, melena, hematochezia  No edema  No rash  No anxiety  No back pain, joint pain  No bleeding, bruising  No dysuria, hematuria    T(C): 36.8 (05-23-22 @ 11:55), Max: 36.8 (05-23-22 @ 04:55)  HR: 51 (05-23-22 @ 11:55) (51 - 97)  BP: 140/51 (05-23-22 @ 11:55) (129/48 - 161/62)  RR: 16 (05-23-22 @ 11:55) (16 - 18)  SpO2: 100% (05-23-22 @ 11:55) (100% - 100%)  Wt(kg): --    PE  NAD  Awake, alert  Anicteric, MMM  RRR  CTAB  Abd soft, NT, ND  No c/c/e  No rash grossly                        10.3   7.42  )-----------( 251      ( 23 May 2022 05:29 )             31.5       05-23    130<L>  |  104  |  57<H>  ----------------------------<  137<H>  4.3   |  16<L>  |  1.21    Ca    9.2      23 May 2022 05:29  Phos  2.7     05-23  Mg     1.90     05-23

## 2022-05-23 NOTE — PROGRESS NOTE ADULT - SUBJECTIVE AND OBJECTIVE BOX
Surgical Oncology    SUBJECTIVE: Pt seen and examined on rounds with team. No acute events overnight.        OBJECTIVE    PHYSICAL EXAM:   General: NAD, Lying in bed   Neuro: Awake and alert  Extremities:   GI/Abd: Soft, NT/ND,       VITALS  T(C): 36.7 (05-22-22 @ 20:02), Max: 37.2 (05-22-22 @ 09:44)  HR: 62 (05-22-22 @ 20:02) (56 - 75)  BP: 161/62 (05-22-22 @ 20:02) (139/42 - 161/62)  RR: 18 (05-22-22 @ 20:02) (17 - 18)  SpO2: 100% (05-22-22 @ 20:02) (98% - 100%)  CAPILLARY BLOOD GLUCOSE          Is/Os    05-21 @ 07:01  -  05-22 @ 07:00  --------------------------------------------------------  IN:    dextrose 5% + sodium chloride 0.9% w/ Additives: 900 mL    Free Water: 1000 mL    Oral Fluid: 640 mL    TwoCal HN: 850 mL  Total IN: 3390 mL    OUT:    Gastrostomy Tube (mL): 375 mL    Voided (mL): 300 mL  Total OUT: 675 mL    Total NET: 2715 mL      05-22 @ 07:01  -  05-23 @ 00:14  --------------------------------------------------------  IN:    dextrose 5% + sodium chloride 0.9% w/ Additives: 225 mL    Free Water: 750 mL    Oral Fluid: 320 mL    sodium chloride 0.45%: 1050 mL    TwoCal HN: 450 mL  Total IN: 2795 mL    OUT:    Gastrostomy Tube (mL): 2400 mL    Voided (mL): 300 mL  Total OUT: 2700 mL    Total NET: 95 mL          MEDICATIONS (STANDING): acetaminophen   IVPB .. 1000 milliGRAM(s) IV Intermittent every 6 hours  amLODIPine   Tablet 10 milliGRAM(s) Oral daily  ATENolol  Tablet 25 milliGRAM(s) Oral daily  heparin   Injectable 5000 Unit(s) SubCutaneous every 8 hours  pantoprazole    Tablet 40 milliGRAM(s) Oral before breakfast  sodium chloride 0.45%. 1000 milliLiter(s) IV Continuous <Continuous>  Viokace (10,440/39,150/39,150) tablet 1 Tablet(s) 1 Tablet(s) Enteral Tube once    MEDICATIONS (PRN):    LABS  CBC (05-22 @ 05:31)                              10.8<L>                         8.09    )----------------(  279        --    % Neutrophils, --    % Lymphocytes, ANC: --                                  32.8<L>    BMP (05-22 @ 05:31)             134<L>  |  104     |  60<H> 		Ca++ --      Ca 9.7                ---------------------------------( 159<H>		Mg 2.10               4.6     |  17<L>   |  1.55<H>			Ph 3.0     BMP (05-21 @ 15:42)             129<L>  |  98      |  51<H> 		Ca++ --      Ca 9.9                ---------------------------------( 120<H>		Mg 2.20               4.6     |  22      |  1.59<H>			Ph 3.3                   IMAGING STUDIES     Surgical Oncology    SUBJECTIVE: Pt seen and examined on rounds with team. Overnight no nausea and vomiting while G was clamped. J tube patent w feeds. Denies pain. Family on phone at bedside. Urine electrolytes sent in wrong tube, require re order      OBJECTIVE    PHYSICAL EXAM:   General: NAD, Lying in bed   Neuro: Awake and alert  GI/Abd: Soft, NT/ND, GJ tube      VITALS  T(C): 36.7 (05-22-22 @ 20:02), Max: 37.2 (05-22-22 @ 09:44)  HR: 62 (05-22-22 @ 20:02) (56 - 75)  BP: 161/62 (05-22-22 @ 20:02) (139/42 - 161/62)  RR: 18 (05-22-22 @ 20:02) (17 - 18)  SpO2: 100% (05-22-22 @ 20:02) (98% - 100%)  CAPILLARY BLOOD GLUCOSE          Is/Os    05-21 @ 07:01  -  05-22 @ 07:00  --------------------------------------------------------  IN:    dextrose 5% + sodium chloride 0.9% w/ Additives: 900 mL    Free Water: 1000 mL    Oral Fluid: 640 mL    TwoCal HN: 850 mL  Total IN: 3390 mL    OUT:    Gastrostomy Tube (mL): 375 mL    Voided (mL): 300 mL  Total OUT: 675 mL    Total NET: 2715 mL      05-22 @ 07:01  -  05-23 @ 00:14  --------------------------------------------------------  IN:    dextrose 5% + sodium chloride 0.9% w/ Additives: 225 mL    Free Water: 750 mL    Oral Fluid: 320 mL    sodium chloride 0.45%: 1050 mL    TwoCal HN: 450 mL  Total IN: 2795 mL    OUT:    Gastrostomy Tube (mL): 2400 mL    Voided (mL): 300 mL  Total OUT: 2700 mL    Total NET: 95 mL          MEDICATIONS (STANDING): acetaminophen   IVPB .. 1000 milliGRAM(s) IV Intermittent every 6 hours  amLODIPine   Tablet 10 milliGRAM(s) Oral daily  ATENolol  Tablet 25 milliGRAM(s) Oral daily  heparin   Injectable 5000 Unit(s) SubCutaneous every 8 hours  pantoprazole    Tablet 40 milliGRAM(s) Oral before breakfast  sodium chloride 0.45%. 1000 milliLiter(s) IV Continuous <Continuous>  Viokace (10,440/39,150/39,150) tablet 1 Tablet(s) 1 Tablet(s) Enteral Tube once    MEDICATIONS (PRN):    LABS  CBC (05-22 @ 05:31)                              10.8<L>                         8.09    )----------------(  279        --    % Neutrophils, --    % Lymphocytes, ANC: --                                  32.8<L>    BMP (05-22 @ 05:31)             134<L>  |  104     |  60<H> 		Ca++ --      Ca 9.7                ---------------------------------( 159<H>		Mg 2.10               4.6     |  17<L>   |  1.55<H>			Ph 3.0     BMP (05-21 @ 15:42)             129<L>  |  98      |  51<H> 		Ca++ --      Ca 9.9                ---------------------------------( 120<H>		Mg 2.20               4.6     |  22      |  1.59<H>			Ph 3.3                   IMAGING STUDIES

## 2022-05-24 NOTE — PROGRESS NOTE ADULT - TIME BILLING
- Review of records, telemetry, vital signs and daily labs.   - General and cardiovascular physical examination.  - Generation of cardiovascular treatment plan.  - Coordination of care.      Patient was seen and examined by me on 5/22/22,interim events noted,labs and radiology studies reviewed.  Trevor De La Garza MD,FACC.  [address] [place], [place] [id number].  [phone]
- Review of records, telemetry, vital signs and daily labs.   - General and cardiovascular physical examination.  - Generation of cardiovascular treatment plan.  - Coordination of care.      Patient was seen and examined by me on 5/23/22,interim events noted,labs and radiology studies reviewed.  Trevor De La Garza MD,FACC.  [address] [place], [place] [id number].  [phone]
- Review of records, telemetry, vital signs and daily labs.   - General and cardiovascular physical examination.  - Generation of cardiovascular treatment plan.  - Coordination of care.      Patient was seen and examined by me on 05/24/2022,interim events noted,labs and radiology studies reviewed.  Trevor De La Garza MD,FACC.  [address] [place], [place] [id number].  [phone]

## 2022-05-24 NOTE — CHART NOTE - NSCHARTNOTEFT_GEN_A_CORE
NUTRITION FOLLOW-UP    Pt seen for nutrition follow up.    Pt is s/p lap assisted gastrostomy and jejunostomy tube. Pt is currently receiving TwoCal tube feeding via J tube as nocturnal feeds. Pt has been tolerating feedings well with no GI distress. \    Current feeding provides:  1600 calories  69gm protein.    Weight: 70 kg 5/13/22    Labs: Na-131, BUN-44, Glu-149    Current Diet: Diet, Clear Liquid:   Tube Feeding Modality: Jejunostomy  TwoCal HN (TWOCALHNRTH)  Total Volume for 24 Hours (mL): 800  Continuous  Starting Tube Feed Rate {mL per Hour}: 50  Until Goal Tube Feed Rate (mL per Hour): 50  Tube Feed Duration (in Hours): 16  Tube Feed Start Time: 16:00  Tube Feed Stop Time: 08:00  Free Water Flush   Total Volume per Flush (mL): 250   Frequency: Every 6 Hours (05-21-22 @ 10:53)    Edema: No edema noted    Skin: Surgical incision abdomen  G tube , J tube    Malnutrition Status: Ongoing severe malnutrition based on physical findings os muscle wasting and fat loss.    Enteral Recommendations: Continue with current regimen    RD to Remain Available: Rani Villarreal MS, RDN, CDN pager [phone]     Additional Recommendations:   1) Monitor weight, labs, enteral tolerance and skin integrity.

## 2022-05-24 NOTE — PROGRESS NOTE ADULT - ASSESSMENT
76y Male s/p a lap assisted gastrostomy and 14Fr feeding red rubber Witzel jejunostomy tube. Now with rising serum creatinine since 5/20.     Plan  - DVT ppx: SQH   - GI ppx: PRTNX   - PO Home Meds: Amlodipine, Hydralazine  - CLD  - Gastrostomy tube to clamp  - c/w nocturnal tube feeds. flush w/ free water 250ml q6hr  - Viokase/Bicarb combo as needed  - F/u urine labs  - Midline placement for home IVF  - OOB and ambulating as tolerated  - possible discharge home w/ tf   - hyponatermia; nephrology recs    D Team Surgery

## 2022-05-24 NOTE — CHART NOTE - NSCHARTNOTESELECT_GEN_ALL_CORE
Chart Note/Nutrition Services
Event Note
Post-op Note
Pre-Op Note
Tube Feed Note
Chart Note/Nutrition Services
Chart Note/Nutrition Services
Surgical Oncology
elevated CR note/Event Note

## 2022-05-24 NOTE — PROGRESS NOTE ADULT - SUBJECTIVE AND OBJECTIVE BOX
DATE OF SERVICE: 05-24-22 @ 06:44  Patient was seen and examined on    05-24-22 @ 06:44 .Interim events noted.Consultant notes ,Labs,Telemetry reviewed by me       HOSPITAL COURSE: HPI:  76M hx of anal cancer 2012 s/p chemo radiation here with 2 weeks of epigastric pain found to have new annular mass involving pancreas +duodenal obstruction third portion. Underwent attempted endoscopic percutaneous endoscopic jejunostomy tube however unsuccessful. Patient was discharged home on liquid diet 2d prior to ED presentation at [facility]. Per patient, he has not been able to tolerate significant PO intake as each time he does he becomes nauseated. 1 episode of emesis since discharge. Patient now presents with reduced po tolerance sent in from outpatient office with concern for dehydration. Patient has persistent GI function, +flatus. +BM.     Denies ha / cp / sob / emesis / diarrhea / melena / hematochezia / changes in bladder function.  (10 May 2022 18:06)      INTERIM EVENTS:Patient seen at bedside ,interim events noted.Awake OOB voiding BP stsble       PMH -reviewed admission note, no change since admission  HEART FAILURE: Acute[ ]Chronic[ ] Systolic[ ] Diastolic[ ] Combined Systolic and Diastolic[ ]  CAD[ ] CABG[ ] PCI[ ]  DEVICES[ ] PPM[ ] ICD[ ] ILR[ ]  ATRIAL FIBRILLATION[ ] Paroxysmal[ ] Permanent[ ]  MOSES[x ] CKD1[ ] CKD2[ ] CKD3[ ] CKD4[ ] ESRD[ ]  COPD[ ] HTN[x ]   DM[ ] Type1[ ] Type 2[ ]   CVA[ ] Paresis[ ]    AMBULATION: Assisted[ ] Cane/walker[ ] Independent[x ]    MEDICATIONS  (STANDING):  amLODIPine   Tablet 10 milliGRAM(s) Oral daily  ATENolol  Tablet 25 milliGRAM(s) Oral daily  heparin   Injectable 5000 Unit(s) SubCutaneous every 8 hours  pantoprazole    Tablet 40 milliGRAM(s) Oral before breakfast  potassium phosphate / sodium phosphate Tablet (K-PHOS No. 2) 1 Tablet(s) Oral four times a day  Viokace (10,440/39,150/39,150) tablet 1 Tablet(s) 1 Tablet(s) Enteral Tube once    MEDICATIONS  (PRN):  benzocaine 20% Spray 1 Spray(s) Topical three times a day PRN sore throat        REVIEW OF SYSTEMS:  Constitutional: [ ] fever, [ ]weight loss,  [x ]fatigue  Eyes: [ ] visual changes  Respiratory: [ ]shortness of breath;  [ ] cough, [ ]wheezing, [ ]chills, [ ]hemoptysis  Cardiovascular: [ ] chest pain, [ ]palpitations, [ ]dizziness,  [ ]leg swelling[ ]orthopnea[ ]PND  Gastrointestinal: [ ] abdominal pain, [ ]nausea, [ ]vomiting,  [ ]diarrhea [ ]Constipation [ ]Melena  Genitourinary: [ ] dysuria, [ ] hematuria [ ]Celaya  Neurologic: [ ] headaches [ ] tremors[ ]weakness [ ]Paralysis Right[ ] Left[ ]  Skin: [ ] itching, [ ]burning, [ ] rashes  Endocrine: [ ] heat or cold intolerance  Musculoskeletal: [ ] joint pain or swelling; [ ] muscle, back, or extremity pain  Psychiatric: [ ] depression, [ ]anxiety, [ ]mood swings, or [ ]difficulty sleeping  Hematologic: [ ] easy bruising, [ ] bleeding gums    [ ] All remaining systems negative except as per above.   [ ]Unable to obtain.  [x] No change in ROS since admission      Vital Signs Last 24 Hrs  T(C): 36.7 (24 May 2022 08:20), Max: 36.7 (24 May 2022 08:20)  T(F): 98.1 (24 May 2022 08:20), Max: 98.1 (24 May 2022 08:20)  HR: 59 (24 May 2022 08:20) (59 - 59)  BP: 151/44 (24 May 2022 08:20) (151/44 - 151/44) --  RR: 16 (24 May 2022 08:20) (16 - 16)  SpO2: 100% (24 May 2022 08:20) (100% - 100%)  I&O's Summary    23 May 2022 07:01  -  24 May 2022 07:00  --------------------------------------------------------  IN: 2175 mL / OUT: 2200 mL / NET: -25 mL    24 May 2022 07:01  -  25 May 2022 06:44  --------------------------------------------------------  IN: 0 mL / OUT: 525 mL / NET: -525 mL        PHYSICAL EXAM:  General: No acute distress BMI-30  HEENT: EOMI, PERRL  Neck: Supple, [ ] JVD  Lungs: Equal air entry bilaterally; [ ] rales [ ] wheezing [ ] rhonchi  Heart: Regular rate and rhythm; [x ] murmur   2/6 [ x] systolic [ ] diastolic [ ] radiation[ ] rubs [ ]  gallops  Abdomen: Nontender, bowel sounds present  Extremities: No clubbing, cyanosis, [ ] edema [ ]Pulses  equal and intact  Nervous system:  Alert & Oriented X3, no focal deficits  Psychiatric: Normal affect  Skin: No rashes or lesions    LABS:  05-24    131<L>  |  103  |  44<H>  ----------------------------<  140<H>  4.3   |  18<L>  |  1.09    Ca    9.9      24 May 2022 04:46  Phos  2.8     05-24  Mg     2.00     05-24      Creatinine Trend: 1.09<--, 1.21<--, 1.55<--, 1.59<--, 1.49<--, 1.49<--                        11.1   8.37  )-----------( 273      ( 24 May 2022 04:46 )             35.2

## 2022-05-24 NOTE — PROGRESS NOTE ADULT - NUTRITIONAL ASSESSMENT
This patient has been assessed with a concern for Malnutrition and has been determined to have a diagnosis/diagnoses of Severe protein-calorie malnutrition.    This patient is being managed with:   Diet Clear Liquid-  Tube Feeding Modality: Jejunostomy  TwoCal HN (TWOCALHNRTH)  Total Volume for 24 Hours (mL): 800  Continuous  Starting Tube Feed Rate {mL per Hour}: 50  Until Goal Tube Feed Rate (mL per Hour): 50  Tube Feed Duration (in Hours): 16  Tube Feed Start Time: 16:00  Tube Feed Stop Time: 08:00  Free Water Flush   Total Volume per Flush (mL): 250   Frequency: Every 6 Hours  Entered: May 21 2022 10:52AM    
This patient has been assessed with a concern for Malnutrition and has been determined to have a diagnosis/diagnoses of Severe protein-calorie malnutrition.    This patient is being managed with:   Diet Clear Liquid-  Tube Feeding Modality: Jejunostomy  TwoCal HN (TWOCALHNRTH)  Total Volume for 24 Hours (mL): 816  Continuous  Starting Tube Feed Rate {mL per Hour}: 25  Increase Tube Feed Rate by (mL): 20     Every 4 hours  Until Goal Tube Feed Rate (mL per Hour): 34  Tube Feed Duration (in Hours): 24  Tube Feed Start Time: 16:00  Entered: May 17 2022  3:31PM    
This patient has been assessed with a concern for Malnutrition and has been determined to have a diagnosis/diagnoses of Severe protein-calorie malnutrition.    This patient is being managed with:   Diet Clear Liquid-  Tube Feeding Modality: Jejunostomy  TwoCal HN (TWOCALHNRTH)  Total Volume for 24 Hours (mL): 800  Continuous  Starting Tube Feed Rate {mL per Hour}: 50  Until Goal Tube Feed Rate (mL per Hour): 50  Tube Feed Duration (in Hours): 16  Tube Feed Start Time: 16:00  Tube Feed Stop Time: 08:00  Free Water Flush   Total Volume per Flush (mL): 250   Frequency: Every 6 Hours  Entered: May 21 2022 10:52AM    
This patient has been assessed with a concern for Malnutrition and has been determined to have a diagnosis/diagnoses of Severe protein-calorie malnutrition.    This patient is being managed with:   Diet NPO with Tube Feed-  Tube Feeding Modality: Jejunostomy  Jevity 1.2 Nabil (JEVITY1.2RTH)  Total Volume for 24 Hours (mL): 240  Continuous  Starting Tube Feed Rate {mL per Hour}: 10  Until Goal Tube Feed Rate (mL per Hour): 10  Tube Feed Duration (in Hours): 24  Tube Feed Start Time: 18:00  Entered: May 13 2022  2:31PM    
This patient has been assessed with a concern for Malnutrition and has been determined to have a diagnosis/diagnoses of Severe protein-calorie malnutrition.    This patient is being managed with:   Diet NPO-  Except Medications  With Ice Chips/Sips of Water  Tube Feeding Modality: Jejunostomy  TwoCal HN (TWOCALHNRTH)  Total Volume for 24 Hours (mL): 800  Continuous  Starting Tube Feed Rate {mL per Hour}: 50  Until Goal Tube Feed Rate (mL per Hour): 50  Tube Feed Duration (in Hours): 16  Tube Feed Start Time: 16:00  Tube Feed Stop Time: 08:00  Free Water Flush   Total Volume per Flush (mL): 250   Frequency: Every 6 Hours  Entered: May 20 2022  8:46AM    
This patient has been assessed with a concern for Malnutrition and has been determined to have a diagnosis/diagnoses of Severe protein-calorie malnutrition.    This patient is being managed with:   Diet Clear Liquid-  Tube Feeding Modality: Jejunostomy  TwoCal HN (TWOCALHNRTH)  Total Volume for 24 Hours (mL): 800  Continuous  Starting Tube Feed Rate {mL per Hour}: 50  Until Goal Tube Feed Rate (mL per Hour): 50  Tube Feed Duration (in Hours): 16  Tube Feed Start Time: 16:00  Tube Feed Stop Time: 08:00  Free Water Flush   Total Volume per Flush (mL): 250   Frequency: Every 6 Hours  Entered: May 21 2022 10:52AM    
This patient has been assessed with a concern for Malnutrition and has been determined to have a diagnosis/diagnoses of Severe protein-calorie malnutrition.    This patient is being managed with:   Diet NPO with Tube Feed-  Tube Feeding Modality: Jejunostomy  Jevity 1.2 Nabil (JEVITY1.2RTH)  Total Volume for 24 Hours (mL): 480  Continuous  Starting Tube Feed Rate {mL per Hour}: 20  Until Goal Tube Feed Rate (mL per Hour): 20  Tube Feed Duration (in Hours): 24  Tube Feed Start Time: 16:00  Entered: May 14 2022  4:32PM    
This patient has been assessed with a concern for Malnutrition and has been determined to have a diagnosis/diagnoses of Severe protein-calorie malnutrition.    This patient is being managed with:   Diet NPO-  Except Medications  Entered: May 10 2022  6:16PM    
This patient has been assessed with a concern for Malnutrition and has been determined to have a diagnosis/diagnoses of Severe protein-calorie malnutrition.    This patient is being managed with:   Diet Clear Liquid-  Tube Feeding Modality: Jejunostomy  TwoCal HN (TWOCALHNRTH)  Total Volume for 24 Hours (mL): 800  Continuous  Starting Tube Feed Rate {mL per Hour}: 50  Until Goal Tube Feed Rate (mL per Hour): 50  Tube Feed Duration (in Hours): 16  Tube Feed Start Time: 16:00  Tube Feed Stop Time: 08:00  Entered: May 19 2022  4:01PM    
This patient has been assessed with a concern for Malnutrition and has been determined to have a diagnosis/diagnoses of Severe protein-calorie malnutrition.    This patient is being managed with:   Diet NPO-  Except Medications  Tube Feeding Modality: Jejunostomy  Jevity 1.2 Nabil (JEVITY1.2RTH)  Total Volume for 24 Hours (mL): 720  Continuous  Starting Tube Feed Rate {mL per Hour}: 30  Increase Tube Feed Rate by (mL): 20     Every 4 hours  Until Goal Tube Feed Rate (mL per Hour): 60  Tube Feed Duration (in Hours): 12  Tube Feed Start Time: 18:00  Tube Feed Stop Time: 06:00  Entered: May 16 2022 11:55AM    
This patient has been assessed with a concern for Malnutrition and has been determined to have a diagnosis/diagnoses of Severe protein-calorie malnutrition.    This patient is being managed with:   Diet Clear Liquid-  Tube Feeding Modality: Jejunostomy  TwoCal HN (TWOCALHNRTH)  Total Volume for 24 Hours (mL): 816  Continuous  Starting Tube Feed Rate {mL per Hour}: 25  Increase Tube Feed Rate by (mL): 20     Every 4 hours  Until Goal Tube Feed Rate (mL per Hour): 34  Tube Feed Duration (in Hours): 24  Tube Feed Start Time: 16:00  Entered: May 17 2022  3:31PM    
This patient has been assessed with a concern for Malnutrition and has been determined to have a diagnosis/diagnoses of Severe protein-calorie malnutrition.    This patient is being managed with:   Diet NPO with Tube Feed-  Tube Feeding Modality: Jejunostomy  Jevity 1.2 Nabil (JEVITY1.2RTH)  Total Volume for 24 Hours (mL): 720  Continuous  Starting Tube Feed Rate {mL per Hour}: 30  Until Goal Tube Feed Rate (mL per Hour): 30  Tube Feed Duration (in Hours): 24  Tube Feed Start Time: 09:00  Entered: May 15 2022  5:10PM    
This patient has been assessed with a concern for Malnutrition and has been determined to have a diagnosis/diagnoses of Severe protein-calorie malnutrition.    This patient is being managed with:   Diet NPO-  Except Medications  Entered: May 10 2022  6:16PM    
This patient has been assessed with a concern for Malnutrition and has been determined to have a diagnosis/diagnoses of Severe protein-calorie malnutrition.    This patient is being managed with:   Diet Clear Liquid-  Tube Feeding Modality: Jejunostomy  TwoCal HN (TWOCALHNRTH)  Total Volume for 24 Hours (mL): 800  Continuous  Starting Tube Feed Rate {mL per Hour}: 50  Until Goal Tube Feed Rate (mL per Hour): 50  Tube Feed Duration (in Hours): 16  Tube Feed Start Time: 16:00  Tube Feed Stop Time: 08:00  Free Water Flush   Total Volume per Flush (mL): 250   Frequency: Every 6 Hours  Entered: May 21 2022 10:52AM    
This patient has been assessed with a concern for Malnutrition and has been determined to have a diagnosis/diagnoses of Severe protein-calorie malnutrition.    This patient is being managed with:   Diet Clear Liquid-  Tube Feeding Modality: Jejunostomy  TwoCal HN (TWOCALHNRTH)  Total Volume for 24 Hours (mL): 800  Continuous  Starting Tube Feed Rate {mL per Hour}: 50  Until Goal Tube Feed Rate (mL per Hour): 50  Tube Feed Duration (in Hours): 16  Tube Feed Start Time: 16:00  Tube Feed Stop Time: 08:00  Free Water Flush   Total Volume per Flush (mL): 250   Frequency: Every 6 Hours  Entered: May 21 2022 10:52AM

## 2022-05-24 NOTE — DISCHARGE NOTE NURSING/CASE MANAGEMENT/SOCIAL WORK - NSDCPEFALRISK_GEN_ALL_CORE
For information on Fall & Injury Prevention, visit: [email or web address] OR  [email or web address] OR  https://www.cdc.gov/steadi/patient.html

## 2022-05-24 NOTE — PROGRESS NOTE ADULT - ASSESSMENT
76M hx of anal cancer 2012 s/p chemo radiation here with 2 weeks of epigastric pain found to have new annular mass involving pancreas +duodenal obstruction third portion. Underwent attempted endoscopic percutaneous endoscopic jejunostomy tube however unsuccessful. Patient was discharged home on liquid diet 2d prior to ED presentation at [facility]. Per patient, he has not been able to tolerate significant PO intake as each time he does he becomes nauseated. 1 episode of emesis since discharge. Patient now presents with reduced po tolerance sent in from outpatient office with concern for dehydration. Patient has persistent GI function, +flatus. +BM.   Patient admitted to surgical oncology service. Medically optimized for OR.  Brought to OR by Dr. Avilez 5/13 where he underwent diagnositc laparotomy and placement of open gastrostomy, placement of feeding jejunostomy. Patient tolerated procedure well without complication.  Transferred to surgical floor post-op.  Tube feeds advanced to goal as tolerated, G tube to be clamped. Unclamp if develops nausea or abdominal distension and connect to gravity drain bag for venting.  Patient currently tolerating tube feeds at goal, voiding, ambulating at baseline, and pain is well controlled.  Per team and attending patient stable for discharge home with tube feeds via Jejunostomy and gastrostomy open to gravity,         Pancreas, duodenal obstruction  -- underwent G tube and J tube placement.  -- Management per surgery    Anemia  -- Hgb 10.3  -- Most likely from chronic disease, with history of BISMARK  -- Under care of Dr. Hahn  -- Please transfuse PRBCs for hemoglobin < 7.0    History of Anal Cancer  -- Metastatic  -- In remission, s/p treatment    MOSES  --Tolerating oral also J tube free water  -- Cr 1.21- 1.09  -Improved    HTN  BP stable Detail Level: Zone Detail Level: Generalized Detail Level: Simple

## 2022-05-24 NOTE — PROGRESS NOTE ADULT - SUBJECTIVE AND OBJECTIVE BOX
Surgical Oncology    SUBJECTIVE: Pt seen and examined on rounds with team. No acute events overnight.        OBJECTIVE    PHYSICAL EXAM:   General: NAD, Lying in bed   Neuro: Awake and alert  Extremities:   GI/Abd: Soft, NT/ND,       VITALS  T(C): 36.9 (05-23-22 @ 21:03), Max: 37.1 (05-23-22 @ 17:50)  HR: 61 (05-23-22 @ 21:03) (51 - 97)  BP: 137/46 (05-23-22 @ 21:03) (129/48 - 152/54)  RR: 17 (05-23-22 @ 21:03) (16 - 18)  SpO2: 100% (05-23-22 @ 21:03) (100% - 100%)  CAPILLARY BLOOD GLUCOSE          Is/Os    05-22 @ 07:01  -  05-23 @ 07:00  --------------------------------------------------------  IN:    dextrose 5% + sodium chloride 0.9% w/ Additives: 225 mL    Free Water: 1000 mL    Oral Fluid: 500 mL    sodium chloride 0.45%: 1425 mL    TwoCal HN: 700 mL  Total IN: 3850 mL    OUT:    Gastrostomy Tube (mL): 2400 mL    Voided (mL): 700 mL  Total OUT: 3100 mL    Total NET: 750 mL      05-23 @ 07:01  -  05-24 @ 00:07  --------------------------------------------------------  IN:    Free Water: 500 mL    Oral Fluid: 270 mL    sodium chloride 0.45%: 75 mL    sodium chloride 0.9%: 300 mL    TwoCal HN: 250 mL  Total IN: 1395 mL    OUT:    Gastrostomy Tube (mL): 1100 mL    Voided (mL): 300 mL  Total OUT: 1400 mL    Total NET: -5 mL          MEDICATIONS (STANDING): amLODIPine   Tablet 10 milliGRAM(s) Oral daily  ATENolol  Tablet 25 milliGRAM(s) Oral daily  heparin   Injectable 5000 Unit(s) SubCutaneous every 8 hours  pantoprazole    Tablet 40 milliGRAM(s) Oral before breakfast  potassium phosphate / sodium phosphate Tablet (K-PHOS No. 2) 1 Tablet(s) Oral four times a day  Viokace (10,440/39,150/39,150) tablet 1 Tablet(s) 1 Tablet(s) Enteral Tube once    MEDICATIONS (PRN):    LABS  CBC (05-23 @ 05:29)                              10.3<L>                         7.42    )----------------(  251        --    % Neutrophils, --    % Lymphocytes, ANC: --                                  31.5<L>    BMP (05-23 @ 05:29)             130<L>  |  104     |  57<H> 		Ca++ --      Ca 9.2                ---------------------------------( 137<H>		Mg 1.90               4.3     |  16<L>   |  1.21  			Ph 2.7             ABG (05-23 @ 11:20)      /  /  /  /  / %     Lactate:  1.5        IMAGING STUDIES

## 2022-05-24 NOTE — DISCHARGE NOTE NURSING/CASE MANAGEMENT/SOCIAL WORK - NSSCNAMETXT_GEN_ALL_CORE
[facility] [phone] initial visit will be day of discharge home. A nurse will call prior to the home visit.

## 2022-05-24 NOTE — PROGRESS NOTE ADULT - PROVIDER SPECIALTY LIST ADULT
Cardiology
Surgery
Anesthesia
Cardiology
Surgery
Cardiology
Surgery
Surgery

## 2022-05-24 NOTE — DISCHARGE NOTE NURSING/CASE MANAGEMENT/SOCIAL WORK - PATIENT PORTAL LINK FT
You can access the FollowMyHealth Patient Portal offered by [facility] by registering at the following website: [email or web address]. By joining [employer]’s FollowMyHealth portal, you will also be able to view your health information using other applications (apps) compatible with our system.

## 2022-05-27 NOTE — H&P ADULT - PROBLEM SELECTOR PLAN 1
Patient with multiple episode of NBNB vomiting due to improper use of G/Jtube   -Surgery consulted. Recs appreciated  -Symptoms improved  -C/w IVF @100 cc per hr   -Restart feeds slowly

## 2022-05-27 NOTE — ED PROVIDER NOTE - OBJECTIVE STATEMENT
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 Per pt, was discharged on 5/24, feeling well. However, since yesterday G tube has stopped working. Pt has had multiple episodes of vomiting yest and today with streaks of blood in emesis today. Denies fevers, chills, abd pain, blood in stool, other sources of bleeding. Does not take anything by mouth other than sips of water

## 2022-05-27 NOTE — H&P ADULT - PROBLEM SELECTOR PLAN 4
Most likely due to vomiting and dehydration   -C/w IVF   -Trend BMP   -Obtain renal US and urine lytes if Scr uptrends   -Hold lisinopril

## 2022-05-27 NOTE — H&P ADULT - ASSESSMENT
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

## 2022-05-27 NOTE — ED PROVIDER NOTE - PHYSICAL EXAMINATION
Iliana Awad MD  GENERAL: Patient awake alert NAD.  HEENT: NC/AT, Moist mucous membranes, EOMI.  LUNGS: CTAB, no wheezes or crackles.   CARDIAC: RRR, no m/r/g.    ABDOMEN: Soft, NT, ND, No rebound, guarding. No CVA tenderness. G-J tubes in place in LLQ, oozing purulent discharge from incision site.   EXT: No edema. No calf tenderness.   MSK: no pain with movement, no deformities.  NEURO: A&Ox3. Moving all extremities.  SKIN: Warm and dry. No rash.  PSYCH: Normal affect.

## 2022-05-27 NOTE — CONSULT NOTE ADULT - TIME BILLING
- Review of records, telemetry, vital signs and daily labs.   - General and cardiovascular physical examination.  - Generation of cardiovascular treatment plan.  - Coordination of care.      Patient was seen and examined by me on 05/27/2022,interim events noted,labs and radiology studies reviewed.  Trevor De La Garza MD,FACC.  [address] [place], [place] [id number].  [phone]

## 2022-05-27 NOTE — ED ADULT TRIAGE NOTE - CHIEF COMPLAINT QUOTE
Patient had a J tube AND a GT tube placed for Duodenal cancer on the 5/13th. He was dc'd Tuesday. Pt has a clogged tube with vomiting all through the night and last vomit this morning was slightly blood tinged. MD Mo: [phone] to be contacted.

## 2022-05-27 NOTE — H&P ADULT - PROBLEM/PLAN-4
DISPLAY PLAN FREE TEXT [Dear  ___] : Dear  [unfilled], [Consult Letter:] : Your patient, [unfilled] was seen in my office today for consultation. [Please see my note below.] : Please see my note below.

## 2022-05-27 NOTE — ED ADULT NURSE NOTE - CHIEF COMPLAINT QUOTE
Patient had a J tube AND a GT tube placed for Duodenal cancer on the 5/13th. He was dc'd Tuesday. Pt has a clogged tube with vomiting all through the night and last vomit this morning was slightly blood tinged. MD Mo: [phone] to be contacted.

## 2022-05-27 NOTE — H&P ADULT - NSHPPHYSICALEXAM_GEN_ALL_CORE
ICU Vital Signs Last 24 Hrs  T(C): 36.6 (27 May 2022 21:51), Max: 36.9 (27 May 2022 14:37)  T(F): 97.9 (27 May 2022 21:51), Max: 98.5 (27 May 2022 14:37)  HR: 80 (27 May 2022 21:51) (80 - 102)  BP: 180/88 (27 May 2022 21:51) (157/78 - 180/88)  BP(mean): --  ABP: --  ABP(mean): --  RR: 16 (27 May 2022 21:51) (16 - 18)  SpO2: 100% (27 May 2022 21:51) (99% - 100%)      GENERAL: NAD, well-developed  HEENT:  Atraumatic, Normocephalic, Conjunctiva and sclera clear, oral mucosa moist, clear w/o any exudate   NECK: Supple, No JVD  CHEST/LUNG: Nonlabored breathing. Clear to auscultation bilaterally; No wheeze  HEART: Regular rate and rhythm; No murmurs, rubs, or gallops  ABDOMEN: Soft, Nontender, Nondistended; Bowel sounds present, Jtube in place  EXTREMITIES:  2+ Peripheral Pulses, No clubbing, cyanosis, or edema  PSYCH: AAOx3, normal affect  NEUROLOGY: non-focal, moving all extremities.   SKIN: No rashes or lesions

## 2022-05-27 NOTE — ED PROVIDER NOTE - PROGRESS NOTE DETAILS
Surgery saw pt, think symptoms caused by mixing up G and J tube, have fixed issue and labeled tubes. Niece Dr. Coffey notified and she would like to speak with surgeons, arranging call between them Niece Dr. Coffey says pt lives alone with wife and is not able to use the tubes correctly. She would like pt to be admitted bc is not a safe discharge home, they do not understand how to use J/G tube and need home health aide/care set up

## 2022-05-27 NOTE — ED ADULT NURSE NOTE - OBJECTIVE STATEMENT
pt received in rm 24 AAO x 3. Patient had a recent  J tube AND a GT tube placed for Duodenal cancer on the 5/13th . pt c/o nausea, vomiting and j tube not functioning. pt also reports noting blood tinged sputum this morning. pt denies sob, chest pain, n/v/d, fevers, chills. respirations even and unlabored. J-tube and GT tube noted to be on left side of abdomen. yellowish drainage noted to tube sites. awaiting MD orders. will continue to monitor.

## 2022-05-27 NOTE — H&P ADULT - NSHPREVIEWOFSYSTEMS_GEN_ALL_CORE
REVIEW OF SYSTEMS:    CONSTITUTIONAL: No weakness, fevers or chills  EYES/ENT: No visual changes;  No vertigo or throat pain   NECK: No pain or stiffness  RESPIRATORY: No cough, wheezing, hemoptysis; No shortness of breath  CARDIOVASCULAR: No chest pain or palpitations  GASTROINTESTINAL: No abdominal or epigastric pain. +nausea and vomiting; No diarrhea or constipation. No melena or hematochezia.  GENITOURINARY: No dysuria, frequency or hematuria  NEUROLOGICAL: No numbness or weakness  MUSCULOSKELETAL: No joint pain, no muscle ache   SKIN: No itching, burning, rashes, or lesions   All other review of systems is negative unless indicated above.

## 2022-05-27 NOTE — ED PROVIDER NOTE - CLINICAL SUMMARY MEDICAL DECISION MAKING FREE TEXT BOX
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 Have consulted surgery, they will see pt. Will get cbc, cmp, ekg, CT abd non con as per surgery. will give fluids and zofran

## 2022-05-27 NOTE — H&P ADULT - HISTORY OF PRESENT ILLNESS
76M PMHx of metastatic anal ca to distant lymph nodes, in remission for >6 years (s/p chemo/radiation), newly diagnosed duodenal cancer c/b duodenal obstruction s/p lap assisted gastrostomy and 14Fr feeding red rubber Witzel jejunostomy tube on 05/13 presents to the ED with worsening nausea and vomiting. Patient was recent discharged from the hospital after placement of jejunostomy tube. However, at home he was putting feeds through the wrong end of the tube. Consequently, he developed nausea and had NBNB vomiting multiple times. He went to see his oncologist today and received some IV hydration w/o any improvement. He was then sent to the ED.   In the ED, his vitals were notable for tachycardia and hypertension. His labs were notable for mild leukocytosis, anemia, elevated BUN/Scr, hyperglycemia, and elevated lipase. Surgery was consulted.

## 2022-05-27 NOTE — ED ADULT NURSE NOTE - INTERVENTIONS DEFINITIONS
[place] to call system/Call bell, personal items and telephone within reach/Non-slip footwear when patient is off stretcher/Physically safe environment: no spills, clutter or unnecessary equipment/Stretcher in lowest position, wheels locked, appropriate side rails in place/Monitor gait and stability/Monitor for mental status changes and reorient to person, place, and time/Reinforce activity limits and safety measures with patient and family

## 2022-05-27 NOTE — ED PROVIDER NOTE - NS ED ROS FT
GENERAL: No fever, chills  EYES: no vision changes, no discharge.   ENT: no difficulty swallowing or speaking   CARDIAC: no chest pain/pressure, SOB, lower extremity swelling  PULMONARY: no cough, SOB  GI: no abdominal pain, +n/v. +clogged G tube  : no dysuria  SKIN: no rashes  NEURO: no headache, lightheadedness, paresthesia  MSK: No joint pain, myalgia, weakness.

## 2022-05-27 NOTE — H&P ADULT - NSHPLABSRESULTS_GEN_ALL_CORE
11.6   10.62 )-----------( 335      ( 27 May 2022 12:30 )             37.5     Hgb Trend: 11.6<--, 11.1<--, 10.3<--, 10.8<--, 11.2<--  05-27    141  |  110<H>  |  55<H>  ----------------------------<  151<H>  4.6   |  17<L>  |  1.39<H>    Ca    10.9<H>      27 May 2022 12:30    TPro  6.6  /  Alb  4.0  /  TBili  0.5  /  DBili  x   /  AST  38  /  ALT  71<H>  /  AlkPhos  165<H>  05-27    Creatinine Trend: 1.39<--, 1.09<--, 1.21<--, 1.55<--, 1.59<--, 1.49<--  PT/INR - ( 27 May 2022 12:30 )   PT: 13.7 sec;   INR: 1.18 ratio         PTT - ( 27 May 2022 12:30 )  PTT:29.7 sec

## 2022-05-27 NOTE — ED PROVIDER NOTE - ATTENDING CONTRIBUTION TO CARE
76 year old male with histoyr of CA with J and G tube came with vomiting and clogged tube. Seen by surgery, and tube issues resolved, but family and pt cannot manage at home, requiring patient to be admitted.

## 2022-05-27 NOTE — H&P ADULT - PROBLEM SELECTOR PLAN 6
F/u with iron studies in the AM   -Previous labs were concerning for iron deficiency   -No sign of active bleeding

## 2022-05-28 NOTE — PATIENT PROFILE ADULT - FALL HARM RISK - RISK INTERVENTIONS

## 2022-05-29 NOTE — CONSULT NOTE ADULT - SUBJECTIVE AND OBJECTIVE BOX
[facility] NEPHROLOGY- CONSULTATION NOTE    76y Male with history of below presents with nausea and vomiting. Nephrology consulted for elevated Scr.    Patient recently admitted where he was evaluated for MOSES secondary to pre-renal azotemia which had resolved with IVF. Patient was not taking lisinopril 5 mg daily as an outpatient. Patient with nausea and vomiting and was evaluated by outpatient Onc where he received IVF. Patient however presented to ER for persistent symptoms. Baseline Scr appears to be 1-1.2.    REVIEW OF SYSTEMS:  Gen: no changes in weight  HEENT: no rhinorrhea  Neck: no sore throat  Cards: no chest pain  Resp: no dyspnea  GI: + nausea and vomiting now resolved, no diarrhea  : no dysuria or hematuria  Vascular: no LE edema  Derm: no rashes  Neuro: no numbness/tingling    No Known Allergies      Home Medications Reviewed  Hospital Medications:   MEDICATIONS  (STANDING):  amLODIPine   Tablet 10 milliGRAM(s) Oral daily  aspirin  chewable 81 milliGRAM(s) Oral daily  ATENolol  Tablet 25 milliGRAM(s) Oral daily  heparin   Injectable 5000 Unit(s) SubCutaneous every 12 hours  influenza  Vaccine (HIGH DOSE) 0.7 milliLiter(s) IntraMuscular once  pantoprazole   Suspension 40 milliGRAM(s) Oral before breakfast  sodium chloride 0.9%. 1000 milliLiter(s) (100 mL/Hr) IV Continuous <Continuous>      PAST MEDICAL & SURGICAL HISTORY:  Anal cancer  Had Chemo and Radiation 4 years ago      S/P laparotomy          FAMILY HISTORY:  No pertinent family history in first degree relatives        SOCIAL HISTORY:  Denies toxic substance use     VITALS:  T(F): 98.9 (22 @ 21:34), Max: 98.9 (22 @ 21:34)  HR: 80 (22 @ 08:15)  BP: 121/75 (22 @ 08:15)  RR: 18 (22 @ 08:15)  SpO2: 100% (22 @ 21:34)  Wt(kg): --        PHYSICAL EXAM:  Gen: NAD, calm  HEENT: MMM  Neck: no JVD  Cards: RRR, +S1/S2, no M/G/R  Resp: CTA B/L  GI: soft, NT/ND, NABS, + PEG, + J tube  : no CVA tenderness  Vascular: no LE edema B/L  Derm: no rashes  Neuro: non-focal    LABS:      142  |  106  |  40<H>  ----------------------------<  118<H>  3.5   |  21<L>  |  1.15    Ca    10.1      29 May 2022 06:30    TPro  5.1<L>  /  Alb  3.0<L>  /  TBili  0.4  /  DBili  <0.2  /  AST  32  /  ALT  54<H>  /  AlkPhos  120      Creatinine Trend: 1.15 <--, 1.14 <--, 1.39 <--, 1.09 <--, 1.21 <--                        9.6    6.66  )-----------( 280      ( 28 May 2022 06:40 )             30.8     Urine Studies:  Urinalysis Basic - ( 23 May 2022 05:00 )    Color: Yellow / Appearance: Clear / S.027 / pH:   Gluc:  / Ketone: Negative  / Bili: Negative / Urobili: <2 mg/dL   Blood:  / Protein: 30 mg/dL / Nitrite: Negative   Leuk Esterase: Negative / RBC: 1 /HPF / WBC 1 /HPF   Sq Epi:  / Non Sq Epi: 0 /HPF / Bacteria: Negative      Sodium, Random Urine: 33 mmol/L ( @ 16:00)  Potassium, Random Urine: 51.2 mmol/L ( @ 16:00)  Creatinine, Random Urine: 106 mg/dL ( @ 16:00)  Osmolality, Random Urine: 750 mosm/kg ( @ 15:30)      RADIOLOGY & ADDITIONAL STUDIES:  < from: US Kidney and Bladder (22 @ 18:22) >  IMPRESSION:  No hydronephrosis.    < end of copied text >      < from: Xray Chest 1 View- PORTABLE-Urgent (Xray Chest 1 View- PORTABLE-Urgent .) (05.15.22 @ 12:08) >  IMPRESSION: Probable left basilar atelectasis. Otherwise, clear lungs.    --- End of Report ---    < end of copied text >    
SURGICAL ONCOLOGY CONSULT NOTE  HPI: 76M PMHx of metastatic anal ca to distant lymph nodes, in remission for >6 years (s/p chemo/radiation), newly diagnosed duodenal cancer c/b duodenal obstruction s/p lap assisted gastrostomy and 14Fr feeding red rubber Witzel jejunostomy tube on 05/13, presents with emesis and concern for clogged G/J tube. Per pt, was discharged on 5/24, feeling well. However, since discharge patient has been nauseous and reports yesterday G tube has stopped working. Pt has had multiple episodes of vomiting yesterday and today with streaks of blood in emesis today. Patient was evaluated by outpatient oncologist yesterday and recommended to present to Ed if symptoms did not improve. Denies fevers, chills, abd pain, blood in stool, other sources of bleeding. Does not take anything by mouth other than sips of water. Last BM was when patient discharged from hospital.     On examination, G-tube noted to have tape on it labeled "food" (see picture below). Per patient, the visiting nurse has been giving him feeds through the clear G tube since discharge. Per the patient, the Red J tube has been connected to the bag.     PAST MEDICAL & SURGICAL HISTORY:  Anal cancer      No significant past surgical history      MEDICATIONS  (STANDING):  pancrelipase Oral Tablet (VIOKACE 10,440 Lipase Units) - Peds 2 Tablet(s) Oral Once  sodium bicarbonate 650 milliGRAM(s) Oral Once    MEDICATIONS  (PRN):      Allergies    No Known Allergies    Intolerances      Physical Exam:  General: NAD, resting comfortably  Neuro: A/O x 3, CNs II-XII grossly intact  HEENT: NC/AT, EOMI, normal hearing  Pulmonary: normal resp effort on RA  Cardiovascular: RRR  Abdominal: Softly distended. See pictures:    Extremities: WWP        Vital Signs Last 24 Hrs  T(C): 36.6 (27 May 2022 10:34), Max: 36.6 (27 May 2022 10:34)  T(F): 97.8 (27 May 2022 10:34), Max: 97.8 (27 May 2022 10:34)  HR: 102 (27 May 2022 10:34) (102 - 102)  BP: 166/78 (27 May 2022 10:34) (166/78 - 166/78)  BP(mean): --  RR: 18 (27 May 2022 10:34) (18 - 18)  SpO2: 99% (27 May 2022 10:34) (99% - 99%)    I&O's Summary          LABS:                        11.6   10.62 )-----------( 335      ( 27 May 2022 12:30 )             37.5     05-27    141  |  110<H>  |  55<H>  ----------------------------<  151<H>  4.6   |  17<L>  |  1.39<H>    Ca    10.9<H>      27 May 2022 12:30    TPro  6.6  /  Alb  4.0  /  TBili  0.5  /  DBili  x   /  AST  38  /  ALT  71<H>  /  AlkPhos  165<H>  05-27    PT/INR - ( 27 May 2022 12:30 )   PT: 13.7 sec;   INR: 1.18 ratio         PTT - ( 27 May 2022 12:30 )  PTT:29.7 sec    CAPILLARY BLOOD GLUCOSE        LIVER FUNCTIONS - ( 27 May 2022 12:30 )  Alb: 4.0 g/dL / Pro: 6.6 g/dL / ALK PHOS: 165 U/L / ALT: 71 U/L / AST: 38 U/L / GGT: x                 
PATIENT SEEN AND EXAMINED ON :-05/27/2022  DATE OF SERVICE:   05/27/2022          Interim events noted,Labs ,Radiological studies and Cardiology tests reviewed .    History of Present Illness:  Reason for Admission: Nausea and vomiting  History of Present Illness:   76M PMHx of metastatic anal ca to distant lymph nodes, in remission for >6 years (s/p chemo/radiation), newly diagnosed duodenal cancer c/b duodenal obstruction s/p lap assisted gastrostomy and 14Fr feeding red rubber Witzel jejunostomy tube on 05/13 presents to the ED with worsening nausea and vomiting. Patient was recent discharged from the hospital after placement of jejunostomy tube. However, at home he was putting feeds through the wrong end of the tube. Consequently, he developed nausea and had NBNB vomiting multiple times. He went to see his oncologist today and received some IV hydration w/o any improvement. He was then sent to the ED.   In the ED, his vitals were notable for tachycardia and hypertension. His labs were notable for mild leukocytosis, anemia, elevated BUN/Scr, hyperglycemia, and elevated lipase. Surgery was consulted.     Review of Systems:  Review of Systems: REVIEW OF SYSTEMS:    CONSTITUTIONAL: No weakness, fevers or chills  EYES/ENT: No visual changes;  No vertigo or throat pain   NECK: No pain or stiffness  RESPIRATORY: No cough, wheezing, hemoptysis; No shortness of breath  CARDIOVASCULAR: No chest pain or palpitations  GASTROINTESTINAL: No abdominal or epigastric pain. +nausea and vomiting; No diarrhea or constipation. No melena or hematochezia.  GENITOURINARY: No dysuria, frequency or hematuria  NEUROLOGICAL: No numbness or weakness  MUSCULOSKELETAL: No joint pain, no muscle ache   SKIN: No itching, burning, rashes, or lesions   All other review of systems is negative unless indicated above.      Allergies and Intolerances:        Allergies:  	No Known Allergies:     Home Medications:   * Patient Currently Takes Medications as of 27-May-2022 23:15 documented in Structured Notes  · 	Multiple Vitamins oral tablet, chewable: Last Dose Taken:  , 1 tab(s) orally once a day  · 	Feeding Tube: Last Dose Taken:  , Equipment, Dispense one tube feeding pole  	Equipment, Dispense one tube feeding pump  	Tube Feeding, Type: Two Nabil HN, Rate 58cc/hr, Duration 14 hours on, 10 hours off, 17:00-07:00 Nocturnal.  	Jejunostomy Tube Care, Flush Jejunostomy tube with 10cc sterile water PRE and POST tube feed administration  · 	amLODIPine 10 mg oral tablet: Last Dose Taken:  , 1 tab(s) orally once a day MDD:one tab  · 	lisinopril 5 mg oral tablet: Last Dose Taken:  , 1 tab(s) orally once a day MDD:one tab  · 	atenolol 25 mg oral tablet: Last Dose Taken:  , 1 tab(s) orally once a day MDD:one  · 	acetaminophen 325 mg oral tablet: Last Dose Taken:  , 2 tab(s) orally every 6 hours, As needed, Mild Pain (1 - 3), Moderate Pain (4 - 6)  · 	oxyCODONE 5 mg oral tablet: Last Dose Taken:  , 1 tab(s) orally every 4 hours, As Needed  	  	Reference #: [id number] MDD:6  · 	pantoprazole 40 mg oral delayed release tablet: Last Dose Taken:  , 1 tab(s) orally once a day  · 	senna oral tablet: Last Dose Taken:  , 2 tab(s) orally once a day (at bedtime) as needed  · 	polyethylene glycol 3350 oral powder for reconstitution: Last Dose Taken:  , 17 gram(s) orally once a day, As needed, Constipation  · 	docusate sodium 100 mg oral capsule: Last Dose Taken:  , 1 cap(s) orally 3 times a day as needed  · 	aspirin 81 mg oral delayed release tablet: Last Dose Taken:  , 2 tab(s) orally every 12 hours  · 	3-n-1 Commode: Last Dose Taken:    · 	Rolling Walker with 5 inch Wheels: Last Dose Taken:      .    Patient History:   Past Medical, Past Surgical, and Family History:  PAST MEDICAL HISTORY:  Anal cancer Had Chemo and Radiation 4 years ago.     PAST SURGICAL HISTORY:  S/P laparotomy.     FAMILY HISTORY:  No pertinent family history in first degree relatives. No pertinent family history of: cancer.    Social History:  Social History (marital status, living situation, occupation, tobacco use, alcohol and drug use, and sexual history): He lives with his wife. Denies any hx of smoking or alcohol consumption.          Physical Exam:   Physical Exam: ICU Vital Signs Last 24 Hrs  T(C): 36.6 (27 May 2022 21:51), Max: 36.9 (27 May 2022 14:37)  T(F): 97.9 (27 May 2022 21:51), Max: 98.5 (27 May 2022 14:37)  HR: 80 (27 May 2022 21:51) (80 - 102)  BP: 180/88 (27 May 2022 21:51) (157/78 - 180/88)  RR: 16 (27 May 2022 21:51) (16 - 18)  SpO2: 100% (27 May 2022 21:51) (99% - 100%)      GENERAL: NAD, well-developed  HEENT:  Atraumatic, Normocephalic, Conjunctiva and sclera clear, oral mucosa moist, clear w/o any exudate   NECK: Supple, No JVD  CHEST/LUNG: Nonlabored breathing. Clear to auscultation bilaterally; No wheeze  HEART: Regular rate and rhythm; 2/6 systolic  murmurs, rubs, or gallops  ABDOMEN: Soft, Nontender, Nondistended; Bowel sounds present, Jtube in place  EXTREMITIES:  2+ Peripheral Pulses, No clubbing, cyanosis, or edema  PSYCH: AAOx3, normal affect  NEUROLOGY: non-focal, moving all extremities.   SKIN: No rashes or lesions      Labs and Results:  Labs, Radiology, Cardiology, and Other Results: 11.6   10.62 )-----------( 335      ( 27 May 2022 12:30 )             37.5     Hgb Trend: 11.6<--, 11.1<--, 10.3<--, 10.8<--, 11.2<--  05-27    141  |  110<H>  |  55<H>  ----------------------------<  151<H>  4.6   |  17<L>  |  1.39<H>    Ca    10.9<H>      27 May 2022 12:30    TPro  6.6  /  Alb  4.0  /  TBili  0.5  /  DBili  x   /  AST  38  /  ALT  71<H>  /  AlkPhos  165<H>  05-27    Creatinine Trend: 1.39<--, 1.09<--, 1.21<--, 1.55<--, 1.59<--, 1.49<--  PT/INR - ( 27 May 2022 12:30 )   PT: 13.7 sec;   INR: 1.18 ratio         PTT - ( 27 May 2022 12:30 )  PTT:29.7 sec      ECG:  Sinus bradycardia at 51 BPM  Voltage criteria for left ventricular hypertrophy  T wave abnormality, consider lateral ischemia      ECHO:  Study Date: 4/29/2022CONCLUSIONS:  1. Normal left ventricular size with mild assymmetric septal hypertrophy.  2. Normal left ventricular systolic function. No segmental wall motion abnormalities.  The LVEF= 60-65%.  3. Normal right ventricular size and function.  *** No previous Echo exam.      
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 Patient was recent discharged from the hospital after placement of jejunostomy tube. However, at home he was putting feeds through the wrong end of the tube. Consequently, he developed nausea and had NBNB vomiting multiple times. He went to see his oncologist today and received some IV hydration w/o any improvement. He was then sent to the ED.     In the ED, his vitals were notable for tachycardia and hypertension. His labs were notable for mild leukocytosis, anemia, elevated BUN/Scr, hyperglycemia, and elevated lipase. Surgery was consulted.  (27 May 2022 23:10)    05/28/22: Symptoms suspected to be due to improper use of G/J tube. Seen by surgery. Patient feeling well/better.     PAST MEDICAL & SURGICAL HISTORY:  Anal cancer  Had Chemo and Radiation 4 years ago  S/P laparotomy    FAMILY HISTORY:  No pertinent family history in first degree relatives    Alochol: Denied  Smoking: Nonsmoker  Drug Use: Denied  Marital Status:     Allergies  No Known Allergies  Intolerances    MEDICATIONS  (STANDING):  amLODIPine   Tablet 10 milliGRAM(s) Oral daily  aspirin  chewable 81 milliGRAM(s) Oral daily  ATENolol  Tablet 25 milliGRAM(s) Oral daily  heparin   Injectable 5000 Unit(s) SubCutaneous every 12 hours  influenza  Vaccine (HIGH DOSE) 0.7 milliLiter(s) IntraMuscular once  pantoprazole   Suspension 40 milliGRAM(s) Oral before breakfast  sodium chloride 0.9%. 1000 milliLiter(s) (100 mL/Hr) IV Continuous <Continuous>    MEDICATIONS  (PRN):  acetaminophen     Tablet .. 650 milliGRAM(s) Oral every 6 hours PRN Temp greater or equal to 38C (100.4F), Mild Pain (1 - 3)  aluminum hydroxide/magnesium hydroxide/simethicone Suspension 30 milliLiter(s) Oral every 4 hours PRN Dyspepsia  melatonin 3 milliGRAM(s) Oral at bedtime PRN Insomnia  ondansetron Injectable 4 milliGRAM(s) IV Push every 8 hours PRN Nausea and/or Vomiting  polyethylene glycol 3350 17 Gram(s) Oral daily PRN Constipation    ROS:   Negative    PHYSICAL EXAM:   No acute distress  Alert, oriented  No dyspnea  No abdominal pain                 9.6    6.66  )-----------( 280      ( 28 May 2022 06:40 )             30.8       05-28    141  |  113<H>  |  44<H>  ----------------------------<  86  4.2   |  17<L>  |  1.14    Ca    9.9      28 May 2022 06:40    TPro  5.1<L>  /  Alb  3.0<L>  /  TBili  0.4  /  DBili  <0.2  /  AST  32  /  ALT  54<H>  /  AlkPhos  120  05-28

## 2022-05-29 NOTE — PROGRESS NOTE ADULT - TIME BILLING
- Review of records, telemetry, vital signs and daily labs.   - General and cardiovascular physical examination.  - Generation of cardiovascular treatment plan.  - Coordination of care.      Patient was seen and examined by me on 05/29/2022,interim events noted,labs and radiology studies reviewed.  Trevor De La Garza MD,FACC.  [address] [place], [place] [id number].  [phone]

## 2022-05-29 NOTE — CONSULT NOTE ADULT - ASSESSMENT
ASSESSMENT/PLAN: 76M hx of anal cancer 2012 s/p chemo radiation, new annular mass involving pancreas +duodenal obstruction third portion s/p lap assisted gastrostomy and 14Fr feeding red rubber Witzel jejunostomy tube on 05/13, presenting with emesis and concern for clogged G/J tube. On examination, G-tube noted to have tape on it labeled "food" (see picture below) and Red J tube clamped but connected to drainage bag. Emesis likely secondary to improperly feeding through G tube (should be fed through J tube only) which is proximal to obstruction. G-tube vented with immediate return of 1L of brown succus, drainage bag reattached. J tube cap removed and flushed, no resistance noted. Patient re-educated on appropriate use of each tube and new label applied to G tube.    - Clear 3way G Tube for venting only, NOT FEEDING (labeled as such)  - Red J tube for feeds only  - Continue outpatient hydration per Med/Onc  - Patient stable for discharge home from SurgOnc perspective    Patient discussed with attending, Dr. Avilez.     Maggie Dudley MD  PGY2 Consult Resident  D Team Surgery (Surgical Oncology)  [id number]      
This is a 76 year old male with a PMHx of metastatic anal ca to distant lymph nodes, in remission for >6 years (last chemo was march, 2013) and anemia. Patient is under care of Dr. Hahn of [facility]. Patient has new pancreatic cancer, complicated by duodenal obstruction. Deemed unresectable at this time, but also deemed not a candidate to start chemo at this time, requiring improvement in nutrition/hydration and performance status.     Presented on this admission with emesis and concern for G/J tube dysfunction. Seen by surgery and found to be due to improper use; subsequently educated on proper use.     Pancreatic mass, duodenal obstruction  -- Appropriate use of G tube and J tube placement  -- Please follow up with Dr Hahn upon discharge for hydration, reassessment, and treatment, if possible    Anemia  -- Most likely from chronic disease, with history of BISMARK  -- Please transfuse PRBCs for hemoglobin < 7.0    History of Anal Cancer  -- Metastatic  -- In remission, s/p treatment    Upon discharge, patient may resume care with Dr. Hahn of [facility].    Hematology/Oncology  [facility]  [phone] (office)  [phone] (alt office)  Evenings and weekends please call MD on call or office
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     Problem/Plan - 1:  ·  Problem: Nausea and vomiting.   ·  Plan: Patient with multiple episode of NBNB vomiting due to improper use of G/Jtube   -Surgery consulted. Recs appreciated  -Symptoms improved  -C/w IVF @100 cc per hr   -Restart feeds slowly.    Problem/Plan - 2:  ·  Problem: Duodenal cancer.   ·  Plan: Patient with recent hx of duodenal cancer   -S/p laparotomy   -Now with elevated lipase, but no abdominal pain, less likely pancreatitis  -Consider obtaining CT A/P if pt develops abdominal pain   .    Problem/Plan - 3:  ·  Problem: Essential hypertension.   ·  Plan: BP elevated to 180   -C/w atenolol and amlodipine.    Problem/Plan - 4:  ·  Problem: MOSES (acute kidney injury).   ·  Plan: Most likely due to vomiting and dehydration   -C/w IVF   -Trend BMP   -Obtain renal US and urine lytes if Scr uptrends   -Hold lisinopril.    Problem/Plan - 5:  ·  Problem: Leukocytosis.   ·  Plan: Most likely reactive due to emesis   -No sign of infection.    Problem/Plan - 6:  ·  Problem: Anemia.   ·  Plan: F/u with iron studies in the AM   -Previous labs were concerning for iron deficiency   -No sign of active bleeding.    Problem/Plan - 7:  ·  Problem: Preventive measure. ·  Plan: DVT-heparin SubQ.
76y Male with history of duodenal obstruction s/p J tube, G tube for venting presents with nausea and vomiting. Nephrology consulted for elevated Scr.    1) MOSES: Mild and in setting of pre-renal azotemia. MOSES resolving. Will give gentle LR today (50 ml/hour X 10 hours) until J-tube feeds resumed (orders to be clarified). Defer urine studies and renal imaging. Prior renal US reviewed. Avoid nephrotoxins. Monitor electrolytes.    2) HTN: BP improving after amlodipine and atenolol resumed. Continue with current medications. Monitor BP.    3) Hypercalcemia: likely due to volume depletion resolved s/p IVF. Monitor serum calcium.    4) Duodenal obstruction: S/P J tube. Would clarify with GI/Surgery maximal J-tube feed volume. Would optimize volume given concerns for recurrent MOSES secondary to pre-renal azotemia. Would not place PICC line for IVF infusions as an outpatient at this time unless patient remains net negative despite optimizing J-tube feed volume.      [facility] NEPHROLOGY  Aston Marcos M.D.  Nile Johnson D.O.  Serenity Andre M.D.  Megha Powell, MSN, ANP-C    Telephone: [phone]  Facsimile: [phone]    [address] [id number]

## 2022-05-30 NOTE — DISCHARGE NOTE PROVIDER - INSTRUCTIONS
Clear liquid diet with tuve feeds. TwoCal HN at 50ml/hr for 16 hours starting at 4pm-8am. Free water flush 250ml every 6 hours.

## 2022-05-30 NOTE — PROVIDER CONTACT NOTE (OTHER) - SITUATION
J Tube dislodged. 5/29 20:00 tube about 1 inch from skin, 5/30 09:00 tube is about 12 inches from skin

## 2022-05-30 NOTE — DISCHARGE NOTE PROVIDER - PROVIDER TOKENS
PROVIDER:[TOKEN:[[id number]]] PROVIDER:[TOKEN:[[id number]]],PROVIDER:[TOKEN:[[id number]]]

## 2022-05-30 NOTE — DISCHARGE NOTE PROVIDER - CARE PROVIDER_API CALL
Shantanu Hahn)  Hematology; Medical Oncology  [address] [place] [address]  Phone: [phone]  Fax: [phone]  Follow Up Time:    Shantanu Hahn)  Hematology; Medical Oncology  [address] [place] [address]  Phone: [phone]  Fax: [phone]  Follow Up Time:     Isaias Mccauley)  Internal Medicine; Pulmonary Disease  [address] [place] [address]  Phone: [phone]  Fax: [phone]  Follow Up Time:

## 2022-05-30 NOTE — DISCHARGE NOTE PROVIDER - NSDCMRMEDTOKEN_GEN_ALL_CORE_FT
3-n-1 Commode:   acetaminophen 325 mg oral tablet: 2 tab(s) orally every 6 hours, As needed, Mild Pain (1 - 3), Moderate Pain (4 - 6)  amLODIPine 10 mg oral tablet: 1 tab(s) orally once a day MDD:one tab  aspirin 81 mg oral delayed release tablet: 2 tab(s) orally every 12 hours  atenolol 25 mg oral tablet: 1 tab(s) orally once a day MDD:one  docusate sodium 100 mg oral capsule: 1 cap(s) orally 3 times a day as needed  Feeding Tube: Equipment, Dispense one tube feeding pole  Equipment, Dispense one tube feeding pump  Tube Feeding, Type: Two Nabil HN, Rate 58cc/hr, Duration 14 hours on, 10 hours off, 17:00-07:00 Nocturnal.  Jejunostomy Tube Care, Flush Jejunostomy tube with 10cc sterile water PRE and POST tube feed administration  lisinopril 5 mg oral tablet: 1 tab(s) orally once a day MDD:one tab  Multiple Vitamins oral tablet, chewable: 1 tab(s) orally once a day  oxyCODONE 5 mg oral tablet: 1 tab(s) orally every 4 hours, As Needed    Reference #: [id number] MDD:6  pantoprazole 40 mg oral delayed release tablet: 1 tab(s) orally once a day  polyethylene glycol 3350 oral powder for reconstitution: 17 gram(s) orally once a day, As needed, Constipation  Rolling Walker with 5 inch Wheels:   senna oral tablet: 2 tab(s) orally once a day (at bedtime) as needed   3-n-1 Commode:   acetaminophen 325 mg oral tablet: 2 tab(s) orally every 6 hours, As needed, Mild Pain (1 - 3), Moderate Pain (4 - 6)  amLODIPine 10 mg oral tablet: 1 tab(s) orally once a day MDD:one tab  aspirin 81 mg oral delayed release tablet: 2 tab(s) orally every 12 hours  atenolol 25 mg oral tablet: 1 tab(s) orally once a day MDD:one  docusate sodium 100 mg oral capsule: 1 cap(s) orally 3 times a day as needed  Feeding Tube: Equipment, Dispense one tube feeding pole  Equipment, Dispense one tube feeding pump  Tube Feeding, Type: Two Nabil HN, Rate 58cc/hr, Duration 14 hours on, 10 hours off, 17:00-07:00 Nocturnal.  Jejunostomy Tube Care, Flush Jejunostomy tube with 10cc sterile water PRE and POST tube feed administration  Multiple Vitamins oral tablet, chewable: 1 tab(s) orally once a day  oxyCODONE 5 mg oral tablet: 1 tab(s) orally every 4 hours, As Needed    Reference #: [id number] MDD:6  pantoprazole 40 mg oral delayed release tablet: 1 tab(s) orally once a day  polyethylene glycol 3350 oral powder for reconstitution: 17 gram(s) orally once a day, As needed, Constipation  Rolling Walker with 5 inch Wheels:   senna oral tablet: 2 tab(s) orally once a day (at bedtime) as needed   3-n-1 Commode:   acetaminophen 325 mg oral tablet: 2 tab(s) orally every 6 hours, As needed, Mild Pain (1 - 3), Moderate Pain (4 - 6)  amLODIPine 10 mg oral tablet: 1 tab(s) orally once a day MDD:one tab  aspirin 81 mg oral delayed release tablet: 2 tab(s) orally every 12 hours  atenolol 25 mg oral tablet: 1 tab(s) orally once a day MDD:one  docusate sodium 100 mg oral capsule: 1 cap(s) orally 3 times a day as needed  Feeding Tube: Equipment, Dispense one tube feeding pole  Equipment, Dispense one tube feeding pump  Tube Feeding, Type: Two Nabil HN, Rate 58cc/hr, Duration 14 hours on, 10 hours off, 17:00-07:00 Nocturnal.  Jejunostomy Tube Care, Flush Jejunostomy tube with 10cc sterile water PRE and POST tube feed administration  Multiple Vitamins oral tablet, chewable: 1 tab(s) orally once a day  oxyCODONE 5 mg oral tablet: 1 tab(s) orally every 4 hours, As Needed    Reference #: [id number] MDD:6  pantoprazole 40 mg oral delayed release tablet: 1 tab(s) orally once a day  polyethylene glycol 3350 oral powder for reconstitution: 17 gram(s) orally once a day, As needed, Constipation  Rolling Walker with 5 inch Wheels:   senna oral tablet: 2 tab(s) orally once a day (at bedtime) as needed  Zofran 4 mg oral tablet: 1 tab(s) by gastrostomy tube every 8 hours, As Needed - for nausea

## 2022-05-30 NOTE — DISCHARGE NOTE PROVIDER - NSDCCPCAREPLAN_GEN_ALL_CORE_FT
PRINCIPAL DISCHARGE DIAGNOSIS  Diagnosis: Vomiting  Assessment and Plan of Treatment: You were admitted into the hospital with multiple episode of  vomiting due to improper use of G/Jtube. You were seen by surgery and your J tube has been fixed. You rare now tolerating your feeds without any difficulty and your symptoms have improved. Please note that the clear 3-way G Tube is for venting only, NOT FEEDING (labeled as such) and the red J tube for feeds only. Please follow the instructions given to you by the surgical and nursing team regarding care and follow up with your primary care provider for further medical management.      SECONDARY DISCHARGE DIAGNOSES  Diagnosis: Duodenal cancer  Assessment and Plan of Treatment: You have a recent hisotry of duodenal cancer. Please follow up with Dr. Hahn of [facility] upon discharge for further medical management.      Diagnosis: Essential hypertension  Assessment and Plan of Treatment: You have a history of high blood pressure. Please follow a low sodium and fat diet, continue atenolol and amlodipine  medications, and follow up with primary care physician.

## 2022-05-30 NOTE — DISCHARGE NOTE PROVIDER - HOSPITAL COURSE
76M hx of anal cancer 2012 s/p chemo radiation, new annular mass involving pancreas +duodenal obstruction third portion s/p lap assisted gastrostomy and 14Fr feeding red rubber Witzel jejunostomy tube on 05/13, presenting with emesis and concern for clogged G/J tube    Hospital Course   Nausea and vomiting.   Patient with multiple episode of NBNB vomiting due to improper use of G/Jtube   -Surgery consulted. Recs appreciated  -Symptoms improved  -C/w IVF @100 cc per hr   -Restarted feeds slowly.    Duodenal cancer.   Patient with recent hx of duodenal cancer   -S/p laparotomy   -Now with elevated lipase, but no abdominal pain, less likely pancreatitis  -Consider obtaining CT A/P if pt develops abdominal pain   - Dr. Hahn consulted     Essential hypertension.   BP elevated to 180   -C/w atenolol and amlodipine.    MOSES (acute kidney injury).   Most likely due to vomiting and dehydration   -C/w IVF   -Trend BMP   -Obtain renal US and urine lytes if Scr uptrends   -Hold lisinopril.  Please follow up with Dr Hahn upon discharge for hydration, reassessment, and treatment, if possible      Leukocytosis.   Most likely reactive due to emesis   -No sign of infection.    Anemia.   F/u with iron studies in the AM   -Previous labs were concerning for iron deficiency   -No sign of active bleeding.    On_________, discussed with __________, patient is medically cleared and optimized for discharge today. All medications were reviewed with attending, and sent to mutually agreed upon pharmacy.         76M hx of anal cancer 2012 s/p chemo radiation, new annular mass involving pancreas +duodenal obstruction third portion s/p lap assisted gastrostomy and 14Fr feeding red rubber Witzel jejunostomy tube on 05/13, presenting with emesis and concern for clogged G/J tube    Hospital Course     Nausea and vomiting.   Patient with multiple episode of NBNB vomiting due to improper use of G/Jtube   -Surgery consulted. - Pushed J tube back in at bed side, J tube study with 30cc gastrografin injected at bedside, abd xray right after demonstrated J tube in small bowel.  Put two new sutures to secure J tube.   -OK to use J tube for feeding from surgery perspective. Pt. tolerating feeds without difficulty.  -Symptoms improved    Duodenal cancer.   Patient with recent hx of duodenal cancer   -S/p laparotomy   -Now with elevated lipase, but no abdominal pain, less likely pancreatitis  -Dr. Hahn consulted. Pt to follow up with Dr Hahn of [facility] upon discharge for hydration, reassessment, and treatment, if possible     Essential hypertension.   BP elevated to 180   -C/w atenolol and amlodipine.    MOSES (acute kidney injury).   Most likely due to vomiting and dehydration   -s/p IVF     On 6/1/2022 this case was reviewed with Dr. West, the patient is medically stable and optimized for discharge. All medications were reviewed and prescriptions were sent to mutually agreed upon pharmacy.     -Obtain renal US and urine lytes if Scr uptrends   -Hold lisinopril.  Please follow up with Dr Hahn upon discharge for hydration, reassessment, and treatment, if possible      Leukocytosis.   Most likely reactive due to emesis   -No sign of infection.    Anemia.   F/u with iron studies in the AM   -Previous labs were concerning for iron deficiency   -No sign of active bleeding.    On_________, discussed with __________, patient is medically cleared and optimized for discharge today. All medications were reviewed with attending, and sent to mutually agreed upon pharmacy.         76M hx of anal cancer 2012 s/p chemo radiation, new annular mass involving pancreas +duodenal obstruction third portion s/p lap assisted gastrostomy and 14Fr feeding red rubber Witzel jejunostomy tube on 05/13, presenting with emesis and concern for clogged G/J tube    Hospital Course     Nausea and vomiting.   Patient with multiple episode of NBNB vomiting due to improper use of G/Jtube   -Surgery consulted. Pushed J tube back in at bed side, J tube study with 30cc gastrografin injected at bedside, abd xray right after demonstrated J tube in small bowel.  Put two new sutures to secure J tube.   -OK to use J tube for feeding from surgery perspective. Pt. tolerating feeds without difficulty.  -Symptoms improved    Duodenal cancer.   Patient with recent hx of duodenal cancer   -S/p laparotomy   -Now with elevated lipase, but no abdominal pain, less likely pancreatitis  -Dr. Hahn consulted. Pt to follow up with Dr Hahn of [facility] upon discharge for hydration, reassessment, and treatment, if possible     Essential hypertension.   BP elevated to 180   -C/w atenolol and amlodipine.    MOSES (acute kidney injury).   Most likely due to vomiting and dehydration   -s/p IVF     On 6/1/2022 this case was reviewed with Dr. West, the patient is medically stable and optimized for discharge. All medications were reviewed and prescriptions were sent to mutually agreed upon pharmacy.     On 6/1/2022 this case was reviewed with Dr. West, the patient is medically stable and optimized for discharge. All medications were reviewed and prescriptions were sent to mutually agreed upon pharmacy.     -Obtain renal US and urine lytes if Scr uptrends   -Hold lisinopril.  Please follow up with Dr Hahn upon discharge for hydration, reassessment, and treatment, if possible      Leukocytosis.   Most likely reactive due to emesis   -No sign of infection.    Anemia.   F/u with iron studies in the AM   -Previous labs were concerning for iron deficiency   -No sign of active bleeding.    On_________, discussed with __________, patient is medically cleared and optimized for discharge today. All medications were reviewed with attending, and sent to mutually agreed upon pharmacy.         PATIENT SEEN AND EXAMINED ON :-06/01/2022  DATE OF SERVICE:   06/01/2022          Interim events noted,Labs ,Radiological studies and Cardiology tests reviewed .      76 yr M hx of anal cancer 2012 s/p chemo radiation, new annular mass involving pancreas +duodenal obstruction third portion s/p lap assisted gastrostomy and 14Fr feeding red rubber Witzel jejunostomy tube on 05/13, presenting with emesis and concern for clogged G/J tube    Hospital Course     Nausea and vomiting.   Patient with multiple episode of NBNB vomiting due to improper use of G/Jtube   -Surgery consulted. Pushed J tube back in at bed side, J tube study with 30cc gastrografin injected at bedside, abd xray right after demonstrated J tube in small bowel.  Put two new sutures to secure J tube.   -OK to use J tube for feeding from surgery perspective. Pt. tolerating feeds without difficulty.  -Symptoms improved    Duodenal cancer.   Patient with recent hx of duodenal cancer   -S/p laparotomy   -Now with elevated lipase, but no abdominal pain, less likely pancreatitis  -Dr. Hahn consulted. Pt to follow up with Dr Hahn of [facility] upon discharge for hydration, reassessment, and treatment, if possible     Essential hypertension.   BP elevated to 180   -C/w atenolol and amlodipine.    MOSES (acute kidney injury).   Most likely due to vomiting and dehydration   -s/p IVF     On 6/1/2022 this case was reviewed with Dr. West, the patient is medically stable and optimized for discharge. All medications were reviewed and prescriptions were sent to mutually agreed upon pharmacy.     On 6/1/2022 this case was reviewed with Dr. West, the patient is medically stable and optimized for discharge. All medications were reviewed and prescriptions were sent to mutually agreed upon pharmacy.     -Obtain renal US and urine lytes if Scr uptrends   -Hold lisinopril.  Please follow up with Dr Hahn upon discharge for hydration, reassessment, and treatment, if possible      Leukocytosis.   Most likely reactive due to emesis   -No sign of infection.    Anemia.   F/u with iron studies in the AM   -Previous labs were concerning for iron deficiency   -No sign of active bleeding.

## 2022-05-30 NOTE — PROGRESS NOTE ADULT - TIME BILLING
- Review of records, telemetry, vital signs and daily labs.   - General and cardiovascular physical examination.  - Generation of cardiovascular treatment plan.  - Coordination of care.      Patient was seen and examined by me on 05/30/2022,interim events noted,labs and radiology studies reviewed.  Trevor De La Garza MD,FACC.  [address] [place], [place] [id number].  [phone]

## 2022-05-31 NOTE — PROGRESS NOTE ADULT - PROBLEM SELECTOR PLAN 5
Most likely reactive due to emesis   -No sign of infection
Most likely reactive due to emesis   -No sign of infection
Most likely reactive due to emesis   -No sign of infection  - improved
Most likely reactive due to emesis   -No sign of infection
Most likely reactive due to emesis   -No sign of infection

## 2022-05-31 NOTE — PROGRESS NOTE ADULT - PROBLEM SELECTOR PLAN 7
DVT-heparin SubQ
DVT-heparin SubQ    Dispo : D/c planning
DVT-heparin SubQ
DVT-heparin SubQ
DVT-heparin SubQ    Dispo : D/c planning

## 2022-05-31 NOTE — PROGRESS NOTE ADULT - PROBLEM SELECTOR PLAN 3
BP elevated to 180   -C/w atenolol and amlodipine
titrate htn meds for optimal bp control
BP elevated to 180   -C/w atenolol and amlodipine

## 2022-05-31 NOTE — PROGRESS NOTE ADULT - PROBLEM SELECTOR PLAN 4
Most likely due to vomiting and dehydration   -C/w IVF   -Trend BMP   -Obtain renal US and urine lytes if Scr uptrends   -Hold lisinopril
Most likely due to vomiting and dehydration   -C/w IVF   - improveed
Most likely due to vomiting and dehydration   -C/w IVF   -Trend BMP   -Obtain renal US and urine lytes if Scr uptrends   -Hold lisinopril

## 2022-05-31 NOTE — PROGRESS NOTE ADULT - TIME BILLING
- Review of records, telemetry, vital signs and daily labs.   - General and cardiovascular physical examination.  - Generation of cardiovascular treatment plan.  - Coordination of care.      Patient was seen and examined by me on 05/31/2022,interim events noted,labs and radiology studies reviewed.  Trevor De La Garza MD,FACC.  [address] [place], [place] [id number].  [phone]

## 2022-05-31 NOTE — PROGRESS NOTE ADULT - PROBLEM SELECTOR PLAN 2
Patient with recent hx of duodenal cancer   -S/p laparotomy   -Now with elevated lipase, but no abdominal pain, less likely pancreatitis  -Consider obtaining CT A/P if pt develops abdominal pain   -F/u with Dr. Hahn in the AM
Patient with recent hx of duodenal cancer   -S/p laparotomy   heme onc f/u
Patient with recent hx of duodenal cancer   -S/p laparotomy   -Now with elevated lipase, but no abdominal pain, less likely pancreatitis  -Consider obtaining CT A/P if pt develops abdominal pain   -F/u heme/onc
Patient with recent hx of duodenal cancer   -S/p laparotomy   -Now with elevated lipase, but no abdominal pain, less likely pancreatitis  -Consider obtaining CT A/P if pt develops abdominal pain   -F/u with Dr. Hahn in the AM
Patient with recent hx of duodenal cancer   -S/p laparotomy   -Now with elevated lipase, but no abdominal pain, less likely pancreatitis  -Consider obtaining CT A/P if pt develops abdominal pain   -F/u with Dr. Hahn in the AM

## 2022-05-31 NOTE — PROGRESS NOTE ADULT - PROBLEM SELECTOR PLAN 6
F/u with iron studies in the AM   -Previous labs were concerning for iron deficiency   -No sign of active bleeding
-No sign of active bleeding
F/u with iron studies in the AM   -Previous labs were concerning for iron deficiency   -No sign of active bleeding

## 2022-05-31 NOTE — PROGRESS NOTE ADULT - PROBLEM SELECTOR PLAN 1
Patient with multiple episode of NBNB vomiting due to improper use of G/Jtube     - Pushed J tube back in at bed side, J tube study with 30cc gastrografin injected at bedside, abd xray right after demonstrated J tube in small bowel.  Put two new sutures to secure J tube.   - OK to use J tube for feeding from surgery perspective.   pt tolerating feeds without difficulty
Patient with multiple episode of NBNB vomiting due to improper use of G/Jtube   -Surgery consulted. Recs appreciated  -Symptoms improved  -C/w IVF @100 cc per hr   -Restart feeds slowly
Patient with multiple episode of NBNB vomiting due to improper use of G/Jtube   -Surgery consulted. Recs appreciated  -Symptoms improved  -C/w IVF @100 cc per hr   -Restart feeds slowly
Patient with multiple episode of NBNB vomiting due to improper use of G/Jtube   -Surgery consulted. Recs appreciated  -Symptoms improved  - Pt tolerating feeds, d/c planning   - 5/30 S/p Dislodged J tube. adjusted by Sx okay to use  - Red J tube for feeds only
Patient with multiple episode of NBNB vomiting due to improper use of G/Jtube   -Surgery consulted. Recs appreciated  -Symptoms improved  -C/w IVF @100 cc per hr   -Restart feeds slowly

## 2022-05-31 NOTE — PROGRESS NOTE ADULT - PROBLEM SELECTOR PROBLEM 4
MOSES (acute kidney injury)

## 2022-06-01 NOTE — PROGRESS NOTE ADULT - SUBJECTIVE AND OBJECTIVE BOX
PATIENT SEEN AND EXAMINED ON :- 5/29/22  DATE OF SERVICE:    5/29/22         Interim events noted,Labs ,Radiological studies and Cardiology tests reviewed .     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 Patient was recent discharged from the hospital after placement of jejunostomy tube. However, at home he was putting feeds through the wrong end of the tube. Consequently, he developed nausea and had NBNB vomiting multiple times. He went to see his oncologist today and received some IV hydration w/o any improvement. He was then sent to the ED.   In the ED, his vitals were notable for tachycardia and hypertension. His labs were notable for mild leukocytosis, anemia, elevated BUN/Scr, hyperglycemia, and elevated lipase. Surgery was consulted.  (27 May 2022 23:10)      INTERIM EVENTS:Patient seen at bedside ,interim events noted.      PMH -reviewed admission note, no change since admission  HEART FAILURE: Acute[ ]Chronic[ ] Systolic[ ] Diastolic[ ] Combined Systolic and Diastolic[ ]  CAD[ ] CABG[ ] PCI[ ]  DEVICES[ ] PPM[ ] ICD[ ] ILR[ ]  ATRIAL FIBRILLATION[ ] Paroxysmal[ ] Permanent[ ]  MOSES[ ] CKD1[ ] CKD2[ ] CKD3[ ] CKD4[ ] ESRD[ ]  COPD[ ] HTN[ ]   DM[ ] Type1[ ] Type 2[ ]   CVA[ ] Paresis[ ]    AMBULATION: Assisted[ ] Cane/walker[ ] Independent[ ]    MEDICATIONS  (STANDING):  amLODIPine   Tablet 10 milliGRAM(s) Oral daily  aspirin  chewable 81 milliGRAM(s) Oral daily  ATENolol  Tablet 25 milliGRAM(s) Oral daily  chlorhexidine 2% Cloths 1 Application(s) Topical daily  heparin   Injectable 5000 Unit(s) SubCutaneous every 12 hours  influenza  Vaccine (HIGH DOSE) 0.7 milliLiter(s) IntraMuscular once  lactated ringers. 500 milliLiter(s) (50 mL/Hr) IV Continuous <Continuous>  pantoprazole   Suspension 40 milliGRAM(s) Oral before breakfast    MEDICATIONS  (PRN):  acetaminophen     Tablet .. 650 milliGRAM(s) Oral every 6 hours PRN Temp greater or equal to 38C (100.4F), Mild Pain (1 - 3)  aluminum hydroxide/magnesium hydroxide/simethicone Suspension 30 milliLiter(s) Oral every 4 hours PRN Dyspepsia  melatonin 3 milliGRAM(s) Oral at bedtime PRN Insomnia  ondansetron Injectable 4 milliGRAM(s) IV Push every 8 hours PRN Nausea and/or Vomiting  polyethylene glycol 3350 17 Gram(s) Oral daily PRN Constipation            REVIEW OF SYSTEMS:  Constitutional: [ ] fever, [ ]weight loss,  [ ]fatigue  Eyes: [ ] visual changes  Respiratory: [ ]shortness of breath;  [ ] cough, [ ]wheezing, [ ]chills, [ ]hemoptysis  Cardiovascular: [ ] chest pain, [ ]palpitations, [ ]dizziness,  [ ]leg swelling[ ]orthopnea[ ]PND  Gastrointestinal: [ ] abdominal pain, [ ]nausea, [ ]vomiting,  [ ]diarrhea [ ]Constipation [ ]Melena  Genitourinary: [ ] dysuria, [ ] hematuria [ ]Celaya  Neurologic: [ ] headaches [ ] tremors[ ]weakness [ ]Paralysis Right[ ] Left[ ]  Skin: [ ] itching, [ ]burning, [ ] rashes  Endocrine: [ ] heat or cold intolerance  Musculoskeletal: [ ] joint pain or swelling; [ ] muscle, back, or extremity pain  Psychiatric: [ ] depression, [ ]anxiety, [ ]mood swings, or [ ]difficulty sleeping  Hematologic: [ ] easy bruising, [ ] bleeding gums    [ ] All remaining systems negative except as per above.   [ ]Unable to obtain.  [x] No change in ROS since admission      Vital Signs Last 24 Hrs  T(C): 36.9 (29 May 2022 13:53), Max: 37.2 (28 May 2022 21:34)  T(F): 98.4 (29 May 2022 13:53), Max: 98.9 (28 May 2022 21:34)  HR: 58 (29 May 2022 13:53) (58 - 80)  BP: 155/52 (29 May 2022 13:53) (121/75 - 157/60)  BP(mean): --  RR: 18 (29 May 2022 13:53) (18 - 18)  SpO2: 100% (29 May 2022 13:53) (100% - 100%)  I&O's Summary    29 May 2022 07:01  -  29 May 2022 19:24  --------------------------------------------------------  IN: 1000 mL / OUT: 480 mL / NET: 520 mL        PHYSICAL EXAM:  General: No acute distress BMI-  HEENT: EOMI, PERRL  Neck: Supple, [ ] JVD  Lungs: Equal air entry bilaterally; [ ] rales [ ] wheezing [ ] rhonchi  Heart: Regular rate and rhythm; [x ] murmur   2/6 [ x] systolic [ ] diastolic [ ] radiation[ ] rubs [ ]  gallops  Abdomen: Nontender, bowel sounds present  Extremities: No clubbing, cyanosis, [ ] edema [ ]Pulses  equal and intact  Nervous system:  Alert & Oriented X3, no focal deficits  Psychiatric: Normal affect  Skin: No rashes or lesions    LABS:  05-29    142  |  106  |  40<H>  ----------------------------<  118<H>  3.5   |  21<L>  |  1.15    Ca    10.1      29 May 2022 06:30    TPro  5.1<L>  /  Alb  3.0<L>  /  TBili  0.4  /  DBili  <0.2  /  AST  32  /  ALT  54<H>  /  AlkPhos  120  05-28    Creatinine Trend: 1.15<--, 1.14<--, 1.39<--, 1.09<--, 1.21<--, 1.55<--                        11.5   10.05 )-----------( 334      ( 29 May 2022 06:30 )             35.2               
Surgery Progress Note    INTERVAl/SUBJECTIVE: Recalled by primary team. J tube is almost out. Patient look comfortable, no abdominal pain.     Vital Signs Last 24 Hrs  T(C): 36.5 (30 May 2022 07:05), Max: 36.9 (29 May 2022 13:53)  T(F): 97.7 (30 May 2022 07:05), Max: 98.4 (29 May 2022 13:53)  HR: 71 (30 May 2022 07:05) (58 - 71)  BP: 155/56 (30 May 2022 07:05) (134/53 - 155/56)  BP(mean): --  RR: 18 (30 May 2022 07:05) (18 - 18)  SpO2: 99% (30 May 2022 07:05) (99% - 100%)    Physical Exam:  General: Appears well, NAD  CHEST: breathing comfortably  CV: appears well perfused  Abdomen: soft, nontender, nondistended, no rebound or guarding, J tube almost out. G tube in place.  Extremities: Grossly symmetric    LABS:                        11.1   11.15 )-----------( 291      ( 30 May 2022 06:35 )             34.8     05-30    138  |  106  |  37<H>  ----------------------------<  138<H>  3.9   |  22  |  1.08    Ca    9.4      30 May 2022 06:35            INs and OUTs:    05-29-22 @ 07:01  -  05-30-22 @ 07:00  --------------------------------------------------------  IN: 1250 mL / OUT: 2480 mL / NET: -1230 mL    05-30-22 @ 07:01  -  05-30-22 @ 11:10  --------------------------------------------------------  IN: 250 mL / OUT: 175 mL / NET: 75 mL    
No acute events overnight.  No pain, no complaints.    MEDICATIONS  (STANDING):  amLODIPine   Tablet 10 milliGRAM(s) Oral daily  aspirin  chewable 81 milliGRAM(s) Oral daily  ATENolol  Tablet 25 milliGRAM(s) Oral daily  chlorhexidine 2% Cloths 1 Application(s) Topical daily  heparin   Injectable 5000 Unit(s) SubCutaneous every 12 hours  influenza  Vaccine (HIGH DOSE) 0.7 milliLiter(s) IntraMuscular once  mupirocin 2% Ointment 1 Application(s) Topical two times a day  pantoprazole   Suspension 40 milliGRAM(s) Oral before breakfast    MEDICATIONS  (PRN):  acetaminophen     Tablet .. 650 milliGRAM(s) Oral every 6 hours PRN Temp greater or equal to 38C (100.4F), Mild Pain (1 - 3)  aluminum hydroxide/magnesium hydroxide/simethicone Suspension 30 milliLiter(s) Oral every 4 hours PRN Dyspepsia  melatonin 3 milliGRAM(s) Oral at bedtime PRN Insomnia  ondansetron Injectable 4 milliGRAM(s) IV Push every 8 hours PRN Nausea and/or Vomiting  polyethylene glycol 3350 17 Gram(s) Oral daily PRN Constipation    Vital Signs Last 24 Hrs  T(C): 36.5 (30 May 2022 07:05), Max: 36.9 (29 May 2022 23:20)  T(F): 97.7 (30 May 2022 07:05), Max: 98.4 (29 May 2022 23:20)  HR: 71 (30 May 2022 07:05) (66 - 71)  BP: 155/56 (30 May 2022 07:05) (134/53 - 155/56)  BP(mean): --  RR: 18 (30 May 2022 07:05) (18 - 18)  SpO2: 99% (30 May 2022 07:05) (99% - 100%)    PHYSICAL EXAM:   NAD  Appears comfortable  No dyspnea  Alert, oriented                        11.1   11.15 )-----------( 291      ( 30 May 2022 06:35 )             34.8       05-30    138  |  106  |  37<H>  ----------------------------<  138<H>  3.9   |  22  |  1.08    Ca    9.4      30 May 2022 06:35        
Patient is a 76y old  Male who presents with a chief complaint of Nausea and vomiting (31 May 2022 11:39)    Patient seen and examined at bedside.    MEDICATIONS  (STANDING):  amLODIPine   Tablet 10 milliGRAM(s) Oral daily  aspirin  chewable 81 milliGRAM(s) Oral daily  ATENolol  Tablet 25 milliGRAM(s) Oral daily  chlorhexidine 2% Cloths 1 Application(s) Topical daily  heparin   Injectable 5000 Unit(s) SubCutaneous every 12 hours  influenza  Vaccine (HIGH DOSE) 0.7 milliLiter(s) IntraMuscular once  mupirocin 2% Ointment 1 Application(s) Topical two times a day  pantoprazole   Suspension 40 milliGRAM(s) Oral before breakfast    MEDICATIONS  (PRN):  acetaminophen     Tablet .. 650 milliGRAM(s) Oral every 6 hours PRN Temp greater or equal to 38C (100.4F), Mild Pain (1 - 3)  aluminum hydroxide/magnesium hydroxide/simethicone Suspension 30 milliLiter(s) Oral every 4 hours PRN Dyspepsia  melatonin 3 milliGRAM(s) Oral at bedtime PRN Insomnia  ondansetron   Disintegrating Tablet 4 milliGRAM(s) Oral every 8 hours PRN Nausea and/or Vomiting  polyethylene glycol 3350 17 Gram(s) Oral daily PRN Constipation    Vital Signs Last 24 Hrs  T(C): 37.2 (01 Jun 2022 06:25), Max: 37.2 (31 May 2022 21:01)  T(F): 99 (01 Jun 2022 06:25), Max: 99 (01 Jun 2022 06:25)  HR: 63 (01 Jun 2022 06:25) (58 - 63)  BP: 132/63 (01 Jun 2022 06:25) (132/63 - 162/56)  BP(mean): --  RR: 16 (01 Jun 2022 06:25) (16 - 18)  SpO2: 100% (01 Jun 2022 06:25) (100% - 100%)    PE  NAD  Awake, alert  Anicteric, MMM  No c/c/e  No rash grossly                        10.1   9.30  )-----------( 250      ( 01 Jun 2022 06:05 )             30.4       06-01    132<L>  |  100  |  27<H>  ----------------------------<  153<H>  3.8   |  22  |  0.94    Ca    9.2      01 Jun 2022 06:05        
PATIENT SEEN AND EXAMINED ON :- 5/31/22    INTERIM EVENTS:Patient seen at bedside ,interim events noted.    MEDICATIONS  (STANDING):  amLODIPine   Tablet 10 milliGRAM(s) Oral daily  aspirin  chewable 81 milliGRAM(s) Oral daily  ATENolol  Tablet 25 milliGRAM(s) Oral daily  chlorhexidine 2% Cloths 1 Application(s) Topical daily  heparin   Injectable 5000 Unit(s) SubCutaneous every 12 hours  influenza  Vaccine (HIGH DOSE) 0.7 milliLiter(s) IntraMuscular once  mupirocin 2% Ointment 1 Application(s) Topical two times a day  pantoprazole   Suspension 40 milliGRAM(s) Oral before breakfast    MEDICATIONS  (PRN):  acetaminophen     Tablet .. 650 milliGRAM(s) Oral every 6 hours PRN Temp greater or equal to 38C (100.4F), Mild Pain (1 - 3)  aluminum hydroxide/magnesium hydroxide/simethicone Suspension 30 milliLiter(s) Oral every 4 hours PRN Dyspepsia  melatonin 3 milliGRAM(s) Oral at bedtime PRN Insomnia  ondansetron   Disintegrating Tablet 4 milliGRAM(s) Oral every 8 hours PRN Nausea and/or Vomiting  polyethylene glycol 3350 17 Gram(s) Oral daily PRN Constipation    Vital Signs Last 24 Hrs  T(C): 37.2 (05-31-22 @ 21:01), Max: 37.2 (05-31-22 @ 21:01)  T(F): 98.9 (05-31-22 @ 21:01), Max: 98.9 (05-31-22 @ 21:01)  HR: 58 (05-31-22 @ 21:01) (58 - 67)  BP: 146/55 (05-31-22 @ 21:01) (138/62 - 162/56)  BP(mean): --  RR: 18 (05-31-22 @ 21:01) (17 - 18)  SpO2: 100% (05-31-22 @ 21:01) (100% - 100%)    General: No acute distress BMI-  HEENT: EOMI, PERRL  Neck: Supple, [ ] JVD  Lungs: dec breath sounds at bases  Heart: Regular rate and rhythm; [x ] murmur   2/6 [ x] systolic [ ] diastolic [ ] radiation[ ] rubs [ ]  gallops  Abdomen: Nontender, bowel sounds present  Extremities: No clubbing, cyanosis, [ ] edema [ ]Pulses  equal and intact  Nervous system:  Alert & Oriented X3, no focal deficits  Psychiatric: Normal affect  Skin: No rashes or lesions    LABS:  05-31    134<L>  |  103  |  34<H>  ----------------------------<  123<H>  3.7   |  21<L>  |  1.07    Ca    9.4      31 May 2022 07:27      Creatinine Trend: 1.07 <--, 1.08 <--, 1.15 <--, 1.14 <--, 1.39 <--                        10.5   9.56  )-----------( 243      ( 31 May 2022 07:27 )             32.8     Urine Studies:                          
PATIENT SEEN AND EXAMINED ON :- 5/31/22  DATE OF SERVICE:   5/31/22          Interim events noted,Labs ,Radiological studies and Cardiology tests reviewed .   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 Patient was recent discharged from the hospital after placement of jejunostomy tube. However, at home he was putting feeds through the wrong end of the tube. Consequently, he developed nausea and had NBNB vomiting multiple times. He went to see his oncologist today and received some IV hydration w/o any improvement. He was then sent to the ED.   In the ED, his vitals were notable for tachycardia and hypertension. His labs were notable for mild leukocytosis, anemia, elevated BUN/Scr, hyperglycemia, and elevated lipase. Surgery was consulted.  (27 May 2022 23:10)      INTERIM EVENTS:Patient seen at bedside ,interim events noted.      PMH -reviewed admission note, no change since admission  HEART FAILURE: Acute[ ]Chronic[ ] Systolic[ ] Diastolic[ ] Combined Systolic and Diastolic[ ]  CAD[ ] CABG[ ] PCI[ ]  DEVICES[ ] PPM[ ] ICD[ ] ILR[ ]  ATRIAL FIBRILLATION[ ] Paroxysmal[ ] Permanent[ ]  MOSES[ ] CKD1[ ] CKD2[ ] CKD3[ ] CKD4[ ] ESRD[ ]  COPD[ ] HTN[ ]   DM[ ] Type1[ ] Type 2[ ]   CVA[ ] Paresis[ ]    AMBULATION: Assisted[ ] Cane/walker[ ] Independent[ ]    MEDICATIONS  (STANDING):  amLODIPine   Tablet 10 milliGRAM(s) Oral daily  aspirin  chewable 81 milliGRAM(s) Oral daily  ATENolol  Tablet 25 milliGRAM(s) Oral daily  chlorhexidine 2% Cloths 1 Application(s) Topical daily  heparin   Injectable 5000 Unit(s) SubCutaneous every 12 hours  influenza  Vaccine (HIGH DOSE) 0.7 milliLiter(s) IntraMuscular once  mupirocin 2% Ointment 1 Application(s) Topical two times a day  pantoprazole   Suspension 40 milliGRAM(s) Oral before breakfast    MEDICATIONS  (PRN):  acetaminophen     Tablet .. 650 milliGRAM(s) Oral every 6 hours PRN Temp greater or equal to 38C (100.4F), Mild Pain (1 - 3)  aluminum hydroxide/magnesium hydroxide/simethicone Suspension 30 milliLiter(s) Oral every 4 hours PRN Dyspepsia  melatonin 3 milliGRAM(s) Oral at bedtime PRN Insomnia  ondansetron   Disintegrating Tablet 4 milliGRAM(s) Oral every 8 hours PRN Nausea and/or Vomiting  polyethylene glycol 3350 17 Gram(s) Oral daily PRN Constipation            REVIEW OF SYSTEMS:  Constitutional: [ ] fever, [ ]weight loss,  [ ]fatigue  Eyes: [ ] visual changes  Respiratory: [ ]shortness of breath;  [ ] cough, [ ]wheezing, [ ]chills, [ ]hemoptysis  Cardiovascular: [ ] chest pain, [ ]palpitations, [ ]dizziness,  [ ]leg swelling[ ]orthopnea[ ]PND  Gastrointestinal: [ ] abdominal pain, [ ]nausea, [ ]vomiting,  [ ]diarrhea [ ]Constipation [ ]Melena  Genitourinary: [ ] dysuria, [ ] hematuria [ ]Celaya  Neurologic: [ ] headaches [ ] tremors[ ]weakness [ ]Paralysis Right[ ] Left[ ]  Skin: [ ] itching, [ ]burning, [ ] rashes  Endocrine: [ ] heat or cold intolerance  Musculoskeletal: [ ] joint pain or swelling; [ ] muscle, back, or extremity pain  Psychiatric: [ ] depression, [ ]anxiety, [ ]mood swings, or [ ]difficulty sleeping  Hematologic: [ ] easy bruising, [ ] bleeding gums    [ ] All remaining systems negative except as per above.   [ ]Unable to obtain.  [x] No change in ROS since admission      Vital Signs Last 24 Hrs  T(C): 37.2 (31 May 2022 21:01), Max: 37.2 (31 May 2022 21:01)  T(F): 98.9 (31 May 2022 21:01), Max: 98.9 (31 May 2022 21:01)  HR: 58 (31 May 2022 21:01) (58 - 67)  BP: 146/55 (31 May 2022 21:01) (138/62 - 162/56)  BP(mean): --  RR: 18 (31 May 2022 21:01) (17 - 18)  SpO2: 100% (31 May 2022 21:01) (100% - 100%)  I&O's Summary    30 May 2022 07:01  -  31 May 2022 07:00  --------------------------------------------------------  IN: 1550 mL / OUT: 375 mL / NET: 1175 mL    31 May 2022 07:01  -  31 May 2022 21:40  --------------------------------------------------------  IN: 750 mL / OUT: 125 mL / NET: 625 mL        PHYSICAL EXAM:  General: No acute distress BMI-  HEENT: EOMI, PERRL  Neck: Supple, [ ] JVD  Lungs: Equal air entry bilaterally; [ ] rales [ ] wheezing [ ] rhonchi  Heart: Regular rate and rhythm; [x ] murmur   2/6 [ x] systolic [ ] diastolic [ ] radiation[ ] rubs [ ]  gallops  Abdomen: Nontender, bowel sounds present  Extremities: No clubbing, cyanosis, [ ] edema [ ]Pulses  equal and intact  Nervous system:  Alert & Oriented X3, no focal deficits  Psychiatric: Normal affect  Skin: No rashes or lesions    LABS:  05-31    134<L>  |  103  |  34<H>  ----------------------------<  123<H>  3.7   |  21<L>  |  1.07    Ca    9.4      31 May 2022 07:27      Creatinine Trend: 1.07<--, 1.08<--, 1.15<--, 1.14<--, 1.39<--, 1.09<--                        10.5   9.56  )-----------( 243      ( 31 May 2022 07:27 )             32.8               
PATIENT SEEN AND EXAMINED ON :-5/28/22  DATE OF SERVICE:   5/28/22          Interim events noted,Labs ,Radiological studies and Cardiology tests reviewed .     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 Patient was recent discharged from the hospital after placement of jejunostomy tube. However, at home he was putting feeds through the wrong end of the tube. Consequently, he developed nausea and had NBNB vomiting multiple times. He went to see his oncologist today and received some IV hydration w/o any improvement. He was then sent to the ED.   In the ED, his vitals were notable for tachycardia and hypertension. His labs were notable for mild leukocytosis, anemia, elevated BUN/Scr, hyperglycemia, and elevated lipase. Surgery was consulted.  (27 May 2022 23:10)      INTERIM EVENTS:Patient seen at bedside ,interim events noted.      PMH -reviewed admission note, no change since admission  HEART FAILURE: Acute[ ]Chronic[ ] Systolic[ ] Diastolic[ ] Combined Systolic and Diastolic[ ]  CAD[ ] CABG[ ] PCI[ ]  DEVICES[ ] PPM[ ] ICD[ ] ILR[ ]  ATRIAL FIBRILLATION[ ] Paroxysmal[ ] Permanent[ ]  MOSES[ ] CKD1[ ] CKD2[ ] CKD3[ ] CKD4[ ] ESRD[ ]  COPD[ ] HTN[ ]   DM[ ] Type1[ ] Type 2[ ]   CVA[ ] Paresis[ ]    AMBULATION: Assisted[ ] Cane/walker[ ] Independent[ ]    MEDICATIONS  (STANDING):  amLODIPine   Tablet 10 milliGRAM(s) Oral daily  aspirin  chewable 81 milliGRAM(s) Oral daily  ATENolol  Tablet 25 milliGRAM(s) Oral daily  heparin   Injectable 5000 Unit(s) SubCutaneous every 12 hours  influenza  Vaccine (HIGH DOSE) 0.7 milliLiter(s) IntraMuscular once  pantoprazole   Suspension 40 milliGRAM(s) Oral before breakfast  sodium chloride 0.9%. 1000 milliLiter(s) (100 mL/Hr) IV Continuous <Continuous>    MEDICATIONS  (PRN):  acetaminophen     Tablet .. 650 milliGRAM(s) Oral every 6 hours PRN Temp greater or equal to 38C (100.4F), Mild Pain (1 - 3)  aluminum hydroxide/magnesium hydroxide/simethicone Suspension 30 milliLiter(s) Oral every 4 hours PRN Dyspepsia  melatonin 3 milliGRAM(s) Oral at bedtime PRN Insomnia  ondansetron Injectable 4 milliGRAM(s) IV Push every 8 hours PRN Nausea and/or Vomiting  polyethylene glycol 3350 17 Gram(s) Oral daily PRN Constipation            REVIEW OF SYSTEMS:  Constitutional: [ ] fever, [ ]weight loss,  [ ]fatigue  Eyes: [ ] visual changes  Respiratory: [ ]shortness of breath;  [ ] cough, [ ]wheezing, [ ]chills, [ ]hemoptysis  Cardiovascular: [ ] chest pain, [ ]palpitations, [ ]dizziness,  [ ]leg swelling[ ]orthopnea[ ]PND  Gastrointestinal: [ ] abdominal pain, [ ]nausea, [ ]vomiting,  [ ]diarrhea [ ]Constipation [ ]Melena  Genitourinary: [ ] dysuria, [ ] hematuria [ ]Celaya  Neurologic: [ ] headaches [ ] tremors[ ]weakness [ ]Paralysis Right[ ] Left[ ]  Skin: [ ] itching, [ ]burning, [ ] rashes  Endocrine: [ ] heat or cold intolerance  Musculoskeletal: [ ] joint pain or swelling; [ ] muscle, back, or extremity pain  Psychiatric: [ ] depression, [ ]anxiety, [ ]mood swings, or [ ]difficulty sleeping  Hematologic: [ ] easy bruising, [ ] bleeding gums    [ ] All remaining systems negative except as per above.   [ ]Unable to obtain.  [x] No change in ROS since admission      Vital Signs Last 24 Hrs  T(C): 36.7 (28 May 2022 06:50), Max: 36.9 (27 May 2022 14:37)  T(F): 98 (28 May 2022 06:50), Max: 98.5 (27 May 2022 14:37)  HR: 87 (28 May 2022 06:50) (80 - 88)  BP: 171/70 (28 May 2022 06:50) (157/78 - 180/88)  BP(mean): --  RR: 16 (28 May 2022 06:50) (16 - 18)  SpO2: 100% (28 May 2022 06:50) (100% - 100%)  I&O's Summary    27 May 2022 07:01  -  28 May 2022 07:00  --------------------------------------------------------  IN: 0 mL / OUT: 900 mL / NET: -900 mL        PHYSICAL EXAM:  General: No acute distress BMI-  HEENT: EOMI, PERRL  Neck: Supple, [ ] JVD  Lungs: Equal air entry bilaterally; [ ] rales [ ] wheezing [ ] rhonchi  Heart: Regular rate and rhythm; [x ] murmur   2/6 [ x] systolic [ ] diastolic [ ] radiation[ ] rubs [ ]  gallops  Abdomen: Nontender, bowel sounds present  Extremities: No clubbing, cyanosis, [ ] edema [ ]Pulses  equal and intact  Nervous system:  Alert & Oriented X3, no focal deficits  Psychiatric: Normal affect  Skin: No rashes or lesions    LABS:  05-28    141  |  113<H>  |  44<H>  ----------------------------<  86  4.2   |  17<L>  |  1.14    Ca    9.9      28 May 2022 06:40    TPro  5.1<L>  /  Alb  3.0<L>  /  TBili  0.4  /  DBili  <0.2  /  AST  32  /  ALT  54<H>  /  AlkPhos  120  05-28    Creatinine Trend: 1.14<--, 1.39<--, 1.09<--, 1.21<--, 1.55<--, 1.59<--                        9.6    6.66  )-----------( 280      ( 28 May 2022 06:40 )             30.8     PT/INR - ( 27 May 2022 12:30 )   PT: 13.7 sec;   INR: 1.18 ratio         PTT - ( 27 May 2022 12:30 )  PTT:29.7 sec          
PATIENT SEEN AND EXAMINED ON :- 5/30/22  DATE OF SERVICE:  5/30/22           Interim events noted,Labs ,Radiological studies and Cardiology tests reviewed .     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 Patient was recent discharged from the hospital after placement of jejunostomy tube. However, at home he was putting feeds through the wrong end of the tube. Consequently, he developed nausea and had NBNB vomiting multiple times. He went to see his oncologist today and received some IV hydration w/o any improvement. He was then sent to the ED.   In the ED, his vitals were notable for tachycardia and hypertension. His labs were notable for mild leukocytosis, anemia, elevated BUN/Scr, hyperglycemia, and elevated lipase. Surgery was consulted.  (27 May 2022 23:10)      INTERIM EVENTS:Patient seen at bedside ,interim events noted.      PMH -reviewed admission note, no change since admission  HEART FAILURE: Acute[ ]Chronic[ ] Systolic[ ] Diastolic[ ] Combined Systolic and Diastolic[ ]  CAD[ ] CABG[ ] PCI[ ]  DEVICES[ ] PPM[ ] ICD[ ] ILR[ ]  ATRIAL FIBRILLATION[ ] Paroxysmal[ ] Permanent[ ]  MOSES[ ] CKD1[ ] CKD2[ ] CKD3[ ] CKD4[ ] ESRD[ ]  COPD[ ] HTN[ ]   DM[ ] Type1[ ] Type 2[ ]   CVA[ ] Paresis[ ]    AMBULATION: Assisted[ ] Cane/walker[ ] Independent[ ]    MEDICATIONS  (STANDING):  amLODIPine   Tablet 10 milliGRAM(s) Oral daily  aspirin  chewable 81 milliGRAM(s) Oral daily  ATENolol  Tablet 25 milliGRAM(s) Oral daily  chlorhexidine 2% Cloths 1 Application(s) Topical daily  heparin   Injectable 5000 Unit(s) SubCutaneous every 12 hours  influenza  Vaccine (HIGH DOSE) 0.7 milliLiter(s) IntraMuscular once  pantoprazole   Suspension 40 milliGRAM(s) Oral before breakfast    MEDICATIONS  (PRN):  acetaminophen     Tablet .. 650 milliGRAM(s) Oral every 6 hours PRN Temp greater or equal to 38C (100.4F), Mild Pain (1 - 3)  aluminum hydroxide/magnesium hydroxide/simethicone Suspension 30 milliLiter(s) Oral every 4 hours PRN Dyspepsia  melatonin 3 milliGRAM(s) Oral at bedtime PRN Insomnia  ondansetron Injectable 4 milliGRAM(s) IV Push every 8 hours PRN Nausea and/or Vomiting  polyethylene glycol 3350 17 Gram(s) Oral daily PRN Constipation            REVIEW OF SYSTEMS:  Constitutional: [ ] fever, [ ]weight loss,  [ ]fatigue  Eyes: [ ] visual changes  Respiratory: [ ]shortness of breath;  [ ] cough, [ ]wheezing, [ ]chills, [ ]hemoptysis  Cardiovascular: [ ] chest pain, [ ]palpitations, [ ]dizziness,  [ ]leg swelling[ ]orthopnea[ ]PND  Gastrointestinal: [ ] abdominal pain, [ ]nausea, [ ]vomiting,  [ ]diarrhea [ ]Constipation [ ]Melena  Genitourinary: [ ] dysuria, [ ] hematuria [ ]Celaya  Neurologic: [ ] headaches [ ] tremors[ ]weakness [ ]Paralysis Right[ ] Left[ ]  Skin: [ ] itching, [ ]burning, [ ] rashes  Endocrine: [ ] heat or cold intolerance  Musculoskeletal: [ ] joint pain or swelling; [ ] muscle, back, or extremity pain  Psychiatric: [ ] depression, [ ]anxiety, [ ]mood swings, or [ ]difficulty sleeping  Hematologic: [ ] easy bruising, [ ] bleeding gums    [ ] All remaining systems negative except as per above.   [ ]Unable to obtain.  [x] No change in ROS since admission      Vital Signs Last 24 Hrs  T(C): 36.5 (30 May 2022 07:05), Max: 36.9 (29 May 2022 13:53)  T(F): 97.7 (30 May 2022 07:05), Max: 98.4 (29 May 2022 13:53)  HR: 71 (30 May 2022 07:05) (58 - 71)  BP: 155/56 (30 May 2022 07:05) (134/53 - 155/56)  BP(mean): --  RR: 18 (30 May 2022 07:05) (18 - 18)  SpO2: 99% (30 May 2022 07:05) (99% - 100%)  I&O's Summary    29 May 2022 07:01  -  30 May 2022 07:00  --------------------------------------------------------  IN: 1250 mL / OUT: 2480 mL / NET: -1230 mL    30 May 2022 07:01  -  30 May 2022 10:51  --------------------------------------------------------  IN: 250 mL / OUT: 175 mL / NET: 75 mL        PHYSICAL EXAM:  General: No acute distress BMI-  HEENT: EOMI, PERRL  Neck: Supple, [ ] JVD  Lungs: Equal air entry bilaterally; [ ] rales [ ] wheezing [ ] rhonchi  Heart: Regular rate and rhythm; [x ] murmur   2/6 [ x] systolic [ ] diastolic [ ] radiation[ ] rubs [ ]  gallops  Abdomen: Nontender, bowel sounds present  Extremities: No clubbing, cyanosis, [ ] edema [ ]Pulses  equal and intact  Nervous system:  Alert & Oriented X3, no focal deficits  Psychiatric: Normal affect  Skin: No rashes or lesions    LABS:  05-30    138  |  106  |  37<H>  ----------------------------<  138<H>  3.9   |  22  |  1.08    Ca    9.4      30 May 2022 06:35      Creatinine Trend: 1.08<--, 1.15<--, 1.14<--, 1.39<--, 1.09<--, 1.21<--                        11.1   11.15 )-----------( 291      ( 30 May 2022 06:35 )             34.8

## 2022-06-01 NOTE — PROGRESS NOTE ADULT - ASSESSMENT
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 Per pt, was discharged on 5/24, feeling well.   Consulted surgery initially because of malfunction of G-tube, reeducated patient about the right way for using G tube and J tube. Now recall for dislodge of J tube.       Plan:  - Pushed J tube back in at bed side, J tube study with 30cc gastrografin injected at bedside, abd xray right after demonstrated J tube in small bowel.  Put two new sutures to secure J tube.   - OK to use J tube for feeding from surgery perspective.   - please recall with any other questions.     D team surgery  [id number]
This is a 76 year old male with a PMHx of metastatic anal ca to distant lymph nodes, in remission for >6 years (last chemo was march, 2013) and anemia. Patient is under care of Dr. Hahn of [facility]. Patient has new pancreatic cancer, complicated by duodenal obstruction. Deemed unresectable at this time, but also deemed not a candidate to start chemo at this time, requiring improvement in nutrition/hydration and performance status.     Presented on this admission with emesis and concern for G/J tube dysfunction. Seen by surgery and found to be due to improper use; subsequently educated on proper use.     Pancreatic mass, duodenal obstruction  -- Appropriate use of G tube and J tube placement  -- OK to use J tube for feeding from surgery perspective.   -- Please follow up with Dr Hahn upon discharge for hydration, reassessment, and treatment, if possible    Anemia  -- Most likely from chronic disease, with history of BISMARK  -- Please transfuse PRBCs for hemoglobin < 7.0  -- HGB stable    History of Anal Cancer  -- Metastatic  -- In remission, s/p treatment    Upon discharge, patient may resume care with Dr. Hahn of [facility].    Galdino Bañuelos PA-C  Hematology/Oncology  [facility]  [phone] (office)  [phone] (alt office)  Evenings and weekends please call MD on call or office
This is a 76 year old male with a PMHx of metastatic anal ca to distant lymph nodes, in remission for >6 years (last chemo was march, 2013) and anemia. Patient is under care of Dr. Hahn of [facility]. Patient has new pancreatic cancer, complicated by duodenal obstruction. Deemed unresectable at this time, but also deemed not a candidate to start chemo at this time, requiring improvement in nutrition/hydration and performance status.     Presented on this admission with emesis and concern for G/J tube dysfunction. Seen by surgery and found to be due to improper use; subsequently educated on proper use.     Pancreatic mass, duodenal obstruction  -- Appropriate use of G tube and J tube placement  -- Please follow up with Dr Hahn upon discharge for hydration, reassessment, and treatment, if possible    Anemia  -- Most likely from chronic disease, with history of BISMARK  -- Please transfuse PRBCs for hemoglobin < 7.0  -- HGB stable    History of Anal Cancer  -- Metastatic  -- In remission, s/p treatment    Upon discharge, patient may resume care with Dr. Hahn of [facility].    Hematology/Oncology  [facility]  [phone] (office)  [phone] (alt office)  Evenings and weekends please call MD on call or office
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 

## 2022-06-01 NOTE — DISCHARGE NOTE NURSING/CASE MANAGEMENT/SOCIAL WORK - PATIENT PORTAL LINK FT
You can access the FollowMyHealth Patient Portal offered by [facility] by registering at the following website: [email or web address]. By joining [employer]’s FollowMyHealth portal, you will also be able to view your health information using other applications (apps) compatible with our system.

## 2022-06-01 NOTE — PROGRESS NOTE ADULT - REASON FOR ADMISSION
Nausea and vomiting

## 2022-06-01 NOTE — DISCHARGE NOTE NURSING/CASE MANAGEMENT/SOCIAL WORK - NSDCPEFALRISK_GEN_ALL_CORE
For information on Fall & Injury Prevention, visit: [email or web address] OR  [email or web address] OR  https://www.cdc.gov/steadi/patient.html

## 2022-06-12 NOTE — ED ADULT NURSE NOTE - OBJECTIVE STATEMENT
pt to rm 1 . alert,oriented x3. pt thin  appearing with dry mouth. states has pancreatic ca and tube came out this am. states does not want to stay in hospital,does not want blood work. inga its med evaluation. pt noted with drainage tube mid abd-  . will continue to monitor

## 2022-06-12 NOTE — ED PROVIDER NOTE - CLINICAL SUMMARY MEDICAL DECISION MAKING FREE TEXT BOX
Bennie Bain, PGY3: 76 year old male p/w accidental J-tube dislodgement. Tube placed 5/13 by Dr. Stoddard. Given recent placement, will have surgery place tube and x-ray to confirm, anticipate d/c home. Bennie Bain, PGY3: 76 year old male p/w accidental J-tube dislodgement. Tube placed 5/13 by Dr. Stoddard. Given recent placement, will have surgery place tube and x-ray to confirm, anticipate d/c home.    magalie: pt with cancer with J tube dislodgement.  surgery called and replaced tube.  pt non toxic appearing and soft abd.

## 2022-06-12 NOTE — CONSULT NOTE ADULT - SUBJECTIVE AND OBJECTIVE BOX
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 Presents to ED with J tube dislodgement.      PAST MEDICAL & SURGICAL HISTORY:  Anal cancer  Had Chemo and Radiation 4 years ago      S/P laparotomy            MEDICATIONS  (STANDING):    MEDICATIONS  (PRN):      Allergies    No Known Allergies    Intolerances        REVIEW OF SYSTEMS    [x ] A ten-point review of systems was otherwise negative except as noted.  [ ] Due to altered mental status/intubation, subjective information were not able to be obtained from the patient. History was obtained, to the extent possible, from review of the chart and collateral sources of information.      Vital Signs Last 24 Hrs  T(C): 36.7 (12 Jun 2022 13:51), Max: 36.7 (12 Jun 2022 13:51)  T(F): 98 (12 Jun 2022 13:51), Max: 98 (12 Jun 2022 13:51)  HR: 74 (12 Jun 2022 18:09) (74 - 85)  BP: 178/80 (12 Jun 2022 18:09) (150/88 - 178/80)  RR: 18 (12 Jun 2022 18:09) (18 - 18)  SpO2: 100% (12 Jun 2022 18:09) (99% - 100%)    PHYSICAL EXAM:  GENERAL: NAD, well-appearing  CHEST/LUNG: Clear to auscultation bilaterally  HEART: Regular rate and rhythm  ABDOMEN: soft, non distended, G tube in place, J tube stoma pink  EXTREMITIES:  No clubbing, cyanosis, or edema

## 2022-06-12 NOTE — ED PROVIDER NOTE - OBJECTIVE STATEMENT
76 year old male PMH anal cancer 2012 s/p chemo radiation, new annular mass involving pancreas +duodenal obstruction third portion s/p lap assisted gastrostomy and 14Fr feeding red rubber Witzel jejunostomy tube on 05/13 presents to ED for J-tube dislodged. Patient states around 6-7am this morning his tube was accidentally removed. Last used for feeding yesterday 4pm. He denies abd pain, fever, n/v.

## 2022-06-12 NOTE — ED PROVIDER NOTE - NSFOLLOWUPINSTRUCTIONS_ED_ALL_ED_FT
You were seen in the emergency department for a displaced J-tube. This was replaced by surgery and confirmed with x-ray. Avoid pulling on the tube to prevent dislodging it in the future.     Rest, drink plenty of fluids.  Advance activity as tolerated.  Continue all previously prescribed medications as directed.  Follow up with your primary care physician in 48-72 hours- bring copies of your results.  Return to the ER for worsening or persistent symptoms, and/or ANY NEW OR CONCERNING SYMPTOMS. If you have issues obtaining follow up, please call: [phone] to obtain a doctor or specialist who takes your insurance in your area.

## 2022-06-12 NOTE — ED PROVIDER NOTE - PHYSICAL EXAMINATION
GENERAL: A&Ox3, non-toxic appearing, no acute distress  HEENT: NCAT, EOMI, oral mucosa moist, normal conjunctiva  RESP: CTAB, no respiratory distress, no wheezes/rhonchi/rales, speaking in full sentences  CV: RRR, no murmurs/rubs/gallops  ABDOMEN: soft, non-tender, non-distended, no guarding, J-tube stoma pink, gastric secretion tube superior to J-tube w/ clear fluid in bag  MSK: no visible deformities  NEURO: no focal sensory or motor deficits, CN 2-12 grossly intact  SKIN: warm, normal color, well perfused, no rash  PSYCH: normal affect

## 2022-06-12 NOTE — ED PROVIDER NOTE - PATIENT PORTAL LINK FT
You can access the FollowMyHealth Patient Portal offered by [facility] by registering at the following website: [email or web address]. By joining [employer]’s FollowMyHealth portal, you will also be able to view your health information using other applications (apps) compatible with our system.

## 2022-06-12 NOTE — ED PROVIDER NOTE - PROGRESS NOTE DETAILS
Bennie Bain, PGY3: Surgery resident at bedside replacing J-tube. Requesting x-ray for confirmation of placement. X-ray tech aware. Bennie Bain, PGY3: J-tube placed by surgery. X-ray showing contrast in jejunum. Discussed return precautions and all questions answered. Pt in agreement w/ plan. CAOx3, NAD, VSS. Stable for d/c.

## 2022-06-12 NOTE — ED ADULT NURSE REASSESSMENT NOTE - NS ED NURSE REASSESS COMMENT FT1
pt remains alert,oriented x3. pt noted with  tube replaced  by surgery,confirmed by x ray. re evaluated by md d/c instructions given by md

## 2022-06-12 NOTE — CONSULT NOTE ADULT - ASSESSMENT
Assessment:  76y Male consulted for dislodged J tube (placed on 5/13)    Plan:  - J tube replaced, post replacement tube study contrast intraluminal  - patient has scheduled follow up with Dr Avilez on Tuesday  - plan dw fellow on behalf of attending    Surgery  [id number]

## 2022-06-14 NOTE — REASON FOR VISIT
[Post-Op] : a post-op for [FreeTextEntry2] : status post laparoscopic feeding jejunostomy tube placement on 5/13/22

## 2022-06-14 NOTE — HISTORY OF PRESENT ILLNESS
[de-identified] : 76 year-old male presents for an initial postop visit.  He has a history of anal cancer 2012 s/p chemo radiation, and was recently found to have a new annular mass involving pancreas +duodenal obstruction third portion.  He is s/p lap assisted gastrostomy and 14Fr feeding red rubber Witzel jejunostomy tube on 05/13/22.  He returned to the ED with emesis and concern for clogged G/J tube.  Jejunostomy tube was replaced on 6/12/22.\par \par 6/14/2022- J-tube is currently clamped. Tolerating tube feed from 5 pm- 7 am. Pt is unsure of name of feeding. Otherwise, feeling well without any new health issues. Pt denies nausea, vomiting, or abdominal pain. No change in bowel habits.

## 2022-06-14 NOTE — ASSESSMENT
[FreeTextEntry1] : 76 year-old male presents for an initial postop visit.  He has a history of anal cancer 2012 s/p chemo radiation, and was recently found to have a new annular mass involving pancreas +duodenal obstruction third portion.  He is s/p lap assisted gastrostomy and 14Fr feeding red rubber Witzel jejunostomy tube on 05/13/22.  He returned to the ED with emesis and concern for clogged G/J tube.  Jejunostomy tube was replaced on 6/12/22.\par \par PLAN:\par 1) RTO 1 months

## 2022-06-14 NOTE — PHYSICAL EXAM
[Normal] : oriented to person, place and time, with appropriate affect [de-identified] : Healed midline incision. Venting PEG tube and clamped J-tube in place.  [de-identified] : Walks with cane [de-identified] : Healed midline incision

## 2022-06-17 NOTE — ED PROVIDER NOTE - ATTENDING APP SHARED VISIT CONTRIBUTION OF CARE
I performed a face-to-face evaluation of the patient and performed a history and physical examination. I agree with the history and physical examination. If this was a PA visit, I personally saw the patient with the PA and performed a substantive portion of the visit including all aspects of the medical decision making.    Will call Gen Surg to re-place the J tube, replace the bag. No e/o infection. Give water by mouth.

## 2022-06-17 NOTE — ED PROVIDER NOTE - PATIENT PORTAL LINK FT
You can access the FollowMyHealth Patient Portal offered by [facility] by registering at the following website: [email or web address]. By joining [employer]’s FollowMyHealth portal, you will also be able to view your health information using other applications (apps) compatible with our system.

## 2022-06-17 NOTE — ED ADULT NURSE NOTE - INTERVENTIONS DEFINITIONS
[place] to call system/Call bell, personal items and telephone within reach/Instruct patient to call for assistance/Non-slip footwear when patient is off stretcher/Physically safe environment: no spills, clutter or unnecessary equipment/Stretcher in lowest position, wheels locked, appropriate side rails in place

## 2022-06-17 NOTE — ED ADULT TRIAGE NOTE - CHIEF COMPLAINT QUOTE
Pt presents to ED via wheelchair from home with c/o feeding tube dislodged. Pt reports similar issue x 5 days ago and came to this ED to have it replaced. Pt denies pain at site. Pt uncertain how it came out.

## 2022-06-17 NOTE — CONSULT NOTE ADULT - ASSESSMENT
Interventional Radiology    Evaluate for Procedure:     76y old  Male who presents with a chief complaint of dislodged J tube.     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 Has had it come out multiple times since placement, this time around 3AM today. Denies abdominal pain, nausea or vomiting. A KO feeding tube was placed in the tract without issue.     Allergies:   Medications (Abx/Cardiac/Anticoagulation/Blood Products)      Data:  157.5  T(C): 36.9  HR: 86  BP: 144/71  RR: 16  SpO2: 98%    -WBC 7.75 / HgB 9.4 / Hct 29.6 / Plt 255  -Na 132 / Cl 100 / BUN 27 / Glucose 153  -K 3.8 / CO2 22 / Cr 0.94  -ALT -- / Alk Phos -- / T.Bili --  -INR 1.18 / PTT 29.7      Radiology:     Assessment/Plan: 76y old  Male who presents with a chief complaint of dislodged J tube.     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 Has had it come out multiple times since placement, this time around 3AM today. Denies abdominal pain, nausea or vomiting. A KO feeding tube was placed in the tract without issue.     77 y/o M w/ hx metastatic anal and duodenal cancer s/p laparoscopically placed VENTING GASTROSTOMY and FEEDING JEJUNOSTOMY in mid May 2022. Patient's jejunostomy has dislodged multiple times. Patient now presenting to ED for same reason. KO feeding tube placed in the tract by Surgical team.     IR consulted for placement of new jejunostomy tube.     -- IR will plan to perform JEJUNOSTOMY REPLACEMENT / EXCHANGE on 06/17/2022  -- please complete IR pre-procedure note  -- please place IR procedure request order under Dr. Duffy    --  Jose Joshi MD   Diagnostic Radiology Resident (PGY-2)  IR Pager: [phone] (J) 
Assessment: Patient is a 76y old  Male who presents with a chief complaint of dislodged J tube. Placed initially 5/13.     Plan:  - KO tube in tract  - IR consulted for formal J tube replacement  - NPO  - Obtain pre-IR labs and Covid  - Discussed with attending surgical oncologist Dr. ZAIRE Guerrier MD, PGY1  D Team Surgery   [id number]

## 2022-06-17 NOTE — CHART NOTE - NSCHARTNOTEFT_GEN_A_CORE
Pre IR note     THOMAS DUKE  |  [id number]   |   [facility] ED   |       Preoperative Diagnosis: Dislodged J tube  Radiologist: Dr. Duffy  Planned Procedure: Replacement of jejunostomy tube     Labs  CBC:                             9.4    7.75  )-----------( 255      ( 17 Jun 2022 13:05 )             29.6     CMP:       06-17    146<H>  |  107  |  31<H>  ----------------------------<  112<H>  3.6   |  30  |  0.97    Ca    9.9      17 Jun 2022 13:05    TPro  5.7<L>  /  Alb  3.5  /  TBili  0.4  /  DBili  x   /  AST  32  /  ALT  50<H>  /  AlkPhos  134<H>  06-17    PT/INR:       PT/INR - ( 17 Jun 2022 13:05 )   PT: 13.4 sec;   INR: 1.15 ratio         PTT - ( 17 Jun 2022 13:05 )  PTT:28.4 sec  Type and Screen:       COVID: negative       Plan:  IR to exchange/replace J tube  NPO for IR, Tube feeds to resume once tube replaced and cleared by IR  Anticipate discharge home post procedure     D Team Surgery   [phone]

## 2022-06-17 NOTE — ED ADULT NURSE REASSESSMENT NOTE - NS ED NURSE REASSESS COMMENT FT1
PICC line used to draw blood and administer IVF. Thoroughly cleaned. +patency + blood return.  Skin intact at site. Tolerate well.

## 2022-06-17 NOTE — ED PROVIDER NOTE - OBJECTIVE STATEMENT
Bhumika: Recent duodenal cancer s/p gastrostomy. Feeding J tube came out last night before 3 AM. Came out also 5 days ago. Was replaced by Gen Surg at bedside and stitched in place. Family notes it's difficult to push the protein liquid formula food in through the J tube. Didn't have nutrition since last night. No fever or pain.

## 2022-06-17 NOTE — ED PROVIDER NOTE - CARE PROVIDER_API CALL
Shantanu Hahn)  Hematology; Medical Oncology  [address] [place] [address]  Phone: [phone]  Fax: [phone]  Follow Up Time:

## 2022-06-17 NOTE — ED PROVIDER NOTE - CARE PLAN
1 Principal Discharge DX:	Jejunostomy tube fell out   Principal Discharge DX:	Jejunostomy tube fell out  Assessment and plan of treatment:	J tube replaced by IR.

## 2022-06-17 NOTE — CONSULT NOTE ADULT - NS_IRCONSULTTYPE_GEN_ALL_CORE
Non Face-to-Face Telephone Encounter by Yazmin Negron at 01/16/17 10:47 AM     Author:  Yazmin Negron Service:  (none) Author Type:  Certified Medical Assistant     Filed:  01/16/17 10:49 AM Encounter Date:  1/13/2017 Status:  Signed     :  Yazmin Negron (Certified Medical Assistant)            Refilled per protocol.[SP1.1T]       Revision History        User Key Date/Time User Provider Type Action    > SP1.1 01/16/17 10:49 AM Yazmin Negron Certified Medical Assistant Sign    T - Template

## 2022-06-17 NOTE — ED PROVIDER NOTE - PHYSICAL EXAMINATION
Well appearing, thin, awake, alert, oriented to person, place, time/situation and in no apparent distress.    Airway patent    Eyes without scleral injection. No jaundice.    Strong pulse.    Respirations unlabored.    Abdomen soft, non-tender, no guarding.    Spine appears normal, range of motion is not limited, no muscle or joint tenderness.    Alert and oriented, no gross motor or sensory deficits.    Skin: J tube hole no e/o infection; tube not in hole. A tube just above the J tube insertion site that he says drains from his intestines whatever he drinks is in place. There is a small amount of brown drainage from the hole in which that tube goes. There is a small, red rash where the tubes' tape touches the skin.    No SI/HI.

## 2022-06-17 NOTE — CONSULT NOTE ADULT - SUBJECTIVE AND OBJECTIVE BOX
VASCULAR SURGERY CONSULT NOTE  --------------------------------------------------------------------------------------------    Patient is a 76y old  Male who presents with a chief complaint of dislodged J tube.     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 Has had it come out multiple times since placement, this time around 3AM today. Denies abdominal pain, nausea or vomiting. A KO feeding tube was placed in the tract without issue.       ROS: 10-system review is otherwise negative except HPI above.      PAST MEDICAL & SURGICAL HISTORY:  Anal cancer  Had Chemo and Radiation 4 years ago  S/P laparotomy    ALLERGIES: No Known Allergies      HOME MEDICATIONS:     CURRENT MEDICATIONS  MEDICATIONS (STANDING): sodium chloride 0.9% Bolus 2000 milliLiter(s) IV Bolus once    MEDICATIONS (PRN):  --------------------------------------------------------------------------------------------    Vitals:   T(C): 36.9 (06-17-22 @ 10:11), Max: 36.9 (06-17-22 @ 10:11)  HR: 86 (06-17-22 @ 10:11) (86 - 86)  BP: 144/71 (06-17-22 @ 10:11) (144/71 - 144/71)  RR: 16 (06-17-22 @ 10:11) (16 - 16)  SpO2: 98% (06-17-22 @ 10:11) (98% - 98%)  CAPILLARY BLOOD GLUCOSE        CAPILLARY BLOOD GLUCOSE          Height (cm): 157.5 (06-17 @ 10:11)    PHYSICAL EXAM:   General: NAD, Lying in bed comfortably  Neuro: A+Ox3  HEENT: NC/AT, EOMI  Neck: Soft, supple  Cardio: well perfused  Resp: Good effort, CTA b/l  GI/Abd: Soft, NT/ND, G tube secured to skin with sutures, J tube tract, KO feeding tube replaced   Vascular: warm well perfused extremities   Skin: Intact, no breakdown  Musculoskeletal: All 4 extremities moving spontaneously, no limitations.  --------------------------------------------------------------------------------------------    LABS                --------------------------------------------------------------------------------------------    MICROBIOLOGY      --------------------------------------------------------------------------------------------    IMAGING

## 2022-06-17 NOTE — ED PROVIDER NOTE - CLINICAL SUMMARY MEDICAL DECISION MAKING FREE TEXT BOX
Bhumika: Will call Gen Surg to re-place the J tube, replace the bag. No e/o infection. Give water by mouth.

## 2022-06-17 NOTE — ED ADULT NURSE NOTE - OBJECTIVE STATEMENT
A&O x3 reporting to ED with G Tube dislodgement. As per patient and wife, around 0300 came out with unknown reason. Patient's wife noted bleeding and taped with guaze. Patient denies pain to site. No additional complaints at this time. Listed pmh of anal cancer.

## 2022-06-17 NOTE — ED PROVIDER NOTE - NSFOLLOWUPINSTRUCTIONS_ED_ALL_ED_FT
Your J tube was dislodged and was replaced by the Interventional Radiology team. Per discussion with them you may resume feeding through the J tube. If the tube becomes removed please return to the Emergency Department or seek medical attention. Please follow-up with your Oncologist and PCP upon discharge. Please make sure to flush your J tube with sterile saline following feeds. Please avoid pulling on the J tube to prevent dislodgement.

## 2022-06-17 NOTE — PRE PROCEDURE NOTE - PRE PROCEDURE EVALUATION
------------------------------------------------------------  Interventional Radiology Pre-Procedure Note  ------------------------------------------------------------    Indication: 76y Male who presents with a chief complaint of dislodged J tube.     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 Has had it come out multiple times since placement, this time around 3AM today. Denies abdominal pain, nausea or vomiting. A KO feeding tube was placed in the tract without issue.         Past Medical History:  Anal cancer    Allergies: No Known Allergies    Vital Signs:   T(F): 98.2 (15:16), Max: 98.5 (10:11)  HR: 76 (15:16)  BP: 180/77 (15:16)  RR: 18 (15:16)  SpO2: 98% (15:16)    Labs:           9.4  7.75)-----(255     (06-17-22 @ 13:05)         29.6     146 | 107 | 31  --------------------< 112     (06-17-22 @ 13:05)  3.6 | 30 | 0.97       PT: 13.4 06-17-22 @ 13:05  aPTT: 28.4 06-17-22 @ 13:05   INR: 1.15 06-17-22 @ 13:05    Consent:  Risks/benefits/alternatives were explained to patient and informed written consent was obtained.     Procedure Plan:   Plan for J-tube replacement today.

## 2022-06-22 NOTE — ED ADULT NURSE NOTE - OBJECTIVE STATEMENT
p/t is a 76y old male sent by PMD for eval, abnormal blood cultures and fever for 2 days, p/t c/o of diarrhea as well

## 2022-06-22 NOTE — ED ADULT NURSE REASSESSMENT NOTE - NS ED NURSE REASSESS COMMENT FT1
received report from china GARCIA. Pt is a/o x 3. no complaints of chest pain, headache, nausea, dizzniness, vomiting, SOB, fever, chills   verbalized. Pt has 20g iv placed to right AC with no redness or swelling noted. Awaiting further orders. Will continue to monitor.

## 2022-06-22 NOTE — ED ADULT TRIAGE NOTE - CHIEF COMPLAINT QUOTE
Pt  brought in by EMS from MD office has a hx of rectal CA sent for positive blood cultures. Pt had a fever the past 2 days. Pt also complaining of diarrhea. Pt denies chest pain, sob, n/v.

## 2022-06-22 NOTE — ED PROVIDER NOTE - OBJECTIVE STATEMENT
77yo M pmhx of rectal cancer, HTN comes to ED w/ fever, diarrhea, nausea. Patient was planned to start chemoradiation today when one of their blood culture results from a prior visit tested positive. They were sent to the ED for evaluation. Recent peg tube placement 3 weeks prior comes with generalized abd pain. Their pain/symptom is moderate to severe, constant, non mediating with rest. Denies chest pain, SOB, vomiting.

## 2022-06-22 NOTE — ED PROVIDER NOTE - PHYSICAL EXAMINATION
General: NAD  HEENT: NCAT, PERRL  Cardiac: RRR, no murmurs, 2+ peripheral pulses  Chest: CTAB  Abdomen: generalized ttp, no rebound or guarding. Peg tube with pustulant drainage. bowel sounds present.   Extremities: no peripheral edema, calf tenderness, or leg size discrepancies  Skin: no rashes  Neuro: AAOx4, 5+motor, sensory grossly intact  Psych: mood and affect appropriate General: NAD  HEENT: NCAT, PERRL, mmm  Cardiac: RRR, no murmurs, 2+ peripheral pulses  Chest: CTAB  Abdomen: generalized ttp, no rebound or guarding. g and j tube, drainage from skin sites. bowel sounds present.   MSK: no peripheral edema, calf tenderness, or leg size discrepancies, no ext tenderness, no midline or paraspinal tenderness.   Skin: no rashes  Neuro: AAOx4, 5+motor, sensory grossly intact  Psych: mood and affect appropriate

## 2022-06-22 NOTE — ED PROVIDER NOTE - ATTENDING CONTRIBUTION TO CARE
75 yo m past medical history anal cancer, hx of pancreatic ca, s/p G and J tube may 2022 htn, reports fever chills 3 days ago  sent in to ED after his heme/onc found gram + cocci in clusters in blood cx. does report non bloody diarrhea, non focal abd pain. denies current fever chills cp sob, n/v, urinary complaints. pt tolerating po. exam as above. plan: abx, labs, ct, symptom relief prn, surgery consult given drainage from tube site.

## 2022-06-22 NOTE — ED PROVIDER NOTE - PROGRESS NOTE DETAILS
DEEPA Pena- received sign out. pt pending labs, ua, ct. surgery following. DEEPA Pena- pt admitted to medicine. spoke with Dr. Trevor De La Garza.

## 2022-06-22 NOTE — ED PROVIDER NOTE - NS ED ROS FT
Constitutional: fevers  HEENT: no cough, rhinorrhea  Cardiac: no chest pain, palpitations  Respiratory: no SOB  GI: diarrhea, nausea. no vomiting, abd pain, bloody or dark stools  : no dysuria, frequency, or hematuria  MSK: no joint pain  Skin: no rashes  Neuro: no headache, change in vision, focal weakness  Psych: negative

## 2022-06-22 NOTE — ED PROVIDER NOTE - CLINICAL SUMMARY MEDICAL DECISION MAKING FREE TEXT BOX
Impression: 75yo M pmhx of rectal cancer, HTN comes to ED w/ fever, diarrhea, nausea. Their symptoms of fever, diarrhea, nausea, exam findings of abdominal ttp w/ recent peg tube placement are concerning for intraabdominal infection, bacteremia.    Ordered labs, imaging, medications for diagnosis, management, and treatment.

## 2022-06-22 NOTE — ED PROVIDER NOTE - CARE PLAN
Principal Discharge DX:	Proctocolitis   1 Principal Discharge DX:	Proctocolitis  Secondary Diagnosis:	Bacteremia

## 2022-06-23 NOTE — PATIENT PROFILE ADULT - FALL HARM RISK - HARM RISK INTERVENTIONS
Assistance with ambulation/Assistance OOB with selected safe patient handling equipment/Communicate Risk of Fall with Harm to all staff/Reinforce activity limits and safety measures with patient and family/Review medications for side effects contributing to fall risk/Sit up slowly, dangle for a short time, stand at bedside before walking/Tailored Fall Risk Interventions/Toileting schedule using arm’s reach rule for commode and bathroom/Visual Cue: Yellow wristband and red socks/Bed in lowest position, wheels locked, appropriate side rails in place/Call bell, personal items and telephone in reach/Instruct patient to call for assistance before getting out of bed or chair/Non-slip footwear when patient is out of bed/[place] to call system/Physically safe environment - no spills, clutter or unnecessary equipment/Purposeful Proactive Rounding/Room/bathroom lighting operational, light cord in reach

## 2022-06-23 NOTE — H&P ADULT - NSHPLABSRESULTS_GEN_ALL_CORE
ACC: [id number] EXAM:  CT ABDOMEN AND PELVIS IC                          PROCEDURE DATE:  06/22/2022          INTERPRETATION:  CLINICAL INFORMATION: Tender abdomen.    COMPARISON: CT abdomen pelvis 4/29/2022    CONTRAST/COMPLICATIONS:  IV Contrast: Omnipaque 350  62 cc administered   8 cc discarded  Oral Contrast: NONE  Complications: None reported at time of study completion    PROCEDURE:  CT of the Abdomen and Pelvis was performed.  Sagittal and coronal reformats were performed.    FINDINGS:  LOWER CHEST: Calcified right lower lobe granuloma. Trace pericardial   effusion. Coronary artery atherosclerotic calcifications.    LIVER: Within normal limits.  BILE DUCTS: Normal caliber.  GALLBLADDER: Within normal limits.  SPLEEN: Within normal limits.  PANCREAS: Heterogeneous hypoattenuating mass in the pancreatic uncinate   process measuring up to 3.9 x 2.4 cm which is inseparable from the third   portion of the duodenum.  ADRENALS: Within normal limits.  KIDNEYS/URETERS: Bilateral subcentimeter hypoattenuating renal foci too   small to catheterize. Bilateral nonobstructing renal calculi with the   largest in the right upper pole measuring up to 0.3 cm. No hydronephrosis.    BLADDER: Within normal limits.  REPRODUCTIVE ORGANS: Prostate isenlarged.    BOWEL: Gastrostomy tube. Jejunostomy tube. D3 duodenal mass extending to   the uncinate process of the pancreas, measuring approximately 3.9 x 2.4   cm (series 2:44) not significantly changed in size compared with prior   exam. No bowel obstruction. Appendix is not visualized. Circumferential   thickening of the distal sigmoid and rectal walls with mucosal   hyperenhancement.  PERITONEUM: No ascites, pneumoperitoneum, or loculated collection.  VESSELS: Atherosclerotic changes. Moderate stenosis of proximal superior   mesenteric artery.  RETROPERITONEUM/LYMPH NODES: No lymphadenopathy.  ABDOMINAL WALL: Postsurgical changes.  BONES: Mild compression deformity of the superior L1 endplate.   Degenerative changes. Left hip arthroplasty.    IMPRESSION:  Redemonstration of a mass arising from the third portion the duodenum   extending into the uncinate process of the pancreas measuring up to 3.9 x   2.4 cm, unchanged compared with prior exam.    Circumferential thickening of the distal sigmoid and rectal walls with   mucosal hyperenhancement concerning for proctocolitis.    Nonobstructing bilateral intrarenal calculi.    --- End of Report ---    ACC: [id number] EXAM:  XR CHEST AP OR PA 1V                          PROCEDURE DATE:  06/22/2022          INTERPRETATION:  EXAMINATION: XR CHEST    CLINICAL INDICATION: Shortness of breath    TECHNIQUE: Single frontal, portable view of the chest was obtained.    COMPARISON: Chest radiograph 5/15/2022.    FINDINGS:  Right chest wall Mediport tip in the SVC. Right PICC line tip in the SVC.  Heart size is normal.  The lungs are clear.  No pleural effusion or pneumothorax.    IMPRESSION:  Clear lungs.    --- End of Report ---

## 2022-06-23 NOTE — H&P ADULT - PROBLEM SELECTOR PLAN 2
Patient with recent hx of duodenal cancer   -S/p laparotomy   -Now with elevated lipase, but no abdominal pain, less likely pancreatitis  -Consider obtaining CT A/P if pt develops abdominal pain   -F/u with Dr. Hahn in the AM Ct abd noted  - Start zosyn  - flu blood cultures  - consider Gi consult

## 2022-06-23 NOTE — H&P ADULT - PROBLEM SELECTOR PLAN 6
F/u with iron studies in the AM   -Previous labs were concerning for iron deficiency   -No sign of active bleeding s/p laparotomy  - CT Redemonstration of a mass arising from the third portion the duodenum   extending into the uncinate process of the pancreas measuring up to 3.9 x   2.4 cm, unchanged compared with prior exam.  - surgery consult

## 2022-06-23 NOTE — CONSULT NOTE ADULT - SUBJECTIVE AND OBJECTIVE BOX
HPI:  Rick Pacsal is a very pleasant 76 year old gentleman with history of metastatic anal cancer s/p chemoxrt and recent duodenal cancer c/b GOO s/p laparoscopic venting gastrostomy and feeding jejunostomy on . Pt presented postoperatively with dislodged J tube and had it replaced with a foster, and subsequently underwent fluoroscopy guided replacement by IR on . Since then pt reports that around  he had a fever and was seen by outpatient PMD who did labs and gave him IV fluids. Today pt developed diarrhea and reports multiple episodes since this morning, and has not administered any J tube feeds today as he was told to come to ED for evaluation. Denies any other complaints including HA/lightheadedness, lethargy, chest pain/shortness of breath, nausea/vomiting, constipation.       PMHx: Anal cancer      PSHx: No significant past surgical history    S/P laparotomy      Medications (inpatient):   Medications (PRN):  Allergies: No Known Allergies  (Intolerances: )  Social Hx:   Family Hx:     T(C): 36.7  HR: 68 (65 - 80)  BP: 149/53 (149/53 - 199/75)  RR: 15 (15 - 18)  SpO2: 99% (99% - 100%)  Tmax: T(C): , Max: 37.3 (- @ 13:12)      Physical Exam:  General: Elderly male, in no acute distress   Neurologic: Awake, alert, GCS 15, No focal Deficits   Respiratory: Normal respiratory effort  CVS: RRR, perfusing adequately  Abdomen: Abdomen soft, NT/ND, no rebound or guarding, G and J tubes in place, mild erythema at superior aspect of J tube site with scant seropurulent discharge on palpation  Ext: Grossly symmetric, Moving all extremities    Labs:                        9.2    8.26  )-----------( 281      ( 2022 16:47 )             30.2     PT/INR - ( 2022 16:47 )   PT: 14.7 sec;   INR: 1.26 ratio         PTT - ( 2022 16:47 )  PTT:28.3 sec      147<H>  |  109<H>  |  27<H>  ----------------------------<  120<H>  3.8   |  30  |  1.00    Ca    10.0      2022 16:47    TPro  6.0  /  Alb  3.4  /  TBili  0.7  /  DBili  x   /  AST  28  /  ALT  45<H>  /  AlkPhos  114      Urinalysis Basic - ( 2022 19:06 )    Color: Yellow / Appearance: Slightly Turbid / S.023 / pH: x  Gluc: x / Ketone: Negative  / Bili: Negative / Urobili: <2 mg/dL   Blood: x / Protein: 100 mg/dL / Nitrite: Negative   Leuk Esterase: Negative / RBC: 2 /HPF / WBC 1 /HPF   Sq Epi: x / Non Sq Epi: 1 /HPF / Bacteria: Few            Imaging and other studies:  < from: CT Abdomen and Pelvis w/ IV Cont (22 @ 23:51) >    ACC: [id number] EXAM:  CT ABDOMEN AND PELVIS IC                          PROCEDURE DATE:  2022          INTERPRETATION:  CLINICAL INFORMATION: Tender abdomen.    COMPARISON: CT abdomen pelvis 2022    CONTRAST/COMPLICATIONS:  IV Contrast: Omnipaque 350  62 cc administered   8 cc discarded  Oral Contrast: NONE  Complications: None reported at time of study completion    PROCEDURE:  CT of the Abdomen and Pelvis was performed.  Sagittal and coronal reformats were performed.    FINDINGS:  LOWER CHEST: Calcified right lower lobe granuloma. Trace pericardial   effusion. Coronary artery atherosclerotic calcifications.    LIVER: Within normal limits.  BILE DUCTS: Normal caliber.  GALLBLADDER: Within normal limits.  SPLEEN: Within normal limits.  PANCREAS: Heterogeneous hypoattenuating mass in the pancreatic uncinate   process measuring up to 3.9 x 2.4 cm which is inseparable from the third   portion of the duodenum.  ADRENALS: Within normal limits.  KIDNEYS/URETERS: Bilateral subcentimeter hypoattenuating renal foci too   small to catheterize. Bilateral nonobstructing renal calculi with the   largest in the right upper pole measuring up to 0.3 cm. No hydronephrosis.    BLADDER: Within normal limits.  REPRODUCTIVE ORGANS: Prostate isenlarged.    BOWEL: Gastrostomy tube. Jejunostomy tube. D3 duodenal mass extending to   the uncinate process of the pancreas, measuring approximately 3.9 x 2.4   cm (series 2:44) not significantly changed in size compared with prior   exam. No bowel obstruction. Appendix is not visualized. Circumferential   thickening of the distal sigmoid and rectal walls with mucosal   hyperenhancement.  PERITONEUM: No ascites, pneumoperitoneum, or loculated collection.  VESSELS: Atherosclerotic changes. Moderate stenosis of proximal superior   mesenteric artery.  RETROPERITONEUM/LYMPH NODES: No lymphadenopathy.  ABDOMINAL WALL: Postsurgical changes.  BONES: Mild compression deformity of the superior L1 endplate.   Degenerative changes. Left hip arthroplasty.    IMPRESSION:  Redemonstration of a mass arising from the third portion the duodenum   extending into the uncinate process of the pancreas measuring up to 3.9 x   2.4 cm, unchanged compared with prior exam.    Circumferential thickening of the distal sigmoid and rectal walls with   mucosal hyperenhancement concerning for proctocolitis.    Nonobstructing bilateral intrarenal calculi.    --- End of Report ---          KARINA ALTAMIRANO MD; Resident Radiologist  This document has been electronically signed.  HARSH JUÁREZ DO; Attending Radiologist  This document has been electronically signed. 2022  2:26AM    < end of copied text >

## 2022-06-23 NOTE — H&P ADULT - NSHPPHYSICALEXAM_GEN_ALL_CORE
General: NAD  HEENT: NCAT, PERRL  Cardiac: RRR, no murmurs, 2+ peripheral pulses  Chest: CTAB  Abdomen: generalized ttp, no rebound or guarding. Peg tube with pustulant drainage. bowel sounds present.   Extremities: no peripheral edema, calf tenderness, or leg size discrepancies  Skin: no rashes  Neuro: AAOx4, 5+motor, sensory grossly intact  Psych: mood and affect appropriate

## 2022-06-23 NOTE — H&P ADULT - PROBLEM SELECTOR PLAN 5
Most likely reactive due to emesis   -No sign of infection - likely anemia of Chronic disease  - no overt bleeding  - trend CBC

## 2022-06-23 NOTE — H&P ADULT - NSHPREVIEWOFSYSTEMS_GEN_ALL_CORE
Constitutional: fever  HEENT: no cough, rhinorrhea  Cardiac: no chest pain, palpitations  Respiratory: no SOB  GI: diarrhea, nausea. no vomiting, abd pain, bloody or dark stools  : no dysuria, frequency, or hematuria  MSK: no joint pain  Skin: no rashes  Neuro: no headache, change in vision, focal weakness  Psych: negative

## 2022-06-23 NOTE — PROGRESS NOTE ADULT - SUBJECTIVE AND OBJECTIVE BOX
SUBJECTIVE:    patient reports diarrhea since early this morning, denies nausea and vomiting    OBJECTIVE:      Vital Signs Last 24 Hrs  T(C): 36.8 (23 Jun 2022 07:43), Max: 37.3 (22 Jun 2022 13:12)  T(F): 98.2 (23 Jun 2022 07:43), Max: 99.2 (22 Jun 2022 13:12)  HR: 78 (23 Jun 2022 07:43) (65 - 80)  BP: 155/60 (23 Jun 2022 07:43) (149/53 - 199/75)  BP(mean): --  RR: 18 (23 Jun 2022 07:43) (15 - 18)  SpO2: 99% (23 Jun 2022 07:43) (99% - 100%)    Physical Exam:  GEN: NAD, resting quietly in bed  RESP: normal respiratory effort  ABDOMEN: soft, non-tender, non-distended, g and j tubes in place with mild erythema around the tube site                          9.2    8.26  )-----------( 281      ( 22 Jun 2022 16:47 )             30.2       06-22    147<H>  |  109<H>  |  27<H>  ----------------------------<  120<H>  3.8   |  30  |  1.00    Ca    10.0      22 Jun 2022 16:47    TPro  6.0  /  Alb  3.4  /  TBili  0.7  /  DBili  x   /  AST  28  /  ALT  45<H>  /  AlkPhos  114  06-22

## 2022-06-23 NOTE — H&P ADULT - TIME BILLING
- Review of records, telemetry, vital signs and daily labs.   - General and cardiovascular physical examination.  - Generation of cardiovascular treatment plan.  - Coordination of care.      Patient was seen and examined by me on 6/23/22,interim events noted,labs and radiology studies reviewed.  Trevor De La Garza MD,FACC.  [address] [place], [place] [id number].  [phone]

## 2022-06-23 NOTE — H&P ADULT - HISTORY OF PRESENT ILLNESS
Rick Pascal is a very pleasant 76 year old gentleman with history of metastatic anal cancer s/p chemoxrt and recent duodenal cancer c/b GOO s/p laparoscopic venting gastrostomy and feeding jejunostomy on 5/13. Pt presented postoperatively with dislodged J tube and had it replaced with a foster, and subsequently underwent fluoroscopy guided replacement by IR on 6/17. Since then pt reports that around 6/20 he had a fever and was seen by outpatient PMD who did labs and gave him IV fluids. Today pt developed diarrhea and reports multiple episodes since this morning, and has not administered any J tube feeds today as he was told to come to ED for evaluation. Denies any other complaints including HA/lightheadedness, lethargy, chest pain/shortness of breath, nausea/vomiting, constipation.

## 2022-06-23 NOTE — H&P ADULT - PROBLEM SELECTOR PLAN 3
-C/w atenolol and amlodipine p/w fever, suyapa from J tube site   - ua negative  - cxr unremarkable  -Cont IV abx  - S/p abx in ED  - F/u blood cx, and urine cx  - ID consult

## 2022-06-23 NOTE — PROGRESS NOTE ADULT - ASSESSMENT
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 Pt. presented postoperatively with dislodged J tube which was replaced with a foster and underwent fluroscopy guided replacement by IR on 6/17. Pt. began having fevers on 6/20, was seen by outpatient PMD where patient was given fluids and did labs. Blood cultures positive for gram positive cocci. Proctocolitis found on imaging. Pt presents today with multiple episodes of diarrhea and has not administered any J tube feeds today.        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 Pt. presented postoperatively with dislodged J tube which was replaced with a foster and underwent fluroscopy guided replacement by IR on 6/17. Pt. began having fevers on 6/20, was seen by outpatient PMD where patient was given fluids and did labs. Blood cultures positive for gram positive cocci. Proctocolitis found on imaging. Pt presents today with multiple episodes of diarrhea and has not administered any J tube feeds today.        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 Pt. presented postoperatively with dislodged J tube which was replaced with a foster and underwent fluroscopy guided replacement by IR on 6/17. Pt. began having fevers on 6/20, was seen by outpatient PMD where patient was given fluids and did labs. Blood cultures positive for gram positive cocci. Proctocolitis found on imaging. Pt presents today with multiple episodes of diarrhea and has not administered any J tube feeds today.        Plan:  - Leakage around J tube site likely due to presence of balloon with J tube causing partial obstruction. Will deflate J tube at bedside today.   - Care per Primary Team appreciated.     D Team Surgery  [id number]

## 2022-06-23 NOTE — H&P ADULT - ASSESSMENT
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 Surgery consult for concern for infections on J- tube site.

## 2022-06-23 NOTE — H&P ADULT - PROBLEM SELECTOR PLAN 1
Patient with multiple episode diarrhea   -Symptoms improved  -C/w IVF   -Restart feeds once cleared by surgery  -Surgery consult  - nutrition consult

## 2022-06-23 NOTE — H&P ADULT - PROBLEM SELECTOR PLAN 4
Most likely due to vomiting and dehydration   -C/w IVF   -Trend BMP   -Obtain renal US and urine lytes if Scr uptrends   -Hold lisinopril c/w amlodipine and Altenol

## 2022-06-23 NOTE — CONSULT NOTE ADULT - ASSESSMENT
ASSESSMENT:  Rick Pascal is a very pleasant 76 year old gentleman with history of metastatic anal cancer s/p chemoxrt and recent duodenal cancer c/b GOO s/p laparoscopic venting gastrostomy and feeding jejunostomy on 5/13, J tube replacement by IR on 6/17, presenting with fever at home and blood cultures positive for gram positive cocci outpatient, found on imaging to have proctocolitis and mild erythema around J tube site on exam    PLAN:  - Recommend medical management with IV antibiotics  - Surgery will follow      Antonio Quevedo, PGY 4  Surgery [id number]

## 2022-06-24 NOTE — CONSULT NOTE ADULT - SUBJECTIVE AND OBJECTIVE BOX
Hematology Consult Note    HPI:  Rick Pascal is a very pleasant 76 year old gentleman with history of metastatic anal cancer s/p chemoxrt and recent duodenal cancer c/b GOO s/p laparoscopic venting gastrostomy and feeding jejunostomy on 5/13. Pt presented postoperatively with dislodged J tube and had it replaced with a foster, and subsequently underwent fluoroscopy guided replacement by IR on 6/17. Since then pt reports that around 6/20 he had a fever and was seen by outpatient PMD who did labs and gave him IV fluids. Today pt developed diarrhea and reports multiple episodes since this morning, and has not administered any J tube feeds today as he was told to come to ED for evaluation. Denies any other complaints including HA/lightheadedness, lethargy, chest pain/shortness of breath, nausea/vomiting, constipation.    (23 Jun 2022 10:01)    At time of encounter, patient reports to be feeling fine. He reports being sent in from oncologist office as was having fevers and cultures were positive. He states that he is unhappy everytime he is to start treatment, something occurs that causes him to not start or have to go to hospital. He wanted a second opinion so as to see if there was another possibility. He reports that at home, he only slightly ambulatory. He states that he is too weak to ambulate. He does have pleasure feeds, but his nourishment is from the PEG tube nutrition.     PAST MEDICAL & SURGICAL HISTORY:  Anal cancer  Had Chemo and Radiation 4 years ago      S/P laparotomy      FAMILY HISTORY:      MEDICATIONS  (STANDING):  amLODIPine   Tablet 5 milliGRAM(s) Oral daily  ATENolol  Tablet 25 milliGRAM(s) Oral daily  chlorhexidine 2% Cloths 1 Application(s) Topical daily  influenza  Vaccine (HIGH DOSE) 0.7 milliLiter(s) IntraMuscular once  multivitamin 1 Tablet(s) Oral daily  pantoprazole  Injectable 40 milliGRAM(s) IV Push daily  piperacillin/tazobactam IVPB.. 3.375 Gram(s) IV Intermittent every 8 hours  sodium chloride 0.9%. 1000 milliLiter(s) (50 mL/Hr) IV Continuous <Continuous>    MEDICATIONS  (PRN):  oxyCODONE    IR 5 milliGRAM(s) Oral every 6 hours PRN Moderate Pain (4 - 6)      Allergies    No Known Allergies    Intolerances        SOCIAL HISTORY: No EtOH, no tobacco    Weight (kg): 63.9 (06-23 @ 22:21)    T(F): 97.6 (06-24-22 @ 12:07), Max: 99 (06-23-22 @ 22:21)  HR: 63 (06-24-22 @ 12:07)  BP: 189/61 (06-24-22 @ 12:07)  RR: 18 (06-24-22 @ 12:07)  SpO2: 100% (06-24-22 @ 12:07)  Wt(kg): --    GENERAL: NAD, emaciated with temporal and supraclavicular wasting.   HEAD:  Atraumatic, Normocephalic  EYES: EOMI, PERRLA, conjunctiva and sclera clear  NECK: Supple, No JVD  CHEST/LUNG: Clear to auscultation bilaterally; No wheeze  HEART: Regular rate and rhythm; No murmurs, rubs, or gallops  ABDOMEN: Soft, Venting gastrostomy and feeding J-tube in place   NEUROLOGY: non-focal  SKIN: No rashes or lesions                          9.6    6.37  )-----------( 284      ( 24 Jun 2022 06:40 )             31.1       06-24    141  |  107  |  23  ----------------------------<  80  3.9   |  20<L>  |  0.99    Ca    9.6      24 Jun 2022 06:40  Phos  3.0     06-24  Mg     2.00     06-24    TPro  5.4<L>  /  Alb  3.0<L>  /  TBili  0.8  /  DBili  x   /  AST  20  /  ALT  28  /  AlkPhos  107  06-24      Magnesium, Serum: 2.00 mg/dL (06-24 @ 06:40)  Phosphorus Level, Serum: 3.0 mg/dL (06-24 @ 06:40) Hematology Consult Note    HPI:  Rick Pascal is a very pleasant 76 year old gentleman with history of metastatic anal cancer s/p chemoxrt and recent duodenal cancer c/b GOO s/p laparoscopic venting gastrostomy and feeding jejunostomy on 5/13. Pt presented postoperatively with dislodged J tube and had it replaced with a foster, and subsequently underwent fluoroscopy guided replacement by IR on 6/17. Since then pt reports that around 6/20 he had a fever and was seen by outpatient PMD who did labs and gave him IV fluids. Today pt developed diarrhea and reports multiple episodes since this morning, and has not administered any J tube feeds today as he was told to come to ED for evaluation. Denies any other complaints including HA/lightheadedness, lethargy, chest pain/shortness of breath, nausea/vomiting, constipation.    (23 Jun 2022 10:01)    At time of encounter, patient reports to be feeling fine. He reports being sent in from oncologist office as was having fevers and cultures were positive. He states that he is unhappy everytime he is to start treatment, something occurs that causes him to not start or have to go to hospital. He wanted a second opinion so as to see if there was another possibility. He reports that at home, he only slightly ambulatory. He states that he is too weak to ambulate. He does have pleasure feeds, but his nourishment is from the PEG tube nutrition.     PAST MEDICAL & SURGICAL HISTORY:  Anal cancer  Had Chemo and Radiation 4 years ago      S/P laparotomy      FAMILY HISTORY:      MEDICATIONS  (STANDING):  amLODIPine   Tablet 5 milliGRAM(s) Oral daily  ATENolol  Tablet 25 milliGRAM(s) Oral daily  chlorhexidine 2% Cloths 1 Application(s) Topical daily  influenza  Vaccine (HIGH DOSE) 0.7 milliLiter(s) IntraMuscular once  multivitamin 1 Tablet(s) Oral daily  pantoprazole  Injectable 40 milliGRAM(s) IV Push daily  piperacillin/tazobactam IVPB.. 3.375 Gram(s) IV Intermittent every 8 hours  sodium chloride 0.9%. 1000 milliLiter(s) (50 mL/Hr) IV Continuous <Continuous>    MEDICATIONS  (PRN):  oxyCODONE    IR 5 milliGRAM(s) Oral every 6 hours PRN Moderate Pain (4 - 6)      Allergies    No Known Allergies    Intolerances        SOCIAL HISTORY: No EtOH, no tobacco    Weight (kg): 63.9 (06-23 @ 22:21)    T(F): 97.6 (06-24-22 @ 12:07), Max: 99 (06-23-22 @ 22:21)  HR: 63 (06-24-22 @ 12:07)  BP: 189/61 (06-24-22 @ 12:07)  RR: 18 (06-24-22 @ 12:07)  SpO2: 100% (06-24-22 @ 12:07)  Wt(kg): --    GENERAL: NAD, emaciated with temporal and supraclavicular wasting.   HEAD:  Atraumatic, Normocephalic  EYES: EOMI, PERRLA, conjunctiva and sclera clear  NECK: Supple, No JVD  CHEST/LUNG: Mediport on right chest; decreased breath sounds bilaterally.   HEART: Regular rate and rhythm; No murmurs, rubs, or gallops  ABDOMEN: Soft, Venting gastrostomy and feeding J-tube in place   NEUROLOGY: non-focal  SKIN: No rashes or lesions                          9.6    6.37  )-----------( 284      ( 24 Jun 2022 06:40 )             31.1       06-24    141  |  107  |  23  ----------------------------<  80  3.9   |  20<L>  |  0.99    Ca    9.6      24 Jun 2022 06:40  Phos  3.0     06-24  Mg     2.00     06-24    TPro  5.4<L>  /  Alb  3.0<L>  /  TBili  0.8  /  DBili  x   /  AST  20  /  ALT  28  /  AlkPhos  107  06-24      Magnesium, Serum: 2.00 mg/dL (06-24 @ 06:40)  Phosphorus Level, Serum: 3.0 mg/dL (06-24 @ 06:40)

## 2022-06-24 NOTE — DIETITIAN INITIAL EVALUATION ADULT - NS FNS DIET ORDER
Diet, Clear Liquid:   Vegan {Accepts Vegetable Products Only}  Tube Feeding Modality: Jejunostomy  Jevity 1.2 Nabil (JEVITY1.2RTH)  Total Volume for 24 Hours (mL): 1344  Continuous  Starting Tube Feed Rate {mL per Hour}: 10  Increase Tube Feed Rate by (mL): 10     Every 4 hours  Until Goal Tube Feed Rate (mL per Hour): 56  Tube Feed Duration (in Hours): 24  Tube Feed Start Time: 10:12 (06-24-22 @ 14:03)

## 2022-06-24 NOTE — DIETITIAN INITIAL EVALUATION ADULT - OTHER INFO
Patient currently ordered for continuous feeds of Jevity1.2 via J-tube at 56ml/hr [id number] provides total volume of 1344mL, 1613kcal, 75gpro and 1085mL free water (meeting ~ 25kcal/kg and 1.2g/kg of ABW). Patient is also taking clear liquids per team. Patient denies any nausea/vomiting/diarrhea/constipation at this time. Importance of adhering to tube feeding regimen and to meet energy/protein intake at home emphasized which patient verbalizes comprehension of.

## 2022-06-24 NOTE — DIETITIAN INITIAL EVALUATION ADULT - ADD RECOMMEND
1. Monitor weights, labs, BM's, skin integrity, tolerance to EN and PO diet tolerance.    2. Further adjustments to rate/volume/duration/free water provision of enteral feeds dependant on long term monitoring of patient's tolerance, weight trends and needs.

## 2022-06-24 NOTE — DIETITIAN INITIAL EVALUATION ADULT - NSFNSGIIOFT_GEN_A_CORE
06-23-22 @ 07:01  -  06-24-22 @ 07:00  --------------------------------------------------------  OUT:    Gastrostomy Tube (mL): 200 mL  Total OUT: 200 mL    Total NET: -200 mL      06-24-22 @ 07:01  -  06-24-22 @ 14:19  --------------------------------------------------------  OUT:    Gastrostomy Tube (mL): 100 mL  Total OUT: 100 mL    Total NET: -100 mL

## 2022-06-24 NOTE — PROGRESS NOTE ADULT - SUBJECTIVE AND OBJECTIVE BOX
Reason for consult:    HPI:  Rick Pascal is a very pleasant 76 year old gentleman with history of metastatic anal cancer s/p chemoxrt and recent duodenal cancer c/b GOO s/p laparoscopic venting gastrostomy and feeding jejunostomy on 5/13. Pt presented postoperatively with dislodged J tube and had it replaced with a foster, and subsequently underwent fluoroscopy guided replacement by IR on 6/17. Since then pt reports that around 6/20 he had a fever and was seen by outpatient PMD who did labs and gave him IV fluids. Today pt developed diarrhea and reports multiple episodes since this morning, and has not administered any J tube feeds today as he was told to come to ED for evaluation. Denies any other complaints including HA/lightheadedness, lethargy, chest pain/shortness of breath, nausea/vomiting, constipation.    (23 Jun 2022 10:01)      PAST MEDICAL & SURGICAL HISTORY:  Anal cancer  Had Chemo and Radiation 4 years ago      S/P laparotomy          FAMILY HISTORY:      Alochol: Denied  Smoking: Nonsmoker  Drug Use: Denied  Marital Status:         Allergies    No Known Allergies    Intolerances        MEDICATIONS  (STANDING):  amLODIPine   Tablet 5 milliGRAM(s) Oral daily  ATENolol  Tablet 25 milliGRAM(s) Oral daily  chlorhexidine 2% Cloths 1 Application(s) Topical daily  influenza  Vaccine (HIGH DOSE) 0.7 milliLiter(s) IntraMuscular once  multivitamin 1 Tablet(s) Oral daily  mupirocin 2% Ointment 1 Application(s) Topical two times a day  pantoprazole  Injectable 40 milliGRAM(s) IV Push daily  piperacillin/tazobactam IVPB.. 3.375 Gram(s) IV Intermittent every 8 hours  sodium chloride 0.9%. 1000 milliLiter(s) (50 mL/Hr) IV Continuous <Continuous>    MEDICATIONS  (PRN):  oxyCODONE    IR 5 milliGRAM(s) Oral every 6 hours PRN Moderate Pain (4 - 6)      ROS:     General:  No wt loss, fevers, chills, night sweats, fatigue,   Eyes:  Good vision, no reported pain  ENT:  No sore throat, pain, runny nose, dysphagia  CV:  No pain, palpitations, hypo/hypertension  Resp:  No dyspnea, cough, tachypnea, wheezing  GI:  See HPI   :  No pain, bleeding, incontinence, nocturia  Muscle:  No pain, weakness  Neuro:  No weakness, tingling, memory problems  Psych:  No fatigue, insomnia, mood problems, depression  Endocrine:  No polyuria, polydipsia, cold/heat intolerance  Heme:  No petechiae, ecchymosis, easy bruisability  Skin:  No rash, tattoos, scars, edema      PHYSICAL EXAM:     GENERAL:  Appears stated age, well-groomed  HEENT:  NC/AT,  conjunctivae clear and pink  CHEST:  Full & symmetric excursion, no increased effort, breath sounds clear  HEART:  Regular rhythm, S1, S2, no murmur/rub/S3/S4  ABDOMEN:  Soft, non-tender, non-distended  EXTEREMITIES:  no cyanosis,clubbing or edema  SKIN:  No rash/erythema/ecchymoses  NEURO:  Alert, oriented, no asterixis  LN: no palpable Lymphadenopathy                           9.6    6.37  )-----------( 284      ( 24 Jun 2022 06:40 )             31.1       06-24    141  |  107  |  23  ----------------------------<  80  3.9   |  20<L>  |  0.99    Ca    9.6      24 Jun 2022 06:40  Phos  3.0     06-24  Mg     2.00     06-24    TPro  5.4<L>  /  Alb  3.0<L>  /  TBili  0.8  /  DBili  x   /  AST  20  /  ALT  28  /  AlkPhos  107  06-24

## 2022-06-24 NOTE — DIETITIAN INITIAL EVALUATION ADULT - ORAL INTAKE PTA/DIET HISTORY
Patient is maintained on J-tube feedings at rate of 90ml/hr and takes about 1000mL per day which provides 1200kcal, 55.5g pro and 807mL free water (insufficient kcal and protein intake @19kcal/kg and <1g pro of ABW-63.9kg). Patient also reports taking oral clear liquids at home.

## 2022-06-24 NOTE — DIETITIAN INITIAL EVALUATION ADULT - PERTINENT LABORATORY DATA
06-24    141  |  107  |  23  ----------------------------<  80  3.9   |  20<L>  |  0.99    Ca    9.6      24 Jun 2022 06:40  Phos  3.0     06-24  Mg     2.00     06-24    TPro  5.4<L>  /  Alb  3.0<L>  /  TBili  0.8  /  DBili  x   /  AST  20  /  ALT  28  /  AlkPhos  107  06-24

## 2022-06-24 NOTE — PROGRESS NOTE ADULT - SUBJECTIVE AND OBJECTIVE BOX
PATIENT SEEN AND EXAMINED ON :- 6/24/22  DATE OF SERVICE:  6/24/22           Interim events noted,Labs ,Radiological studies and Cardiology tests reviewed .       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 Pt presented postoperatively with dislodged J tube and had it replaced with a foster, and subsequently underwent fluoroscopy guided replacement by IR on 6/17. Since then pt reports that around 6/20 he had a fever and was seen by outpatient PMD who did labs and gave him IV fluids. Today pt developed diarrhea and reports multiple episodes since this morning, and has not administered any J tube feeds today as he was told to come to ED for evaluation. Denies any other complaints including HA/lightheadedness, lethargy, chest pain/shortness of breath, nausea/vomiting, constipation.    (23 Jun 2022 10:01)      INTERIM EVENTS:Patient seen at bedside ,interim events noted.      PMH -reviewed admission note, no change since admission  HEART FAILURE: Acute[ ]Chronic[ ] Systolic[ ] Diastolic[ ] Combined Systolic and Diastolic[ ]  CAD[ ] CABG[ ] PCI[ ]  DEVICES[ ] PPM[ ] ICD[ ] ILR[ ]  ATRIAL FIBRILLATION[ ] Paroxysmal[ ] Permanent[ ]  MOSES[ ] CKD1[ ] CKD2[ ] CKD3[ ] CKD4[ ] ESRD[ ]  COPD[ ] HTN[ ]   DM[ ] Type1[ ] Type 2[ ]   CVA[ ] Paresis[ ]    AMBULATION: Assisted[ ] Cane/walker[ ] Independent[ ]    MEDICATIONS  (STANDING):  amLODIPine   Tablet 5 milliGRAM(s) Oral daily  ATENolol  Tablet 25 milliGRAM(s) Oral daily  chlorhexidine 2% Cloths 1 Application(s) Topical daily  influenza  Vaccine (HIGH DOSE) 0.7 milliLiter(s) IntraMuscular once  multivitamin 1 Tablet(s) Oral daily  mupirocin 2% Ointment 1 Application(s) Topical two times a day  pantoprazole  Injectable 40 milliGRAM(s) IV Push daily  piperacillin/tazobactam IVPB.. 3.375 Gram(s) IV Intermittent every 8 hours  sodium chloride 0.9%. 1000 milliLiter(s) (50 mL/Hr) IV Continuous <Continuous>    MEDICATIONS  (PRN):  oxyCODONE    IR 5 milliGRAM(s) Oral every 6 hours PRN Moderate Pain (4 - 6)            REVIEW OF SYSTEMS:  Constitutional: [ ] fever, [ ]weight loss,  [ ]fatigue  Eyes: [ ] visual changes  Respiratory: [ ]shortness of breath;  [ ] cough, [ ]wheezing, [ ]chills, [ ]hemoptysis  Cardiovascular: [ ] chest pain, [ ]palpitations, [ ]dizziness,  [ ]leg swelling[ ]orthopnea[ ]PND  Gastrointestinal: [ ] abdominal pain, [ ]nausea, [ ]vomiting,  [ ]diarrhea [ ]Constipation [ ]Melena  Genitourinary: [ ] dysuria, [ ] hematuria [ ]Foster  Neurologic: [ ] headaches [ ] tremors[ ]weakness [ ]Paralysis Right[ ] Left[ ]  Skin: [ ] itching, [ ]burning, [ ] rashes  Endocrine: [ ] heat or cold intolerance  Musculoskeletal: [ ] joint pain or swelling; [ ] muscle, back, or extremity pain  Psychiatric: [ ] depression, [ ]anxiety, [ ]mood swings, or [ ]difficulty sleeping  Hematologic: [ ] easy bruising, [ ] bleeding gums    [ ] All remaining systems negative except as per above.   [ ]Unable to obtain.  [x] No change in ROS since admission      Vital Signs Last 24 Hrs  T(C): 36.4 (24 Jun 2022 12:07), Max: 37.2 (23 Jun 2022 22:21)  T(F): 97.6 (24 Jun 2022 12:07), Max: 99 (23 Jun 2022 22:21)  HR: 63 (24 Jun 2022 12:07) (63 - 75)  BP: 189/61 (24 Jun 2022 12:07) (176/71 - 194/71)  BP(mean): --  RR: 18 (24 Jun 2022 12:07) (18 - 18)  SpO2: 100% (24 Jun 2022 12:07) (98% - 100%)  I&O's Summary    23 Jun 2022 07:01  -  24 Jun 2022 07:00  --------------------------------------------------------  IN: 0 mL / OUT: 200 mL / NET: -200 mL    24 Jun 2022 07:01  -  24 Jun 2022 20:27  --------------------------------------------------------  IN: 0 mL / OUT: 1300 mL / NET: -1300 mL        PHYSICAL EXAM:  General: No acute distress BMI-  HEENT: EOMI, PERRL  Neck: Supple, [ ] JVD  Lungs: Equal air entry bilaterally; [ ] rales [ ] wheezing [ ] rhonchi  Heart: Regular rate and rhythm; [x ] murmur   2/6 [ x] systolic [ ] diastolic [ ] radiation[ ] rubs [ ]  gallops  Abdomen: Nontender, bowel sounds present  Extremities: No clubbing, cyanosis, [ ] edema [ ]Pulses  equal and intact  Nervous system:  Alert & Oriented X3, no focal deficits  Psychiatric: Normal affect  Skin: No rashes or lesions    LABS:  06-24    141  |  107  |  23  ----------------------------<  80  3.9   |  20<L>  |  0.99    Ca    9.6      24 Jun 2022 06:40  Phos  3.0     06-24  Mg     2.00     06-24    TPro  5.4<L>  /  Alb  3.0<L>  /  TBili  0.8  /  DBili  x   /  AST  20  /  ALT  28  /  AlkPhos  107  06-24    Creatinine Trend: 0.99<--, 1.00<--, 0.97<--, 0.94<--, 1.07<--, 1.08<--                        9.6    6.37  )-----------( 284      ( 24 Jun 2022 06:40 )             31.1     < from: Transthoracic Echocardiogram (04.29.22 @ 16:41) >  CONCLUSIONS:  1. Normal left ventricular size with mild assymmetric  septal hypertrophy.  2. Normal left ventricular systolic function. No segmental  wall motion abnormalities.  The LVEF= 60-65%.  3. Normal right ventricular size and function.  *** No previous Echo exam.  ------------------------------------------------------------------------  PROCEDURE DESCRIPTION: Transthoracic echocardiogram with  2-D, M-Mode and complete spectral and color flow Doppler.  ------------------------------------------------------------------------  ECHOCARDIOGRAPHIC EXAMINATION:  AORTIC ROOT:  Aortic Root (Leaflet): 3.1 cm  Aortic Root Index: 0.9  LEFT ATRIUM:  AP Dimensions: 3.3 cm  LA Volume Index: 53.00 cc/sqm  LA Volume: 96.6 cc  LEFT VENTRICLE:  LVIDd: 4.3 cm  LVIDs: 2.5 cm  Fraction Short: 42 %  IVS: 1.39  ILWT: 1.0 cm  RWT: 0.48  LV Mass: 190.0 gm  LV Mass Index: 103 gm/sqm  EF (Visual Estimate): 65%  HR and BP:  HR: 66 bpm  BP: 190/70  BSA: 1.84  TRICUSPID VALVE:  TR Velocity: 2.5 M/s  TR Gradient: 25.0 mm Hg  ------------------------------------------------------------------------  HEMODYNAMICS:  RA Pressure Estimate: 5  RVSP: 30  LA Pressure Estimate: < 20  PAS: 18  ------------------------------------------------------------------------  COLOR FLOW and SPECIAL DOPPLER:  AORTIC VALVE:  AV Peak Velocity: 1.6 M/sec  AV Peak Gradient: 10 mm Hg  AV Mean Gradient: 5 mm Hg  Valve Area: 2.7 sqcm  Valve Area Index: 1.52sqcm/sqM  LVOT:  LVOT Velocity: 1.3 M/sec  LVOT Diameter: 2.1 cm  LVOT Peak Gradient Rest: 6 mm Hg  LVOT Mean Gradient Rest: 2 mm Hg  ------------------------------------------------------------------------  DIASTOLIC FUNCTION:  DT:194 ms  E/A: 0.65  e' Septal: 6.0 cm/s  E/e' Septal: 10.1  e' Lateral: 8.0 cm/s  E/e' Lateral: 7.6  MV E wave: 0.6 m/s  MV A wave: 0.9 m/s  Septal a': 12.0 cm/s  Lateral a': 13.0 cm/s  ------------------------------------------------------------------------  Confirmed on  4/30/2022 - 12:31:51 by Keren Anaya M.D.  ------------------------------------------------------------------------    < end of copied text >

## 2022-06-24 NOTE — DIETITIAN INITIAL EVALUATION ADULT - PERTINENT MEDS FT
MEDICATIONS  (STANDING):  amLODIPine   Tablet 5 milliGRAM(s) Oral daily  ATENolol  Tablet 25 milliGRAM(s) Oral daily  chlorhexidine 2% Cloths 1 Application(s) Topical daily  influenza  Vaccine (HIGH DOSE) 0.7 milliLiter(s) IntraMuscular once  multivitamin 1 Tablet(s) Oral daily  pantoprazole  Injectable 40 milliGRAM(s) IV Push daily  piperacillin/tazobactam IVPB.. 3.375 Gram(s) IV Intermittent every 8 hours  sodium chloride 0.9%. 1000 milliLiter(s) (50 mL/Hr) IV Continuous <Continuous>    MEDICATIONS  (PRN):  oxyCODONE    IR 5 milliGRAM(s) Oral every 6 hours PRN Moderate Pain (4 - 6)

## 2022-06-24 NOTE — PROGRESS NOTE ADULT - SUBJECTIVE AND OBJECTIVE BOX
TEAM [ D ] Surgery Daily Progress Note  =====================================================    SUBJECTIVE: Patient seen and examined at bedside on AM rounds. Patient reports that they're feeling well.       ALLERGIES:  No Known Allergies    --------------------------------------------------------------------------------------    MEDICATIONS:    Neurologic Medications  oxyCODONE    IR 5 milliGRAM(s) Oral every 6 hours PRN Moderate Pain (4 - 6)    Respiratory Medications    Cardiovascular Medications  amLODIPine   Tablet 5 milliGRAM(s) Oral daily  ATENolol  Tablet 25 milliGRAM(s) Oral daily    Gastrointestinal Medications  multivitamin 1 Tablet(s) Oral daily  pantoprazole  Injectable 40 milliGRAM(s) IV Push daily  sodium chloride 0.9%. 1000 milliLiter(s) IV Continuous <Continuous>    Hematologic/Oncologic Medications  influenza  Vaccine (HIGH DOSE) 0.7 milliLiter(s) IntraMuscular once    Antimicrobial/Immunologic Medications  piperacillin/tazobactam IVPB.. 3.375 Gram(s) IV Intermittent every 8 hours    Endocrine/Metabolic Medications    Topical/Other Medications  chlorhexidine 2% Cloths 1 Application(s) Topical daily    --------------------------------------------------------------------------------------    VITAL SIGNS:    ICU Vital Signs Last 24 Hrs  T(C): 37.2 (23 Jun 2022 22:21), Max: 37.2 (23 Jun 2022 22:21)  T(F): 99 (23 Jun 2022 22:21), Max: 99 (23 Jun 2022 22:21)  HR: 73 (24 Jun 2022 01:00) (73 - 75)  BP: 176/71 (24 Jun 2022 01:00) (170/58 - 190/80)  BP(mean): --  ABP: --  ABP(mean): --  RR: 18 (24 Jun 2022 01:00) (18 - 18)  SpO2: 100% (24 Jun 2022 01:00) (98% - 100%)    --------------------------------------------------------------------------------------    EXAM    General: NAD, resting in bed comfortably.  Cardiac: regular rate, warm and well perfused  Respiratory: Nonlabored respirations, normal cw expansion.  Abdomen: soft, nontender, nondistended, g tube site secured and venting, j tube site secured, no purulence expressed, no fluctuance or induration.  Extremities: normal strength, FROM, no deformities    --------------------------------------------------------------------------------------

## 2022-06-24 NOTE — PROGRESS NOTE ADULT - ASSESSMENT
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 Pt. presented postoperatively with dislodged J tube which was replaced with a foster and underwent fluroscopy guided replacement by IR on 6/17. Pt. began having fevers on 6/20, was seen by outpatient PMD where patient was given fluids and did labs. Blood cultures positive for gram positive cocci. Proctocolitis found on imaging. Pt presents today with multiple episodes of diarrhea and has not administered any J tube feeds today.        Plan:  - Leakage around J tube site likely due to presence of balloon with J tube causing partial obstruction.   - Balloon deflated J tube at bedside today.   - PO diet as tolerated  - Tube feeds via J tube as tolerated  - Please continue Augmentin suspension via j tube x7 days.   - Care per Primary Team appreciated.   - ok for discharge.    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 Pt. presented postoperatively with dislodged J tube which was replaced with a foster and underwent fluroscopy guided replacement by IR on 6/17. Pt. began having fevers on 6/20, was seen by outpatient PMD where patient was given fluids and did labs. Blood cultures positive for gram positive cocci. Proctocolitis found on imaging. Pt presents today with multiple episodes of diarrhea and has not administered any J tube feeds today.        Plan:  - Leakage around J tube site likely due to presence of balloon with J tube causing partial obstruction.   - Balloon deflated J tube at bedside today.   - PO diet as tolerated  - Tube feeds via J tube as tolerated  - Please continue Augmentin suspension via j tube x7 days.   - Care per Primary Team appreciated.   - please reconsult as necessary    D Team Surgery  [id number]

## 2022-06-24 NOTE — CONSULT NOTE ADULT - ATTENDING COMMENTS
Pt seen , examined and d/w fellow/ History as above reviewed and confirmed with pt at bedside as well as review of EMR notes. Pt was seen for a second opinion at his own request. He is a pt under the care of Dr Hahn.    Prior remote h/o anal ca s/p chemo / xrt (???approx 2013??). More recently diagnosed with pancreatic adenoca with squamous features causing duodenal compression / obstruction with N/V, approx 50 lb wt loss and gradually worsening generalized weakness. Had venting PEG placed as well as feeding J -tube c/b difficulties with J tube falling out and needing replacing x 2.  It appear sin light of poor performance status pt was to start on Xeloda and XRT - took only Xeloda x 1 and developed fevers / chills- found to have staph aureus bacteremia on admission. Starte don peripheral IV abx with improvement. PE sclerae anicteric Lungs clear Heart RRR S1S2 Abd with venting PEG tube an dL tube (appears to be leaking around tube) with NABS soft / NT , no masses. Ext w/o edema. A/P While it is not clear whether there are distant mets he has locally advancer pancreatic cancer causing significant morbidity and the decision to use Xeloda (for palliation and as a radiation sensitizer) and XRT is reasonable in light of his poor performance status and inability to tolerate multidrug chemo regimens.  After recovering from his acute admission / bacteremia, should he resume this regimen and have significant improvement in feeding, performance status and a response he can always be re-evaluated for a multi-drug regimen - although this is not likely. He should have NGS sent on his tumor bx (Dr Hahn informed me this has been already requested). Lastly would recommend that the IV abx be given through his mediport (this is not accessed) because the pt indicates that may be from where his cultures may have been drawn - and he notes th elast time this was accessed was 2 weeks ago - if this is so needs IV abx through the port. I agree with his current management, have discussed this all with the pt as well as Dr. Hahn by phone.

## 2022-06-24 NOTE — CONSULT NOTE ADULT - ASSESSMENT
75 yo make with pmhx of metastatic anal cancer (stable for the last 7 years) with recent diagnosis of pancreatic cancer causing duodenal obstruction s/p laproscopic venting gastrotomy and feeding jejunostomy on 5/13.  77 yo make with pmhx of metastatic anal cancer (stable for the last 7 years) with recent diagnosis of pancreatic cancer causing duodenal obstruction s/p laproscopic venting gastrotomy and feeding jejunostomy on 5/13. Patient suffered post-op complications with J-tube dislodgement  now s/p IR guided replacement on 6/17. Patient sent in from office for positive blood cultures 1 day post first dose of Xeloda/RT. Our team consulted for second opinion requested by patient.     # Pancreatic adenocarcinoma   - Recently diagnosed in may 2022; follows with Dr Hahn  - Pathology 5/4/22 of mass biopsy: Adenocarcinoma with squamous differentiation. TP53 +, KRAS + CDKN2A/B+, SMAD4 +, PDL-1 neg  - CT A/P on admission showing a 3.9cm x 2.4 cm duodenal mass extending to the uncinate process of the pancreas, circumferential thickening of the distal sigmoid & rectal wall w mucosal enhancement, no adenoapthy.   - CT Chest from 5/3 showing few b/l sub5mm nodules which were new compared to march 2020   - Patient admits to weakness, difficulty ambulating due to severe weakness, recent weight loss of 50lbs. ECOG 3-4  - Surgical oncology evaluation prior admission deeming tumor unresectable.   - Was started on Xeloda and RT treatment on 6/22 - received only 1 dose PO.   - Given the current performance status of patient and the unresectability of tumor; Xeloda and RT is an appropriate treatment as a palliative bridge to either further treatment should patient performance status improve or palliative approach. However, would recommend completing treatment for current bacteremia prior to resuming treatment.     # Bacteremia   - Blood cultures positive with Staph Aureus   - Patient has mediport that has been placed over 10-12 years ago; which he reports has had flushes intermittently at oncologist office.   - This can be potential source of bacteremia and if accessible, recommend Antibiotic be administered through port so that the port is treated as well.   - Consider ID consult for further management/recommendation of port.     Patient had indicated that he will continue follow up with Dr Hahn at this time. Will sign off. Please call us back if any further questions.     Tawana Amaya MD   PGY4 heme onc fellow

## 2022-06-24 NOTE — PROGRESS NOTE ADULT - ASSESSMENT
Mr. Pascal was sent from our [facility] office by his oncologist (dr. huntley) for mssa bacteremia.  Patient was recently diagnosed with locally advanced pancreatic adenocarcinoma.  He is s/p one day of RT on 6/22. He was started on concurrent xeloda. In the Er, patient had a low grade temp. Today the patient reports diarrhea for two days that has now subsided.     1. Pancreatic adenocarcinoma -  is under the care of dr. huntley.   2. mssa bacteremia - c/w zosyn, if any changes to clinical status- recommend ID consult, repeat cx are negative    3. surgery f/u - j  tube ok to use    4. f/u dietary reccomendations    5. diarrhea - resolved, but please monitor frequency of bm    can f/u with dr. huntley when discharged.

## 2022-06-24 NOTE — PROGRESS NOTE ADULT - TIME BILLING
- Review of records, telemetry, vital signs and daily labs.   - General and cardiovascular physical examination.  - Generation of cardiovascular treatment plan.  - Coordination of care.      Patient was seen and examined by me on 6/24/22,interim events noted,labs and radiology studies reviewed.  Trevor De La Garza MD,FACC.  [address] [place], [place] [id number].  [phone] Doxepin Counseling:  Patient advised that the medication is sedating and not to drive a car after taking this medication. Patient informed of potential adverse effects including but not limited to dry mouth, urinary retention, and blurry vision.  The patient verbalized understanding of the proper use and possible adverse effects of doxepin.  All of the patient's questions and concerns were addressed.

## 2022-06-24 NOTE — DIETITIAN INITIAL EVALUATION ADULT - NS FNS REASON FOR WEIGHT CHANG
Usual weight 180lbs, last admission 69.9kg/154lbs on 5/11/22 and now 63.9kg/140.8lbs. Continues w/ downtrending weight likely related to increased needs and insufficient EN administration PTA./decreased po intake

## 2022-06-24 NOTE — DIETITIAN NUTRITION RISK NOTIFICATION - TREATMENT: THE FOLLOWING DIET HAS BEEN RECOMMENDED
Diet, Clear Liquid:   Vegan {Accepts Vegetable Products Only}  Tube Feeding Modality: Jejunostomy  Jevity 1.2 Nabil (JEVITY1.2RTH)  Total Volume for 24 Hours (mL): 1344  Continuous  Starting Tube Feed Rate {mL per Hour}: 10  Increase Tube Feed Rate by (mL): 10     Every 4 hours  Until Goal Tube Feed Rate (mL per Hour): 56  Tube Feed Duration (in Hours): 24  Tube Feed Start Time: 10:12 (06-24-22 @ 14:03) [Active]

## 2022-06-25 NOTE — PROGRESS NOTE ADULT - TIME BILLING
- Review of records, telemetry, vital signs and daily labs.   - General and cardiovascular physical examination.  - Generation of cardiovascular treatment plan.  - Coordination of care.      Patient was seen and examined by me on 6/25/22,interim events noted,labs and radiology studies reviewed.  Trevor De La Garza MD,FACC.  [address] [place], [place] [id number].  [phone]

## 2022-06-25 NOTE — DISCHARGE NOTE PROVIDER - NSDCFUSCHEDAPPT_GEN_ALL_CORE_FT
Mino Zaldivar  [facility]  IN[address]ISE[address]  Scheduled Appointment: 07/20/2022

## 2022-06-25 NOTE — CONSULT NOTE ADULT - REASON FOR ADMISSION
fever, diarrhea, J tube site concern for infection
fever, diarrhea, J tube site concern for infection

## 2022-06-25 NOTE — CONSULT NOTE ADULT - SUBJECTIVE AND OBJECTIVE BOX
Patient is a 76y old  Male who presents with a chief complaint of fever, diarrhea, J tube site concern for infection (24 Jun 2022 21:37)    HPI: Mr. Rikc Pascal is a very pleasant 76 year old gentleman with history of metastatic anal cancer s/p chemoxrt and newly diagnosed  Pancreatic adenocarcinoma c/b duodenal obstruction s/p lap assisted gastrostomy and 14Fr feeding jejunostomy tube on 05/13 . Pt presented postoperatively with dislodged J tube and had it replaced with a foster, and subsequently underwent fluoroscopy guided replacement by IR on 6/17. Pt was started on Xeloda and RT treatment on 6/22 - received only 1 dose PO.  Since then pt reports that he had a fever and was seen by outpatient PMD who did labs and gave him IV fluids. Today pt developed diarrhea and reports multiple episodes since this morning, and has not administered any J tube feeds today as he was told to come to ED for evaluation. Denies any other complaints including HA/lightheadedness, lethargy, chest pain/shortness of breath, nausea/vomiting, constipation. Here pt with no fever or leukocytosis however CT a/p with concern for proctocolitis. Pt also noted to have erythema and drainage around J tube site and cx were sent. Outpt blood cx with MSSA or strep?? as per chart review. ID now consulted for management of the same.       REVIEW OF SYSTEMS  [  ] ROS unobtainable because:    [ x ] All other systems negative except as noted below    Constitutional:  [ ] fever [ ] chills  [ ] weight loss  [ ]night sweat  [ ]poor appetite/PO intake [ ]fatigue   Skin:  [ ] rash [ ] phlebitis	  Eyes: [ ] icterus [ ] pain  [ ] discharge	  ENMT: [ ] sore throat  [ ] thrush [ ] ulcers [ ] exudates [ ]anosmia  Respiratory: [ ] dyspnea [ ] hemoptysis [ ] cough [ ] sputum	  Cardiovascular:  [ ] chest pain [ ] palpitations [ ] edema	  Gastrointestinal:  [ ] nausea [ ] vomiting [ ] diarrhea [ ] constipation [ ] pain	  Genitourinary:  [ ] dysuria [ ] frequency [ ] hematuria [ ] discharge [ ] flank pain  [ ] incontinence  Musculoskeletal:  [ ] myalgias [ ] arthralgias [ ] arthritis  [ ] back pain  Neurological:  [ ] headache [ ] weakness [ ] seizures  [ ] confusion/altered mental status    prior hospital charts reviewed [V]  primary team notes reviewed [V]  other consultant notes reviewed [V]    PAST MEDICAL & SURGICAL HISTORY:  Anal cancer Had Chemo and Radiation 4 years ago  S/P laparotomy    SOCIAL HISTORY:  - Denied smoking/vaping/alcohol/recreational drug use    FAMILY HISTORY:      Allergies  No Known Allergies        ANTIMICROBIALS:  piperacillin/tazobactam IVPB.. 3.375 every 8 hours      ANTIMICROBIALS (past 90 days):  MEDICATIONS  (STANDING):  piperacillin/tazobactam IVPB..   25 mL/Hr IV Intermittent (06-25-22 @ 06:50)   25 mL/Hr IV Intermittent (06-24-22 @ 21:08)   25 mL/Hr IV Intermittent (06-24-22 @ 13:49)   25 mL/Hr IV Intermittent (06-24-22 @ 07:18)   25 mL/Hr IV Intermittent (06-23-22 @ 23:04)   25 mL/Hr IV Intermittent (06-23-22 @ 15:04)    piperacillin/tazobactam IVPB...   200 mL/Hr IV Intermittent (06-22-22 @ 19:12)    vancomycin  IVPB.   250 mL/Hr IV Intermittent (06-22-22 @ 21:42)        OTHER MEDS:   MEDICATIONS  (STANDING):  amLODIPine   Tablet 5 daily  ATENolol  Tablet 25 daily  influenza  Vaccine (HIGH DOSE) 0.7 once  oxyCODONE    IR 5 every 6 hours PRN  pantoprazole  Injectable 40 daily      VITALS:  Vital Signs Last 24 Hrs  T(F): 98.6 (06-25-22 @ 06:50), Max: 99.2 (06-22-22 @ 13:12)    Vital Signs Last 24 Hrs  HR: 60 (06-25-22 @ 06:50) (55 - 73)  BP: 162/70 (06-25-22 @ 06:50) (156/61 - 194/71)  RR: 16 (06-25-22 @ 06:50)  SpO2: 100% (06-25-22 @ 06:50) (100% - 100%)  Wt(kg): --    EXAM:    GA: NAD, AOx3  HEENT: oral cavity no lesion  CV: nl S1/S2, no RMG  Lungs: CTAB, No distress  Abd: BS+, soft, nontender, no rebounding pain  Ext: no edema  Neuro: No focal deficits  Skin: Intact  IV: no phlebitis    Labs:                        9.2    7.69  )-----------( 287      ( 25 Jun 2022 06:34 )             29.1     06-25    136  |  103  |  20  ----------------------------<  161<H>  3.4<L>   |  23  |  1.04    Ca    8.9      25 Jun 2022 06:34  Phos  3.0     06-24  Mg     2.00     06-24    TPro  5.4<L>  /  Alb  3.0<L>  /  TBili  0.8  /  DBili  x   /  AST  20  /  ALT  28  /  AlkPhos  107  06-24      WBC Trend:  WBC Count: 7.69 (06-25-22 @ 06:34)  WBC Count: 6.37 (06-24-22 @ 06:40)  WBC Count: 8.26 (06-22-22 @ 16:47)      Auto Neutrophil #: 5.06 K/uL (06-22-22 @ 16:47)  Auto Neutrophil #: 4.85 K/uL (06-17-22 @ 13:05)  Auto Neutrophil #: 6.90 K/uL (06-01-22 @ 06:05)  Auto Neutrophil #: 6.84 K/uL (05-31-22 @ 07:27)  Auto Neutrophil #: 8.63 K/uL (05-30-22 @ 06:35)      Creatine Trend:  Creatinine, Serum: 1.04 (06-25)  Creatinine, Serum: 0.99 (06-24)  Creatinine, Serum: 1.00 (06-22)      Liver Biochemical Testing Trend:  Alanine Aminotransferase (ALT/SGPT): 28 (06-24)  Alanine Aminotransferase (ALT/SGPT): 45 *H* (06-22)  Alanine Aminotransferase (ALT/SGPT): 50 *H* (06-17)  Alanine Aminotransferase (ALT/SGPT): 54 *H* (05-28)  Alanine Aminotransferase (ALT/SGPT): 71 *H* (05-27)  Aspartate Aminotransferase (AST/SGOT): 20 (06-24-22 @ 06:40)  Aspartate Aminotransferase (AST/SGOT): 28 (06-22-22 @ 16:47)  Aspartate Aminotransferase (AST/SGOT): 32 (06-17-22 @ 13:05)  Aspartate Aminotransferase (AST/SGOT): 32 (05-28-22 @ 06:40)  Aspartate Aminotransferase (AST/SGOT): 38 (05-27-22 @ 12:30)  Bilirubin Total, Serum: 0.8 (06-24)  Bilirubin Total, Serum: 0.7 (06-22)  Bilirubin Total, Serum: 0.4 (06-17)  Bilirubin Direct, Serum: <0.2 (05-28)  Bilirubin Total, Serum: 0.4 (05-28)      Trend LDH  05-03-22 @ 07:24  152      Auto Eosinophil %: 0.5 % (06-22-22 @ 16:47)          MICROBIOLOGY:    MRSA PCR Result.: NotDetec (06-24-22 @ 02:16)  MRSA PCR Result.: NotDetec (05-29-22 @ 19:58)      Culture - Urine (collected 22 Jun 2022 19:06)  Source: Catheterized Catheterized  Preliminary Report:    10,000 - 49,000 CFU/mL Escherichia coli    Culture - Abscess with Gram Stain (collected 22 Jun 2022 18:33)  Source: .Abscess drainage from cutaneous site of lower abd drain  Preliminary Report:    Moderate Escherichia coli    Few Staphylococcus aureus    Moderate Pilar albicans "Susceptibilities not performed"  Organism: Escherichia coli  Staphylococcus aureus  Organism: Staphylococcus aureus    Sensitivities:      -  Ampicillin/Sulbactam: S <=8/4      -  Cefazolin: S <=4      -  Clindamycin: S <=0.25      -  Erythromycin: R >4      -  Gentamicin: S <=1 Should not be used as monotherapy      -  Oxacillin: S <=0.25 Oxacillin predicts susceptibility for dicloxacillin, methicillin, and nafcillin      -  Penicillin: R >8      -  Rifampin: S <=1 Should not be used as monotherapy      -  Tetra/Doxy: S <=1      -  Trimethoprim/Sulfamethoxazole: S <=0.5/9.5      -  Vancomycin: S 1      Method Type: JEANA  Organism: Escherichia coli    Sensitivities:      -  Amikacin: S <=16      -  Amoxicillin/Clavulanic Acid: S <=8/4      -  Ampicillin: R >16 These ampicillin results predict results for amoxicillin      -  Ampicillin/Sulbactam: I 16/8 Enterobacter, Klebsiella aerogenes, Citrobacter, and Serratia may develop resistance during prolonged therapy (3-4 days)      -  Aztreonam: S <=4      -  Cefazolin: I 4 Enterobacter, Klebsiella aerogenes, Citrobacter, and Serratia may develop resistance during prolonged therapy (3-4 days)      -  Cefepime: S <=2      -  Cefoxitin: S <=8      -  Ceftriaxone: S <=1 Enterobacter, Klebsiella aerogenes, Citrobacter, and Serratia may develop resistance during prolonged therapy      -  Ciprofloxacin: S <=0.25      -  Ertapenem: S <=0.5      -  Gentamicin: S <=2      -  Imipenem: S <=1      -  Levofloxacin: S <=0.5      -  Meropenem: S <=1      -  Piperacillin/Tazobactam: S <=8      -  Tobramycin: S <=2      -  Trimethoprim/Sulfamethoxazole: R >2/38      Method Type: JEANA    Culture - Blood (collected 22 Jun 2022 16:45)  Source: .Blood Blood-Peripheral  Preliminary Report:    No growth to date.    Culture - Blood (collected 22 Jun 2022 16:30)  Source: .Blood Blood-Venous  Preliminary Report:    No growth to date.      COVID-19 PCR: NotDetec (05-27-22 @ 12:58)  COVID-19 PCR: NotDetec (05-17-22 @ 06:00)  COVID-19 PCR: NotDetec (05-01-22 @ 17:49)  COVID-19 PCR: NotDetec (04-29-22 @ 12:50)    Blood Gas Venous - Lactate: 1.0 (06-22 @ 16:47)      RADIOLOGY:  imaging below personally reviewed    < from: Xray Chest 1 View AP/PA (06.22.22 @ 17:17) >  Clear lungs.  < end of copied text >    < from: CT Abdomen and Pelvis w/ IV Cont (06.22.22 @ 23:51) >  Redemonstration of a mass arising from the third portion the duodenum extending into the uncinate process of the pancreas measuring up to 3.9 x  2.4 cm, unchanged compared with prior exam.  Circumferential thickening of the distal sigmoid and rectal walls with mucosal hyperenhancement concerning for proctocolitis. Nonobstructing bilateral intrarenal calculi.   < end of copied text >   Patient is a 76y old  Male who presents with a chief complaint of fever, diarrhea, J tube site concern for infection (24 Jun 2022 21:37)    HPI: Mr. Rick Pascal is a very pleasant 76 year old gentleman with history of metastatic anal cancer s/p chemoxrt and newly diagnosed  Pancreatic adenocarcinoma c/b duodenal obstruction s/p lap assisted gastrostomy and 14Fr feeding jejunostomy tube on 05/13 . Pt presented postoperatively with dislodged J tube and had it replaced with a foster, and subsequently underwent fluoroscopy guided replacement by IR on 6/17. Pt was started on Xeloda and RT treatment on 6/22 - received only 1 dose PO.  Since then pt reports that he had a fever and was seen by outpatient PMD who did labs and gave him IV fluids. Today pt developed diarrhea and reports multiple episodes since this morning, and has not administered any J tube feeds today as he was told to come to ED for evaluation. Denies any other complaints including HA/lightheadedness, lethargy, chest pain/shortness of breath, nausea/vomiting, constipation. Here pt with no fever or leukocytosis however CT a/p with concern for proctocolitis. Pt also noted to have erythema and drainage around J tube site and cx were sent. Outpt blood cx with MSSA. ID now consulted for management of the same.    REVIEW OF SYSTEMS  [  ] ROS unobtainable because:    [ x ] All other systems negative except as noted below    Constitutional:  [ ] fever [ ] chills  [ ] weight loss  [ ]night sweat  [ ]poor appetite/PO intake [ ]fatigue   Skin:  [ ] rash [ ] phlebitis	  Eyes: [ ] icterus [ ] pain  [ ] discharge	  ENMT: [ ] sore throat  [ ] thrush [ ] ulcers [ ] exudates [ ]anosmia  Respiratory: [ ] dyspnea [ ] hemoptysis [ ] cough [ ] sputum	  Cardiovascular:  [ ] chest pain [ ] palpitations [ ] edema	  Gastrointestinal:  [ ] nausea [ ] vomiting [ ] diarrhea [ ] constipation [ ] pain	  Genitourinary:  [ ] dysuria [ ] frequency [ ] hematuria [ ] discharge [ ] flank pain  [ ] incontinence  Musculoskeletal:  [ ] myalgias [ ] arthralgias [ ] arthritis  [ ] back pain  Neurological:  [ ] headache [ ] weakness [ ] seizures  [ ] confusion/altered mental status    prior hospital charts reviewed [V]  primary team notes reviewed [V]  other consultant notes reviewed [V]    PAST MEDICAL & SURGICAL HISTORY:  Anal cancer Had Chemo and Radiation 4 years ago  S/P laparotomy    SOCIAL HISTORY:  - Denied smoking/vaping/alcohol/recreational drug use    FAMILY HISTORY:      Allergies  No Known Allergies        ANTIMICROBIALS:  piperacillin/tazobactam IVPB.. 3.375 every 8 hours      ANTIMICROBIALS (past 90 days):  MEDICATIONS  (STANDING):  piperacillin/tazobactam IVPB..   25 mL/Hr IV Intermittent (06-25-22 @ 06:50)   25 mL/Hr IV Intermittent (06-24-22 @ 21:08)   25 mL/Hr IV Intermittent (06-24-22 @ 13:49)   25 mL/Hr IV Intermittent (06-24-22 @ 07:18)   25 mL/Hr IV Intermittent (06-23-22 @ 23:04)   25 mL/Hr IV Intermittent (06-23-22 @ 15:04)    piperacillin/tazobactam IVPB...   200 mL/Hr IV Intermittent (06-22-22 @ 19:12)    vancomycin  IVPB.   250 mL/Hr IV Intermittent (06-22-22 @ 21:42)        OTHER MEDS:   MEDICATIONS  (STANDING):  amLODIPine   Tablet 5 daily  ATENolol  Tablet 25 daily  influenza  Vaccine (HIGH DOSE) 0.7 once  oxyCODONE    IR 5 every 6 hours PRN  pantoprazole  Injectable 40 daily      VITALS:  Vital Signs Last 24 Hrs  T(F): 98.6 (06-25-22 @ 06:50), Max: 99.2 (06-22-22 @ 13:12)    Vital Signs Last 24 Hrs  HR: 60 (06-25-22 @ 06:50) (55 - 73)  BP: 162/70 (06-25-22 @ 06:50) (156/61 - 194/71)  RR: 16 (06-25-22 @ 06:50)  SpO2: 100% (06-25-22 @ 06:50) (100% - 100%)  Wt(kg): --    EXAM:    Constitutional: Not in acute distress  Eyes: pupils bilaterally reactive to light. No icterus.  Oral cavity: thrush ++  Neck: No neck vein distension noted  RS: Chest clear to auscultation bilaterally. No wheeze/rhonchi/crepitations.  CVS: S1, S2 heard. Regular rate and rhythm. No murmurs/rubs/gallops.  Abdomen: Soft. No guarding/rigidity/tenderness. Gastrostomy tube with bilious fluid and J tube below it with some leakage around it with associated dermatitis  : No acute abnormalities  Extremities: Warm. No pedal edema  Skin: No lesions noted  Vascular: RUE PICC and Rt chest wall port. No evidence of phlebitis  Neuro: Alert, oriented to time/place/person  Cranial nerves 2-12 grossly normal. No focal abnormalities      Labs:                        9.2    7.69  )-----------( 287      ( 25 Jun 2022 06:34 )             29.1     06-25    136  |  103  |  20  ----------------------------<  161<H>  3.4<L>   |  23  |  1.04    Ca    8.9      25 Jun 2022 06:34  Phos  3.0     06-24  Mg     2.00     06-24    TPro  5.4<L>  /  Alb  3.0<L>  /  TBili  0.8  /  DBili  x   /  AST  20  /  ALT  28  /  AlkPhos  107  06-24      WBC Trend:  WBC Count: 7.69 (06-25-22 @ 06:34)  WBC Count: 6.37 (06-24-22 @ 06:40)  WBC Count: 8.26 (06-22-22 @ 16:47)      Auto Neutrophil #: 5.06 K/uL (06-22-22 @ 16:47)  Auto Neutrophil #: 4.85 K/uL (06-17-22 @ 13:05)  Auto Neutrophil #: 6.90 K/uL (06-01-22 @ 06:05)  Auto Neutrophil #: 6.84 K/uL (05-31-22 @ 07:27)  Auto Neutrophil #: 8.63 K/uL (05-30-22 @ 06:35)      Creatine Trend:  Creatinine, Serum: 1.04 (06-25)  Creatinine, Serum: 0.99 (06-24)  Creatinine, Serum: 1.00 (06-22)      Liver Biochemical Testing Trend:  Alanine Aminotransferase (ALT/SGPT): 28 (06-24)  Alanine Aminotransferase (ALT/SGPT): 45 *H* (06-22)  Alanine Aminotransferase (ALT/SGPT): 50 *H* (06-17)  Alanine Aminotransferase (ALT/SGPT): 54 *H* (05-28)  Alanine Aminotransferase (ALT/SGPT): 71 *H* (05-27)  Aspartate Aminotransferase (AST/SGOT): 20 (06-24-22 @ 06:40)  Aspartate Aminotransferase (AST/SGOT): 28 (06-22-22 @ 16:47)  Aspartate Aminotransferase (AST/SGOT): 32 (06-17-22 @ 13:05)  Aspartate Aminotransferase (AST/SGOT): 32 (05-28-22 @ 06:40)  Aspartate Aminotransferase (AST/SGOT): 38 (05-27-22 @ 12:30)  Bilirubin Total, Serum: 0.8 (06-24)  Bilirubin Total, Serum: 0.7 (06-22)  Bilirubin Total, Serum: 0.4 (06-17)  Bilirubin Direct, Serum: <0.2 (05-28)  Bilirubin Total, Serum: 0.4 (05-28)      Trend LDH  05-03-22 @ 07:24  152      Auto Eosinophil %: 0.5 % (06-22-22 @ 16:47)          MICROBIOLOGY:    MRSA PCR Result.: NotDetec (06-24-22 @ 02:16)  MRSA PCR Result.: NotDetec (05-29-22 @ 19:58)      Culture - Urine (collected 22 Jun 2022 19:06)  Source: Catheterized Catheterized  Preliminary Report:    10,000 - 49,000 CFU/mL Escherichia coli    Culture - Abscess with Gram Stain (collected 22 Jun 2022 18:33)  Source: .Abscess drainage from cutaneous site of lower abd drain  Preliminary Report:    Moderate Escherichia coli    Few Staphylococcus aureus    Moderate Pilar albicans "Susceptibilities not performed"  Organism: Escherichia coli  Staphylococcus aureus  Organism: Staphylococcus aureus    Sensitivities:      -  Ampicillin/Sulbactam: S <=8/4      -  Cefazolin: S <=4      -  Clindamycin: S <=0.25      -  Erythromycin: R >4      -  Gentamicin: S <=1 Should not be used as monotherapy      -  Oxacillin: S <=0.25 Oxacillin predicts susceptibility for dicloxacillin, methicillin, and nafcillin      -  Penicillin: R >8      -  Rifampin: S <=1 Should not be used as monotherapy      -  Tetra/Doxy: S <=1      -  Trimethoprim/Sulfamethoxazole: S <=0.5/9.5      -  Vancomycin: S 1      Method Type: JEANA  Organism: Escherichia coli    Sensitivities:      -  Amikacin: S <=16      -  Amoxicillin/Clavulanic Acid: S <=8/4      -  Ampicillin: R >16 These ampicillin results predict results for amoxicillin      -  Ampicillin/Sulbactam: I 16/8 Enterobacter, Klebsiella aerogenes, Citrobacter, and Serratia may develop resistance during prolonged therapy (3-4 days)      -  Aztreonam: S <=4      -  Cefazolin: I 4 Enterobacter, Klebsiella aerogenes, Citrobacter, and Serratia may develop resistance during prolonged therapy (3-4 days)      -  Cefepime: S <=2      -  Cefoxitin: S <=8      -  Ceftriaxone: S <=1 Enterobacter, Klebsiella aerogenes, Citrobacter, and Serratia may develop resistance during prolonged therapy      -  Ciprofloxacin: S <=0.25      -  Ertapenem: S <=0.5      -  Gentamicin: S <=2      -  Imipenem: S <=1      -  Levofloxacin: S <=0.5      -  Meropenem: S <=1      -  Piperacillin/Tazobactam: S <=8      -  Tobramycin: S <=2      -  Trimethoprim/Sulfamethoxazole: R >2/38      Method Type: JEANA    Culture - Blood (collected 22 Jun 2022 16:45)  Source: .Blood Blood-Peripheral  Preliminary Report:    No growth to date.    Culture - Blood (collected 22 Jun 2022 16:30)  Source: .Blood Blood-Venous  Preliminary Report:    No growth to date.      COVID-19 PCR: NotDetec (05-27-22 @ 12:58)  COVID-19 PCR: NotDetec (05-17-22 @ 06:00)  COVID-19 PCR: NotDetec (05-01-22 @ 17:49)  COVID-19 PCR: NotDetec (04-29-22 @ 12:50)    Blood Gas Venous - Lactate: 1.0 (06-22 @ 16:47)      RADIOLOGY:  imaging below personally reviewed    < from: Xray Chest 1 View AP/PA (06.22.22 @ 17:17) >  Clear lungs.  < end of copied text >    < from: CT Abdomen and Pelvis w/ IV Cont (06.22.22 @ 23:51) >  Redemonstration of a mass arising from the third portion the duodenum extending into the uncinate process of the pancreas measuring up to 3.9 x  2.4 cm, unchanged compared with prior exam.  Circumferential thickening of the distal sigmoid and rectal walls with mucosal hyperenhancement concerning for proctocolitis. Nonobstructing bilateral intrarenal calculi.   < end of copied text >

## 2022-06-25 NOTE — PHYSICAL THERAPY INITIAL EVALUATION ADULT - PATIENT PROFILE REVIEW, REHAB EVAL
No specific activity orders. Spoke with RADHA Amaya prior to session, pt cleared for participation in PT evaluation./yes

## 2022-06-25 NOTE — CHART NOTE - NSCHARTNOTEFT_GEN_A_CORE
76M with metastatic anal cancer s/p chemo (completed), newly diagnosed pancreatic adenocarcinoma on Xeloda PO, now found to have bactermia MSSA as outpatient, started on antbiotics. Inpatient blood cultures negative to date, afebrile without evidence of sepsis.     IR consulted for port removal.     plan for  above procedure Monday 6/27. can put order for IR procedure under Dr Quintanilla  NPO AMN for sedation  please recheck CBC BMP Coags 4 AM  ensure covid swab within 72 hours of procedure           Micky David MD  Interventional Radiology PGY5    -EMERGENT: [facility] IR Pager: [phone].  [facility] IR Pager: [phone] [email or web address]  -Nonemergent consults:  [facility] order "Consult- Interventional Radiology"  -Available on [employer] TEAMS for questions 76M with metastatic anal cancer s/p chemo (completed), newly diagnosed pancreatic adenocarcinoma on Xeloda PO, now found to have bacteremia MSSA as outpatient, started on antibiotics. Inpatient blood cultures negative to date, afebrile without evidence of sepsis.     IR consulted for port removal.     plan for  above procedure Monday 6/27. can put order for IR procedure under Dr Quintanilla  NPO AMN for sedation  please recheck CBC BMP Coags 4 AM  ensure covid swab within 72 hours of procedure         Micky David MD  Interventional Radiology PGY5    -EMERGENT: [facility] IR Pager: [phone].  [facility] IR Pager: [phone] [id number]  -Nonemergent consults:  [facility] order "Consult- Interventional Radiology"  -Available on [employer] TEAMS for questions

## 2022-06-25 NOTE — DISCHARGE NOTE PROVIDER - NSDCFUADDAPPT_GEN_ALL_CORE_FT
Can follow up as an outpt     7/20 at 2:30 PM  [address] [facility]  [place] [address]  [phone]    Weekly CBC/ BMP fax to [phone]. D/c planning ro rehab. Weekly CBC/ BMP fax to [phone]    7/20 at 2:30 PM  [address] [facility] [address]  [place] [address]  [phone]    Weekly CBC/ BMP fax to [phone]. D/c planning  rehab.

## 2022-06-25 NOTE — DISCHARGE NOTE PROVIDER - NSDCCPCAREPLAN_GEN_ALL_CORE_FT
PRINCIPAL DISCHARGE DIAGNOSIS  Diagnosis: MSSA bacteremia  Assessment and Plan of Treatment: You were admitted with an infection in your blood. You had a CT scan of your abdomen that did not show a leak in your j-tube and it is still safe to use. You have been started on IV antibiotics for which you will continue to take until 7/20. You have had an IV access placed for this reason. Please follow up with infectious disease and your primary care provider for further medical management.      SECONDARY DISCHARGE DIAGNOSES  Diagnosis: E-coli UTI  Assessment and Plan of Treatment: Please continue to take your antibiotics as prescrobed and follow up with infectious disease as an outpatient 7/20 at 2:30 PM.    Diagnosis: Proctocolitis  Assessment and Plan of Treatment: Proctocolitis.   Ct abd - showing increase in size of mass at the level of the pancreatic uncinate process.  Small amount of air within the mass, suggesting fistulization with bowel. Stable 1.5 x 1.0 cm partially partially calcified mass along the gastric fundus poss calcified lymph node or small GIST. C/w abx    Diagnosis: HTN (hypertension)  Assessment and Plan of Treatment: HTN (hypertension).   C/w amlodipine and Altenol.    Diagnosis: Anemia  Assessment and Plan of Treatment: Anemia.   Likely anemia of Chronic disease. No overt bleeding    Diagnosis: History of duodenal cancer  Assessment and Plan of Treatment: History of duodenal cancer.   P/p laparotomy. CT abdomen as above. Heme/onc following. Upon discharge, patient may resume care with Dr. Hahn.    Diagnosis: Bacteremia  Assessment and Plan of Treatment:      PRINCIPAL DISCHARGE DIAGNOSIS  Diagnosis: MSSA bacteremia  Assessment and Plan of Treatment: You were admitted with an infection in your blood. You had a CT scan of your abdomen that did not show a leak in your j-tube and it is still safe to use. You have been started on IV antibiotics for which you will continue to take until 7/20. You have had an IV access placed for this reason. Please follow up with infectious disease and your primary care provider for further medical management.      SECONDARY DISCHARGE DIAGNOSES  Diagnosis: E-coli UTI  Assessment and Plan of Treatment: Please continue to take your antibiotics as prescrobed and follow up with infectious disease as an outpatient 7/20 at 2:30 PM   [address]  [place] [address] [phone]    Diagnosis: Proctocolitis  Assessment and Plan of Treatment: Please continue to take your antibiotics as prescribed and follow up with your primary care provider and infectious disease for further medical management.    Diagnosis: HTN (hypertension)  Assessment and Plan of Treatment: Follow a low sodium and fat diet, continue amlodipine and altenol medications, and follow up with primary care physician.    Diagnosis: Anemia  Assessment and Plan of Treatment: Follow-up with your outpatient provider for further monitoring of your blood counts. Monitor for signs/symptoms indicating worsening of disease, such as, easy bleeding/bruising, pale skin, fatigue, dizziness, increased heart rate, or chest pain.      Diagnosis: History of duodenal cancer  Assessment and Plan of Treatment: Upon discharge, please resume care with your out patient oncologist Dr. Hahn for further medical management .    Diagnosis: Bacteremia  Assessment and Plan of Treatment:

## 2022-06-25 NOTE — DISCHARGE NOTE PROVIDER - CARE PROVIDER_API CALL
Shantanu Hahn)  Hematology; Medical Oncology  [address] [place] [id number]  Phone: [phone]  Fax: [phone]  Follow Up Time:     Mino Zaldivar)  Infectious Disease  [address] [place] [address]  Phone: [phone]  Fax: [phone]  Follow Up Time:     Isaias Mccauley)  Internal Medicine; Pulmonary Disease  [address] [place] [address]  Phone: [phone]  Fax: [phone]  Follow Up Time:

## 2022-06-25 NOTE — CONSULT NOTE ADULT - ASSESSMENT
Mr. Rick Pascal is a very pleasant 76 year old gentleman with history of metastatic anal cancer s/p chemoxrt and newly diagnosed  Pancreatic adenocarcinoma c/b duodenal obstruction s/p lap assisted gastrostomy and 14Fr feeding jejunostomy tube on 05/13 . Pt presented postoperatively with dislodged J tube and had it replaced with a foster, and subsequently underwent fluoroscopy guided replacement by IR on 6/17. Pt was started on Xeloda and RT treatment on 6/22 - received only 1 dose PO.  Since then pt reports that he had a fever and was seen by outpatient PMD who did labs and gave him IV fluids. Today pt developed diarrhea and reports multiple episodes since this morning, and has not administered any J tube feeds today as he was told to come to ED for evaluation. Denies any other complaints including HA/lightheadedness, lethargy, chest pain/shortness of breath, nausea/vomiting, constipation. Here pt with no fever or leukocytosis however CT a/p with concern for proctocolitis. Pt also noted to have erythema and drainage around J tube site and cx were sent. Outpt blood cx with MSSA or strep?? as per chart review. ID now consulted for management of the same.    WORKUP  Abscess drainage from cutaneous site of lower abd drain (6/22):  Escherichia coli, MSSA and Candida albicans  Urine cx (6/22): 10,000 - 49,000 CFU/mL Escherichia coli  UA: Bacteria: Few, Nitrite: Negative, Leukocyte Esterase Concentration: Negative  WBC 1 /HPF (06-22-22 @ 19:06)  Blood cx (6/22): Negative  s/p Recent J tube exchange on 6/17/22  Lines: Rt Chest Mediport. Right arm PICC  CT Abdomen and Pelvis w/ IV Cont (06.22): Redemonstration of a mass arising from the third portion the duodenum extending into the uncinate process of the pancreas measuring up to 3.9 x  2.4 cm, unchanged compared with prior exam.  Circumferential thickening of the distal sigmoid and rectal walls with mucosal hyperenhancement concerning for proctocolitis. Nonobstructing bilateral intrarenal calculi.     DIAGNOSIS and IMPRESSION  # MSSA bacteremia  # Proctocolitis  # Newly diagnosed duodenal cancer c/b duodenal obstruction s/p lap assisted gastrostomy and 14Fr feeding jejunostomy tube on 05/13  # Asymptomatic Bacteruria    Afebrile with no leukocytosis  s/p Vanc (6/22)  Currently on Zosyn (6/22 - )    RECOMMENDATIONS  D/w PA -> Will need to obtain outpt records regarding staph vs strep and sensitivties  ECHO ordered -> WIll f/u  C.diff ordered  Pt has Rt Chest Mediport and Right arm PICC which is a significant concern with staph bacteremia    PT TO BE SEEN. PRELIM NOTE  PENDING RECS. PLEASE WAIT FOR FINAL RECS AFTER DISCUSSION WITH ATTENDING#    Brennon Soria MD, PGY4   ID fellow  [employer] Teams Preferred  After 5pm/weekends call [phone]   Mr. Rick Pascal is a very pleasant 76 year old gentleman with history of metastatic anal cancer s/p chemoxrt and newly diagnosed  Pancreatic adenocarcinoma c/b duodenal obstruction s/p lap assisted gastrostomy and 14Fr feeding jejunostomy tube on 05/13 . Pt presented postoperatively with dislodged J tube and had it replaced with a foster, and subsequently underwent fluoroscopy guided replacement by IR on 6/17. Pt was started on Xeloda and RT treatment on 6/22 - received only 1 dose PO.  Since then pt reports that he had a fever and was seen by outpatient PMD who did labs and gave him IV fluids. Today pt developed diarrhea and reports multiple episodes since this morning, and has not administered any J tube feeds today as he was told to come to ED for evaluation. Denies any other complaints including HA/lightheadedness, lethargy, chest pain/shortness of breath, nausea/vomiting, constipation. Here pt with no fever or leukocytosis however CT a/p with concern for proctocolitis. Pt also noted to have erythema and drainage around J tube site and cx were sent. Outpt blood cx with MSSA or strep?? as per chart review. ID now consulted for management of the same.    WORKUP  Abscess drainage from cutaneous site of lower abd drain (6/22):  Escherichia coli, MSSA and Candida albicans  Urine cx (6/22): 10,000 - 49,000 CFU/mL Escherichia coli  UA: Bacteria: Few, Nitrite: Negative, Leukocyte Esterase Concentration: Negative  WBC 1 /HPF (06-22-22 @ 19:06)  Blood cx (6/22): 2/2 bottles grew MSSA  Blood cx (6/22): Negative  s/p Recent J tube exchange on 6/17/22  Lines: Rt Chest Mediport. Right arm PICC  CT Abdomen and Pelvis w/ IV Cont (06.22): Redemonstration of a mass arising from the third portion the duodenum extending into the uncinate process of the pancreas measuring up to 3.9 x  2.4 cm, unchanged compared with prior exam.  Circumferential thickening of the distal sigmoid and rectal walls with mucosal hyperenhancement concerning for proctocolitis. Nonobstructing bilateral intrarenal calculi.     DIAGNOSIS and IMPRESSION  # MSSA bacteremia  # Proctocolitis  # Newly diagnosed duodenal cancer c/b duodenal obstruction s/p lap assisted gastrostomy and 14Fr feeding jejunostomy tube on 05/13  # Asymptomatic Bacteruria  Afebrile with no leukocytosis  s/p Vanc (6/22)  Currently on Zosyn (6/22 - )  J tube with leakage cultures with polymicrobial growth- this likely represents GI leonie/ colonization  CT with new proctocolitis-> infection vs related to xeloda     RECOMMENDATIONS  - ECHO ordered -> WIll f/u  - C.diff ordered -> Will f/u  - Recommend GI PCR  - Pt has Rt Chest Mediport and Right arm PICC which is a significant concern with staph bacteremia: Recommend IR and Vasc consult for removal  - Recommend switch Zosyn to Ancef 2gm Q8  - Recommend Nystatin swish and spit for thrush  - Skin changes-around J tube likely dermatitis -> Monitor for infection    Pt seen and examined. Case d/w attending and primary team    Brennon Soria MD, PGY4   ID fellow  [employer] Teams Preferred  After 5pm/weekends call [phone]

## 2022-06-25 NOTE — DISCHARGE NOTE PROVIDER - HOSPITAL COURSE
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 Surgery consult for concern for infections on J- tube site.    Hospital Course:    MSSA bacteremia.   P/w fever, likley 2/2 J tube site. OP blood cx + MSSA. J-tube site Ecoli and Staph - ct demonstrating no intra-abdominal leak, no acute surgical intervention at this time. Picc line placed for extended IV abx - cefazolin 2 grma iv q 8 through 7/20/2022. Weekly CBC/ BMP fax to [phone]. D/c planning ro rehab.    E-coli UTI.   Cont IV abx. Pt. to follow up as an outpt 7/20 at 2:30 PM      Proctocolitis.   Ct abd - showing increase in size of mass at the level of the pancreatic uncinate process.  Small amount of air within the mass, suggesting fistulization with bowel. Stable 1.5 x 1.0 cm partially partially calcified mass along the gastric fundus poss calcified lymph node or small GIST. C/w abx.    HTN (hypertension).   C/w amlodipine and Altenol.    Anemia.   Likely anemia of Chronic disease. No overt bleeding    History of duodenal cancer.   P/p laparotomy. CT abdomen as above. Heme/onc following. Upon discharge, patient may resume care with Dr. Hahn.      On 7/6/2022 this case was reviewed with Dr. De La Garza, the patient is medically stable and optimized for discharge. All medications were reviewed and prescriptions were sent to mutually agreed upon pharmacy.    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 Surgery consult for concern for infections on J- tube site.    Hospital Course:    MSSA bacteremia.   P/w fever, likley 2/2 J tube site. OP blood cx + MSSA. J-tube site Ecoli and Staph - ct demonstrating no intra-abdominal leak, no acute surgical intervention at this time. Picc line placed for extended IV abx - cefazolin 2 grma iv q 8 through 7/20/2022. Weekly CBC/ BMP fax to [phone]. D/c planning ro rehab.    E-coli UTI.   Cont IV abx. Pt. to follow up as an outpt 7/20 at 2:30 PM    Proctocolitis.   Ct abd - showing increase in size of mass at the level of the pancreatic uncinate process.  Small amount of air within the mass, suggesting fistulization with bowel. Stable 1.5 x 1.0 cm partially partially calcified mass along the gastric fundus poss calcified lymph node or small GIST. C/w abx.    HTN (hypertension).   C/w amlodipine and Altenol.    Anemia.   Likely anemia of Chronic disease. No overt bleeding    History of duodenal cancer.   P/p laparotomy. CT abdomen as above. Heme/onc following. Upon discharge, patient may resume care with Dr. Hahn.      On 7/6/2022 this case was reviewed with Dr. De La Garza, the patient is medically stable and optimized for discharge. All medications were reviewed and prescriptions were sent to mutually agreed upon pharmacy.

## 2022-06-25 NOTE — DISCHARGE NOTE PROVIDER - NSDCMRMEDTOKEN_GEN_ALL_CORE_FT
3-n-1 Commode:   acetaminophen 325 mg oral tablet: 2 tab(s) orally every 6 hours, As needed, Mild Pain (1 - 3), Moderate Pain (4 - 6)  amLODIPine 10 mg oral tablet: 1 tab(s) orally once a day MDD:one tab  aspirin 81 mg oral delayed release tablet: 2 tab(s) orally every 12 hours  atenolol 25 mg oral tablet: 1 tab(s) orally once a day MDD:one  docusate sodium 100 mg oral capsule: 1 cap(s) orally 3 times a day as needed  Feeding Tube: Equipment, Dispense one tube feeding pole  Equipment, Dispense one tube feeding pump  Tube Feeding, Type: Two Nabil HN, Rate 58cc/hr, Duration 14 hours on, 10 hours off, 17:00-07:00 Nocturnal.  Jejunostomy Tube Care, Flush Jejunostomy tube with 10cc sterile water PRE and POST tube feed administration  Multiple Vitamins oral tablet, chewable: 1 tab(s) orally once a day  oxyCODONE 5 mg oral tablet: 1 tab(s) orally every 4 hours, As Needed    Reference #: [id number] MDD:6  pantoprazole 40 mg oral delayed release tablet: 1 tab(s) orally once a day  polyethylene glycol 3350 oral powder for reconstitution: 17 gram(s) orally once a day, As needed, Constipation  Rolling Walker with 5 inch Wheels:   senna oral tablet: 2 tab(s) orally once a day (at bedtime) as needed  Zofran 4 mg oral tablet: 1 tab(s) by gastrostomy tube every 8 hours, As Needed - for nausea    acetaminophen 325 mg oral tablet: 2 tab(s) by jejunostomy tube every 6 hours, As Needed - 3), Moderate Pain (4 - 6)  amLODIPine 10 mg oral tablet: 1 tab(s) by jejunostomy tube once a day MDD:one tab   aspirin 81 mg oral delayed release tablet: 2 tab(s) orally every 12 hours  atenolol 25 mg oral tablet: 1 tab(s) by jejunostomy tube once a day MDD:one   ceFAZolin: 2000 milligram(s) intravenous every 8 hours through 7/20  Multiple Vitamins oral tablet, chewable: 1 tab(s) by jejunostomy tube once a day  nystatin 100,000 units/mL oral suspension: 5 milliliter(s) orally 4 times a day  oxyCODONE 5 mg oral tablet: 1 tab(s) orally every 4 hours, As Needed    Reference #: [id number] MDD:6  pantoprazole 40 mg oral granule, delayed release: 40 milligram(s) by jejunostomy tube once a day   polyethylene glycol 3350 oral powder for reconstitution: 17 gram(s) by jejunostomy tube once a day, As Needed  senna oral tablet: 2 tab(s) by jejunostomy tube once a day (at bedtime)  Zofran 4 mg oral tablet: 1 tab(s) by gastrostomy tube every 8 hours, As Needed - for nausea

## 2022-06-25 NOTE — DISCHARGE NOTE PROVIDER - PROVIDER TOKENS
PROVIDER:[TOKEN:[[id number]]],PROVIDER:[TOKEN:[[id number]]],PROVIDER:[TOKEN:[[id number]]]

## 2022-06-25 NOTE — CHART NOTE - NSCHARTNOTEFT_GEN_A_CORE
Pt's outpt blood culture result was obtained: confirmed MSSA bacteremia.   Case discussed with Dr. Soria (ID) with following recommendation/plan:   - Zosyn was changed to Ancef  - Add Nystatin swish and spit for thrush  - PICC line to be removed   - Surgery was consulted to take a look at mediport for possible removal     Case discussed with Dr. De La Garza,   Plan:   - If pt tolerates PO and passes dysphagia screening: to advance diet to PO  - Continue to monitor for signs of infection

## 2022-06-25 NOTE — DISCHARGE NOTE PROVIDER - DETAILS OF MALNUTRITION DIAGNOSIS/DIAGNOSES
This patient has been assessed with a concern for Malnutrition and was treated during this hospitalization for the following Nutrition diagnosis/diagnoses:     -  06/24/2022: Severe protein-calorie malnutrition

## 2022-06-25 NOTE — DISCHARGE NOTE PROVIDER - CARE PROVIDERS DIRECT ADDRESSES
,DirectAddress_Unknown,[email or web address],DirectAddress_Unknown

## 2022-06-25 NOTE — PROGRESS NOTE ADULT - SUBJECTIVE AND OBJECTIVE BOX
PATIENT SEEN AND EXAMINED ON :- 6/25/22  DATE OF SERVICE:   6/25/22          Interim events noted,Labs ,Radiological studies and Cardiology tests reviewed .     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 Pt presented postoperatively with dislodged J tube and had it replaced with a foster, and subsequently underwent fluoroscopy guided replacement by IR on 6/17. Since then pt reports that around 6/20 he had a fever and was seen by outpatient PMD who did labs and gave him IV fluids. Today pt developed diarrhea and reports multiple episodes since this morning, and has not administered any J tube feeds today as he was told to come to ED for evaluation. Denies any other complaints including HA/lightheadedness, lethargy, chest pain/shortness of breath, nausea/vomiting, constipation.    (23 Jun 2022 10:01)      INTERIM EVENTS:Patient seen at bedside ,interim events noted.      PMH -reviewed admission note, no change since admission  HEART FAILURE: Acute[ ]Chronic[ ] Systolic[ ] Diastolic[ ] Combined Systolic and Diastolic[ ]  CAD[ ] CABG[ ] PCI[ ]  DEVICES[ ] PPM[ ] ICD[ ] ILR[ ]  ATRIAL FIBRILLATION[ ] Paroxysmal[ ] Permanent[ ]  MOSES[ ] CKD1[ ] CKD2[ ] CKD3[ ] CKD4[ ] ESRD[ ]  COPD[ ] HTN[ ]   DM[ ] Type1[ ] Type 2[ ]   CVA[ ] Paresis[ ]    AMBULATION: Assisted[ ] Cane/walker[ ] Independent[ ]    MEDICATIONS  (STANDING):  amLODIPine   Tablet 5 milliGRAM(s) Oral daily  ATENolol  Tablet 25 milliGRAM(s) Oral daily  ceFAZolin   IVPB 2000 milliGRAM(s) IV Intermittent every 8 hours  ceFAZolin   IVPB      chlorhexidine 2% Cloths 1 Application(s) Topical daily  influenza  Vaccine (HIGH DOSE) 0.7 milliLiter(s) IntraMuscular once  multivitamin 1 Tablet(s) Oral daily  mupirocin 2% Ointment 1 Application(s) Topical two times a day  pantoprazole  Injectable 40 milliGRAM(s) IV Push daily  potassium chloride   Powder 40 milliEquivalent(s) Oral once  sodium chloride 0.9%. 1000 milliLiter(s) (50 mL/Hr) IV Continuous <Continuous>    MEDICATIONS  (PRN):  oxyCODONE    IR 5 milliGRAM(s) Oral every 6 hours PRN Moderate Pain (4 - 6)            REVIEW OF SYSTEMS:  Constitutional: [ ] fever, [ ]weight loss,  [ ]fatigue  Eyes: [ ] visual changes  Respiratory: [ ]shortness of breath;  [ ] cough, [ ]wheezing, [ ]chills, [ ]hemoptysis  Cardiovascular: [ ] chest pain, [ ]palpitations, [ ]dizziness,  [ ]leg swelling[ ]orthopnea[ ]PND  Gastrointestinal: [ ] abdominal pain, [ ]nausea, [ ]vomiting,  [ ]diarrhea [ ]Constipation [ ]Melena  Genitourinary: [ ] dysuria, [ ] hematuria [ ]Foster  Neurologic: [ ] headaches [ ] tremors[ ]weakness [ ]Paralysis Right[ ] Left[ ]  Skin: [ ] itching, [ ]burning, [ ] rashes  Endocrine: [ ] heat or cold intolerance  Musculoskeletal: [ ] joint pain or swelling; [ ] muscle, back, or extremity pain  Psychiatric: [ ] depression, [ ]anxiety, [ ]mood swings, or [ ]difficulty sleeping  Hematologic: [ ] easy bruising, [ ] bleeding gums    [ ] All remaining systems negative except as per above.   [ ]Unable to obtain.  [x] No change in ROS since admission      Vital Signs Last 24 Hrs  T(C): 37 (25 Jun 2022 06:50), Max: 37 (25 Jun 2022 06:50)  T(F): 98.6 (25 Jun 2022 06:50), Max: 98.6 (25 Jun 2022 06:50)  HR: 60 (25 Jun 2022 06:50) (55 - 63)  BP: 162/70 (25 Jun 2022 06:50) (156/61 - 189/61)  BP(mean): --  RR: 16 (25 Jun 2022 06:50) (16 - 18)  SpO2: 100% (25 Jun 2022 06:50) (100% - 100%)  I&O's Summary    24 Jun 2022 07:01  -  25 Jun 2022 07:00  --------------------------------------------------------  IN: 220 mL / OUT: 1825 mL / NET: -1605 mL        PHYSICAL EXAM:  General: No acute distress BMI-  HEENT: EOMI, PERRL  Neck: Supple, [ ] JVD  Lungs: Equal air entry bilaterally; [ ] rales [ ] wheezing [ ] rhonchi  Heart: Regular rate and rhythm; [x ] murmur   2/6 [ x] systolic [ ] diastolic [ ] radiation[ ] rubs [ ]  gallops  Abdomen: Nontender, bowel sounds present  Extremities: No clubbing, cyanosis, [ ] edema [ ]Pulses  equal and intact  Nervous system:  Alert & Oriented X3, no focal deficits  Psychiatric: Normal affect  Skin: No rashes or lesions    LABS:  06-25    136  |  103  |  20  ----------------------------<  161<H>  3.4<L>   |  23  |  1.04    Ca    8.9      25 Jun 2022 06:34  Phos  3.0     06-24  Mg     2.00     06-24    TPro  5.4<L>  /  Alb  3.0<L>  /  TBili  0.8  /  DBili  x   /  AST  20  /  ALT  28  /  AlkPhos  107  06-24    Creatinine Trend: 1.04<--, 0.99<--, 1.00<--, 0.97<--, 0.94<--, 1.07<--                        9.2    7.69  )-----------( 287      ( 25 Jun 2022 06:34 )             29.1

## 2022-06-25 NOTE — PHYSICAL THERAPY INITIAL EVALUATION ADULT - ADDITIONAL COMMENTS
Pt states he lives with his family in a house with 2 stairs to enter. Pt reports prior to admission he had been ambulating with a rolling walker independently and is able to walk community distances. For the last several days, pt was required use of a wheelchair and is unable to walk.    Following evaluation, pt was left sitting in chair in no distress, all lines in tact.

## 2022-06-25 NOTE — CONSULT NOTE ADULT - ATTENDING COMMENTS
76 year old with a history of anal cancer with recent diagnosis of metastatic cancer.     Recently started xeloda    Developed diarrhea    Report of outpt culture with positive blood culture    1) Positive blood culture    Please obtain culture results from outside lab  Conflicting chart reports of the organism    Repeat blood culture here are without growth    Unclear if this is a pathogen (MSSA/MRSA) or contaminant  ---this will have implications if picc line/port need to be removed    Start vancomycin 1 gram iv q 12    2) J tube with leakage  Cultures with polymicrobial growth- this likely represents GI leonie/ colonization  Skin changes- ? dermatitis    3) Diarrhea  CT with new proctocolitis  Check C diff  Check GI pcr  Continue zosyn 76 year old with a history of anal cancer with recent diagnosis of metastatic cancer.     Recently started xeloda    Developed diarrhea    Report of outpt culture with positive blood culture    1) Positive blood culture    Report reviewed- MSSA    Repeat blood culture here are without growth    Change zosyn  to cefazolin 2 grma iv q 8  Check echo    Remove picc/ Remove port    2) J tube with leakage  Cultures with polymicrobial growth- this likely represents GI leonie/ colonization  Skin changes- ? dermatitis    3) Diarrhea  CT with new proctocolitis- ? infection vs related to xeloda    Check C diff  Check GI pcr

## 2022-06-25 NOTE — PHYSICAL THERAPY INITIAL EVALUATION ADULT - BED MOBILITY LIMITATIONS, REHAB EVAL
decreased ability to use legs for bridging/pushing/impaired ability to control trunk for mobility Patient w/ Hx Prostate CA and Bladder stones, states indwelling pratt catheter fell off last night, unable to void on own since last night, states just some dribbling of urine .  Denies any groin/pelvic pain.  States catheter was scheduled to be removed on Sept. 7 .

## 2022-06-26 NOTE — PROGRESS NOTE ADULT - SUBJECTIVE AND OBJECTIVE BOX
Follow Up:      Inverval History/ROS:Patient is a 76y old  Male who presents with a chief complaint of fever, diarrhea, J tube site concern for infection (25 Jun 2022 12:43)    No fever  BM less frequent    Allergies    No Known Allergies    Intolerances        ANTIMICROBIALS:  ceFAZolin   IVPB 2000 every 8 hours  nystatin    Suspension 435330 four times a day      OTHER MEDS:  amLODIPine   Tablet 5 milliGRAM(s) Oral daily  ATENolol  Tablet 25 milliGRAM(s) Oral daily  chlorhexidine 2% Cloths 1 Application(s) Topical daily  influenza  Vaccine (HIGH DOSE) 0.7 milliLiter(s) IntraMuscular once  multivitamin 1 Tablet(s) Oral daily  mupirocin 2% Ointment 1 Application(s) Topical two times a day  pantoprazole  Injectable 40 milliGRAM(s) IV Push daily  sodium chloride 0.9%. 1000 milliLiter(s) IV Continuous <Continuous>      Vital Signs Last 24 Hrs  T(C): 37.3 (26 Jun 2022 06:00), Max: 37.3 (26 Jun 2022 06:00)  T(F): 99.2 (26 Jun 2022 06:00), Max: 99.2 (26 Jun 2022 06:00)  HR: 56 (26 Jun 2022 06:00) (55 - 57)  BP: 153/76 (26 Jun 2022 06:00) (120/61 - 153/76)  BP(mean): --  RR: 17 (26 Jun 2022 06:00) (16 - 17)  SpO2: 100% (26 Jun 2022 06:00) (100% - 100%)    PHYSICAL EXAM:  General: [ ] non-toxic  HEAD/EYES: [ ] PERRL [ x] white sclera [ ] icterus  ENT:  [ ] normal x[ ] supple [ ] thrush [ ] pharyngeal exudate  Cardiovascular:   [ ] murmur [x ] normal [ ] PPM/AICD  Respiratory:  [x ] clear to ausculation bilaterally  GI:  [ x] soft, non-tender, normal bowel sounds  :  [ ] foster [ ] no CVA tenderness   Musculoskeletal:  [ ] no synovitis  Neurologic:  [ ] non-focal exam   Skin:  [x ] no rash  Lymph: [ ] no lymphadenopathy  Psychiatric:  [ ] appropriate affect [x ] alert & oriented  Lines:  [x ] no phlebitis [ ] central line                                9.6    7.25  )-----------( 309      ( 26 Jun 2022 06:00 )             30.0       06-26    136  |  103  |  19  ----------------------------<  126<H>  3.9   |  23  |  1.00    Ca    9.1      26 Jun 2022 06:00            MICROBIOLOGY:Culture Results:   10,000 - 49,000 CFU/mL Escherichia coli (06-22-22 @ 19:06)  Culture Results:   Moderate Escherichia coli  Few Staphylococcus aureus  Moderate Pilar albicans "Susceptibilities not performed" (06-22-22 @ 18:33)  Culture Results:   No growth to date. (06-22-22 @ 16:45)  Culture Results:   No growth to date. (06-22-22 @ 16:30)      RADIOLOGY:

## 2022-06-26 NOTE — PROGRESS NOTE ADULT - TIME BILLING
- Review of records, telemetry, vital signs and daily labs.   - General and cardiovascular physical examination.  - Generation of cardiovascular treatment plan.  - Coordination of care.      Patient was seen and examined by me on 6/26/22,interim events noted,labs and radiology studies reviewed.  Trevor De La Garza MD,FACC.  [address] [place], [place] [id number].  [phone]

## 2022-06-26 NOTE — PROGRESS NOTE ADULT - SUBJECTIVE AND OBJECTIVE BOX
PATIENT SEEN AND EXAMINED ON :- 6/26/22  DATE OF SERVICE:  6/26/22           Interim events noted,Labs ,Radiological studies and Cardiology tests reviewed .       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 Pt presented postoperatively with dislodged J tube and had it replaced with a foster, and subsequently underwent fluoroscopy guided replacement by IR on 6/17. Since then pt reports that around 6/20 he had a fever and was seen by outpatient PMD who did labs and gave him IV fluids. Today pt developed diarrhea and reports multiple episodes since this morning, and has not administered any J tube feeds today as he was told to come to ED for evaluation. Denies any other complaints including HA/lightheadedness, lethargy, chest pain/shortness of breath, nausea/vomiting, constipation.    (23 Jun 2022 10:01)      INTERIM EVENTS:Patient seen at bedside ,interim events noted.      PMH -reviewed admission note, no change since admission  HEART FAILURE: Acute[ ]Chronic[ ] Systolic[ ] Diastolic[ ] Combined Systolic and Diastolic[ ]  CAD[ ] CABG[ ] PCI[ ]  DEVICES[ ] PPM[ ] ICD[ ] ILR[ ]  ATRIAL FIBRILLATION[ ] Paroxysmal[ ] Permanent[ ]  MOSES[ ] CKD1[ ] CKD2[ ] CKD3[ ] CKD4[ ] ESRD[ ]  COPD[ ] HTN[ ]   DM[ ] Type1[ ] Type 2[ ]   CVA[ ] Paresis[ ]    AMBULATION: Assisted[ ] Cane/walker[ ] Independent[ ]    MEDICATIONS  (STANDING):  amLODIPine   Tablet 5 milliGRAM(s) Oral daily  ATENolol  Tablet 25 milliGRAM(s) Oral daily  ceFAZolin   IVPB 2000 milliGRAM(s) IV Intermittent every 8 hours  chlorhexidine 2% Cloths 1 Application(s) Topical daily  influenza  Vaccine (HIGH DOSE) 0.7 milliLiter(s) IntraMuscular once  multivitamin 1 Tablet(s) Oral daily  mupirocin 2% Ointment 1 Application(s) Topical two times a day  nystatin    Suspension 620986 Unit(s) Oral four times a day  pantoprazole  Injectable 40 milliGRAM(s) IV Push daily  sodium chloride 0.9%. 1000 milliLiter(s) (50 mL/Hr) IV Continuous <Continuous>    MEDICATIONS  (PRN):            REVIEW OF SYSTEMS:  Constitutional: [ ] fever, [ ]weight loss,  [ ]fatigue  Eyes: [ ] visual changes  Respiratory: [ ]shortness of breath;  [ ] cough, [ ]wheezing, [ ]chills, [ ]hemoptysis  Cardiovascular: [ ] chest pain, [ ]palpitations, [ ]dizziness,  [ ]leg swelling[ ]orthopnea[ ]PND  Gastrointestinal: [ ] abdominal pain, [ ]nausea, [ ]vomiting,  [ ]diarrhea [ ]Constipation [ ]Melena  Genitourinary: [ ] dysuria, [ ] hematuria [ ]Foster  Neurologic: [ ] headaches [ ] tremors[ ]weakness [ ]Paralysis Right[ ] Left[ ]  Skin: [ ] itching, [ ]burning, [ ] rashes  Endocrine: [ ] heat or cold intolerance  Musculoskeletal: [ ] joint pain or swelling; [ ] muscle, back, or extremity pain  Psychiatric: [ ] depression, [ ]anxiety, [ ]mood swings, or [ ]difficulty sleeping  Hematologic: [ ] easy bruising, [ ] bleeding gums    [ ] All remaining systems negative except as per above.   [ ]Unable to obtain.  [x] No change in ROS since admission      Vital Signs Last 24 Hrs  T(C): 37.2 (26 Jun 2022 14:50), Max: 37.3 (26 Jun 2022 06:00)  T(F): 98.9 (26 Jun 2022 14:50), Max: 99.2 (26 Jun 2022 06:00)  HR: 59 (26 Jun 2022 14:50) (56 - 59)  BP: 139/70 (26 Jun 2022 14:50) (120/61 - 153/76)  BP(mean): --  RR: 16 (26 Jun 2022 14:50) (16 - 17)  SpO2: 99% (26 Jun 2022 14:50) (99% - 100%)  I&O's Summary    25 Jun 2022 07:01  -  26 Jun 2022 07:00  --------------------------------------------------------  IN: 0 mL / OUT: 1735 mL / NET: -1735 mL        PHYSICAL EXAM:  General: No acute distress BMI-  HEENT: EOMI, PERRL  Neck: Supple, [ ] JVD  Lungs: Equal air entry bilaterally; [ ] rales [ ] wheezing [ ] rhonchi  Heart: Regular rate and rhythm; [x ] murmur   2/6 [ x] systolic [ ] diastolic [ ] radiation[ ] rubs [ ]  gallops  Abdomen: Nontender, bowel sounds present  Extremities: No clubbing, cyanosis, [ ] edema [ ]Pulses  equal and intact  Nervous system:  Alert & Oriented X3, no focal deficits  Psychiatric: Normal affect  Skin: No rashes or lesions    LABS:  06-26    136  |  103  |  19  ----------------------------<  126<H>  3.9   |  23  |  1.00    Ca    9.1      26 Jun 2022 06:00      Creatinine Trend: 1.00<--, 1.04<--, 0.99<--, 1.00<--, 0.97<--, 0.94<--                        9.6    7.25  )-----------( 309      ( 26 Jun 2022 06:00 )             30.0

## 2022-06-26 NOTE — PROGRESS NOTE ADULT - ASSESSMENT
76 year old with a history of anal cancer with recent diagnosis of metastatic cancer.     Recently started xeloda    Developed diarrhea    Report of outpt culture with positive blood culture    1) Positive blood culture    Report reviewed- MSSA    Repeat blood culture here are without growth    cefazolin 2 grma iv q 8  Check echo  Picc removed    Recommend port removal    2) J tube with leakage  Cultures with polymicrobial growth- this likely represents GI leonie/ colonization  Skin changes- ? dermatitis    3) Diarrhea  CT with new proctocolitis- ? infection vs related to xeloda  Improved today  If diarrhea persists,   Check C diff  Check GI pcr

## 2022-06-27 NOTE — PRE PROCEDURE NOTE - PRE PROCEDURE EVALUATION
Interventional Radiology Pre-Procedure Note    Diagnosis/Indication: Patient is a 76y old M with metastatic anal cancer, who presented with bacteremia. Port removal was requested. Patient states that the port was in place for "11-12 years."    PAST MEDICAL & SURGICAL HISTORY:  Anal cancer  Had Chemo and Radiation 4 years ago  S/P laparotomy      Allergies: No Known Allergies      LABS:  CBC Full  -  ( 27 Jun 2022 04:00 )  WBC Count : 9.52 K/uL  RBC Count : 3.53 M/uL  Hemoglobin : 9.0 g/dL  Hematocrit : 29.0 %  Platelet Count - Automated : 321 K/uL  Mean Cell Volume : 82.2 fL  Mean Cell Hemoglobin : 25.5 pg  Mean Cell Hemoglobin Concentration : 31.0 gm/dL    06-27    132<L>  |  101  |  19  ----------------------------<  111<H>  4.0   |  22  |  0.96    Ca    9.0      27 Jun 2022 04:00  Phos  2.3     06-27  Mg     2.00     06-27    PT/INR - ( 27 Jun 2022 04:00 )   PT: 14.7 sec;   INR: 1.26 ratio       PTT - ( 27 Jun 2022 04:00 )  PTT:36.7 sec    Procedure/ risks/ benefits/ alternatives were discussed with the patient, who verbalizes understanding, and witnessed informed consent was obtained.

## 2022-06-27 NOTE — PROGRESS NOTE ADULT - ASSESSMENT
76 year old with a history of anal cancer with recent diagnosis of metastatic cancer.     Recently started xeloda    Developed diarrhea    Report of outpt culture with positive blood culture    1) Positive blood culture    Report reviewed- MSSA    Repeat blood culture here are without growth    cefazolin 2 grma iv q 8  Check echo  Picc/ port removed    Will likely need 4 weeks of iv antibiotics  Can place picc when blood cultures negative at 48 hours      2) J tube with leakage  Cultures with polymicrobial growth- this likely represents GI leonie/ colonization  Skin changes- ? dermatitis    3) bacteruria  Clinically [place] snot have uti    4) Diarrhea  CT with new proctocolitis- ? infection vs related to xeloda  Improving  If diarrhea persists,   Check C diff  Check GI pcr    I will be away 6/27- call ID service with questions

## 2022-06-27 NOTE — PROGRESS NOTE ADULT - SUBJECTIVE AND OBJECTIVE BOX
Follow Up:      Inverval History/ROS:Patient is a 76y old  Male who presents with a chief complaint of fever, diarrhea, J tube site concern for infection (26 Jun 2022 17:45)    No fever  S/p port removal    Allergies    No Known Allergies    Intolerances        ANTIMICROBIALS:  ceFAZolin   IVPB 2000 every 8 hours  nystatin    Suspension [id number] four times a day      OTHER MEDS:  amLODIPine   Tablet 5 milliGRAM(s) Oral daily  ATENolol  Tablet 25 milliGRAM(s) Oral daily  chlorhexidine 2% Cloths 1 Application(s) Topical daily  influenza  Vaccine (HIGH DOSE) 0.7 milliLiter(s) IntraMuscular once  multivitamin 1 Tablet(s) Oral daily  mupirocin 2% Ointment 1 Application(s) Topical two times a day  pantoprazole  Injectable 40 milliGRAM(s) IV Push daily  sodium chloride 0.9%. 1000 milliLiter(s) IV Continuous <Continuous>      Vital Signs Last 24 Hrs  T(C): 36.8 (27 Jun 2022 14:28), Max: 37.7 (27 Jun 2022 06:30)  T(F): 98.3 (27 Jun 2022 14:28), Max: 99.8 (27 Jun 2022 06:30)  HR: 61 (27 Jun 2022 14:28) (61 - 64)  BP: 151/67 (27 Jun 2022 14:28) (134/65 - 153/67)  BP(mean): --  RR: 17 (27 Jun 2022 14:28) (16 - 17)  SpO2: 100% (27 Jun 2022 14:28) (99% - 100%)    PHYSICAL EXAM:  General: x[ ] non-toxic  HEAD/EYES: [ ] PERRL [x ] white sclera [ ] icterus  ENT:  [ ] normal [x ] supple [ ] thrush [ ] pharyngeal exudate  Cardiovascular:   [ ] murmur [x ] normal [ ] PPM/AICD  Respiratory:  [ ] clear to ausculation bilaterally  GI:  [ ] soft, non-tender, normal bowel sounds  :  [ ] foster [ ] no CVA tenderness   Musculoskeletal:  [ ] no synovitis  Neurologic:  [ ] non-focal exam   Skin:  [x ] no rash  Lymph: [x ] no lymphadenopathy  Psychiatric:  [x ] appropriate affect [ ] alert & oriented  Lines:  [ x] no phlebitis [ ] central line                                9.0    9.52  )-----------( 321      ( 27 Jun 2022 04:00 )             29.0       06-27    132<L>  |  101  |  19  ----------------------------<  111<H>  4.0   |  22  |  0.96    Ca    9.0      27 Jun 2022 04:00  Phos  2.3     06-27  Mg     2.00     06-27            MICROBIOLOGY:Culture Results:   10,000 - 49,000 CFU/mL Escherichia coli (06-22-22 @ 19:06)  Culture Results:   Moderate Escherichia coli  Few Staphylococcus aureus  Moderate Pilar albicans "Susceptibilities not performed" (06-22-22 @ 18:33)  Culture Results:   No growth to date. (06-22-22 @ 16:45)  Culture Results:   No growth to date. (06-22-22 @ 16:30)      RADIOLOGY:

## 2022-06-27 NOTE — PROGRESS NOTE ADULT - TIME BILLING
- Review of records, telemetry, vital signs and daily labs.   - General and cardiovascular physical examination.  - Generation of cardiovascular treatment plan.  - Coordination of care.      Patient was seen and examined by me on 6/27/22,interim events noted,labs and radiology studies reviewed.  Trevor De La Garza MD,FACC.  [address] [place], [place] [id number].  [phone]

## 2022-06-27 NOTE — PROGRESS NOTE ADULT - SUBJECTIVE AND OBJECTIVE BOX
PATIENT SEEN AND EXAMINED ON :- 6/27/22  DATE OF SERVICE:  6/27/22           Interim events noted,Labs ,Radiological studies and Cardiology tests reviewed .     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 Pt presented postoperatively with dislodged J tube and had it replaced with a foster, and subsequently underwent fluoroscopy guided replacement by IR on 6/17. Since then pt reports that around 6/20 he had a fever and was seen by outpatient PMD who did labs and gave him IV fluids. Today pt developed diarrhea and reports multiple episodes since this morning, and has not administered any J tube feeds today as he was told to come to ED for evaluation. Denies any other complaints including HA/lightheadedness, lethargy, chest pain/shortness of breath, nausea/vomiting, constipation.    (23 Jun 2022 10:01)      INTERIM EVENTS:Patient seen at bedside ,interim events noted.      PMH -reviewed admission note, no change since admission  HEART FAILURE: Acute[ ]Chronic[ ] Systolic[ ] Diastolic[ ] Combined Systolic and Diastolic[ ]  CAD[ ] CABG[ ] PCI[ ]  DEVICES[ ] PPM[ ] ICD[ ] ILR[ ]  ATRIAL FIBRILLATION[ ] Paroxysmal[ ] Permanent[ ]  MOSES[ ] CKD1[ ] CKD2[ ] CKD3[ ] CKD4[ ] ESRD[ ]  COPD[ ] HTN[ ]   DM[ ] Type1[ ] Type 2[ ]   CVA[ ] Paresis[ ]    AMBULATION: Assisted[ ] Cane/walker[ ] Independent[ ]    MEDICATIONS  (STANDING):  amLODIPine   Tablet 5 milliGRAM(s) Oral daily  ATENolol  Tablet 25 milliGRAM(s) Oral daily  ceFAZolin   IVPB 2000 milliGRAM(s) IV Intermittent every 8 hours  chlorhexidine 2% Cloths 1 Application(s) Topical daily  influenza  Vaccine (HIGH DOSE) 0.7 milliLiter(s) IntraMuscular once  multivitamin 1 Tablet(s) Oral daily  mupirocin 2% Ointment 1 Application(s) Topical two times a day  nystatin    Suspension [id number] Unit(s) Oral four times a day  pantoprazole  Injectable 40 milliGRAM(s) IV Push daily  sodium chloride 0.9%. 1000 milliLiter(s) (50 mL/Hr) IV Continuous <Continuous>    MEDICATIONS  (PRN):            REVIEW OF SYSTEMS:  Constitutional: [ ] fever, [ ]weight loss,  [ ]fatigue  Eyes: [ ] visual changes  Respiratory: [ ]shortness of breath;  [ ] cough, [ ]wheezing, [ ]chills, [ ]hemoptysis  Cardiovascular: [ ] chest pain, [ ]palpitations, [ ]dizziness,  [ ]leg swelling[ ]orthopnea[ ]PND  Gastrointestinal: [ ] abdominal pain, [ ]nausea, [ ]vomiting,  [ ]diarrhea [ ]Constipation [ ]Melena  Genitourinary: [ ] dysuria, [ ] hematuria [ ]Foster  Neurologic: [ ] headaches [ ] tremors[ ]weakness [ ]Paralysis Right[ ] Left[ ]  Skin: [ ] itching, [ ]burning, [ ] rashes  Endocrine: [ ] heat or cold intolerance  Musculoskeletal: [ ] joint pain or swelling; [ ] muscle, back, or extremity pain  Psychiatric: [ ] depression, [ ]anxiety, [ ]mood swings, or [ ]difficulty sleeping  Hematologic: [ ] easy bruising, [ ] bleeding gums    [ ] All remaining systems negative except as per above.   [ ]Unable to obtain.  [x] No change in ROS since admission      Vital Signs Last 24 Hrs  T(C): 36.8 (27 Jun 2022 14:28), Max: 37.7 (27 Jun 2022 06:30)  T(F): 98.3 (27 Jun 2022 14:28), Max: 99.8 (27 Jun 2022 06:30)  HR: 61 (27 Jun 2022 14:28) (61 - 64)  BP: 151/67 (27 Jun 2022 14:28) (134/65 - 153/67)  BP(mean): --  RR: 17 (27 Jun 2022 14:28) (16 - 17)  SpO2: 100% (27 Jun 2022 14:28) (99% - 100%)  I&O's Summary    26 Jun 2022 07:01  -  27 Jun 2022 07:00  --------------------------------------------------------  IN: 0 mL / OUT: 300 mL / NET: -300 mL    27 Jun 2022 07:01  -  27 Jun 2022 19:31  --------------------------------------------------------  IN: 0 mL / OUT: 100 mL / NET: -100 mL        PHYSICAL EXAM:  General: No acute distress BMI-  HEENT: EOMI, PERRL  Neck: Supple, [ ] JVD  Lungs: Equal air entry bilaterally; [ ] rales [ ] wheezing [ ] rhonchi  Heart: Regular rate and rhythm; [x ] murmur   2/6 [ x] systolic [ ] diastolic [ ] radiation[ ] rubs [ ]  gallops  Abdomen: Nontender, bowel sounds present  Extremities: No clubbing, cyanosis, [ ] edema [ ]Pulses  equal and intact  Nervous system:  Alert & Oriented X3, no focal deficits  Psychiatric: Normal affect  Skin: No rashes or lesions    LABS:  06-27    132<L>  |  101  |  19  ----------------------------<  111<H>  4.0   |  22  |  0.96    Ca    9.0      27 Jun 2022 04:00  Phos  2.3     06-27  Mg     2.00     06-27      Creatinine Trend: 0.96<--, 1.00<--, 1.04<--, 0.99<--, 1.00<--, 0.97<--                        9.0    9.52  )-----------( 321      ( 27 Jun 2022 04:00 )             29.0     PT/INR - ( 27 Jun 2022 04:00 )   PT: 14.7 sec;   INR: 1.26 ratio         PTT - ( 27 Jun 2022 04:00 )  PTT:36.7 sec

## 2022-06-27 NOTE — CHART NOTE - NSCHARTNOTEFT_GEN_A_CORE
Patient Age: 77 y/o    Patient Gender: Male    Procedure (including site/side if known): Mediport removal    Diagnosis/Indication: Bacteremia    Interventional Radiology Attending Physician: Dr. Quintanilla    Ordering Attending Physician: Dr. De La Garza    Pertinent medical history:    Pertinent Labs:                       9.0    9.52  )-----------( 321      ( 27 Jun 2022 04:00 )             29.0   06-27    132<L>  |  101  |  19  ----------------------------<  111<H>  4.0   |  22  |  0.96    Ca    9.0      27 Jun 2022 04:00  Phos  2.3     06-27  Mg     2.00     06-27      Patient and Family aware? Yes      Attending/Resident/NP/PA: Paulino Moran    Contact:        [phone]                       Date:       6/27/22                             time: 0840

## 2022-06-28 NOTE — PROGRESS NOTE ADULT - SUBJECTIVE AND OBJECTIVE BOX
PATIENT SEEN AND EXAMINED ON :- 6/28/2022  DATE OF SERVICE: 6/28/2022            Interim events noted,Labs ,Radiological studies and Cardiology tests reviewed .     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 Pt presented postoperatively with dislodged J tube and had it replaced with a foster, and subsequently underwent fluoroscopy guided replacement by IR on 6/17. Since then pt reports that around 6/20 he had a fever and was seen by outpatient PMD who did labs and gave him IV fluids. Today pt developed diarrhea and reports multiple episodes since this morning, and has not administered any J tube feeds today as he was told to come to ED for evaluation. Denies any other complaints including HA/lightheadedness, lethargy, chest pain/shortness of breath, nausea/vomiting, constipation.    (23 Jun 2022 10:01)      INTERIM EVENTS:Patient seen at bedside ,interim events noted.      PMH -reviewed admission note, no change since admission  HEART FAILURE: Acute[ ]Chronic[ ] Systolic[ ] Diastolic[ ] Combined Systolic and Diastolic[ ]  CAD[ ] CABG[ ] PCI[ ]  DEVICES[ ] PPM[ ] ICD[ ] ILR[ ]  ATRIAL FIBRILLATION[ ] Paroxysmal[ ] Permanent[ ]  MOSES[ ] CKD1[ ] CKD2[ ] CKD3[ ] CKD4[ ] ESRD[ ]  COPD[ ] HTN[ ]   DM[ ] Type1[ ] Type 2[ ]   CVA[ ] Paresis[ ]    AMBULATION: Assisted[ ] Cane/walker[ ] Independent[ ]    MEDICATIONS  (STANDING):  amLODIPine   Tablet 5 milliGRAM(s) Oral daily  ATENolol  Tablet 25 milliGRAM(s) Oral daily  ceFAZolin   IVPB 2000 milliGRAM(s) IV Intermittent every 8 hours  chlorhexidine 2% Cloths 1 Application(s) Topical daily  influenza  Vaccine (HIGH DOSE) 0.7 milliLiter(s) IntraMuscular once  multivitamin 1 Tablet(s) Oral daily  mupirocin 2% Ointment 1 Application(s) Topical two times a day  nystatin    Suspension 390874 Unit(s) Oral four times a day  pantoprazole  Injectable 40 milliGRAM(s) IV Push daily  sodium chloride 0.9%. 1000 milliLiter(s) (50 mL/Hr) IV Continuous <Continuous>    MEDICATIONS  (PRN):            REVIEW OF SYSTEMS:  Constitutional: [ ] fever, [ ]weight loss,  [ ]fatigue  Eyes: [ ] visual changes  Respiratory: [ ]shortness of breath;  [ ] cough, [ ]wheezing, [ ]chills, [ ]hemoptysis  Cardiovascular: [ ] chest pain, [ ]palpitations, [ ]dizziness,  [ ]leg swelling[ ]orthopnea[ ]PND  Gastrointestinal: [ ] abdominal pain, [ ]nausea, [ ]vomiting,  [ ]diarrhea [ ]Constipation [ ]Melena  Genitourinary: [ ] dysuria, [ ] hematuria [ ]Foster  Neurologic: [ ] headaches [ ] tremors[ ]weakness [ ]Paralysis Right[ ] Left[ ]  Skin: [ ] itching, [ ]burning, [ ] rashes  Endocrine: [ ] heat or cold intolerance  Musculoskeletal: [ ] joint pain or swelling; [ ] muscle, back, or extremity pain  Psychiatric: [ ] depression, [ ]anxiety, [ ]mood swings, or [ ]difficulty sleeping  Hematologic: [ ] easy bruising, [ ] bleeding gums    [ ] All remaining systems negative except as per above.   [ ]Unable to obtain.  [x] No change in ROS since admission      Vital Signs Last 24 Hrs  T(C): 37.2 (28 Jun 2022 05:34), Max: 37.2 (28 Jun 2022 05:34)  T(F): 98.9 (28 Jun 2022 05:34), Max: 98.9 (28 Jun 2022 05:34)  HR: 53 (28 Jun 2022 05:34) (53 - 56)  BP: 155/67 (28 Jun 2022 05:34) (144/65 - 155/67)  BP(mean): --  RR: 18 (28 Jun 2022 05:34) (17 - 18)  SpO2: 99% (28 Jun 2022 05:34) (99% - 100%)  I&O's Summary    27 Jun 2022 07:01  -  28 Jun 2022 07:00  --------------------------------------------------------  IN: 0 mL / OUT: 400 mL / NET: -400 mL    28 Jun 2022 07:01  -  28 Jun 2022 17:37  --------------------------------------------------------  IN: 0 mL / OUT: 775 mL / NET: -775 mL        PHYSICAL EXAM:  General: No acute distress BMI-  HEENT: EOMI, PERRL  Neck: Supple, [ ] JVD  Lungs: Equal air entry bilaterally; [ ] rales [ ] wheezing [ ] rhonchi  Heart: Regular rate and rhythm; [x ] murmur   2/6 [ x] systolic [ ] diastolic [ ] radiation[ ] rubs [ ]  gallops  Abdomen: Nontender, bowel sounds present  Extremities: No clubbing, cyanosis, [ ] edema [ ]Pulses  equal and intact  Nervous system:  Alert & Oriented X3, no focal deficits  Psychiatric: Normal affect  Skin: No rashes or lesions    LABS:  06-28    134<L>  |  101  |  22  ----------------------------<  129<H>  4.3   |  22  |  0.99    Ca    9.3      28 Jun 2022 05:40  Phos  2.3     06-27  Mg     2.00     06-27      Creatinine Trend: 0.99<--, 0.96<--, 1.00<--, 1.04<--, 0.99<--, 1.00<--                        9.9    7.22  )-----------( 348      ( 28 Jun 2022 05:40 )             32.2     PT/INR - ( 27 Jun 2022 04:00 )   PT: 14.7 sec;   INR: 1.26 ratio         PTT - ( 27 Jun 2022 04:00 )  PTT:36.7 sec

## 2022-06-28 NOTE — PROGRESS NOTE ADULT - TIME BILLING
- Review of records, telemetry, vital signs and daily labs.   - General and cardiovascular physical examination.  - Generation of cardiovascular treatment plan.  - Coordination of care.      Patient was seen and examined by me on 6/28/22,interim events noted,labs and radiology studies reviewed.  Trevor De La Garza MD,FACC.  [address] [place], [place] [id number].  [phone]

## 2022-06-28 NOTE — PROGRESS NOTE ADULT - ASSESSMENT
Mr. Pascal was sent from our [facility] office by his oncologist (dr. huntley) for mssa bacteremia.  Patient was recently diagnosed with locally advanced pancreatic adenocarcinoma.  He is s/p one day of RT on 6/22. He was started on concurrent xeloda. In the Er, patient had a low grade temp. Today the patient reports diarrhea for two days that has now subsided.     1. Pancreatic adenocarcinoma   - Patient was on Xeloda. Holding while inpatient.  - Plan for RT as outpatient  - Under care of Dr. Huntley of [facility]    2. MSSA bacteremia  - On IV antibiotics  - Repeat cultures negative  - Catheter culture positive  - Port removed on 6/27  - ID following    3. J tube leakage  - Cultures with polymicrobial growth  - surgery following, deflated balloon J tube at bedside    Will continue to follow. Upon discharge, patient may resume care with Dr. Huntley.    Galdino Bañuelos PA-C  Hematology/Oncology  [facility]  [phone] (office)  [phone] (alt office)  Evenings and weekends please call MD on call or office

## 2022-06-28 NOTE — PROGRESS NOTE ADULT - SUBJECTIVE AND OBJECTIVE BOX
Patient is a 76y old  Male who presents with a chief complaint of fever, diarrhea, J tube site concern for infection (27 Jun 2022 19:30)    Pt seen and examined at bedside.    MEDICATIONS  (STANDING):  amLODIPine   Tablet 5 milliGRAM(s) Oral daily  ATENolol  Tablet 25 milliGRAM(s) Oral daily  ceFAZolin   IVPB 2000 milliGRAM(s) IV Intermittent every 8 hours  chlorhexidine 2% Cloths 1 Application(s) Topical daily  influenza  Vaccine (HIGH DOSE) 0.7 milliLiter(s) IntraMuscular once  multivitamin 1 Tablet(s) Oral daily  mupirocin 2% Ointment 1 Application(s) Topical two times a day  nystatin    Suspension 505779 Unit(s) Oral four times a day  pantoprazole  Injectable 40 milliGRAM(s) IV Push daily  sodium chloride 0.9%. 1000 milliLiter(s) (50 mL/Hr) IV Continuous <Continuous>    MEDICATIONS  (PRN):    Vital Signs Last 24 Hrs  T(C): 37.2 (28 Jun 2022 05:34), Max: 37.2 (28 Jun 2022 05:34)  T(F): 98.9 (28 Jun 2022 05:34), Max: 98.9 (28 Jun 2022 05:34)  HR: 53 (28 Jun 2022 05:34) (53 - 61)  BP: 155/67 (28 Jun 2022 05:34) (144/65 - 155/67)  BP(mean): --  RR: 18 (28 Jun 2022 05:34) (17 - 18)  SpO2: 99% (28 Jun 2022 05:34) (99% - 100%)    PE  NAD  Awake, alert  Anicteric, MMM  No c/c/e  No rash grossly                       9.9    7.22  )-----------( 348      ( 28 Jun 2022 05:40 )             32.2     06-28    134<L>  |  101  |  22  ----------------------------<  129<H>  4.3   |  22  |  0.99    Ca    9.3      28 Jun 2022 05:40  Phos  2.3     06-27  Mg     2.00     06-27

## 2022-06-29 NOTE — PROGRESS NOTE ADULT - ASSESSMENT
76 year old with a history of anal cancer with recent diagnosis of metastatic cancer.     Recently started xeloda    Developed diarrhea    Report of outpt culture with positive blood culture    1) Positive blood culture    Report reviewed- MSSA    Repeat blood culture here are without growth  Echo limitted but without obvious vegetation  Picc/ port removed    cefazolin 2 grma iv q 8 through 7/20/2022  Weekly CBC/ BMP fax to [phone]    Can place picc line    2) J tube with leakage  Cultures with polymicrobial growth- this likely represents GI leonie/ colonization  Skin changes- ? dermatitis    3) bacteruria  Clinically [place] snot have uti    4) Diarrhea  improved    Can follow up as an outpt     7/20 at 2:30 PM  [address]  [place] [address]  [phone]    Call ID service with additional questions

## 2022-06-29 NOTE — PROGRESS NOTE ADULT - TIME BILLING
- Review of records, telemetry, vital signs and daily labs.   - General and cardiovascular physical examination.  - Generation of cardiovascular treatment plan.  - Coordination of care.      Patient was seen and examined by me on 6/29/22,interim events noted,labs and radiology studies reviewed.  Trevor De La Garza MD,FACC.  [address] [place], [place] [id number].  [phone]

## 2022-06-29 NOTE — PROGRESS NOTE ADULT - SUBJECTIVE AND OBJECTIVE BOX
PATIENT SEEN AND EXAMINED ON :- 6/29/2022  DATE OF SERVICE:   6/29/2022          Interim events noted,Labs ,Radiological studies and Cardiology tests reviewed .     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 Pt presented postoperatively with dislodged J tube and had it replaced with a foster, and subsequently underwent fluoroscopy guided replacement by IR on 6/17. Since then pt reports that around 6/20 he had a fever and was seen by outpatient PMD who did labs and gave him IV fluids. Today pt developed diarrhea and reports multiple episodes since this morning, and has not administered any J tube feeds today as he was told to come to ED for evaluation. Denies any other complaints including HA/lightheadedness, lethargy, chest pain/shortness of breath, nausea/vomiting, constipation.    (23 Jun 2022 10:01)      INTERIM EVENTS:Patient seen at bedside ,interim events noted.      PMH -reviewed admission note, no change since admission  HEART FAILURE: Acute[ ]Chronic[ ] Systolic[ ] Diastolic[ ] Combined Systolic and Diastolic[ ]  CAD[ ] CABG[ ] PCI[ ]  DEVICES[ ] PPM[ ] ICD[ ] ILR[ ]  ATRIAL FIBRILLATION[ ] Paroxysmal[ ] Permanent[ ]  MOSES[ ] CKD1[ ] CKD2[ ] CKD3[ ] CKD4[ ] ESRD[ ]  COPD[ ] HTN[ ]   DM[ ] Type1[ ] Type 2[ ]   CVA[ ] Paresis[ ]    AMBULATION: Assisted[ ] Cane/walker[ ] Independent[ ]    MEDICATIONS  (STANDING):  amLODIPine   Tablet 5 milliGRAM(s) Oral daily  ATENolol  Tablet 25 milliGRAM(s) Oral daily  ceFAZolin   IVPB 2000 milliGRAM(s) IV Intermittent every 8 hours  chlorhexidine 2% Cloths 1 Application(s) Topical daily  influenza  Vaccine (HIGH DOSE) 0.7 milliLiter(s) IntraMuscular once  multivitamin 1 Tablet(s) Oral daily  mupirocin 2% Ointment 1 Application(s) Topical two times a day  nystatin    Suspension 866627 Unit(s) Oral four times a day  pantoprazole  Injectable 40 milliGRAM(s) IV Push daily  sodium chloride 0.9%. 1000 milliLiter(s) (50 mL/Hr) IV Continuous <Continuous>    MEDICATIONS  (PRN):            REVIEW OF SYSTEMS:  Constitutional: [ ] fever, [ ]weight loss,  [ ]fatigue  Eyes: [ ] visual changes  Respiratory: [ ]shortness of breath;  [ ] cough, [ ]wheezing, [ ]chills, [ ]hemoptysis  Cardiovascular: [ ] chest pain, [ ]palpitations, [ ]dizziness,  [ ]leg swelling[ ]orthopnea[ ]PND  Gastrointestinal: [ ] abdominal pain, [ ]nausea, [ ]vomiting,  [ ]diarrhea [ ]Constipation [ ]Melena  Genitourinary: [ ] dysuria, [ ] hematuria [ ]Foster  Neurologic: [ ] headaches [ ] tremors[ ]weakness [ ]Paralysis Right[ ] Left[ ]  Skin: [ ] itching, [ ]burning, [ ] rashes  Endocrine: [ ] heat or cold intolerance  Musculoskeletal: [ ] joint pain or swelling; [ ] muscle, back, or extremity pain  Psychiatric: [ ] depression, [ ]anxiety, [ ]mood swings, or [ ]difficulty sleeping  Hematologic: [ ] easy bruising, [ ] bleeding gums    [ ] All remaining systems negative except as per above.   [ ]Unable to obtain.  [x] No change in ROS since admission      Vital Signs Last 24 Hrs  T(C): 36.6 (29 Jun 2022 12:18), Max: 37 (29 Jun 2022 06:40)  T(F): 97.9 (29 Jun 2022 12:18), Max: 98.6 (29 Jun 2022 06:40)  HR: 53 (29 Jun 2022 12:18) (53 - 64)  BP: 156/61 (29 Jun 2022 06:40) (138/61 - 156/61)  BP(mean): --  RR: 18 (29 Jun 2022 12:18) (18 - 18)  SpO2: 100% (29 Jun 2022 12:18) (100% - 100%)  I&O's Summary    28 Jun 2022 07:01  -  29 Jun 2022 07:00  --------------------------------------------------------  IN: 0 mL / OUT: 775 mL / NET: -775 mL        PHYSICAL EXAM:  General: No acute distress BMI-  HEENT: EOMI, PERRL  Neck: Supple, [ ] JVD  Lungs: Equal air entry bilaterally; [ ] rales [ ] wheezing [ ] rhonchi  Heart: Regular rate and rhythm; [x ] murmur   2/6 [ x] systolic [ ] diastolic [ ] radiation[ ] rubs [ ]  gallops  Abdomen: Nontender, bowel sounds present  Extremities: No clubbing, cyanosis, [ ] edema [ ]Pulses  equal and intact  Nervous system:  Alert & Oriented X3, no focal deficits  Psychiatric: Normal affect  Skin: No rashes or lesions    LABS:  06-29    134<L>  |  101  |  26<H>  ----------------------------<  117<H>  4.2   |  22  |  0.99    Ca    9.2      29 Jun 2022 08:02      Creatinine Trend: 0.99<--, 0.99<--, 0.96<--, 1.00<--, 1.04<--, 0.99<--                        10.1   9.07  )-----------( 381      ( 29 Jun 2022 08:02 )             31.8

## 2022-06-29 NOTE — PROGRESS NOTE ADULT - SUBJECTIVE AND OBJECTIVE BOX
Follow Up:      Inverval History/ROS:Patient is a 76y old  Male who presents with a chief complaint of fever, diarrhea, J tube site concern for infection (28 Jun 2022 17:36)    no fever  Port removed      Allergies    No Known Allergies    Intolerances        ANTIMICROBIALS:  ceFAZolin   IVPB 2000 every 8 hours  nystatin    Suspension [id number] four times a day      OTHER MEDS:  amLODIPine   Tablet 5 milliGRAM(s) Oral daily  ATENolol  Tablet 25 milliGRAM(s) Oral daily  chlorhexidine 2% Cloths 1 Application(s) Topical daily  influenza  Vaccine (HIGH DOSE) 0.7 milliLiter(s) IntraMuscular once  multivitamin 1 Tablet(s) Oral daily  mupirocin 2% Ointment 1 Application(s) Topical two times a day  pantoprazole  Injectable 40 milliGRAM(s) IV Push daily  sodium chloride 0.9%. 1000 milliLiter(s) IV Continuous <Continuous>      Vital Signs Last 24 Hrs  T(C): 36.6 (29 Jun 2022 12:18), Max: 37 (29 Jun 2022 06:40)  T(F): 97.9 (29 Jun 2022 12:18), Max: 98.6 (29 Jun 2022 06:40)  HR: 53 (29 Jun 2022 12:18) (53 - 64)  BP: 156/61 (29 Jun 2022 06:40) (138/61 - 156/61)  BP(mean): --  RR: 18 (29 Jun 2022 12:18) (18 - 18)  SpO2: 100% (29 Jun 2022 12:18) (100% - 100%)    PHYSICAL EXAM:  General: [x ] non-toxic  HEAD/EYES: [ ] PERRL x[ ] white sclera [ ] icterus  ENT:  [ ] normal [ x] supple [ ] thrush [ ] pharyngeal exudate  Cardiovascular:   [ ] murmurx [ ] normal [ ] PPM/AICD  Respiratory:  [ ] clear to ausculation bilaterally  GI:  [ x] soft, non-tender, normal bowel sounds  :  [ ] foster [ ] no CVA tenderness   Musculoskeletal:  [ ] no synovitis  Neurologic:  [ ] non-focal exam   Skin:  [x ] no rash  Lymph: [ ] no lymphadenopathy  Psychiatric:  [ ] appropriate affect [x ] alert & oriented  Lines:  [ x] no phlebitis [ ] central line                                10.1   9.07  )-----------( 381      ( 29 Jun 2022 08:02 )             31.8       06-29    134<L>  |  101  |  26<H>  ----------------------------<  117<H>  4.2   |  22  |  0.99    Ca    9.2      29 Jun 2022 08:02            MICROBIOLOGY:Culture Results:   10,000 - 49,000 CFU/mL Escherichia coli (06-22-22 @ 19:06)  Culture Results:   Moderate Escherichia coli  Few Staphylococcus aureus  Moderate Pilar albicans "Susceptibilities not performed" (06-22-22 @ 18:33)  Culture Results:   No Growth Final (06-22-22 @ 16:45)  Culture Results:   No Growth Final (06-22-22 @ 16:30)      RADIOLOGY:

## 2022-06-30 NOTE — PROGRESS NOTE ADULT - SUBJECTIVE AND OBJECTIVE BOX
PATIENT SEEN AND EXAMINED ON :- 6/30/22  DATE OF SERVICE:    6/30/22         Interim events noted,Labs ,Radiological studies and Cardiology tests reviewed .       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 Pt presented postoperatively with dislodged J tube and had it replaced with a foster, and subsequently underwent fluoroscopy guided replacement by IR on 6/17. Since then pt reports that around 6/20 he had a fever and was seen by outpatient PMD who did labs and gave him IV fluids. Today pt developed diarrhea and reports multiple episodes since this morning, and has not administered any J tube feeds today as he was told to come to ED for evaluation. Denies any other complaints including HA/lightheadedness, lethargy, chest pain/shortness of breath, nausea/vomiting, constipation.    (23 Jun 2022 10:01)      INTERIM EVENTS:Patient seen at bedside ,interim events noted.      PMH -reviewed admission note, no change since admission  HEART FAILURE: Acute[ ]Chronic[ ] Systolic[ ] Diastolic[ ] Combined Systolic and Diastolic[ ]  CAD[ ] CABG[ ] PCI[ ]  DEVICES[ ] PPM[ ] ICD[ ] ILR[ ]  ATRIAL FIBRILLATION[ ] Paroxysmal[ ] Permanent[ ]  MOSES[ ] CKD1[ ] CKD2[ ] CKD3[ ] CKD4[ ] ESRD[ ]  COPD[ ] HTN[ ]   DM[ ] Type1[ ] Type 2[ ]   CVA[ ] Paresis[ ]    AMBULATION: Assisted[ ] Cane/walker[ ] Independent[ ]    MEDICATIONS  (STANDING):  amLODIPine   Tablet 5 milliGRAM(s) Oral daily  ATENolol  Tablet 25 milliGRAM(s) Oral daily  ceFAZolin   IVPB 2000 milliGRAM(s) IV Intermittent every 8 hours  chlorhexidine 2% Cloths 1 Application(s) Topical daily  influenza  Vaccine (HIGH DOSE) 0.7 milliLiter(s) IntraMuscular once  multivitamin 1 Tablet(s) Oral daily  nystatin    Suspension 442445 Unit(s) Oral four times a day  pantoprazole  Injectable 40 milliGRAM(s) IV Push daily  sodium chloride 0.9%. 1000 milliLiter(s) (50 mL/Hr) IV Continuous <Continuous>    MEDICATIONS  (PRN):            REVIEW OF SYSTEMS:  Constitutional: [ ] fever, [ ]weight loss,  [ ]fatigue  Eyes: [ ] visual changes  Respiratory: [ ]shortness of breath;  [ ] cough, [ ]wheezing, [ ]chills, [ ]hemoptysis  Cardiovascular: [ ] chest pain, [ ]palpitations, [ ]dizziness,  [ ]leg swelling[ ]orthopnea[ ]PND  Gastrointestinal: [ ] abdominal pain, [ ]nausea, [ ]vomiting,  [ ]diarrhea [ ]Constipation [ ]Melena  Genitourinary: [ ] dysuria, [ ] hematuria [ ]Foster  Neurologic: [ ] headaches [ ] tremors[ ]weakness [ ]Paralysis Right[ ] Left[ ]  Skin: [ ] itching, [ ]burning, [ ] rashes  Endocrine: [ ] heat or cold intolerance  Musculoskeletal: [ ] joint pain or swelling; [ ] muscle, back, or extremity pain  Psychiatric: [ ] depression, [ ]anxiety, [ ]mood swings, or [ ]difficulty sleeping  Hematologic: [ ] easy bruising, [ ] bleeding gums    [ ] All remaining systems negative except as per above.   [ ]Unable to obtain.  [x] No change in ROS since admission      Vital Signs Last 24 Hrs  T(C): 36.9 (30 Jun 2022 12:50), Max: 37.8 (29 Jun 2022 21:45)  T(F): 98.4 (30 Jun 2022 12:50), Max: 100.1 (29 Jun 2022 21:45)  HR: 59 (30 Jun 2022 12:50) (59 - 76)  BP: 133/61 (30 Jun 2022 12:50) (133/61 - 146/63)  BP(mean): --  RR: 17 (30 Jun 2022 12:50) (16 - 17)  SpO2: 100% (30 Jun 2022 12:50) (100% - 100%)  I&O's Summary    29 Jun 2022 07:01  -  30 Jun 2022 07:00  --------------------------------------------------------  IN: 1032 mL / OUT: 1650 mL / NET: -618 mL    30 Jun 2022 07:01  -  30 Jun 2022 19:47  --------------------------------------------------------  IN: 0 mL / OUT: 700 mL / NET: -700 mL        PHYSICAL EXAM:  General: No acute distress BMI-  HEENT: EOMI, PERRL  Neck: Supple, [ ] JVD  Lungs: Equal air entry bilaterally; [ ] rales [ ] wheezing [ ] rhonchi  Heart: Regular rate and rhythm; [x ] murmur   2/6 [ x] systolic [ ] diastolic [ ] radiation[ ] rubs [ ]  gallops  Abdomen: Nontender, bowel sounds present  Extremities: No clubbing, cyanosis, [ ] edema [ ]Pulses  equal and intact  Nervous system:  Alert & Oriented X3, no focal deficits  Psychiatric: Normal affect  Skin: No rashes or lesions    LABS:  06-30    133<L>  |  102  |  30<H>  ----------------------------<  139<H>  4.1   |  21<L>  |  0.97    Ca    9.5      30 Jun 2022 06:29      Creatinine Trend: 0.97<--, 0.99<--, 0.99<--, 0.96<--, 1.00<--, 1.04<--                        9.7    10.16 )-----------( 370      ( 30 Jun 2022 06:29 )             30.1

## 2022-06-30 NOTE — PROGRESS NOTE ADULT - ASSESSMENT
76 year old with a history of anal cancer with recent diagnosis of metastatic cancer.     Recently started xeloda    Developed diarrhea    Report of outpt culture with positive blood culture    1) Positive blood culture    Report reviewed- MSSA    Repeat blood culture here are without growth  Echo limitted but without obvious vegetation  Picc/ port removed    cefazolin 2 grma iv q 8 through 7/20/2022  Weekly CBC/ BMP fax to [phone]    Can place picc line    2) J tube with leakage  Cultures with polymicrobial growth- this likely represents GI leonie/ colonization  Skin changes- ? dermatitis    3) bacteruria  Clinically [place] snot have uti    4) Diarrhea  improved    Can follow up as an outpt     7/20 at 2:30 PM  [address]  [place] [address]  [phone]    Call ID service with additional questions

## 2022-06-30 NOTE — PROGRESS NOTE ADULT - SUBJECTIVE AND OBJECTIVE BOX
Patient is a 77y old  Male who presents with a chief complaint of fever, diarrhea, J tube site concern for infection (29 Jun 2022 16:52)    Pt seen and examined at bedside.    MEDICATIONS  (STANDING):  amLODIPine   Tablet 5 milliGRAM(s) Oral daily  ATENolol  Tablet 25 milliGRAM(s) Oral daily  ceFAZolin   IVPB 2000 milliGRAM(s) IV Intermittent every 8 hours  chlorhexidine 2% Cloths 1 Application(s) Topical daily  influenza  Vaccine (HIGH DOSE) 0.7 milliLiter(s) IntraMuscular once  multivitamin 1 Tablet(s) Oral daily  nystatin    Suspension [id number] Unit(s) Oral four times a day  pantoprazole  Injectable 40 milliGRAM(s) IV Push daily  sodium chloride 0.9%. 1000 milliLiter(s) (50 mL/Hr) IV Continuous <Continuous>    MEDICATIONS  (PRN):    Vital Signs Last 24 Hrs  T(C): 36.9 (30 Jun 2022 12:50), Max: 37.8 (29 Jun 2022 21:45)  T(F): 98.4 (30 Jun 2022 12:50), Max: 100.1 (29 Jun 2022 21:45)  HR: 59 (30 Jun 2022 12:50) (59 - 76)  BP: 133/61 (30 Jun 2022 12:50) (133/61 - 146/63)  BP(mean): --  RR: 17 (30 Jun 2022 12:50) (16 - 17)  SpO2: 100% (30 Jun 2022 12:50) (100% - 100%)    PE  NAD  Awake, alert  Anicteric, MMM  No c/c/e  No rash grossly                        9.7    10.16 )-----------( 370      ( 30 Jun 2022 06:29 )             30.1     06-30    133<L>  |  102  |  30<H>  ----------------------------<  139<H>  4.1   |  21<L>  |  0.97    Ca    9.5      30 Jun 2022 06:29

## 2022-06-30 NOTE — PROGRESS NOTE ADULT - NS ATTEND AMEND GEN_ALL_CORE FT
76 y/o male with pancreatic cancer, on capecitabine with concurrent radiation, admitted for bacteremia. He remains on IV antibiotics; port removed on June 27th. Continue to hold capecitabine while inpatient or during rehabilitation; hold until radiation is planned to restart. 78 y/o male with pancreatic cancer, on capecitabine with concurrent radiation, admitted for bacteremia. He remains on IV antibiotics to continue until July 20th; port removed on June 27th. PICC to be placed. Continue to hold capecitabine while inpatient or during rehabilitation; hold until radiation is planned to restart.

## 2022-06-30 NOTE — PROGRESS NOTE ADULT - SUBJECTIVE AND OBJECTIVE BOX
Follow Up:      Inverval History/ROS:Patient is a 77y old  Male who presents with a chief complaint of fever, diarrhea, J tube site concern for infection (30 Jun 2022 14:27)    No fever  No events    Allergies    No Known Allergies    Intolerances        ANTIMICROBIALS:  ceFAZolin   IVPB 2000 every 8 hours  nystatin    Suspension 338591 four times a day      OTHER MEDS:  amLODIPine   Tablet 5 milliGRAM(s) Oral daily  ATENolol  Tablet 25 milliGRAM(s) Oral daily  chlorhexidine 2% Cloths 1 Application(s) Topical daily  influenza  Vaccine (HIGH DOSE) 0.7 milliLiter(s) IntraMuscular once  multivitamin 1 Tablet(s) Oral daily  pantoprazole  Injectable 40 milliGRAM(s) IV Push daily  sodium chloride 0.9%. 1000 milliLiter(s) IV Continuous <Continuous>      Vital Signs Last 24 Hrs  T(C): 36.9 (30 Jun 2022 12:50), Max: 37.8 (29 Jun 2022 21:45)  T(F): 98.4 (30 Jun 2022 12:50), Max: 100.1 (29 Jun 2022 21:45)  HR: 59 (30 Jun 2022 12:50) (59 - 76)  BP: 133/61 (30 Jun 2022 12:50) (133/61 - 146/63)  BP(mean): --  RR: 17 (30 Jun 2022 12:50) (16 - 17)  SpO2: 100% (30 Jun 2022 12:50) (100% - 100%)    PHYSICAL EXAM:  General: [x ] non-toxic  HEAD/EYES: [ ] PERRL [ x] white sclera [ ] icterus  ENT:  [ ] normal [x ] supple [ ] thrush [ ] pharyngeal exudate  Cardiovascular:   [ ] murmur [ x] normal [ ] PPM/AICD  Respiratory:  x[ ] clear to ausculation bilaterally  GI:  [x ] soft, non-tender, normal bowel sounds  :  [ ] foster [ ] no CVA tenderness   Musculoskeletal:  [ ] no synovitis  Neurologic:  [ ] non-focal exam   Skin:  [x ] no rash  Lymph: [ ] no lymphadenopathy  Psychiatric:  [x ] appropriate affect [ ] alert & oriented  Lines:  [x ] no phlebitis [ ] central line                                9.7    10.16 )-----------( 370      ( 30 Jun 2022 06:29 )             30.1       06-30    133<L>  |  102  |  30<H>  ----------------------------<  139<H>  4.1   |  21<L>  |  0.97    Ca    9.5      30 Jun 2022 06:29            MICROBIOLOGY:    RADIOLOGY:

## 2022-06-30 NOTE — PROGRESS NOTE ADULT - ASSESSMENT
Mr. Pascal was sent from our [facility] office by his oncologist (dr. huntley) for mssa bacteremia.  Patient was recently diagnosed with locally advanced pancreatic adenocarcinoma.  He is s/p one day of RT on 6/22. He was started on concurrent xeloda. In the Er, patient had a low grade temp. Today the patient reports diarrhea for two days that has now subsided.     1. Pancreatic adenocarcinoma   - Patient was on Xeloda. Holding while inpatient.  - Plan for RT as outpatient  - Under care of Dr. Huntley of [facility]    2. MSSA bacteremia  - On IV antibiotics  - Repeat cultures negative  - Catheter culture positive  - Port removed on 6/27  - ID following, can place picc line    3. J tube leakage  - Cultures with polymicrobial growth  - surgery following, deflated balloon J tube at bedside    Will continue to follow. Upon discharge, patient may resume care with Dr. Huntley.    Galdino Bañuelos PA-C  Hematology/Oncology  [facility]  [phone] (office)  [phone] (alt office)  Evenings and weekends please call MD on call or office

## 2022-06-30 NOTE — CHART NOTE - NSCHARTNOTEFT_GEN_A_CORE
Plan of care discussed with pt and sister at bedside yesterday and they were considering rehab. Followed up with pt today: in agreement with discharge to rehab. SW assisting with rehab placement. Once rehab placement and DC date confirmed, will arrange for line placement in order for pt to complete abx.

## 2022-06-30 NOTE — PROGRESS NOTE ADULT - TIME BILLING
- Review of records, telemetry, vital signs and daily labs.   - General and cardiovascular physical examination.  - Generation of cardiovascular treatment plan.  - Coordination of care.      Patient was seen and examined by me on 6/30/22,interim events noted,labs and radiology studies reviewed.  Trevor De La Garza MD,FACC.  [address] [place], [place] [id number].  [phone]

## 2022-06-30 NOTE — PROGRESS NOTE ADULT - PROBLEM SELECTOR PLAN 8
- SCD  - PPI
- SCD  - PPI    Dispo: d/c planning after PICC
- SCD  - PPI
- SCD  - PPI
- SCD  - PPI    Dispo: d/c planning after PICC
- SCD  - PPI
- SCD  - PPI

## 2022-07-01 NOTE — CHART NOTE - NSCHARTNOTEFT_GEN_A_CORE
Source: Patient [X ]    Family [ ]     other [X ] electronic chart, RN, ACP    Diet : Diet, Clear Liquid:   Vegan {Accepts Vegetable Products Only}  Tube Feeding Modality: Jejunostomy  Jevity 1.2 Nabil (JEVITY1.2RTH)  Total Volume for 24 Hours (mL): 1344  Continuous  Starting Tube Feed Rate {mL per Hour}: 10  Increase Tube Feed Rate by (mL): 10     Every 4 hours  Until Goal Tube Feed Rate (mL per Hour): 56  Tube Feed Duration (in Hours): 24  Tube Feed Start Time: 10:12 (06-24-22 @ 14:03)    Nutrition Follow-up Note. 76 year old with a history of anal cancer with recent diagnosis of metastatic cancer. Patient maintained on continuous feeds at 56ml/hr [id number] via J-tube. This regimen provides total volume of 1344mL, 1613kcal, 75g pro and 1085mL free water  (meeting ~ 25kcal/kg and 1.2g/kg of ABW). Patient is also on clear liquid diet per team. Patient reports tolerating both oral diet and tube feeding. No nausea/vomiting/diarrhea/constipation reported. Continue current TF regimen as tolerated suggested.       Weight (kg): 59.5 (07-01 @ 10:46)  63.9kg (6/23)  Indicative of 10lbs weight loss over 1 week -?questionable accuracy likely related to weight scale discrepancy.       Pertinent Medications: amLODIPine   Tablet  ATENolol  Tablet  multivitamin  pantoprazole  Injectable  sodium chloride 0.9%.    Pertinent Labs:  07-01 Na133 mmol/L<L> Glu 145 mg/dL<H> K+ 4.4 mmol/L Cr  1.19 mg/dL BUN 38 mg/dL<H> 07-01 Phos 3.5 mg/dL      Skin: No pressure ulcers/DTI noted in flowsheets.   No edema noted.     Estimated Needs:   [X] no change since previous assessment  [ ] recalculated:       Previous Nutrition Diagnosis:      [X ] Malnutrition, severe      Nutrition Diagnosis is [X] ongoing  [ ] resolved [ ] not applicable       Additional Recommendations:    1. Continue current diet/TF order as tolerated.   2. Obtain new weight for accuracy of weight change-RN informed.  3. Monitor weights, labs, BM's, skin integrity, p.o. intake/ EN tolerance.   4. Please document % PO intake in nursing flowsheet.   5. Further adjustments to rate/volume/duration/free water provision of enteral feeds dependant on long term monitoring of patient's tolerance, weight trends and needs. Source: Patient [X ]    Family [ ]     other [X ] electronic chart, RN, ACP    Diet : Diet, Clear Liquid:   Vegan {Accepts Vegetable Products Only}  Tube Feeding Modality: Jejunostomy  Jevity 1.2 Nabil (JEVITY1.2RTH)  Total Volume for 24 Hours (mL): 1344  Continuous  Starting Tube Feed Rate {mL per Hour}: 10  Increase Tube Feed Rate by (mL): 10     Every 4 hours  Until Goal Tube Feed Rate (mL per Hour): 56  Tube Feed Duration (in Hours): 24  Tube Feed Start Time: 10:12 (06-24-22 @ 14:03)    Nutrition Follow-up Note. 76 year old with a history of anal cancer with recent diagnosis of metastatic cancer. Patient maintained on continuous feeds at 56ml/hr [id number] via J-tube. This regimen provides total volume of 1344mL, 1613kcal, 75g pro and 1085mL free water  (meeting ~ 25kcal/kg and 1.2g/kg of ABW). Patient is also on clear liquid diet per team. Patient reports tolerating both oral diet and tube feeding. No nausea/vomiting/diarrhea/constipation reported-confirmed with RN. Continue current TF regimen as tolerated suggested.       Weight (kg): 59.5 (07-01 @ 10:46)  63.9kg (6/23)  Indicative of 10lbs weight loss over 1 week -?questionable accuracy likely related to weight scale discrepancy.       Pertinent Medications: amLODIPine   Tablet  ATENolol  Tablet  multivitamin  pantoprazole  Injectable  sodium chloride 0.9%.    Pertinent Labs:  07-01 Na133 mmol/L<L> Glu 145 mg/dL<H> K+ 4.4 mmol/L Cr  1.19 mg/dL BUN 38 mg/dL<H> 07-01 Phos 3.5 mg/dL      Skin: No pressure ulcers/DTI noted in flowsheets.   No edema noted.     Estimated Needs:   [X] no change since previous assessment  [ ] recalculated:       Previous Nutrition Diagnosis:      [X ] Malnutrition, severe      Nutrition Diagnosis is [X] ongoing  [ ] resolved [ ] not applicable       Additional Recommendations:    1. Continue current diet/TF order as tolerated.   2. Obtain new weight for accuracy of weight change-RN informed.  3. Monitor weights, labs, BM's, skin integrity, p.o. intake/ EN tolerance.   4. Please document % PO intake in nursing flowsheet.   5. Further adjustments to rate/volume/duration/free water provision of enteral feeds dependant on long term monitoring of patient's tolerance, weight trends and needs.

## 2022-07-01 NOTE — PROGRESS NOTE ADULT - TIME BILLING
- Review of records, telemetry, vital signs and daily labs.   - General and cardiovascular physical examination.  - Generation of cardiovascular treatment plan.  - Coordination of care.      Patient was seen and examined by me on 6/30/22,interim events noted,labs and radiology studies reviewed.  Trevor De La Garza MD,FACC.  [address] [place], [place] [id number].  [phone] - Review of records, telemetry, vital signs and daily labs.   - General and cardiovascular physical examination.  - Generation of cardiovascular treatment plan.  - Coordination of care.      Patient was seen and examined by me on 7/1/22,interim events noted,labs and radiology studies reviewed.  Trevor De La Garza MD,FACC.  [address] [place], [place] [id number].  [phone]

## 2022-07-01 NOTE — PROGRESS NOTE ADULT - SUBJECTIVE AND OBJECTIVE BOX
PATIENT SEEN AND EXAMINED ON :- 7/1/22  DATE OF SERVICE:   7/1/22          Interim events noted,Labs ,Radiological studies and Cardiology tests reviewed .     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 Pt presented postoperatively with dislodged J tube and had it replaced with a foster, and subsequently underwent fluoroscopy guided replacement by IR on 6/17. Since then pt reports that around 6/20 he had a fever and was seen by outpatient PMD who did labs and gave him IV fluids. Today pt developed diarrhea and reports multiple episodes since this morning, and has not administered any J tube feeds today as he was told to come to ED for evaluation. Denies any other complaints including HA/lightheadedness, lethargy, chest pain/shortness of breath, nausea/vomiting, constipation.    (23 Jun 2022 10:01)      INTERIM EVENTS:Patient seen at bedside ,interim events noted.      PMH -reviewed admission note, no change since admission  HEART FAILURE: Acute[ ]Chronic[ ] Systolic[ ] Diastolic[ ] Combined Systolic and Diastolic[ ]  CAD[ ] CABG[ ] PCI[ ]  DEVICES[ ] PPM[ ] ICD[ ] ILR[ ]  ATRIAL FIBRILLATION[ ] Paroxysmal[ ] Permanent[ ] CHADS2-[  ]  MOSES[ ] CKD1[ ] CKD2[ ] CKD3[ ] CKD4[ ] ESRD[ ]  COPD[ ] HTN[ ]   DM[ ] Type1[ ] Type 2[ ]   CVA[ ] Paresis[ ]    AMBULATION: Assisted[ ] Cane/walker[ ] Independent[ ]    MEDICATIONS  (STANDING):  amLODIPine   Tablet 5 milliGRAM(s) Oral daily  ATENolol  Tablet 25 milliGRAM(s) Oral daily  ceFAZolin   IVPB 2000 milliGRAM(s) IV Intermittent every 8 hours  chlorhexidine 2% Cloths 1 Application(s) Topical daily  influenza  Vaccine (HIGH DOSE) 0.7 milliLiter(s) IntraMuscular once  multivitamin 1 Tablet(s) Oral daily  nystatin    Suspension 318568 Unit(s) Oral four times a day  pantoprazole  Injectable 40 milliGRAM(s) IV Push daily  sodium chloride 0.9%. 1000 milliLiter(s) (50 mL/Hr) IV Continuous <Continuous>    MEDICATIONS  (PRN):            REVIEW OF SYSTEMS:  Constitutional: [ ] fever, [ ]weight loss,  [ ]fatigue [ ]weight gain  Eyes: [ ] visual changes  Respiratory: [ ]shortness of breath;  [ ] cough, [ ]wheezing, [ ]chills, [ ]hemoptysis  Cardiovascular: [ ] chest pain, [ ]palpitations, [ ]dizziness,  [ ]leg swelling[ ]orthopnea[ ]PND  Gastrointestinal: [ ] abdominal pain, [ ]nausea, [ ]vomiting,  [ ]diarrhea [ ]Constipation [ ]Melena  Genitourinary: [ ] dysuria, [ ] hematuria [ ]Foster  Neurologic: [ ] headaches [ ] tremors[ ]weakness [ ]Paralysis Right[ ] Left[ ]  Skin: [ ] itching, [ ]burning, [ ] rashes  Endocrine: [ ] heat or cold intolerance  Musculoskeletal: [ ] joint pain or swelling; [ ] muscle, back, or extremity pain  Psychiatric: [ ] depression, [ ]anxiety, [ ]mood swings, or [ ]difficulty sleeping  Hematologic: [ ] easy bruising, [ ] bleeding gums    [ ] All remaining systems negative except as per above.   [ ]Unable to obtain.  [x] No change in ROS since admission      Vital Signs Last 24 Hrs  T(C): 36.2 (01 Jul 2022 13:54), Max: 37.9 (30 Jun 2022 22:47)  T(F): 97.1 (01 Jul 2022 13:54), Max: 100.3 (30 Jun 2022 22:47)  HR: 70 (01 Jul 2022 13:54) (59 - 70)  BP: 165/54 (01 Jul 2022 13:54) (123/45 - 165/54)  BP(mean): --  RR: 16 (01 Jul 2022 13:54) (16 - 17)  SpO2: 100% (01 Jul 2022 13:54) (100% - 100%)  I&O's Summary    30 Jun 2022 07:01  -  01 Jul 2022 07:00  --------------------------------------------------------  IN: 1344 mL / OUT: 2175 mL / NET: -831 mL        PHYSICAL EXAM:  General: No acute distress BMI-  HEENT: EOMI, PERRL  Neck: Supple, [ ] JVD  Lungs: Equal air entry bilaterally; [ ] rales [ ] wheezing [ ] rhonchi  Heart: Regular rate and rhythm; [x ] murmur   2/6 [ x] systolic [ ] diastolic [ ] radiation[ ] rubs [ ]  gallops  Abdomen: Nontender, bowel sounds present  Extremities: No clubbing, cyanosis, [ ] edema [ ]Pulses  equal and intact  Nervous system:  Alert & Oriented X3, no focal deficits  Psychiatric: Normal affect  Skin: No rashes or lesions    LABS:  07-01    133<L>  |  100  |  38<H>  ----------------------------<  145<H>  4.4   |  22  |  1.19    Ca    9.7      01 Jul 2022 08:09  Phos  3.5     07-01  Mg     2.40     07-01      Creatinine Trend: 1.19<--, 0.97<--, 0.99<--, 0.99<--, 0.96<--, 1.00<--                        10.2   11.80 )-----------( 387      ( 01 Jul 2022 08:09 )             33.1

## 2022-07-02 NOTE — PROGRESS NOTE ADULT - SUBJECTIVE AND OBJECTIVE BOX
PATIENT SEEN AND EXAMINED ON :- 7/2/22  DATE OF SERVICE:   7/2/22          Interim events noted,Labs ,Radiological studies and Cardiology tests reviewed .     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 Pt presented postoperatively with dislodged J tube and had it replaced with a foster, and subsequently underwent fluoroscopy guided replacement by IR on 6/17. Since then pt reports that around 6/20 he had a fever and was seen by outpatient PMD who did labs and gave him IV fluids. Today pt developed diarrhea and reports multiple episodes since this morning, and has not administered any J tube feeds today as he was told to come to ED for evaluation. Denies any other complaints including HA/lightheadedness, lethargy, chest pain/shortness of breath, nausea/vomiting, constipation.    (23 Jun 2022 10:01)      INTERIM EVENTS:Patient seen at bedside ,interim events noted.      PMH -reviewed admission note, no change since admission  HEART FAILURE: Acute[ ]Chronic[ ] Systolic[ ] Diastolic[ ] Combined Systolic and Diastolic[ ]  CAD[ ] CABG[ ] PCI[ ]  DEVICES[ ] PPM[ ] ICD[ ] ILR[ ]  ATRIAL FIBRILLATION[ ] Paroxysmal[ ] Permanent[ ] CHADS2-[  ]  MOSES[ ] CKD1[ ] CKD2[ ] CKD3[ ] CKD4[ ] ESRD[ ]  COPD[ ] HTN[ ]   DM[ ] Type1[ ] Type 2[ ]   CVA[ ] Paresis[ ]    AMBULATION: Assisted[ ] Cane/walker[ ] Independent[ ]    MEDICATIONS  (STANDING):  amLODIPine   Tablet 5 milliGRAM(s) Oral daily  ATENolol  Tablet 25 milliGRAM(s) Oral daily  ceFAZolin   IVPB 2000 milliGRAM(s) IV Intermittent every 8 hours  chlorhexidine 2% Cloths 1 Application(s) Topical daily  influenza  Vaccine (HIGH DOSE) 0.7 milliLiter(s) IntraMuscular once  multivitamin 1 Tablet(s) Oral daily  nystatin    Suspension 275156 Unit(s) Oral four times a day  pantoprazole  Injectable 40 milliGRAM(s) IV Push daily  sodium chloride 0.9%. 1000 milliLiter(s) (50 mL/Hr) IV Continuous <Continuous>    MEDICATIONS  (PRN):            REVIEW OF SYSTEMS:  Constitutional: [ ] fever, [ ]weight loss,  [ ]fatigue [ ]weight gain  Eyes: [ ] visual changes  Respiratory: [ ]shortness of breath;  [ ] cough, [ ]wheezing, [ ]chills, [ ]hemoptysis  Cardiovascular: [ ] chest pain, [ ]palpitations, [ ]dizziness,  [ ]leg swelling[ ]orthopnea[ ]PND  Gastrointestinal: [ ] abdominal pain, [ ]nausea, [ ]vomiting,  [ ]diarrhea [ ]Constipation [ ]Melena  Genitourinary: [ ] dysuria, [ ] hematuria [ ]Foster  Neurologic: [ ] headaches [ ] tremors[ ]weakness [ ]Paralysis Right[ ] Left[ ]  Skin: [ ] itching, [ ]burning, [ ] rashes  Endocrine: [ ] heat or cold intolerance  Musculoskeletal: [ ] joint pain or swelling; [ ] muscle, back, or extremity pain  Psychiatric: [ ] depression, [ ]anxiety, [ ]mood swings, or [ ]difficulty sleeping  Hematologic: [ ] easy bruising, [ ] bleeding gums    [ ] All remaining systems negative except as per above.   [ ]Unable to obtain.  [x] No change in ROS since admission      Vital Signs Last 24 Hrs  T(C): 36.7 (02 Jul 2022 14:40), Max: 37.1 (02 Jul 2022 05:15)  T(F): 98 (02 Jul 2022 14:40), Max: 98.8 (02 Jul 2022 05:15)  HR: 59 (02 Jul 2022 14:40) (59 - 74)  BP: 119/69 (02 Jul 2022 14:40) (119/69 - 131/59)  BP(mean): --  RR: 15 (02 Jul 2022 14:40) (15 - 16)  SpO2: 100% (02 Jul 2022 14:40) (100% - 100%)  I&O's Summary    01 Jul 2022 07:01  -  02 Jul 2022 07:00  --------------------------------------------------------  IN: 0 mL / OUT: 1500 mL / NET: -1500 mL    02 Jul 2022 07:01  -  02 Jul 2022 20:30  --------------------------------------------------------  IN: 0 mL / OUT: 500 mL / NET: -500 mL        PHYSICAL EXAM:  General: No acute distress BMI-  HEENT: EOMI, PERRL  Neck: Supple, [ ] JVD  Lungs: Equal air entry bilaterally; [ ] rales [ ] wheezing [ ] rhonchi  Heart: Regular rate and rhythm; [x ] murmur   2/6 [ x] systolic [ ] diastolic [ ] radiation[ ] rubs [ ]  gallops  Abdomen: Nontender, bowel sounds present  Extremities: No clubbing, cyanosis, [ ] edema [ ]Pulses  equal and intact  Nervous system:  Alert & Oriented X3, no focal deficits  Psychiatric: Normal affect  Skin: No rashes or lesions    LABS:  07-02    130<L>  |  98  |  41<H>  ----------------------------<  129<H>  4.9   |  23  |  1.20    Ca    9.8      02 Jul 2022 06:15  Phos  3.3     07-02  Mg     2.20     07-02      Creatinine Trend: 1.20<--, 1.19<--, 0.97<--, 0.99<--, 0.99<--, 0.96<--                        9.3    10.09 )-----------( 389      ( 02 Jul 2022 06:15 )             29.4

## 2022-07-02 NOTE — PROGRESS NOTE ADULT - TIME BILLING
- Review of records, telemetry, vital signs and daily labs.   - General and cardiovascular physical examination.  - Generation of cardiovascular treatment plan.  - Coordination of care.      Patient was seen and examined by me on 7/2//22,interim events noted,labs and radiology studies reviewed.  Trevor De La Garza MD,FACC.  [address] [place], [place] [id number].  [phone]

## 2022-07-03 NOTE — CHART NOTE - NSCHARTNOTEFT_GEN_A_CORE
Pt with persistent leaking around J-tube, noted purulent drainage, surgery re-consulted, spoke with Dr. Thakur. ID f/u requested for further recommendation. Awaiting recs.

## 2022-07-03 NOTE — PROGRESS NOTE ADULT - ASSESSMENT
Assessment and Plan:  Mr. 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 He presents today with another leak of his J-tube. Patient seen and discussed with senior resident. He most recently had CT AP imaging on 6/22 which shows abdominal mass, J-tube in place, no crepitus, or abdominal wall edema/fat stranding appreciated. Green seropurulent drainage does not appear to be sepideh infection. Given change in baseline exam, new J-tube leak in the setting of balloon recently being deflated, and recent CT imagining, we recommend repeat CT AP imaging with PO contrast through J-tube to evaluate for worsening obstruction.

## 2022-07-03 NOTE — CHART NOTE - NSCHARTNOTEFT_GEN_A_CORE
Patient continues to endorse having diarrhea, as per RN, no BM yesterday, no BM overnight. At the time of my examination of pt this am, Pt noted w/moderate soft brown stool.

## 2022-07-03 NOTE — PROGRESS NOTE ADULT - SUBJECTIVE AND OBJECTIVE BOX
SUBJECTIVE:  Mr. 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 He has previously been seen by surgery team due to leaking J-tube where balloon was deflated and obstruction was temporarily resolved. Today he presents with leak again from J-tube. Patient is tender around J-tube but no diffuse abdominal pain. He has tolerated J-tubes without issue.    OBJECTIVE:  Vital Signs Last 24 Hrs  T(C): 36.7 (03 Jul 2022 13:19), Max: 37.8 (02 Jul 2022 21:50)  T(F): 98 (03 Jul 2022 13:19), Max: 100 (02 Jul 2022 21:50)  HR: 61 (03 Jul 2022 13:19) (61 - 77)  BP: 144/70 (03 Jul 2022 13:19) (126/65 - 145/65)  BP(mean): --  RR: 16 (03 Jul 2022 13:19) (16 - 16)  SpO2: 100% (03 Jul 2022 13:19) (100% - 100%)      07-02-22 @ 07:01  -  07-03-22 @ 07:00  --------------------------------------------------------  IN: 0 mL / OUT: 500 mL / NET: -500 mL    07-03-22 @ 07:01  -  07-03-22 @ 20:08  --------------------------------------------------------  IN: 0 mL / OUT: 950 mL / NET: -950 mL        Physical Examination:  GEN: NAD,   PULM: symmetric chest rise bilaterally, no increased WOB  ABD: soft, appropriately tender, nondistended, no rebound or guarding. J-tube in place with mild erythema around tube. Green seropurulent drainage appreciated leaking from tube. Venting G tube also present and in place.   EXTR: no LE erythema, moving all extremities      LABS:                        10.1   12.33 )-----------( 399      ( 03 Jul 2022 06:30 )             32.9       07-03    131<L>  |  99  |  46<H>  ----------------------------<  152<H>  5.0   |  21<L>  |  1.15    Ca    9.9      03 Jul 2022 06:30  Phos  3.2     07-03  Mg     2.40     07-03    Assessment and Plan:  Mr. 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 He presents today with another leak of his J-tube. He most recently had CT AP imaging on 6/22 which shows abdominal mass, J-tube in place, no crepitus, or abdominal wall edema/fat stranding appreciated. Green seropurulent drainage does not appear to be sepideh infection. Given change in baseline exam, new J-tube leak in the setting of balloon recently being deflated, and recent CT imagining, we recommend repeat CT AP imaging with PO contrast through J-tube to evaluate for worsening obstruction.     -CT AP with PO contrast through J-tube tomorrow AM  -Continue J-tube feeds as tolerated  -Activity as tolerated         SUBJECTIVE:  Mr. 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 He has previously been seen by surgery team due to leaking J-tube where balloon was deflated and obstruction was temporarily resolved. Today he presents with leak again from J-tube. Patient is tender around J-tube but no diffuse abdominal pain. He has tolerated J-tubes without issue.    OBJECTIVE:  Vital Signs Last 24 Hrs  T(C): 36.7 (03 Jul 2022 13:19), Max: 37.8 (02 Jul 2022 21:50)  T(F): 98 (03 Jul 2022 13:19), Max: 100 (02 Jul 2022 21:50)  HR: 61 (03 Jul 2022 13:19) (61 - 77)  BP: 144/70 (03 Jul 2022 13:19) (126/65 - 145/65)  BP(mean): --  RR: 16 (03 Jul 2022 13:19) (16 - 16)  SpO2: 100% (03 Jul 2022 13:19) (100% - 100%)      07-02-22 @ 07:01  -  07-03-22 @ 07:00  --------------------------------------------------------  IN: 0 mL / OUT: 500 mL / NET: -500 mL    07-03-22 @ 07:01  -  07-03-22 @ 20:08  --------------------------------------------------------  IN: 0 mL / OUT: 950 mL / NET: -950 mL        Physical Examination:  GEN: NAD,   PULM: symmetric chest rise bilaterally, no increased WOB  ABD: soft, appropriately tender, nondistended, no rebound or guarding. J-tube in place with mild erythema around tube. Green seropurulent drainage appreciated leaking from tube. Venting G tube also present and in place.   EXTR: no LE erythema, moving all extremities      LABS:                        10.1   12.33 )-----------( 399      ( 03 Jul 2022 06:30 )             32.9       07-03    131<L>  |  99  |  46<H>  ----------------------------<  152<H>  5.0   |  21<L>  |  1.15    Ca    9.9      03 Jul 2022 06:30  Phos  3.2     07-03  Mg     2.40     07-03

## 2022-07-03 NOTE — PROGRESS NOTE ADULT - SUBJECTIVE AND OBJECTIVE BOX
PATIENT SEEN AND EXAMINED ON :- 7/3/2022  DATE OF SERVICE:  7/3/2022           Interim events noted,Labs ,Radiological studies and Cardiology tests reviewed .     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 Pt presented postoperatively with dislodged J tube and had it replaced with a foster, and subsequently underwent fluoroscopy guided replacement by IR on 6/17. Since then pt reports that around 6/20 he had a fever and was seen by outpatient PMD who did labs and gave him IV fluids. Today pt developed diarrhea and reports multiple episodes since this morning, and has not administered any J tube feeds today as he was told to come to ED for evaluation. Denies any other complaints including HA/lightheadedness, lethargy, chest pain/shortness of breath, nausea/vomiting, constipation.    (23 Jun 2022 10:01)      INTERIM EVENTS:Patient seen at bedside ,interim events noted.      PMH -reviewed admission note, no change since admission  HEART FAILURE: Acute[ ]Chronic[ ] Systolic[ ] Diastolic[ ] Combined Systolic and Diastolic[ ]  CAD[ ] CABG[ ] PCI[ ]  DEVICES[ ] PPM[ ] ICD[ ] ILR[ ]  ATRIAL FIBRILLATION[ ] Paroxysmal[ ] Permanent[ ] CHADS2-[  ]  MOSES[ ] CKD1[ ] CKD2[ ] CKD3[ ] CKD4[ ] ESRD[ ]  COPD[ ] HTN[ ]   DM[ ] Type1[ ] Type 2[ ]   CVA[ ] Paresis[ ]    AMBULATION: Assisted[ ] Cane/walker[ ] Independent[ ]    MEDICATIONS  (STANDING):  amLODIPine   Tablet 5 milliGRAM(s) Oral daily  ATENolol  Tablet 25 milliGRAM(s) Oral daily  ceFAZolin   IVPB 2000 milliGRAM(s) IV Intermittent every 8 hours  chlorhexidine 2% Cloths 1 Application(s) Topical daily  influenza  Vaccine (HIGH DOSE) 0.7 milliLiter(s) IntraMuscular once  multivitamin 1 Tablet(s) Oral daily  nystatin    Suspension 778288 Unit(s) Oral four times a day  pantoprazole  Injectable 40 milliGRAM(s) IV Push daily  sodium chloride 0.9%. 1000 milliLiter(s) (50 mL/Hr) IV Continuous <Continuous>    MEDICATIONS  (PRN):            REVIEW OF SYSTEMS:  Constitutional: [ ] fever, [ ]weight loss,  [ ]fatigue [ ]weight gain  Eyes: [ ] visual changes  Respiratory: [ ]shortness of breath;  [ ] cough, [ ]wheezing, [ ]chills, [ ]hemoptysis  Cardiovascular: [ ] chest pain, [ ]palpitations, [ ]dizziness,  [ ]leg swelling[ ]orthopnea[ ]PND  Gastrointestinal: [ ] abdominal pain, [ ]nausea, [ ]vomiting,  [ ]diarrhea [ ]Constipation [ ]Melena  Genitourinary: [ ] dysuria, [ ] hematuria [ ]Foster  Neurologic: [ ] headaches [ ] tremors[ ]weakness [ ]Paralysis Right[ ] Left[ ]  Skin: [ ] itching, [ ]burning, [ ] rashes  Endocrine: [ ] heat or cold intolerance  Musculoskeletal: [ ] joint pain or swelling; [ ] muscle, back, or extremity pain  Psychiatric: [ ] depression, [ ]anxiety, [ ]mood swings, or [ ]difficulty sleeping  Hematologic: [ ] easy bruising, [ ] bleeding gums    [ ] All remaining systems negative except as per above.   [ ]Unable to obtain.  [x] No change in ROS since admission      Vital Signs Last 24 Hrs  T(C): 36.7 (03 Jul 2022 13:19), Max: 37.8 (02 Jul 2022 21:50)  T(F): 98 (03 Jul 2022 13:19), Max: 100 (02 Jul 2022 21:50)  HR: 61 (03 Jul 2022 13:19) (61 - 77)  BP: 144/70 (03 Jul 2022 13:19) (126/65 - 145/65)  BP(mean): --  RR: 16 (03 Jul 2022 13:19) (16 - 16)  SpO2: 100% (03 Jul 2022 13:19) (100% - 100%)  I&O's Summary    02 Jul 2022 07:01  -  03 Jul 2022 07:00  --------------------------------------------------------  IN: 0 mL / OUT: 500 mL / NET: -500 mL        PHYSICAL EXAM:  General: No acute distress BMI-  HEENT: EOMI, PERRL  Neck: Supple, [ ] JVD  Lungs: Equal air entry bilaterally; [ ] rales [ ] wheezing [ ] rhonchi  Heart: Regular rate and rhythm; [x ] murmur   2/6 [ x] systolic [ ] diastolic [ ] radiation[ ] rubs [ ]  gallops  Abdomen: Nontender, bowel sounds present  Extremities: No clubbing, cyanosis, [ ] edema [ ]Pulses  equal and intact  Nervous system:  Alert & Oriented X3, no focal deficits  Psychiatric: Normal affect  Skin: No rashes or lesions    LABS:  07-03    131<L>  |  99  |  46<H>  ----------------------------<  152<H>  5.0   |  21<L>  |  1.15    Ca    9.9      03 Jul 2022 06:30  Phos  3.2     07-03  Mg     2.40     07-03      Creatinine Trend: 1.15<--, 1.20<--, 1.19<--, 0.97<--, 0.99<--, 0.99<--                        10.1   12.33 )-----------( 399      ( 03 Jul 2022 06:30 )             32.9

## 2022-07-03 NOTE — PROGRESS NOTE ADULT - TIME BILLING
- Review of records, telemetry, vital signs and daily labs.   - General and cardiovascular physical examination.  - Generation of cardiovascular treatment plan.  - Coordination of care.      Patient was seen and examined by me on 7/3//22,interim events noted,labs and radiology studies reviewed.  Trevor De La Garza MD,FACC.  [address] [place], [place] [id number].  [phone]

## 2022-07-04 NOTE — PROGRESS NOTE ADULT - SUBJECTIVE AND OBJECTIVE BOX
SUBJECTIVE  Mr. 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 He has previously been seen by surgery team due to leaking J-tube where balloon was deflated and obstruction was temporarily resolved. Patient had no acute overnight events.     Overnight:  Patient had no acute overnight events. He complains of nausea and abdominal pain localized around the drainage tube. Patient was seen today during morning rounds. He appears comfortable in not acute stress.    OBJECTIVE  Patient is sitting comfortably without acute distress. Physical exam of the abdomen revealed soft, abdomen with mild tenderness around the tube.    Vital Signs Last 24 Hrs  T(C): 36.9 (07-04-22 @ 06:45), Max: 36.9 (07-04-22 @ 06:45)  T(F): 98.5 (07-04-22 @ 06:45), Max: 98.5 (07-04-22 @ 06:45)  HR: 70 (07-04-22 @ 06:45) (61 - 73)  BP: 138/73 (07-04-22 @ 06:45) (138/73 - 150/76)  BP(mean): --  RR: 16 (07-04-22 @ 06:45) (16 - 16)  SpO2: 100% (07-04-22 @ 06:45) (100% - 100%)      T(C): 36.9 (07-04-22 @ 06:45), Max: 36.9 (07-04-22 @ 06:45)  HR: 70 (07-04-22 @ 06:45) (61 - 73)  BP: 138/73 (07-04-22 @ 06:45) (138/73 - 150/76)  RR: 16 (07-04-22 @ 06:45) (16 - 16)  SpO2: 100% (07-04-22 @ 06:45) (100% - 100%)    CONSTITUTIONAL: Well groomed, no apparent distress  GASTROINTESTINAL: Soft, non tender, non distended, no rebound, no guarding.    MEDICATIONS  (STANDING):  amLODIPine   Tablet 5 milliGRAM(s) Oral daily  ATENolol  Tablet 25 milliGRAM(s) Oral daily  ceFAZolin   IVPB 2000 milliGRAM(s) IV Intermittent every 8 hours  chlorhexidine 2% Cloths 1 Application(s) Topical daily  diatrizoate meglumine/diatrizoate sodium. 90 milliLiter(s) Oral once  influenza  Vaccine (HIGH DOSE) 0.7 milliLiter(s) IntraMuscular once  multivitamin 1 Tablet(s) Oral daily  nystatin    Suspension 583135 Unit(s) Oral four times a day  pantoprazole  Injectable 40 milliGRAM(s) IV Push daily    MEDICATIONS  (PRN):

## 2022-07-04 NOTE — PROGRESS NOTE ADULT - ASSESSMENT
Mr. 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     PLAN  CT scan with PO contrast through J-tube Mr. 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     PLAN  CT scan with PO contrast through J-tube to assess for any obstruction  Continue monitoring the patient

## 2022-07-04 NOTE — PROGRESS NOTE ADULT - SUBJECTIVE AND OBJECTIVE BOX
PATIENT SEEN AND EXAMINED ON :- 7/4/22  DATE OF SERVICE:   7/4/22          Interim events noted,Labs ,Radiological studies and Cardiology tests reviewed .   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 Pt presented postoperatively with dislodged J tube and had it replaced with a foster, and subsequently underwent fluoroscopy guided replacement by IR on 6/17. Since then pt reports that around 6/20 he had a fever and was seen by outpatient PMD who did labs and gave him IV fluids. Today pt developed diarrhea and reports multiple episodes since this morning, and has not administered any J tube feeds today as he was told to come to ED for evaluation. Denies any other complaints including HA/lightheadedness, lethargy, chest pain/shortness of breath, nausea/vomiting, constipation.    (23 Jun 2022 10:01)      INTERIM EVENTS:Patient seen at bedside ,interim events noted.      PMH -reviewed admission note, no change since admission  HEART FAILURE: Acute[ ]Chronic[ ] Systolic[ ] Diastolic[ ] Combined Systolic and Diastolic[ ]  CAD[ ] CABG[ ] PCI[ ]  DEVICES[ ] PPM[ ] ICD[ ] ILR[ ]  ATRIAL FIBRILLATION[ ] Paroxysmal[ ] Permanent[ ] CHADS2-[  ]  MOSES[ ] CKD1[ ] CKD2[ ] CKD3[ ] CKD4[ ] ESRD[ ]  COPD[ ] HTN[ ]   DM[ ] Type1[ ] Type 2[ ]   CVA[ ] Paresis[ ]    AMBULATION: Assisted[ ] Cane/walker[ ] Independent[ ]    MEDICATIONS  (STANDING):  amLODIPine   Tablet 5 milliGRAM(s) Oral daily  ATENolol  Tablet 25 milliGRAM(s) Oral daily  ceFAZolin   IVPB 2000 milliGRAM(s) IV Intermittent every 8 hours  chlorhexidine 2% Cloths 1 Application(s) Topical daily  diatrizoate meglumine/diatrizoate sodium. 90 milliLiter(s) Oral once  influenza  Vaccine (HIGH DOSE) 0.7 milliLiter(s) IntraMuscular once  multivitamin 1 Tablet(s) Oral daily  nystatin    Suspension 516524 Unit(s) Oral four times a day  pantoprazole  Injectable 40 milliGRAM(s) IV Push daily    MEDICATIONS  (PRN):            REVIEW OF SYSTEMS:  Constitutional: [ ] fever, [ ]weight loss,  [ ]fatigue [ ]weight gain  Eyes: [ ] visual changes  Respiratory: [ ]shortness of breath;  [ ] cough, [ ]wheezing, [ ]chills, [ ]hemoptysis  Cardiovascular: [ ] chest pain, [ ]palpitations, [ ]dizziness,  [ ]leg swelling[ ]orthopnea[ ]PND  Gastrointestinal: [ ] abdominal pain, [ ]nausea, [ ]vomiting,  [ ]diarrhea [ ]Constipation [ ]Melena  Genitourinary: [ ] dysuria, [ ] hematuria [ ]Foster  Neurologic: [ ] headaches [ ] tremors[ ]weakness [ ]Paralysis Right[ ] Left[ ]  Skin: [ ] itching, [ ]burning, [ ] rashes  Endocrine: [ ] heat or cold intolerance  Musculoskeletal: [ ] joint pain or swelling; [ ] muscle, back, or extremity pain  Psychiatric: [ ] depression, [ ]anxiety, [ ]mood swings, or [ ]difficulty sleeping  Hematologic: [ ] easy bruising, [ ] bleeding gums    [ ] All remaining systems negative except as per above.   [ ]Unable to obtain.  [x] No change in ROS since admission      Vital Signs Last 24 Hrs  T(C): 36.9 (04 Jul 2022 06:45), Max: 36.9 (04 Jul 2022 06:45)  T(F): 98.5 (04 Jul 2022 06:45), Max: 98.5 (04 Jul 2022 06:45)  HR: 70 (04 Jul 2022 06:45) (61 - 73)  BP: 138/73 (04 Jul 2022 06:45) (138/73 - 150/76)  BP(mean): --  RR: 16 (04 Jul 2022 06:45) (16 - 16)  SpO2: 100% (04 Jul 2022 06:45) (100% - 100%)  I&O's Summary    03 Jul 2022 07:01  -  04 Jul 2022 07:00  --------------------------------------------------------  IN: 0 mL / OUT: 1500 mL / NET: -1500 mL        PHYSICAL EXAM:  General: No acute distress BMI-  HEENT: EOMI, PERRL  Neck: Supple, [ ] JVD  Lungs: Equal air entry bilaterally; [ ] rales [ ] wheezing [ ] rhonchi  Heart: Regular rate and rhythm; [x ] murmur   2/6 [ x] systolic [ ] diastolic [ ] radiation[ ] rubs [ ]  gallops  Abdomen: Nontender, bowel sounds present  Extremities: No clubbing, cyanosis, [ ] edema [ ]Pulses  equal and intact  Nervous system:  Alert & Oriented X3, no focal deficits  Psychiatric: Normal affect  Skin: No rashes or lesions    LABS:  07-04    133<L>  |  98  |  47<H>  ----------------------------<  153<H>  5.3   |  25  |  1.24    Ca    10.0      04 Jul 2022 06:05  Phos  3.7     07-04  Mg     2.30     07-04      Creatinine Trend: 1.24<--, 1.15<--, 1.20<--, 1.19<--, 0.97<--, 0.99<--                        10.0   12.02 )-----------( 390      ( 04 Jul 2022 06:05 )             30.6

## 2022-07-04 NOTE — PROGRESS NOTE ADULT - TIME BILLING
- Review of records, telemetry, vital signs and daily labs.   - General and cardiovascular physical examination.  - Generation of cardiovascular treatment plan.  - Coordination of care.      Patient was seen and examined by me on 7/4//22,interim events noted,labs and radiology studies reviewed.  Trevor De La Garza MD,FACC.  [address] [place], [place] [id number].  [phone]

## 2022-07-05 NOTE — PROGRESS NOTE ADULT - ASSESSMENT
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 Surgery consult for concern for infections on J- tube site.    f/ ct scan for J tube leak

## 2022-07-05 NOTE — PROGRESS NOTE ADULT - SUBJECTIVE AND OBJECTIVE BOX
PATIENT SEEN AND EXAMINED ON :-  DATE OF SERVICE:             Interim events noted,Labs ,Radiological studies and Cardiology tests reviewed .   PATIENT SEEN AND EXAMINED ON :- 7/5/22  DATE OF SERVICE:    7/5/22         Interim events noted,Labs ,Radiological studies and Cardiology tests reviewed .   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 Pt presented postoperatively with dislodged J tube and had it replaced with a foster, and subsequently underwent fluoroscopy guided replacement by IR on 6/17. Since then pt reports that around 6/20 he had a fever and was seen by outpatient PMD who did labs and gave him IV fluids. Today pt developed diarrhea and reports multiple episodes since this morning, and has not administered any J tube feeds today as he was told to come to ED for evaluation. Denies any other complaints including HA/lightheadedness, lethargy, chest pain/shortness of breath, nausea/vomiting, constipation.    (23 Jun 2022 10:01)      INTERIM EVENTS:Patient seen at bedside ,interim events noted.      PMH -reviewed admission note, no change since admission  HEART FAILURE: Acute[ ]Chronic[ ] Systolic[ ] Diastolic[ ] Combined Systolic and Diastolic[ ]  CAD[ ] CABG[ ] PCI[ ]  DEVICES[ ] PPM[ ] ICD[ ] ILR[ ]  ATRIAL FIBRILLATION[ ] Paroxysmal[ ] Permanent[ ] CHADS2-[  ]  MOSES[ ] CKD1[ ] CKD2[ ] CKD3[ ] CKD4[ ] ESRD[ ]  COPD[ ] HTN[ ]   DM[ ] Type1[ ] Type 2[ ]   CVA[ ] Paresis[ ]    AMBULATION: Assisted[ ] Cane/walker[ ] Independent[ ]    MEDICATIONS  (STANDING):  amLODIPine   Tablet 5 milliGRAM(s) Oral daily  ATENolol  Tablet 25 milliGRAM(s) Oral daily  ceFAZolin   IVPB 2000 milliGRAM(s) IV Intermittent every 8 hours  chlorhexidine 2% Cloths 1 Application(s) Topical daily  diatrizoate meglumine/diatrizoate sodium. 90 milliLiter(s) Oral once  influenza  Vaccine (HIGH DOSE) 0.7 milliLiter(s) IntraMuscular once  multivitamin 1 Tablet(s) Oral daily  nystatin    Suspension 697329 Unit(s) Oral four times a day  pantoprazole  Injectable 40 milliGRAM(s) IV Push daily  sodium chloride 0.9%. 1000 milliLiter(s) (75 mL/Hr) IV Continuous <Continuous>    MEDICATIONS  (PRN):            REVIEW OF SYSTEMS:  Constitutional: [ ] fever, [ ]weight loss,  [ ]fatigue [ ]weight gain  Eyes: [ ] visual changes  Respiratory: [ ]shortness of breath;  [ ] cough, [ ]wheezing, [ ]chills, [ ]hemoptysis  Cardiovascular: [ ] chest pain, [ ]palpitations, [ ]dizziness,  [ ]leg swelling[ ]orthopnea[ ]PND  Gastrointestinal: [ ] abdominal pain, [ ]nausea, [ ]vomiting,  [ ]diarrhea [ ]Constipation [ ]Melena  Genitourinary: [ ] dysuria, [ ] hematuria [ ]Foster  Neurologic: [ ] headaches [ ] tremors[ ]weakness [ ]Paralysis Right[ ] Left[ ]  Skin: [ ] itching, [ ]burning, [ ] rashes  Endocrine: [ ] heat or cold intolerance  Musculoskeletal: [ ] joint pain or swelling; [ ] muscle, back, or extremity pain  Psychiatric: [ ] depression, [ ]anxiety, [ ]mood swings, or [ ]difficulty sleeping  Hematologic: [ ] easy bruising, [ ] bleeding gums    [ ] All remaining systems negative except as per above.   [ ]Unable to obtain.  [x] No change in ROS since admission      Vital Signs Last 24 Hrs  T(C): 36.6 (05 Jul 2022 17:22), Max: 37.5 (04 Jul 2022 21:43)  T(F): 97.9 (05 Jul 2022 17:22), Max: 99.5 (04 Jul 2022 21:43)  HR: 65 (05 Jul 2022 17:22) (65 - 78)  BP: 145/59 (05 Jul 2022 17:22) (139/67 - 148/74)  BP(mean): --  RR: 17 (05 Jul 2022 17:22) (17 - 18)  SpO2: 100% (05 Jul 2022 17:22) (100% - 100%)  I&O's Summary    04 Jul 2022 07:01  -  05 Jul 2022 07:00  --------------------------------------------------------  IN: 400 mL / OUT: 2200 mL / NET: -1800 mL    05 Jul 2022 07:01  -  05 Jul 2022 19:37  --------------------------------------------------------  IN: 200 mL / OUT: 0 mL / NET: 200 mL        PHYSICAL EXAM:  General: No acute distress BMI-  HEENT: EOMI, PERRL  Neck: Supple, [ ] JVD  Lungs: Equal air entry bilaterally; [ ] rales [ ] wheezing [ ] rhonchi  Heart: Regular rate and rhythm; [x ] murmur   2/6 [ x] systolic [ ] diastolic [ ] radiation[ ] rubs [ ]  gallops  Abdomen: Nontender, bowel sounds present  Extremities: No clubbing, cyanosis, [ ] edema [ ]Pulses  equal and intact  Nervous system:  Alert & Oriented X3, no focal deficits  Psychiatric: Normal affect  Skin: No rashes or lesions    LABS:  07-04    133<L>  |  98  |  47<H>  ----------------------------<  153<H>  5.3   |  25  |  1.24    Ca    10.0      04 Jul 2022 06:05  Phos  3.4     07-05  Mg     2.30     07-04      Creatinine Trend: 1.24<--, 1.15<--, 1.20<--, 1.19<--, 0.97<--, 0.99<--                        10.7   12.09 )-----------( 367      ( 05 Jul 2022 06:25 )             33.2

## 2022-07-05 NOTE — PROGRESS NOTE ADULT - TIME BILLING
- Review of records, telemetry, vital signs and daily labs.   - General and cardiovascular physical examination.  - Generation of cardiovascular treatment plan.  - Coordination of care.      Patient was seen and examined by me on 7/4//22,interim events noted,labs and radiology studies reviewed.  Trevor De La Garza MD,FACC.  [address] [place], [place] [id number].  [phone]

## 2022-07-05 NOTE — PROGRESS NOTE ADULT - ASSESSMENT
Mr. 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     PLAN  - care per primary team  - CT scan with PO contrast through J-tube to assess for leak  - continue iv abx    Jacqueline Munroe PGY3  D Team Surgery  [id number]

## 2022-07-05 NOTE — PROGRESS NOTE ADULT - SUBJECTIVE AND OBJECTIVE BOX
SUBJECTIVE  Mr. 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 He has previously been seen by surgery team due to leaking J-tube where balloon was deflated and obstruction was temporarily resolved. Patient had no acute overnight events.     OBJECTIVE  No events overnight. Patient complaint of hard stool. Continued drainage around g tube site. Tolerating TF. Denies n/v/abdominal pain.    T(C): 37.1 (07-05-22 @ 05:20), Max: 37.5 (07-04-22 @ 21:43)  HR: 78 (07-05-22 @ 05:20) (66 - 78)  BP: 141/65 (07-05-22 @ 05:20) (139/67 - 156/72)  RR: 17 (07-05-22 @ 05:20) (16 - 17)  SpO2: 100% (07-05-22 @ 05:20) (100% - 100%)  Wt(kg): --    07-04 @ 07:01  -  07-05 @ 07:00  --------------------------------------------------------  IN:    Free Water: 400 mL  Total IN: 400 mL    OUT:    Gastrostomy Tube (mL): 2200 mL  Total OUT: 2200 mL    Total NET: -1800 mL        CONSTITUTIONAL: Well groomed, no apparent distress  GASTROINTESTINAL: Soft, non tender, non distended, no rebound, no guarding. G and J tube with drainage and surrounding skin irritation.                             10.7   12.09 )-----------( 367      ( 05 Jul 2022 06:25 )             33.2     07-04    133<L>  |  98  |  47<H>  ----------------------------<  153<H>  5.3   |  25  |  1.24    Ca    10.0      04 Jul 2022 06:05  Phos  3.4     07-05  Mg     2.30     07-04

## 2022-07-06 NOTE — PROVIDER CONTACT NOTE (OTHER) - BACKGROUND
noninfectious gastroenteritis
Noninfectious gastroenteritis
Pt admitted with fevers and diarrhea hx of anal cancer
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 presented with fever diarrhea, and nausea.

## 2022-07-06 NOTE — DISCHARGE NOTE NURSING/CASE MANAGEMENT/SOCIAL WORK - NSDCFUADDAPPT_GEN_ALL_CORE_FT
Weekly CBC/ BMP fax to [phone]    7/20 at 2:30 PM  [address] [facility] [address]  [place] [address]  [phone]    Weekly CBC/ BMP fax to [phone]. D/c planning  rehab.

## 2022-07-06 NOTE — PROGRESS NOTE ADULT - PROBLEM SELECTOR PROBLEM 4
HTN (hypertension)
Proctocolitis
Proctocolitis
HTN (hypertension)
Proctocolitis
Proctocolitis
HTN (hypertension)
Proctocolitis

## 2022-07-06 NOTE — PROVIDER CONTACT NOTE (OTHER) - ASSESSMENT
Patient has oral temp of 100.3, other VS stable. Patient warm to touch c/o generalized body ache.
pt remains stable at this time, bp elevated. afebrile. will cont to monitor
Patient oral temp of 100.1, other VS stable. Oral temp rechecked 99.7
A&Ox4. VSS. No c/o pain.

## 2022-07-06 NOTE — PROGRESS NOTE ADULT - SUBJECTIVE AND OBJECTIVE BOX
SUBJECTIVE  Mr. 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 He has previously been seen by surgery team due to leaking J-tube where balloon was deflated and obstruction was temporarily resolved.      OBJECTIVE  No events overnight. Patient complaining of pain and continued drainage around g tube site. Tolerating TF with some nausea. Passing flatus and stool    T(C): 36.6 (07-06-22 @ 11:02), Max: 37.2 (07-06-22 @ 05:50)  HR: 73 (07-06-22 @ 11:02) (60 - 75)  BP: 134/56 (07-06-22 @ 11:02) (134/56 - 148/74)  RR: 20 (07-06-22 @ 11:02) (16 - 20)  SpO2: 100% (07-06-22 @ 11:02) (100% - 100%)  Wt(kg): --    07-05 @ 07:01  -  07-06 @ 07:00  --------------------------------------------------------  IN:    Free Water: 200 mL  Total IN: 200 mL    OUT:  Total OUT: 0 mL    Total NET: 200 mL          CONSTITUTIONAL: Well groomed, no apparent distress  GASTROINTESTINAL: Soft, non tender, non distended, no rebound, no guarding. G and J tube with drainage and surrounding skin irritation.                                      9.4    11.78 )-----------( 372      ( 06 Jul 2022 05:20 )             29.4     07-06    132<L>  |  101  |  47<H>  ----------------------------<  172<H>  5.1   |  21<L>  |  1.08    Ca    9.8      06 Jul 2022 05:20  Phos  3.6     07-06  Mg     2.10     07-06            ct< from: CT Abdomen and Pelvis w/ Oral Cont (07.05.22 @ 11:25) >  IMPRESSION:  Gastrostomy tube and jejunostomy tube. Small amount of air and contrast   and the superficial aspect of the left rectus abdominis muscle along the   course of the jejunostomy tube.    Increase in size of mass at the level of the pancreatic uncinate process   indistinguishable from the third portion of the duodenum. Small amount of   air within the mass, suggesting fistulization with bowel.    Stable 1.5 x 1.0 cm partially partially calcified mass along the gastric   fundus. Considerations include calcified lymph node or small GIST.    < end of copied text >

## 2022-07-06 NOTE — PROGRESS NOTE ADULT - PROBLEM SELECTOR PROBLEM 5
Anemia
HTN (hypertension)
Anemia
Anemia
HTN (hypertension)
Anemia
HTN (hypertension)
Anemia
Anemia
HTN (hypertension)

## 2022-07-06 NOTE — PROVIDER CONTACT NOTE (OTHER) - RECOMMENDATIONS
Change route from Oral to PEG tube.
NP made aware, continue to monitor, gave blood pressure medications

## 2022-07-06 NOTE — PROGRESS NOTE ADULT - PROBLEM SELECTOR PROBLEM 2
E-coli UTI

## 2022-07-06 NOTE — PROGRESS NOTE ADULT - REASON FOR ADMISSION
fever, diarrhea, J tube site concern for infection
Diarrhea
fever, diarrhea, J tube site concern for infection

## 2022-07-06 NOTE — PROGRESS NOTE ADULT - PROBLEM SELECTOR PLAN 5
c/w amlodipine and Altenol
- likely anemia of Chronic disease  - no overt bleeding  - trend CBC
- likely anemia of Chronic disease  - no overt bleeding  - trend CBC
c/w amlodipine and Altenol
- likely anemia of Chronic disease  - no overt bleeding  - trend CBC
- likely anemia of Chronic disease  - no overt bleeding  - trend CBC
c/w amlodipine and Altenol
- likely anemia of Chronic disease  - no overt bleeding  - trend CBC
c/w amlodipine and Altenol
- likely anemia of Chronic disease  - no overt bleeding  - trend CBC
c/w amlodipine and Altenol

## 2022-07-06 NOTE — PROGRESS NOTE ADULT - TIME BILLING
- Review of records, telemetry, vital signs and daily labs.   - General and cardiovascular physical examination.  - Generation of cardiovascular treatment plan.  - Coordination of care.      Patient was seen and examined by me on 7/4//22,interim events noted,labs and radiology studies reviewed.  Trevor De La Garza MD,FACC.  [address] [place], [place] [id number].  [phone]

## 2022-07-06 NOTE — PROVIDER CONTACT NOTE (OTHER) - ACTION/TREATMENT ORDERED:
ACP aware. Will verify with surgery. Awaiting further orders.
Letty Kang notified. No new orders at this time.
Letty Kang notified. Tylenol ordered.
NP made aware, continue to monitor

## 2022-07-06 NOTE — PROGRESS NOTE ADULT - PROBLEM SELECTOR PROBLEM 6
Anemia
History of duodenal cancer
Anemia
Anemia
History of duodenal cancer
Anemia
History of duodenal cancer
Anemia
History of duodenal cancer

## 2022-07-06 NOTE — PROGRESS NOTE ADULT - ASSESSMENT
Mr. Pascal was sent from our [facility] office by his oncologist (dr. huntley) for mssa bacteremia.  Patient was recently diagnosed with locally advanced pancreatic adenocarcinoma.  He is s/p one day of RT on 6/22. He was started on concurrent xeloda. In the Er, patient had a low grade temp. Today the patient reports diarrhea for two days that has now subsided.     1. Pancreatic adenocarcinoma   - Patient was on Xeloda. Holding while inpatient.  - Plan for RT as outpatient  - Under care of Dr. Huntley of [facility]    2. MSSA bacteremia  - Plan for PICC placement prior to rehab  - On IV antibiotics  - Repeat cultures negative  - Catheter culture positive  - Port removed on 6/27  - ID following    3. J tube leakage  - Per surgery, CT demonstrating no intra-abdominal leak, no acute surgical intervention at this time  - Cultures with polymicrobial growth  - surgery following    Will continue to follow. Upon discharge, patient may resume care with Dr. Huntley.    Galdino Bañuelos PA-C  Hematology/Oncology  [facility]  [phone] (office)  [phone] (alt office)  Evenings and weekends please call MD on call or office

## 2022-07-06 NOTE — PROGRESS NOTE ADULT - PROBLEM SELECTOR PLAN 4
c/w amlodipine and Altenol
Ct abd noted  - Start zosyn  - flu blood cultures  - Gi consult
c/w amlodipine and Altenol
Ct abd noted  - Start zosyn  - flu blood cultures  - Gi consult
c/w amlodipine and Altenol
Ct abd noted  - Start zosyn  - flu blood cultures  - Gi consult
c/w amlodipine and Altenol
Ct abd noted  - Start zosyn  - flu blood cultures  - Gi consult

## 2022-07-06 NOTE — PROGRESS NOTE ADULT - PROBLEM SELECTOR PLAN 7
s/p laparotomy  - CT Redemonstration of a mass arising from the third portion the duodenum   extending into the uncinate process of the pancreas measuring up to 3.9 x   2.4 cm, unchanged compared with prior exam.  - surgery consult  - Heme/ onc  follow up
s/p laparotomy  - CT Redemonstration of a mass arising from the third portion the duodenum   extending into the uncinate process of the pancreas measuring up to 3.9 x   2.4 cm, unchanged compared with prior exam.  - surgery consult  - Heme/ onc  follow up
- SCD  - PPI    Dispo: d/c planning after PICC
s/p laparotomy  - CT Redemonstration of a mass arising from the third portion the duodenum   extending into the uncinate process of the pancreas measuring up to 3.9 x   2.4 cm, unchanged compared with prior exam.  - surgery consult  - Heme/ onc  follow up
- SCD  - PPI    Dispo: d/c planning after PICC
- SCD  - PPI    Dispo: d/c planning after PICC
s/p laparotomy  - CT Redemonstration of a mass arising from the third portion the duodenum   extending into the uncinate process of the pancreas measuring up to 3.9 x   2.4 cm, unchanged compared with prior exam.  - surgery consult  - Heme/ onc  follow up
- SCD  - PPI    Dispo: d/c planning after PICC
s/p laparotomy  - CT Redemonstration of a mass arising from the third portion the duodenum   extending into the uncinate process of the pancreas measuring up to 3.9 x   2.4 cm, unchanged compared with prior exam.  - surgery consult  - Heme/ onc  follow up
s/p laparotomy  - CT Redemonstration of a mass arising from the third portion the duodenum   extending into the uncinate process of the pancreas measuring up to 3.9 x   2.4 cm, unchanged compared with prior exam.  - surgery consult  - Heme/ onc  follow up
- SCD  - PPI    Dispo: d/c planning after PICC
s/p laparotomy  - CT Redemonstration of a mass arising from the third portion the duodenum   extending into the uncinate process of the pancreas measuring up to 3.9 x   2.4 cm, unchanged compared with prior exam.  - surgery consult  - Heme/ onc  follow up
- SCD  - PPI    Dispo: d/c planning after PICC

## 2022-07-06 NOTE — PROGRESS NOTE ADULT - PROBLEM SELECTOR PLAN 3
Patient with multiple episode diarrhea   -Symptoms improved  -C/w IVF   -cont feed with cleat liquid diet  - insolation for concern for c-diff  - f/u c-diff  -Surgery consult
Patient with multiple episode diarrhea   -Symptoms improved  -C/w IVF   -cont feed with cleat liquid diet  - insolation for concern for c-diff  - f/u c-diff  -Surgery consult
Ct abd noted  - Start zosyn  - flu blood cultures  - Gi consult
Ct abd noted  - c/w abx
Patient with multiple episode diarrhea   -Symptoms improved  -C/w IVF   -cont feed with cleat liquid diet  - insolation for concern for c-diff  - f/u c-diff  -Surgery consult
Patient with multiple episode diarrhea   -Symptoms improved  -C/w IVF   -cont feed with cleat liquid diet  - insolation for concern for c-diff  - f/u c-diff  -Surgery consult  - nutrition consult
Ct abd noted  - Start zosyn  - flu blood cultures  - Gi consult
Patient with multiple episode diarrhea   -Symptoms improved  -C/w IVF   -cont feed with cleat liquid diet  - insolation for concern for c-diff  - f/u c-diff  -Surgery consult
Ct abd noted  - Start zosyn  - flu blood cultures  - Gi consult
Ct abd noted  - Start zosyn  - flu blood cultures  - Gi consult
Ct abd noted  - c/w abx

## 2022-07-06 NOTE — DISCHARGE NOTE NURSING/CASE MANAGEMENT/SOCIAL WORK - PATIENT PORTAL LINK FT
You can access the FollowMyHealth Patient Portal offered by [facility] by registering at the following website: [email or web address]. By joining [facility]’s FollowMyHealth portal, you will also be able to view your health information using other applications (apps) compatible with our system. Normal rate, regular rhythm.  Heart sounds S1, S2.  No murmurs, rubs or gallops.

## 2022-07-06 NOTE — PROGRESS NOTE ADULT - PROVIDER SPECIALTY LIST ADULT
Heme/Onc
Heme/Onc
Cardiology
Cardiology
Surgery
Surgery
Heme/Onc
Infectious Disease
Surgery
Cardiology
Heme/Onc
Infectious Disease
Surgery
Internal Medicine
Cardiology
Internal Medicine
Cardiology

## 2022-07-06 NOTE — PROGRESS NOTE ADULT - SUBJECTIVE AND OBJECTIVE BOX
PATIENT SEEN AND EXAMINED ON :- 7/6/2022  DATE OF SERVICE: 7/6/2022            Interim events noted,Labs ,Radiological studies and Cardiology tests reviewed .     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 Pt presented postoperatively with dislodged J tube and had it replaced with a foster, and subsequently underwent fluoroscopy guided replacement by IR on 6/17. Since then pt reports that around 6/20 he had a fever and was seen by outpatient PMD who did labs and gave him IV fluids. Today pt developed diarrhea and reports multiple episodes since this morning, and has not administered any J tube feeds today as he was told to come to ED for evaluation. Denies any other complaints including HA/lightheadedness, lethargy, chest pain/shortness of breath, nausea/vomiting, constipation.    (23 Jun 2022 10:01)      INTERIM EVENTS:Patient seen at bedside ,interim events noted.      PMH -reviewed admission note, no change since admission  HEART FAILURE: Acute[ ]Chronic[ ] Systolic[ ] Diastolic[ ] Combined Systolic and Diastolic[ ]  CAD[ ] CABG[ ] PCI[ ]  DEVICES[ ] PPM[ ] ICD[ ] ILR[ ]  ATRIAL FIBRILLATION[ ] Paroxysmal[ ] Permanent[ ] CHADS2-[  ]  MOSES[ ] CKD1[ ] CKD2[ ] CKD3[ ] CKD4[ ] ESRD[ ]  COPD[ ] HTN[ ]   DM[ ] Type1[ ] Type 2[ ]   CVA[ ] Paresis[ ]    AMBULATION: Assisted[ ] Cane/walker[ ] Independent[ ]    MEDICATIONS  (STANDING):  amLODIPine   Tablet 5 milliGRAM(s) Oral daily  ATENolol  Tablet 25 milliGRAM(s) Oral daily  ceFAZolin   IVPB 2000 milliGRAM(s) IV Intermittent every 8 hours  chlorhexidine 2% Cloths 1 Application(s) Topical daily  chlorhexidine 4% Liquid 1 Application(s) Topical <User Schedule>  diatrizoate meglumine/diatrizoate sodium. 90 milliLiter(s) Oral once  influenza  Vaccine (HIGH DOSE) 0.7 milliLiter(s) IntraMuscular once  multivitamin 1 Tablet(s) Oral daily  nystatin    Suspension 246420 Unit(s) Oral four times a day  pantoprazole  Injectable 40 milliGRAM(s) IV Push daily  sodium chloride 0.9%. 1000 milliLiter(s) (75 mL/Hr) IV Continuous <Continuous>    MEDICATIONS  (PRN):  sodium chloride 0.9% lock flush 10 milliLiter(s) IV Push every 1 hour PRN Pre/post blood products, medications, blood draw, and to maintain line patency            REVIEW OF SYSTEMS:  Constitutional: [ ] fever, [ ]weight loss,  [ ]fatigue [ ]weight gain  Eyes: [ ] visual changes  Respiratory: [ ]shortness of breath;  [ ] cough, [ ]wheezing, [ ]chills, [ ]hemoptysis  Cardiovascular: [ ] chest pain, [ ]palpitations, [ ]dizziness,  [ ]leg swelling[ ]orthopnea[ ]PND  Gastrointestinal: [ ] abdominal pain, [ ]nausea, [ ]vomiting,  [ ]diarrhea [ ]Constipation [ ]Melena  Genitourinary: [ ] dysuria, [ ] hematuria [ ]Foster  Neurologic: [ ] headaches [ ] tremors[ ]weakness [ ]Paralysis Right[ ] Left[ ]  Skin: [ ] itching, [ ]burning, [ ] rashes  Endocrine: [ ] heat or cold intolerance  Musculoskeletal: [ ] joint pain or swelling; [ ] muscle, back, or extremity pain  Psychiatric: [ ] depression, [ ]anxiety, [ ]mood swings, or [ ]difficulty sleeping  Hematologic: [ ] easy bruising, [ ] bleeding gums    [ ] All remaining systems negative except as per above.   [ ]Unable to obtain.  [x] No change in ROS since admission      Vital Signs Last 24 Hrs  T(C): 36.6 (06 Jul 2022 13:00), Max: 37.2 (06 Jul 2022 05:50)  T(F): 97.9 (06 Jul 2022 13:00), Max: 98.9 (06 Jul 2022 05:50)  HR: 71 (06 Jul 2022 13:00) (60 - 73)  BP: 132/65 (06 Jul 2022 13:00) (132/65 - 146/69)  BP(mean): --  RR: 18 (06 Jul 2022 13:00) (16 - 20)  SpO2: 100% (06 Jul 2022 13:00) (100% - 100%)  I&O's Summary    05 Jul 2022 07:01  -  06 Jul 2022 07:00  --------------------------------------------------------  IN: 200 mL / OUT: 0 mL / NET: 200 mL        PHYSICAL EXAM:  General: No acute distress BMI-  HEENT: EOMI, PERRL  Neck: Supple, [ ] JVD  Lungs: Equal air entry bilaterally; [ ] rales [ ] wheezing [ ] rhonchi  Heart: Regular rate and rhythm; [x ] murmur   2/6 [ x] systolic [ ] diastolic [ ] radiation[ ] rubs [ ]  gallops  Abdomen: Nontender, bowel sounds present  Extremities: No clubbing, cyanosis, [ ] edema [ ]Pulses  equal and intact  Nervous system:  Alert & Oriented X3, no focal deficits  Psychiatric: Normal affect  Skin: No rashes or lesions    LABS:  07-06    132<L>  |  101  |  47<H>  ----------------------------<  172<H>  5.1   |  21<L>  |  1.08    Ca    9.8      06 Jul 2022 05:20  Phos  3.6     07-06  Mg     2.10     07-06      Creatinine Trend: 1.08<--, 1.24<--, 1.15<--, 1.20<--, 1.19<--, 0.97<--                        9.4    11.78 )-----------( 372      ( 06 Jul 2022 05:20 )             29.4

## 2022-07-06 NOTE — PROGRESS NOTE ADULT - NUTRITIONAL ASSESSMENT
This patient has been assessed with a concern for Malnutrition and has been determined to have a diagnosis/diagnoses of Severe protein-calorie malnutrition.    This patient is being managed with:   Diet Clear Liquid-  Vegan {Accepts Vegetable Products Only}  Tube Feeding Modality: Jejunostomy  Jevity 1.2 Nabil (JEVITY1.2RTH)  Total Volume for 24 Hours (mL): 1344  Continuous  Starting Tube Feed Rate {mL per Hour}: 10  Increase Tube Feed Rate by (mL): 10     Every 4 hours  Until Goal Tube Feed Rate (mL per Hour): 56  Tube Feed Duration (in Hours): 24  Tube Feed Start Time: 10:12  Entered: Jun 24 2022  2:03PM    

## 2022-07-06 NOTE — DISCHARGE NOTE NURSING/CASE MANAGEMENT/SOCIAL WORK - NSDCPEFALRISK_GEN_ALL_CORE
For information on Fall & Injury Prevention, visit: [email or web address] OR  [email or web address] OR  https://www.cdc.gov/steadi/patient.html

## 2022-07-06 NOTE — PROGRESS NOTE ADULT - PROBLEM SELECTOR PLAN 1
p/w fever, likley 2/2 J tube site. OP blood cx +  - jtube site Ecoli and Staph   - repeat blood cx negative so far  - cxr unremarkable  -Cont IV abx cefazolin  - F/u TTE  - ID consult appreciate  -  PICC line out   - IR planned to take out mediport on 6/27  -plan picc for extended IV abx
p/w fever, likley 2/2 J tube site. OP blood cx +  - jtube site Ecoli and Staph   - repeat blood cx negative so far  - cxr unremarkable  -Cont IV abx cefazolin  - F/u TTE  - ID consult appreciate  -  PICC line out   - s/p mediport removed on 6/27  - Place  picc for extended IV abx
p/w fever, likley 2/2 J tube site. OP blood cx +  - jtube site Ecoli and Staph   - repeat blood cx negative so far  - cxr unremarkable  -Cont IV abx cefazolin  - F/u TTE  - ID consult appreciate  -  PICC line out   - s/p mediport removed on 6/27  - Place  picc for extended IV abx
p/w fever, likley 2/2 J tube site. OP blood cx +  - jtube site Ecoli and Staph   - repeat blood cx negative so far  - cxr unremarkable  -Cont IV abx zosyn   - F/u TTE  - consult ID
p/w fever, likley 2/2 J tube site. OP blood cx +  - jtube site Ecoli and Staph - continues to leak- f/u surgery recs  - Place  picc for extended IV abx - ID recs  - d/c planning after PICC
p/w fever, likley 2/2 J tube site. OP blood cx +  - jtube site Ecoli and Staph - continues to leak- f/u surgery recs  - Place  picc for extended IV abx - ID recs  - d/c planning after PICC
-CT AP with PO contrast through J-tube tomorrow AM  -Continue J-tube feeds as tolerated  -Activity as tolerated
p/w fever, likley 2/2 J tube site. OP blood cx +  - jtube site Ecoli and Staph   - repeat blood cx negative so far  - cxr unremarkable  -Cont IV abx cefazolin  - F/u TTE  - ID consult appreciate  -  PICC line out   - IR planned to take out mediport on Monday
p/w fever, likley 2/2 J tube site. OP blood cx +  - jtube site Ecoli and Staph - continues to leak- f/u surgery recs  - Place  picc for extended IV abx - ID recs  - d/c planning after PICC
p/w fever, likley 2/2 J tube site. OP blood cx +  - jtube site Ecoli and Staph   - repeat blood cx negative so far  - cxr unremarkable  -Cont IV abx cefazolin  - F/u TTE  - ID consult appreciate
p/w fever, likley 2/2 J tube site. OP blood cx +  - jtube site Ecoli and Staph   - Place  picc for extended IV abx - ID recs  - d/c planning after PICC
p/w fever, likley 2/2 J tube site. OP blood cx +  - jtube site Ecoli and Staph   - repeat blood cx negative so far  - cxr unremarkable  -Cont IV abx cefazolin  - F/u TTE  - ID consult appreciate  -  PICC line out   - IR planned to take out mediport on 6/27  -plan picc for extended IV abx
p/w fever, likley 2/2 J tube site. OP blood cx +  - jtube site Ecoli and Staph   - Place  picc for extended IV abx - ID recs  - d/c planning after PICC
p/w fever, likley 2/2 J tube site. OP blood cx +  - jtube site Ecoli and Staph - continues to leak- f/u surgery recs  - Place  picc for extended IV abx - ID recs    - J tube leakage, Per surgery, CT demonstrating no intra-abdominal leak, no acute surgical intervention at this time    - d/c planning after PICC

## 2022-07-06 NOTE — PROGRESS NOTE ADULT - PROBLEM SELECTOR PROBLEM 7
Prophylactic measure
History of duodenal cancer
Prophylactic measure
Prophylactic measure
History of duodenal cancer
Prophylactic measure
History of duodenal cancer

## 2022-07-06 NOTE — PROGRESS NOTE ADULT - PROBLEM SELECTOR PROBLEM 1
MSSA bacteremia
MSSA bacteremia
Leak of percutaneous jejunostomy tube
MSSA bacteremia

## 2022-07-06 NOTE — PROGRESS NOTE ADULT - PROBLEM SELECTOR PLAN 6
s/p laparotomy  - CT Redemonstration of a mass arising from the third portion the duodenum   extending into the uncinate process of the pancreas measuring up to 3.9 x   2.4 cm, unchanged compared with prior exam.  - surgery consult  - Heme/ onc  follow up
- likely anemia of Chronic disease  - no overt bleeding  - trend CBC
s/p laparotomy  - CT Redemonstration of a mass arising from the third portion the duodenum   extending into the uncinate process of the pancreas measuring up to 3.9 x   2.4 cm, unchanged compared with prior exam.  - surgery consult  - Heme/ onc  follow up
- likely anemia of Chronic disease  - no overt bleeding  - trend CBC
s/p laparotomy  - CT Redemonstration of a mass arising from the third portion the duodenum   extending into the uncinate process of the pancreas measuring up to 3.9 x   2.4 cm, unchanged compared with prior exam.  - surgery consult  - Heme/ onc  follow up
- likely anemia of Chronic disease  - no overt bleeding  - trend CBC
s/p laparotomy  - CT Redemonstration of a mass arising from the third portion the duodenum   extending into the uncinate process of the pancreas measuring up to 3.9 x   2.4 cm, unchanged compared with prior exam.  - surgery consult  - Heme/ onc  follow up
s/p laparotomy  - CT Redemonstration of a mass arising from the third portion the duodenum   extending into the uncinate process of the pancreas measuring up to 3.9 x   2.4 cm, unchanged compared with prior exam.  - surgery consult  - Heme/ onc  follow up
- likely anemia of Chronic disease  - no overt bleeding  - trend CBC
s/p laparotomy  - CT Redemonstration of a mass arising from the third portion the duodenum   extending into the uncinate process of the pancreas measuring up to 3.9 x   2.4 cm, unchanged compared with prior exam.  - surgery consult  - Heme/ onc  follow up
- likely anemia of Chronic disease  - no overt bleeding  - trend CBC

## 2022-07-06 NOTE — PROGRESS NOTE ADULT - PROBLEM SELECTOR PROBLEM 3
Proctocolitis
Diarrhea
Diarrhea
Proctocolitis
Diarrhea
Proctocolitis
Diarrhea
Diarrhea
Proctocolitis
Proctocolitis
Diarrhea
Diarrhea
Proctocolitis

## 2022-07-06 NOTE — PROGRESS NOTE ADULT - ASSESSMENT
Mr. 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     PLAN  - care per primary team  - continue iv abx  - ct demonstrating no intra-abdominal leak, no acute surgical intervention at this time    Jacqueline Munroe PGY3  D Team Surgery  [id number] Mr. 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     PLAN  - care per primary team  - continue iv abx  - ct demonstrating no intra-abdominal leak, no acute surgical intervention at this time  - recommend wound care c/s for skin irritation around g and j tube.    Jacqueline Munroe PGY3  D Team Surgery  [id number]

## 2022-07-06 NOTE — PROGRESS NOTE ADULT - SUBJECTIVE AND OBJECTIVE BOX
Patient is a 77y old  Male who presents with a chief complaint of fever, diarrhea, J tube site concern for infection (06 Jul 2022 11:44)    Pt seen and examined at bedside.    MEDICATIONS  (STANDING):  amLODIPine   Tablet 5 milliGRAM(s) Oral daily  ATENolol  Tablet 25 milliGRAM(s) Oral daily  ceFAZolin   IVPB 2000 milliGRAM(s) IV Intermittent every 8 hours  chlorhexidine 2% Cloths 1 Application(s) Topical daily  chlorhexidine 4% Liquid 1 Application(s) Topical <User Schedule>  diatrizoate meglumine/diatrizoate sodium. 90 milliLiter(s) Oral once  influenza  Vaccine (HIGH DOSE) 0.7 milliLiter(s) IntraMuscular once  multivitamin 1 Tablet(s) Oral daily  nystatin    Suspension 222065 Unit(s) Oral four times a day  pantoprazole  Injectable 40 milliGRAM(s) IV Push daily  sodium chloride 0.9%. 1000 milliLiter(s) (75 mL/Hr) IV Continuous <Continuous>    MEDICATIONS  (PRN):  sodium chloride 0.9% lock flush 10 milliLiter(s) IV Push every 1 hour PRN Pre/post blood products, medications, blood draw, and to maintain line patency    Vital Signs Last 24 Hrs  T(C): 36.6 (06 Jul 2022 11:02), Max: 37.2 (06 Jul 2022 05:50)  T(F): 97.9 (06 Jul 2022 11:02), Max: 98.9 (06 Jul 2022 05:50)  HR: 73 (06 Jul 2022 11:02) (60 - 73)  BP: 134/56 (06 Jul 2022 11:02) (134/56 - 146/69)  BP(mean): --  RR: 20 (06 Jul 2022 11:02) (16 - 20)  SpO2: 100% (06 Jul 2022 11:02) (100% - 100%)    PE  NAD  Awake, alert  Anicteric, MMM  No c/c/e  No rash grossly                        9.4    11.78 )-----------( 372      ( 06 Jul 2022 05:20 )             29.4     07-06    132<L>  |  101  |  47<H>  ----------------------------<  172<H>  5.1   |  21<L>  |  1.08    Ca    9.8      06 Jul 2022 05:20  Phos  3.6     07-06  Mg     2.10     07-06

## 2022-07-06 NOTE — CHART NOTE - NSCHARTNOTESELECT_GEN_ALL_CORE
ACP NP/Event Note
ACP NP/Event Note
ACP/Event Note
Event Note
Event Note
IR
IR Pre procedure/Event Note
Nutrition Follow-up Note/Nutrition Services
Pre-IR Procedure/Event Note

## 2022-07-06 NOTE — CHART NOTE - NSCHARTNOTEFT_GEN_A_CORE
Case discussed with ACP supervisor Sofy ([id number]) regarding PICC line placement approval. As per supervisor, pt. approved for PICC line placement for antibiotics at rehab facility.     EVERETT Peralta-BC

## 2022-07-06 NOTE — PROGRESS NOTE ADULT - PROBLEM SELECTOR PLAN 2
cont IV abx  -plan as above

## 2022-07-06 NOTE — CHART NOTE - NSCHARTNOTEFT_GEN_A_CORE
IR Pre-Procedure Note    Patient Age:   77y    Patient Gender:   Male    Procedure (including site / side if known): PICC line placement    Diagnosis / Indication: Patient is a 77y old  Male who presents with a chief complaint of fever, diarrhea, J tube site concern for infection (05 Jul 2022 18:12)      Interventional Radiology Attending Physician:    Ordering Attending Physician: Dr. De La Garza    PAST MEDICAL & SURGICAL HISTORY:  Anal cancer  Had Chemo and Radiation 4 years ago      S/P laparotomy           Pertinent Labs:   CBC Full  -  ( 06 Jul 2022 05:20 )  WBC Count : 11.78 K/uL  RBC Count : 3.66 M/uL  Hemoglobin : 9.4 g/dL  Hematocrit : 29.4 %  Platelet Count - Automated : 372 K/uL  Mean Cell Volume : 80.3 fL  Mean Cell Hemoglobin : 25.7 pg  Mean Cell Hemoglobin Concentration : 32.0 gm/dL  Auto Neutrophil # : x  Auto Lymphocyte # : x  Auto Monocyte # : x  Auto Eosinophil # : x  Auto Basophil # : x  Auto Neutrophil % : x  Auto Lymphocyte % : x  Auto Monocyte % : x  Auto Eosinophil % : x  Auto Basophil % : x    07-06    132<L>  |  101  |  47<H>  ----------------------------<  172<H>  5.1   |  21<L>  |  1.08    Ca    9.8      06 Jul 2022 05:20  Phos  3.6     07-06  Mg     2.10     07-06          Patient / Family aware of procedure:   [ X ] Y   [  ] N

## 2022-07-20 NOTE — H&P ADULT - NSHPPHYSICALEXAM_GEN_ALL_CORE
Vital Signs Last 24 Hrs  T(C): 36.7 (20 Jul 2022 22:55), Max: 37.2 (20 Jul 2022 15:56)  T(F): 98 (20 Jul 2022 22:55), Max: 99 (20 Jul 2022 15:56)  HR: 62 (20 Jul 2022 22:55) (60 - 62)  BP: 110/61 (20 Jul 2022 22:55) (108/66 - 123/75)  BP(mean): 73 (20 Jul 2022 19:37) (73 - 73)  RR: 16 (20 Jul 2022 22:55) (16 - 18)  SpO2: 100% (20 Jul 2022 22:55) (99% - 100%)    Parameters below as of 20 Jul 2022 22:55  Patient On (Oxygen Delivery Method): room air

## 2022-07-20 NOTE — ED ADULT NURSE NOTE - OBJECTIVE STATEMENT
76 yo male BIBEMS form [facility], [facility] of Anal/GI CA, with G tube and Jtube for feeding, C/o Jtube being pulled out by staff. Pt has no other compliants or discomfort. Pt denies chest pain, palpitations, shortness of breath, headache, visual disturbances, numbness/tingling, fever, chills, diaphoresis,  nausea, vomiting, constipation, diarrhea, or urinary symptoms.  PT skin w/d/i, PICC line to Left upper arm.

## 2022-07-20 NOTE — ED PROVIDER NOTE - ATTENDING CONTRIBUTION TO CARE
I, Roberto Carlos Goodman, performed a history and physical exam of the patient and discussed their management with the resident and /or advanced care provider. I reviewed the resident and /or ACP's note and agree with the documented findings and plan of care. I was present and available for all procedures. Patient without acute complaint, 10 fr placed in J tube stoma site without issue and IR consulted for J tube placed. Patient otherwise stable but K+ of 5.5 without EKG changes and hgb of 7.9 with pt not complaining of any sxs of anemia and no concerns for acute blood loss and will not transfuse at this time. Patient was initially placed on observation but ultimately admitted to the floor given the lab abnormalities.

## 2022-07-20 NOTE — ED PROVIDER NOTE - NS ED ATTENDING STATEMENT MOD
I have seen and examined this patient and fully participated in the care of this patient as the teaching attending.  The service was shared with the KULDEEP.  I reviewed and verified the documentation and independently performed the documented:

## 2022-07-20 NOTE — CONSULT NOTE ADULT - ASSESSMENT
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   ·  Plan: -IR consulted by ED, consult noted, appreciate recommendations   -plan for replacement 7/21  -hold all PO meds and tube feeds via j-tube for now  -will give additional 1L NS over 10 hours for supplemental hydration.    Problem/Plan - 2:  ·  Problem: GI malignancy.   ·  Plan: -per niece, pt is planned to resume Capecitabine soon but not currently taking it  -history of metastatic anal cancer and duodenal cancer  -c/w Zofran PRN (obtain EKG to check QTc).    Problem/Plan - 3:  ·  Problem: Acute on chronic anemia.   ·  Plan: -unclear etiology but likely multifactorial   -obtain b12, folate, Iron Studies, LDH, haptoglobin   -obtain fecal occult.    Problem/Plan - 4:  ·  Problem: Encounter for wound care.   ·  Plan: -c/w mupirocin to tube insertion wound sites  -c/w A&D to bilateral heels  -c/w Helio to bilateral buttocks.    Problem/Plan - 5:  ·  Problem: Need for prophylactic measure.   ·  Plan: dvt ppx: lovenox (monitor Hg closely-hold if continues to drop)  ambulate: with assistance, obtain PT consult  diet: NPO pending j-tube replacement  gi ppx: home ppi (iv form pending j-tube replacement).

## 2022-07-20 NOTE — H&P ADULT - PROBLEM SELECTOR PLAN 3
-unclear etiology but likely multifactorial   -obtain b12, folate, Iron Studies, LDH, haptoglobin   -obtain fecal occult

## 2022-07-20 NOTE — H&P ADULT - PROBLEM SELECTOR PLAN 5
dvt ppx: lovenox (monitor Hg closely-hold if continues to drop)  ambulate: with assistance, obtain PT consult  diet: NPO pending j-tube replacement  gi ppx: home ppi (iv form pending j-tube replacement)

## 2022-07-20 NOTE — H&P ADULT - NSHPLABSRESULTS_GEN_ALL_CORE
Personally reviewed labs and prior hospitalization records    Prior Hospitalization records summary: admitted to [facility] 6/22-7/6 for fever, grew MSSA bacteremia, completed course of cefazolin via PICC (finished antibiotics today), discharged to [facility], has been there since

## 2022-07-20 NOTE — ED PROVIDER NOTE - OBJECTIVE STATEMENT
78 y/o M h/o anal CA with G- and J-tube in place BIBEMS from rehab facility c/o J tube dislodgement approximately 1-2 hours prior to arrival. Pt reports J-tube dislodged during PT today, pt denies abd pain, n/v, pain at site, abnormal output or other complaints.

## 2022-07-20 NOTE — CONSULT NOTE ADULT - TIME BILLING
- Review of records, telemetry, vital signs and daily labs.   - General and cardiovascular physical examination.  - Generation of cardiovascular treatment plan.  - Coordination of care.  -Discussed with patients niece      Patient was seen and examined by me on 07/20/2022,interim events noted,labs and radiology studies reviewed.  Trevor De La Garza MD,FACC.  [address] [place], [place] [id number].  [phone]

## 2022-07-20 NOTE — H&P ADULT - HISTORY OF PRESENT ILLNESS
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 and feeding jejunostomy on 5/13 s/p exchange of J tube on 6/17, presents with dislodged J-tube, pt reports this is the fourth time the tube has come out, he reports he was at rehab being moved when the tube was mechanically dislodged, of note, patient was recently admitted to [facility] 6/22-7/6 for fever, grew MSSA bacteremia, completed course of cefazolin via PICC (finished antibiotics today), patient since discharge has been at [facility], planning to be discharged on 7/22, patient is currently endorsing fatigue but denies f/c, chest pain, palpitations, abdominal pain, n/v, diarrhea. in the ED, VSS, labs were significant for Hg slightly below baseline, pt was given 500cc bolus, IR consulted in ED, admitted to general medicine for further management.

## 2022-07-20 NOTE — ED PROVIDER NOTE - CARE PLAN
1 Principal Discharge DX:	Dislodged jejunostomy tube   Principal Discharge DX:	Dislodged jejunostomy tube  Secondary Diagnosis:	Hyperkalemia  Secondary Diagnosis:	Anemia

## 2022-07-20 NOTE — CONSULT NOTE ADULT - ASSESSMENT
Interventional Radiology    Evaluate for Procedure: J tube exchange.     HPI: 76 year old gentleman with history of metastatic anal cancer s/p chemoxrt and recent duodenal cancer c/b GOO s/p laparoscopic venting gastrostomy and feeding jejunostomy on 5/13 s/p Exchange of J tube on 6/17 now dislodged. IR consulted for J tube exchange.     Allergies:   Medications (Abx/Cardiac/Anticoagulation/Blood Products)      Data:  157.5  56.7  T(C): 37.2  HR: 60  BP: 123/75  RR: 16  SpO2: 99%    -WBC 11.78 / HgB 9.4 / Hct 29.4 / Plt 372  -Na 132 / Cl 101 / BUN 47 / Glucose 172  -K 5.1 / CO2 21 / Cr 1.08  -ALT -- / Alk Phos -- / T.Bili --  -INR 1.26 / PTT 36.7    Radiology:     Assessment/Plan: 76 year old gentleman with history of metastatic anal cancer s/p chemoxrt and recent duodenal cancer c/b GOO s/p laparoscopic venting gastrostomy and feeding jejunostomy on 5/13 s/p Exchange of J tube on 6/17 now dislodged. IR consulted for J tube exchange.      - will plan for replacement on 7/21.  - Place order under [place].   - Need covid test within 5 days of procedure.   - D/w ED resident.

## 2022-07-20 NOTE — H&P ADULT - PROBLEM SELECTOR PLAN 1
-IR consulted by ED, consult noted, appreciate recommendations   -plan for replacement 7/21  -hold all PO meds and tube feeds via j-tube for now  -will give additional 1L NS over 10 hours for supplemental hydration

## 2022-07-20 NOTE — ED PROVIDER NOTE - CLINICAL SUMMARY MEDICAL DECISION MAKING FREE TEXT BOX
dislodged J tube, pt otherwise without acute complaint, no evidence of infection of J tube site. Plan for IR consult, dispo pending consult. -Neri Giles PA-C

## 2022-07-20 NOTE — CONSULT NOTE ADULT - SUBJECTIVE AND OBJECTIVE BOX
PATIENT SEEN AND EXAMINED ON :-07/20/2022  DATE OF SERVICE:     07/20/2022        Interim events noted,Labs ,Radiological studies and Cardiology tests reviewed .      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 and feeding jejunostomy on 5/13 s/p exchange of J tube on 6/17, presents with dislodged J-tube, pt reports this is the fourth time the tube has come out, he reports he was at rehab being moved when the tube was mechanically dislodged, of note, patient was recently admitted to [facility] 6/22-7/6 for fever, grew MSSA bacteremia, completed course of cefazolin via PICC (finished antibiotics today), patient since discharge has been at [facility], planning to be discharged on 7/22, patient is currently endorsing fatigue but denies f/c, chest pain, palpitations, abdominal pain, n/v, diarrhea. in the ED, VSS, labs were significant for Hg slightly below baseline, pt was given 500cc bolus, IR consulted in ED, admitted to general medicine for further management.      Allergies and Intolerances:        Allergies:  	No Known Allergies:     Home Medications:   * Patient Currently Takes Medications as of 21-Jul-2022 00:20 documented in Structured Notes  · 	pantoprazole 40 mg oral granule, delayed release: Last Dose Taken:  , 40 milligram(s) by jejunostomy tube once a day   · 	amLODIPine 10 mg oral tablet: Last Dose Taken:  , 1 tab(s) by jejunostomy tube once a day MDD:one tab   · 	atenolol 25 mg oral tablet: Last Dose Taken:  , 1 tab(s) by jejunostomy tube once a day MDD:one   · 	aspirin 81 mg oral delayed release tablet: Last Dose Taken:  , 1 tab(s) orally every 12 hours  · 	nystatin 100,000 units/mL oral suspension: Last Dose Taken:  , 5 milliliter(s) orally 4 times a day  · 	Zofran 4 mg oral tablet: Last Dose Taken:  , 1 tab(s) by gastrostomy tube every 8 hours, As Needed - for nausea   · 	Multiple Vitamins oral tablet, chewable: Last Dose Taken:  , 1 tab(s) by jejunostomy tube once a day  · 	acetaminophen 325 mg oral tablet: Last Dose Taken:  , 2 tab(s) by jejunostomy tube every 6 hours, As Needed - 3), Moderate Pain (4 - 6)  · 	oxyCODONE 5 mg oral tablet: Last Dose Taken:  , 1 tab(s) orally every 4 hours, As Needed  	  	Reference #: [id number] MDD:6  · 	senna oral tablet: Last Dose Taken:  , 2 tab(s) by jejunostomy tube once a day (at bedtime)  · 	polyethylene glycol 3350 oral powder for reconstitution: Last Dose Taken:  , 17 gram(s)         REVIEW OF SYSTEMS:   Review of Systems:  · Review of Systems: +J tube dislodged  · CONSTITUTIONAL: no fever and no chills.  · ROS STATEMENT: all other ROS negative except as per HPI        Patient History:   Past Medical, Past Surgical, and Family History:  PAST MEDICAL HISTORY:  Anal cancer Had Chemo and Radiation 4 years ago.     PAST SURGICAL HISTORY:  Jejunostomy tube present     S/P laparotomy.     FAMILY HISTORY:  No pertinent family history in first degree relatives. No pertinent family history of: cancer.    Social History:  Social History (marital status, living situation, occupation, tobacco use, alcohol and drug use, and sexual history): no current substance, currently in sub acute rehab        Vital Signs Triage Note to FS:  ,,ED ADULT Flow Sheet:    20-Jul-2022 15:56  · Temp (F)	99  · Temp (C) Temp (C)	37.2  · Temp site Temp Site	oral  · Heart Rate Heart Rate (beats/min)	60  · BP Systolic Systolic	123  · BP Diastolic Diastolic (mm Hg)	75  · Respiration Rate (breaths/min) Respiration Rate (breaths/min)	16  · SpO2 (%) SpO2 (%)	99  · SpO2 (%) SpO2 (%)	99          Physical Exam:  · Constitutional	no distress  · Constitutional Comments	appears fatigued but alert  · Eyes	EOMI; conjunctiva clear  · Respiratory	no wheezes; no rales; no rhonchi  · Cardiovascular	regular rate and rhythm; no murmur  · Gastrointestinal	soft  · Gastrointestinal Comments	mild diffuse tenderness, no rebound or guarding, tube insertion sites are clean, dry and intact  · Neurological	cranial nerves II-XII intact; sensation intact; responds to verbal commands  · Motor	non-focal  · Skin	warm and dry; color normal  · Musculoskeletal	ROM intact; no joint warmth; no calf tenderness  · Psychiatric	alert and oriented x3; normal behavior; flat affect      LABS:    7.9    8.47  )-----------( 333      ( 20 Jul 2022 16:46 )             25.3       07-20    130<L>  |  97  |  35<H>  ----------------------------<  101<H>  4.9   |  26  |  0.97    Ca    9.0      20 Jul 2022 22:12

## 2022-07-20 NOTE — H&P ADULT - PROBLEM SELECTOR PLAN 2
-per niece, pt is planned to resume Capecitabine soon but not currently taking it  -history of metastatic anal cancer and duodenal cancer  -c/w Zofran PRN (obtain EKG to check QTc)

## 2022-07-20 NOTE — H&P ADULT - ASSESSMENT
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

## 2022-07-20 NOTE — ED PROVIDER NOTE - PHYSICAL EXAMINATION
GEN: Pt in NAD, A&O x3.  PSYCH: Affect appropriate.  EYES: Sclera white w/o injection.   ENT: Head NCAT. Neck supple FROM.  RESP: No evidence of respiratory distress.  CARDIAC: RRR.  ABD: Abdomen soft, non-tender. Gastric tube in place, jejunostomy tube site appears clean without surrounding erythema, expected scant output from jejunostomy tube site. No CVAT b/l.  VASC: No edema or calf tenderness.  SKIN: No rashes on the trunk.

## 2022-07-20 NOTE — ED PROVIDER NOTE - PROGRESS NOTE DETAILS
d/w IR, 10F foster placed in J tube site to prevent complete closure as per IR request, IR currently in emergent procedure and will not be able to replace J tube until tomorrow. pt informed, d/w CDU provider, will send to CDU. -Neri Giles PA-C No ECG changes c/w hyperkalemia, pt asx at this time, pt denies change in nutritional status or color of output from G or J tube to suggest bleeding. pt would not be appropriate for CDU given these lab results and additionally no availability currently in CDU, will admit for further monitoring and management as well as IR procedure. -Neri Giles PA-C d/w IR, 10F foster placed in J tube site to prevent complete closure as per IR request, IR currently in emergent procedure and will not be able to replace J tube until tomorrow. pt informed, d/w CDU provider, will evaluate pt. -Neri Giles PA-C No ECG changes c/w hyperkalemia, pt asx at this time, pt denies change in nutritional status or color of output from G or J tube to suggest bleeding, will give IVF and repeat BMP. pt would not be appropriate for CDU given these lab results and additionally no availability currently in CDU, will admit for further monitoring and management as well as IR procedure. -Neri Giles PA-C patient's oncologist called, reports pt not currently on chemotherapy to explain anemia, requesting admission to Dr. Trevor De La Garza. -Neri Glies PA-C

## 2022-07-20 NOTE — H&P ADULT - PROBLEM SELECTOR PLAN 4
-c/w mupirocin to tube insertion wound sites  -c/w A&D to bilateral heels  -c/w Helio to bilateral buttocks

## 2022-07-21 NOTE — PATIENT PROFILE ADULT - FUNCTIONAL ASSESSMENT - DAILY ACTIVITY 1.
Pt sent from md hand/ NV,abdominal pain, and fever as well as abnormal Lab and ct results. 2 = A lot of assistance

## 2022-07-21 NOTE — CONSULT NOTE ADULT - NS ATTEND AMEND GEN_ALL_CORE FT
76 y/o male with history of metastatic anal cancer, pancreatic cancer, admitted after his J-tube was dislodged. He was recently discharged from [facility] for bacteremia. In rehabilitation the tube came out. He is planned to undergo replacement by interventional radiology today.     Noted that he continues to have severe anemia, will follow-up on ordered labs. Likely due to ongoing treatment and active malignancy.

## 2022-07-21 NOTE — CONSULT NOTE ADULT - ASSESSMENT
Pancreatic and anal malignancies   - Under care of Dr. 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 and feeding jejunostomy on 5/13 s/p exchange of J tube on 6/17, presents with dislodged J-tube, pt reports this is the fourth time the tube has come out, he reports he was at rehab being moved when the tube was mechanically dislodged, of note, patient was recently admitted to [facility] 6/22-7/6 for fever, grew MSSA bacteremia, completed course of cefazolin via PICC (finished antibiotics today), patient since discharge has been at [facility], planning to be discharged on 7/22, patient is currently endorsing fatigue but denies f/c, chest pain, palpitations, abdominal pain, n/v, diarrhea. in the ED, VSS, labs were significant for Hg slightly below baseline, pt was given 500cc bolus, IR consulted in ED, admitted to general medicine for further management.  (20 Jul 2022 22:49)    Hematology/Oncology was called to see patient who is currently under care of Dr. Shantanu Hahn of [facility]. He had a history of anal cancer with metastases to distal lymph nodes, treated with 6 cycles of carboplatin + 5FU, and has been in remission for >6 years. He was diagnosed with an unresectable pancreatic cancer 05/2022 and has received concurrent RT and capecitabine. Pt also with chronic mild anemia.     Pancreatic and anal malignancies   - Under care of Dr. Shantanu Hahn of [facility]  - Received concurrent RT with capecitabine for newly diagnosed unresectable pancreatic ca   - Anal cancer in remission >6 years     Anemia  - Hgb lower than baseline (~10-12) in office  - Anemia labs, FOBT ordered, will follow.  - If iron deficient, will request GI consult.   - Please transfuse for hgb < 7.0 grams     Dislodged J-tube  - Replacement by IR today     After discharge, please resume care with Dr. Shantanu Hahn of [facility].

## 2022-07-21 NOTE — CONSULT NOTE ADULT - SUBJECTIVE AND OBJECTIVE BOX
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 and feeding jejunostomy on 5/13 s/p exchange of J tube on 6/17, presents with dislodged J-tube, pt reports this is the fourth time the tube has come out, he reports he was at rehab being moved when the tube was mechanically dislodged, of note, patient was recently admitted to [facility] 6/22-7/6 for fever, grew MSSA bacteremia, completed course of cefazolin via PICC (finished antibiotics today), patient since discharge has been at [facility], planning to be discharged on 7/22, patient is currently endorsing fatigue but denies f/c, chest pain, palpitations, abdominal pain, n/v, diarrhea. in the ED, VSS, labs were significant for Hg slightly below baseline, pt was given 500cc bolus, IR consulted in ED, admitted to general medicine for further management.  (20 Jul 2022 22:49)    Hematology/Oncology was called to see patient with a history of anemia as well as pancreatic and anal cancers under care of Dr. Shantanu Hahn of [facility].       PAST MEDICAL & SURGICAL HISTORY:  Anal cancer  Had Chemo and Radiation 4 years ago      S/P laparotomy      Jejunostomy tube present          FAMILY HISTORY:  No pertinent family history in first degree relatives        Alochol: Denied  Smoking: Nonsmoker  Drug Use: Denied  Marital Status:         Allergies    No Known Allergies    Intolerances        MEDICATIONS  (STANDING):  enoxaparin Injectable 40 milliGRAM(s) SubCutaneous every 24 hours  mineral oil/petrolatum Hydrophilic Ointment 1 Application(s) Topical daily  mineral oil/petrolatum Hydrophilic Ointment 1 Application(s) Topical daily  mupirocin 2% Ointment 1 Application(s) Topical three times a day  pantoprazole  Injectable 40 milliGRAM(s) IV Push daily  sodium chloride 0.9%. 1000 milliLiter(s) (100 mL/Hr) IV Continuous <Continuous>    MEDICATIONS  (PRN):  ondansetron Injectable 4 milliGRAM(s) IV Push every 8 hours PRN Nausea and/or Vomiting      ROS  No fever, sweats, chills  No epistaxis, HA, sore throat  No CP, SOB, cough, sputum  No n/v/d, abd pain, melena, hematochezia  No edema  No rash  No anxiety  No back pain, joint pain  No bleeding, bruising  No dysuria, hematuria    T(C): 36.7 (07-21-22 @ 04:50), Max: 37.2 (07-20-22 @ 15:56)  HR: 63 (07-21-22 @ 04:50) (60 - 63)  BP: 129/72 (07-21-22 @ 04:50) (108/66 - 129/72)  RR: 17 (07-21-22 @ 04:50) (16 - 18)  SpO2: 99% (07-21-22 @ 04:50) (98% - 100%)  Wt(kg): --    PE  NAD  Awake, alert  Anicteric, MMM  RRR  CTAB  Abd soft, NT, ND  No c/c/e  No rash grossly  FROM                          7.9    8.47  )-----------( 333      ( 20 Jul 2022 16:46 )             25.3       07-20    130<L>  |  97  |  35<H>  ----------------------------<  101<H>  4.9   |  26  |  0.97    Ca    9.0      20 Jul 2022 22:12     Reason for consult: Anemia, pancreatic and anal cancer    HPI:  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 and feeding jejunostomy on 5/13 s/p exchange of J tube on 6/17, presents with dislodged J-tube, pt reports this is the fourth time the tube has come out, he reports he was at rehab being moved when the tube was mechanically dislodged, of note, patient was recently admitted to [facility] 6/22-7/6 for fever, grew MSSA bacteremia, completed course of cefazolin via PICC (finished antibiotics today), patient since discharge has been at [facility], planning to be discharged on 7/22, patient is currently endorsing fatigue but denies f/c, chest pain, palpitations, abdominal pain, n/v, diarrhea. in the ED, VSS, labs were significant for Hg slightly below baseline, pt was given 500cc bolus, IR consulted in ED, admitted to general medicine for further management.  (20 Jul 2022 22:49)    Hematology/Oncology was called to see patient who is currently under care of Dr. Shantanu Hahn of [facility]. He had a history of anal cancer with metastases to distal lymph nodes, treated with 6 cycles of carboplatin + 5FU, and has been in remission for >6 years. He was diagnosed with an unresectable pancreatic cancer 05/2022 and has received concurrent RT and capecitabine. Pt also with chronic mild anemia.       PAST MEDICAL & SURGICAL HISTORY:  Anal cancer  Had Chemo and Radiation 4 years ago      S/P laparotomy      Jejunostomy tube present          FAMILY HISTORY:  No pertinent family history in first degree relatives        Alochol: Denied  Smoking: Nonsmoker  Drug Use: Denied  Marital Status:         Allergies    No Known Allergies    Intolerances        MEDICATIONS  (STANDING):  enoxaparin Injectable 40 milliGRAM(s) SubCutaneous every 24 hours  mineral oil/petrolatum Hydrophilic Ointment 1 Application(s) Topical daily  mineral oil/petrolatum Hydrophilic Ointment 1 Application(s) Topical daily  mupirocin 2% Ointment 1 Application(s) Topical three times a day  pantoprazole  Injectable 40 milliGRAM(s) IV Push daily  sodium chloride 0.9%. 1000 milliLiter(s) (100 mL/Hr) IV Continuous <Continuous>    MEDICATIONS  (PRN):  ondansetron Injectable 4 milliGRAM(s) IV Push every 8 hours PRN Nausea and/or Vomiting      ROS  No fever, sweats, chills  No epistaxis, HA, sore throat  No CP, SOB, cough, sputum  No n/v/d, abd pain, melena, hematochezia  No edema  No rash  No anxiety  No back pain, joint pain  No bleeding, bruising  No dysuria, hematuria    T(C): 36.7 (07-21-22 @ 04:50), Max: 37.2 (07-20-22 @ 15:56)  HR: 63 (07-21-22 @ 04:50) (60 - 63)  BP: 129/72 (07-21-22 @ 04:50) (108/66 - 129/72)  RR: 17 (07-21-22 @ 04:50) (16 - 18)  SpO2: 99% (07-21-22 @ 04:50) (98% - 100%)  Wt(kg): --    PE  NAD  Awake, alert  Anicteric, MMM  RRR  CTAB  Abd soft, NT, ND  No c/c/e  No rash grossly  FROM                          7.9    8.47  )-----------( 333      ( 20 Jul 2022 16:46 )             25.3       07-20    130<L>  |  97  |  35<H>  ----------------------------<  101<H>  4.9   |  26  |  0.97    Ca    9.0      20 Jul 2022 22:12     Reason for consult: Anemia, pancreatic and anal cancer    HPI:  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 and feeding jejunostomy on 5/13 s/p exchange of J tube on 6/17, presents with dislodged J-tube, pt reports this is the fourth time the tube has come out, he reports he was at rehab being moved when the tube was mechanically dislodged, of note, patient was recently admitted to [facility] 6/22-7/6 for fever, grew MSSA bacteremia, completed course of cefazolin via PICC (finished antibiotics today), patient since discharge has been at [facility], planning to be discharged on 7/22, patient is currently endorsing fatigue but denies f/c, chest pain, palpitations, abdominal pain, n/v, diarrhea. in the ED, VSS, labs were significant for Hg slightly below baseline, pt was given 500cc bolus, IR consulted in ED, admitted to general medicine for further management.  (20 Jul 2022 22:49)    Hematology/Oncology was called to see patient who is currently under care of Dr. Shantanu Hahn of [facility]. He had a history of anal cancer with metastases to distal lymph nodes, treated with 6 cycles of carboplatin + 5FU, and has been in remission for >6 years. He was diagnosed with an unresectable pancreatic cancer 05/2022 and has received concurrent RT and capecitabine. Pt also with chronic mild anemia.       PAST MEDICAL & SURGICAL HISTORY:  Anal cancer  Had Chemo and Radiation 4 years ago      S/P laparotomy      Jejunostomy tube present          FAMILY HISTORY:  No pertinent family history in first degree relatives        Alochol: Denied  Smoking: Nonsmoker  Drug Use: Denied  Marital Status:         Allergies    No Known Allergies    Intolerances        MEDICATIONS  (STANDING):  enoxaparin Injectable 40 milliGRAM(s) SubCutaneous every 24 hours  mineral oil/petrolatum Hydrophilic Ointment 1 Application(s) Topical daily  mineral oil/petrolatum Hydrophilic Ointment 1 Application(s) Topical daily  mupirocin 2% Ointment 1 Application(s) Topical three times a day  pantoprazole  Injectable 40 milliGRAM(s) IV Push daily  sodium chloride 0.9%. 1000 milliLiter(s) (100 mL/Hr) IV Continuous <Continuous>    MEDICATIONS  (PRN):  ondansetron Injectable 4 milliGRAM(s) IV Push every 8 hours PRN Nausea and/or Vomiting      ROS  +Fatigue  No fever, sweats, chills  No epistaxis, HA, sore throat  No CP, SOB, cough, sputum  +Mild generalized abdominal pain, No N/V/D  No edema  No rash  No anxiety  No back pain, joint pain  No bleeding, bruising  No dysuria, hematuria    T(C): 36.7 (07-21-22 @ 04:50), Max: 37.2 (07-20-22 @ 15:56)  HR: 63 (07-21-22 @ 04:50) (60 - 63)  BP: 129/72 (07-21-22 @ 04:50) (108/66 - 129/72)  RR: 17 (07-21-22 @ 04:50) (16 - 18)  SpO2: 99% (07-21-22 @ 04:50) (98% - 100%)  Wt(kg): --    PE  NAD  Awake, alert  Anicteric, MMM  RRR  CTAB  Abdomen mildly tender throughout. No abdominal distention.   No c/c/e  No rash grossly                          7.9    8.47  )-----------( 333      ( 20 Jul 2022 16:46 )             25.3       07-20    130<L>  |  97  |  35<H>  ----------------------------<  101<H>  4.9   |  26  |  0.97    Ca    9.0      20 Jul 2022 22:12

## 2022-07-21 NOTE — PRE PROCEDURE NOTE - PRE PROCEDURE EVALUATION
Interventional Radiology    HPI: 77y Male with history of metastatic anal cancer s/p chemoxrt and recent duodenal cancer c/b GOO s/p laparoscopic venting gastrostomy and feeding jejunostomy on 5/13 s/p Exchange of J tube on 6/17 now dislodged. IR consulted for J tube exchange and recommendation is for replacement/exchange.      Allergies:   Medications (Abx/Cardiac/Anticoagulation/Blood Products)      Data:  177.8  58.2  T(C): 36.7  HR: 63  BP: 129/72  RR: 17  SpO2: 99%    Exam  General: No acute distress  Chest: Non labored breathing  Abdomen: Non-distended    -WBC 8.47 / HgB 7.9 / Hct 25.3 / Plt 333  -Na 130 / Cl 97 / BUN 35 / Glucose 101  -K 4.9 / CO2 26 / Cr 0.97  -ALT -- / Alk Phos -- / T.Bili --  -INR1.33    Plan: 77y Male presents for J tube replacement/exchange  -Risks/Benefits/alternatives explained with the patient and/or healthcare proxy and witnessed informed consent obtained.

## 2022-07-21 NOTE — PHYSICAL THERAPY INITIAL EVALUATION ADULT - PERTINENT HX OF CURRENT PROBLEM, REHAB EVAL
78 y/o M h/o anal CA with G- and J-tube in place BIBEMS from rehab facility c/o J tube dislodgement Pt reports J-tube dislodged during PT session. pt denies abd pain, n/v, pain at site, abnormal output or other complaints.

## 2022-07-21 NOTE — PROCEDURE NOTE - PROCEDURE FINDINGS AND DETAILS
Attempted to exchange the foster catheter that was on the site however was not successful. A Berenstein catheter was advanced alongside the foster and exchanged over a wire for a new 16 [place] J tube. Balloon inflated with 7cc dilute contrast. Contrast injection confirmed appropriate positioning.

## 2022-07-21 NOTE — PATIENT PROFILE ADULT - FALL HARM RISK - HARM RISK INTERVENTIONS
Assistance with ambulation/Assistance OOB with selected safe patient handling equipment/Communicate Risk of Fall with Harm to all staff/Reinforce activity limits and safety measures with patient and family/Review medications for side effects contributing to fall risk/Sit up slowly, dangle for a short time, stand at bedside before walking/Tailored Fall Risk Interventions/Toileting schedule using arm’s reach rule for commode and bathroom/Visual Cue: Yellow wristband and red socks/Bed in lowest position, wheels locked, appropriate side rails in place/Call bell, personal items and telephone in reach/Instruct patient to call for assistance before getting out of bed or chair/Non-slip footwear when patient is out of bed/[place] to call system/Physically safe environment - no spills, clutter or unnecessary equipment/Purposeful Proactive Rounding/Room/bathroom lighting operational, light cord in reach

## 2022-07-21 NOTE — PHYSICAL THERAPY INITIAL EVALUATION ADULT - ADDITIONAL COMMENTS
Pt was in rehab, prior to that Pt states he lives with his family in a house with 2 stairs to enter. Pt reports prior to admission he had been ambulating with a rolling walker independently and is able to walk community distances. as per recently pt was required use of a wheelchair and is unable to walk.    Following evaluation, pt was left sitting in chair in no distress, all lines in tact.

## 2022-07-22 NOTE — DIETITIAN INITIAL EVALUATION ADULT - ENTERAL
Recommend change to Vital 1.5 at 55 ml/hr x 24 hours to provide 1320 ml, 1980 kcal, 89 g protein, and 1008 ml water (34 kcal/kg and 1.5 g/kg of protein based on dosing weight 58.2 kg); defer fluids to team. Will continue to monitor as able and adjust as needed. Spoke to NP.

## 2022-07-22 NOTE — PROGRESS NOTE ADULT - NUTRITIONAL ASSESSMENT
This patient has been assessed with a concern for Malnutrition and has been determined to have a diagnosis/diagnoses of Severe protein-calorie malnutrition and Underweight (BMI < 19).    This patient is being managed with:   Diet Clear Liquid-  Tube Feeding Modality: Gastrostomy  Vital 1.5 Nabil (VITAL1.5RTH)  Total Volume for 24 Hours (mL): 1320  Continuous  Starting Tube Feed Rate {mL per Hour}: 55  Until Goal Tube Feed Rate (mL per Hour): 55  Tube Feed Duration (in Hours): 24  Tube Feed Start Time: 18:30  Free Water Flush  Bolus   Total Volume per Flush (mL): 250   Frequency: Every 8 Hours  Supplement Feeding Modality:  Oral  Ensure Clear Cans or Servings Per Day:  2       Frequency:  Daily  Entered: Jul 22 2022 11:17AM

## 2022-07-22 NOTE — PROGRESS NOTE ADULT - PROBLEM SELECTOR PLAN 5
dvt ppx: lovenox (monitor Hg closely-hold if continues to drop)  ambulate: with assistance, obtain PT consult  diet: NPO pending j-tube replacement  gi ppx: home ppi (iv form pending j-tube replacement)
dvt ppx: lovenox (monitor Hg closely-hold if continues to drop)  ambulate: with assistance, obtain PT consult  diet: NPO pending j-tube replacement  gi ppx: home ppi (iv form pending j-tube replacement)

## 2022-07-22 NOTE — DIETITIAN NUTRITION RISK NOTIFICATION - TREATMENT: THE FOLLOWING DIET HAS BEEN RECOMMENDED
Diet, Clear Liquid:   Tube Feeding Modality: Gastrostomy  Jevity 1.2 Nabil (JEVITY1.2RTH)  Total Volume for 24 Hours (mL): 1344  Continuous  Starting Tube Feed Rate {mL per Hour}: 56  Until Goal Tube Feed Rate (mL per Hour): 56  Tube Feed Duration (in Hours): 24  Tube Feed Start Time: 18:30  Free Water Flush  Bolus   Total Volume per Flush (mL): 250   Frequency: Every 8 Hours (07-21-22 @ 18:22) [Active]

## 2022-07-22 NOTE — DISCHARGE NOTE PROVIDER - NSDCCPCAREPLAN_GEN_ALL_CORE_FT
PRINCIPAL DISCHARGE DIAGNOSIS  Diagnosis: Dislodged jejunostomy tube  Assessment and Plan of Treatment: Replaced  Your tube feedings were changed to Vital 1.5 via J-tube  Monitor for any signs of intolerance (i.e. diarrhea, nausea, vomiting, abdominal distension).      SECONDARY DISCHARGE DIAGNOSES  Diagnosis: Hyperkalemia  Assessment and Plan of Treatment: Resolved  You must follow up with your primary medical doctor within 3-4 days of discharge - please call to make an appointment.  At this time, you will need blood work drawn to monitor your electrolytes, including potassium.    Diagnosis: Anemia  Assessment and Plan of Treatment: You were transfused with one unit of packed red blood cells during this admission.    You must have a repeat CBC drawn within 3-4 days - at this time, Dr. De La Garza will determine if you need IV iron infusion.     PRINCIPAL DISCHARGE DIAGNOSIS  Diagnosis: Dislodged jejunostomy tube  Assessment and Plan of Treatment: Replaced  Your tube feedings were changed to Vital 1.5 via J-tube  Monitor for any signs of intolerance (i.e. diarrhea, nausea, vomiting, abdominal distension).      SECONDARY DISCHARGE DIAGNOSES  Diagnosis: Hyperkalemia  Assessment and Plan of Treatment: Resolved  You must follow up with your primary medical doctor within 3-4 days of discharge - please call to make an appointment.  At this time, you will need blood work drawn to monitor your electrolytes, including potassium.    Diagnosis: Anemia  Assessment and Plan of Treatment: You were transfused with one unit of packed red blood cells during this admission.    Hematology consulted - based on labwork, probable iron deficiency anemia.  You must have a repeat CBC drawn within 3-4 days - at this time, Dr. De La Garza will determine if you need IV iron infusion.

## 2022-07-22 NOTE — DISCHARGE NOTE PROVIDER - DETAILS OF MALNUTRITION DIAGNOSIS/DIAGNOSES
This patient has been assessed with a concern for Malnutrition and was treated during this hospitalization for the following Nutrition diagnosis/diagnoses:     -  07/22/2022: Severe protein-calorie malnutrition   -  07/22/2022: Underweight (BMI < 19)

## 2022-07-22 NOTE — DIETITIAN INITIAL EVALUATION ADULT - ENERGY INTAKE
Pt reports tolerating Jevity 1.2 at goal of 56 ml/hr but complains of diarrhea x 3 weeks, last BM today (07/22). Reports nausea sometimes without vomiting. Reports tolerating clear liquid diet and would like to try Ensure Clear. Denies difficulty swallowing liquid diet. Current regimen provides 1612 kcal and 74 g protein.

## 2022-07-22 NOTE — DIETITIAN INITIAL EVALUATION ADULT - NS FNS DIET ORDER
Diet, Clear Liquid:   Tube Feeding Modality: Gastrostomy  Jevity 1.2 Nabil (JEVITY1.2RTH)  Total Volume for 24 Hours (mL): 1344  Continuous  Starting Tube Feed Rate {mL per Hour}: 56  Until Goal Tube Feed Rate (mL per Hour): 56  Tube Feed Duration (in Hours): 24  Tube Feed Start Time: 18:30  Free Water Flush  Bolus   Total Volume per Flush (mL): 250   Frequency: Every 8 Hours (07-21-22 @ 18:22)

## 2022-07-22 NOTE — DIETITIAN INITIAL EVALUATION ADULT - CONTINUE CURRENT NUTRITION CARE PLAN
1. Continue clear liquid diet as medically feasible + tube feedings (see below). 2. Recommend Ensure Clear 2x daily (360 torri and 16 gm protein) to optimize kcal and protein intake via PO per pt's request.  Spoke to NP.

## 2022-07-22 NOTE — DISCHARGE NOTE NURSING/CASE MANAGEMENT/SOCIAL WORK - PATIENT PORTAL LINK FT
You can access the FollowMyHealth Patient Portal offered by [facility] by registering at the following website: [email or web address]. By joining [facility]’s FollowMyHealth portal, you will also be able to view your health information using other applications (apps) compatible with our system.

## 2022-07-22 NOTE — PROGRESS NOTE ADULT - PROBLEM SELECTOR PLAN 2
-per niece, pt is planned to resume Capecitabine soon but not currently taking it  -history of metastatic anal cancer and duodenal cancer  -c/w Zofran PRN (obtain EKG to check QTc)
-per niece, pt is planned to resume Capecitabine soon but not currently taking it  -history of metastatic anal cancer and duodenal cancer  -c/w Zofran PRN (obtain EKG to check QTc)

## 2022-07-22 NOTE — DISCHARGE NOTE PROVIDER - INSTRUCTIONS
Clear liquids; ensure clear 2 servings per day    Tube feedings - Vital 1.5 Nabil @55cc/hr   Duration - 24 hours/day  Free Water Flush - 250cc bolus every 8 hours

## 2022-07-22 NOTE — DISCHARGE NOTE PROVIDER - HOSPITAL COURSE
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 and feeding jejunostomy on 5/13 s/p exchange of J tube on 6/17, presents with dislodged J-tube, pt reports this is the fourth time the tube has come out, he reports he was at rehab being moved when the tube was mechanically dislodged, of note, patient was recently admitted to [facility] 6/22-7/6 for fever, grew MSSA bacteremia, completed course of cefazolin via PICC (finished antibiotics today), patient since discharge has been at [facility], planning to be discharged on 7/22, patient is currently endorsing fatigue but denies f/c, chest pain, palpitations, abdominal pain, n/v, diarrhea. in the ED, VSS, labs were significant for Hg slightly below baseline, pt was given 500cc bolus, IR consulted in ED, admitted to general medicine for further management.  J tube replaced by IR on 7/21/2022.  Dietitian consulted - tube feeds changed to Vital 1.5.  Patient's hemoglobin noted to be 7.4 - one unit PRBC given.  Patient medically cleared for discharge, by Dr. De La Garza, with PCP and Oncology follow up.

## 2022-07-22 NOTE — PROGRESS NOTE ADULT - SUBJECTIVE AND OBJECTIVE BOX
PATIENT SEEN AND EXAMINED ON :- 7/22/22  DATE OF SERVICE:  7/22/22           Interim events noted,Labs ,Radiological studies and Cardiology tests reviewed .       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 and feeding jejunostomy on 5/13 s/p exchange of J tube on 6/17, presents with dislodged J-tube, pt reports this is the fourth time the tube has come out, he reports he was at rehab being moved when the tube was mechanically dislodged, of note, patient was recently admitted to [facility] 6/22-7/6 for fever, grew MSSA bacteremia, completed course of cefazolin via PICC (finished antibiotics today), patient since discharge has been at [facility], planning to be discharged on 7/22, patient is currently endorsing fatigue but denies f/c, chest pain, palpitations, abdominal pain, n/v, diarrhea. in the ED, VSS, labs were significant for Hg slightly below baseline, pt was given 500cc bolus, IR consulted in ED, admitted to general medicine for further management.  (20 Jul 2022 22:49)      INTERIM EVENTS:Patient seen at bedside ,interim events noted.      PMH -reviewed admission note, no change since admission  HEART FAILURE: Acute[ ]Chronic[ ] Systolic[ ] Diastolic[ ] Combined Systolic and Diastolic[ ]  CAD[ ] CABG[ ] PCI[ ]  DEVICES[ ] PPM[ ] ICD[ ] ILR[ ]  ATRIAL FIBRILLATION[ ] Paroxysmal[ ] Permanent[ ] CHADS2-[  ]  MOSES[ ] CKD1[ ] CKD2[ ] CKD3[ ] CKD4[ ] ESRD[ ]  COPD[ ] HTN[ ]   DM[ ] Type1[ ] Type 2[ ]   CVA[ ] Paresis[ ]    AMBULATION: Assisted[ ] Cane/walker[ ] Independent[ ]    MEDICATIONS  (STANDING):  amLODIPine   Tablet 10 milliGRAM(s) Oral daily  chlorhexidine 2% Cloths 1 Application(s) Topical <User Schedule>  enoxaparin Injectable 40 milliGRAM(s) SubCutaneous every 24 hours  mineral oil/petrolatum Hydrophilic Ointment 1 Application(s) Topical daily  mineral oil/petrolatum Hydrophilic Ointment 1 Application(s) Topical daily  mupirocin 2% Ointment 1 Application(s) Topical three times a day  pantoprazole  Injectable 40 milliGRAM(s) IV Push daily  sodium chloride 0.9%. 1000 milliLiter(s) (100 mL/Hr) IV Continuous <Continuous>    MEDICATIONS  (PRN):  ondansetron Injectable 4 milliGRAM(s) IV Push every 8 hours PRN Nausea and/or Vomiting            REVIEW OF SYSTEMS:  Constitutional: [ ] fever, [ ]weight loss,  [ ]fatigue [ ]weight gain  Eyes: [ ] visual changes  Respiratory: [ ]shortness of breath;  [ ] cough, [ ]wheezing, [ ]chills, [ ]hemoptysis  Cardiovascular: [ ] chest pain, [ ]palpitations, [ ]dizziness,  [ ]leg swelling[ ]orthopnea[ ]PND  Gastrointestinal: [ ] abdominal pain, [ ]nausea, [ ]vomiting,  [ ]diarrhea [ ]Constipation [ ]Melena  Genitourinary: [ ] dysuria, [ ] hematuria [ ]Celaya  Neurologic: [ ] headaches [ ] tremors[ ]weakness [ ]Paralysis Right[ ] Left[ ]  Skin: [ ] itching, [ ]burning, [ ] rashes  Endocrine: [ ] heat or cold intolerance  Musculoskeletal: [ ] joint pain or swelling; [ ] muscle, back, or extremity pain  Psychiatric: [ ] depression, [ ]anxiety, [ ]mood swings, or [ ]difficulty sleeping  Hematologic: [ ] easy bruising, [ ] bleeding gums    [ ] All remaining systems negative except as per above.   [ ]Unable to obtain.  [x] No change in ROS since admission      Vital Signs Last 24 Hrs  T(C): 36.9 (22 Jul 2022 18:10), Max: 37.3 (21 Jul 2022 21:56)  T(F): 98.5 (22 Jul 2022 18:10), Max: 99.2 (21 Jul 2022 21:56)  HR: 74 (22 Jul 2022 18:10) (69 - 74)  BP: 136/73 (22 Jul 2022 18:10) (133/75 - 162/68)  BP(mean): --  RR: 18 (22 Jul 2022 18:10) (18 - 18)  SpO2: 99% (22 Jul 2022 18:10) (99% - 100%)    Parameters below as of 22 Jul 2022 18:10  Patient On (Oxygen Delivery Method): room air      I&O's Summary    21 Jul 2022 07:01  -  22 Jul 2022 07:00  --------------------------------------------------------  IN: 392 mL / OUT: 1865 mL / NET: -1473 mL    22 Jul 2022 07:01  -  22 Jul 2022 19:50  --------------------------------------------------------  IN: 0 mL / OUT: 850 mL / NET: -850 mL        PHYSICAL EXAM:  General: No acute distress BMI-  HEENT: EOMI, PERRL  Neck: Supple, [ ] JVD  Lungs: Equal air entry bilaterally; [ ] rales [ ] wheezing [ ] rhonchi  Heart: Regular rate and rhythm; [x ] murmur   2/6 [ x] systolic [ ] diastolic [ ] radiation[ ] rubs [ ]  gallops  Abdomen: Nontender, bowel sounds present  Extremities: No clubbing, cyanosis, [ ] edema [ ]Pulses  equal and intact  Nervous system:  Alert & Oriented X3, no focal deficits  Psychiatric: Normal affect  Skin: No rashes or lesions    LABS:  07-22    130<L>  |  98  |  33<H>  ----------------------------<  162<H>  4.5   |  23  |  0.97    Ca    9.3      22 Jul 2022 07:04  Phos  3.2     07-22  Mg     1.9     07-22    TPro  5.4<L>  /  Alb  2.9<L>  /  TBili  0.3  /  DBili  x   /  AST  14  /  ALT  <5<L>  /  AlkPhos  129<H>  07-22    Creatinine Trend: 0.97<--, 0.92<--, 0.97<--, 1.02<--, 1.08<--, 1.24<--                        7.4    7.23  )-----------( 340      ( 22 Jul 2022 07:07 )             23.9               
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 J tube exchanged yesterday. Fatigue slightly improved.     MEDICATIONS  (STANDING):  chlorhexidine 2% Cloths 1 Application(s) Topical <User Schedule>  enoxaparin Injectable 40 milliGRAM(s) SubCutaneous every 24 hours  mineral oil/petrolatum Hydrophilic Ointment 1 Application(s) Topical daily  mineral oil/petrolatum Hydrophilic Ointment 1 Application(s) Topical daily  mupirocin 2% Ointment 1 Application(s) Topical three times a day  pantoprazole  Injectable 40 milliGRAM(s) IV Push daily  sodium chloride 0.9%. 1000 milliLiter(s) (100 mL/Hr) IV Continuous <Continuous>    MEDICATIONS  (PRN):  ondansetron Injectable 4 milliGRAM(s) IV Push every 8 hours PRN Nausea and/or Vomiting      Vital Signs Last 24 Hrs  T(C): 36.7 (22 Jul 2022 12:25), Max: 37.3 (21 Jul 2022 21:56)  T(F): 98.1 (22 Jul 2022 12:25), Max: 99.2 (21 Jul 2022 21:56)  HR: 72 (22 Jul 2022 12:25) (69 - 78)  BP: 162/68 (22 Jul 2022 12:25) (128/70 - 162/68)  BP(mean): --  RR: 18 (22 Jul 2022 12:25) (18 - 18)  SpO2: 100% (22 Jul 2022 12:25) (99% - 100%)    Parameters below as of 22 Jul 2022 12:25  Patient On (Oxygen Delivery Method): room air      PE  NAD  Awake, alert  Anicteric, MMM  RRR  CTAB  Abdomen diffusely tender to palpation. No masses or hepatosplenomegaly palpated.   No c/c/e  No rash grossly                          7.4    7.23  )-----------( 340      ( 22 Jul 2022 07:07 )             23.9       07-22    130<L>  |  98  |  33<H>  ----------------------------<  162<H>  4.5   |  23  |  0.97    Ca    9.3      22 Jul 2022 07:04  Phos  3.2     07-22  Mg     1.9     07-22    TPro  5.4<L>  /  Alb  2.9<L>  /  TBili  0.3  /  DBili  x   /  AST  14  /  ALT  <5<L>  /  AlkPhos  129<H>  07-22      
  Interventional Radiology Follow-Up Note.     Patient seen and examined @ bedside around 645-7a    This is a 77y Male s/p routine j tube exchange on 7/21 in Interventional Radiology with DEEPA Jarrett    No complaint offered.      Medication:     enoxaparin Injectable: (07-21)    Vitals:   T(F): 98.2, Max: 99.2 (21:56)  HR: 70  BP: 135/76  RR: 18  SpO2: 99%    Physical Exam:  General: Nontoxic, in NAD.  abd: soft nttp nondistended, site c/d/i without leaks, disc synch down appropriately to skin.   skin intact.         LABS:  Na: 132 (07-21 @ 11:47), 130 (07-20 @ 22:12), 131 (07-20 @ 17:11)  K: 5.0 (07-21 @ 11:47), 4.9 (07-20 @ 22:12), 5.5 (07-20 @ 17:11)  Cl: 98 (07-21 @ 11:47), 97 (07-20 @ 22:12), 95 (07-20 @ 17:11)  CO2: 24 (07-21 @ 11:47), 26 (07-20 @ 22:12), 26 (07-20 @ 17:11)  BUN: 32 (07-21 @ 11:47), 35 (07-20 @ 22:12), 36 (07-20 @ 17:11)  Cr: 0.92 (07-21 @ 11:47), 0.97 (07-20 @ 22:12), 1.02 (07-20 @ 17:11)  Glu: 98(07-21 @ 11:47), 101(07-20 @ 22:12), 116(07-20 @ 17:11)    Hgb: 7.3 (07-21 @ 11:47), 7.9 (07-20 @ 16:46)  Hct: 23.4 (07-21 @ 11:47), 25.3 (07-20 @ 16:46)  WBC: 6.05 (07-21 @ 11:47), 8.47 (07-20 @ 16:46)  Plt: 321 (07-21 @ 11:47), 333 (07-20 @ 16:46)    INR: 1.33 07-20-22 @ 16:46  PTT: 28.6 07-20-22 @ 16:46          LIVER FUNCTIONS - ( 21 Jul 2022 11:47 )  Alb: 3.3 g/dL / Pro: 5.7 g/dL / ALK PHOS: 149 U/L / ALT: <5 U/L / AST: 18 U/L / GGT: x                    Assessment/Plan:  77y Male  s/p routine j tube exchange on 7/21 in Interventional Radiology with DEEPA Jarrett    - overnight vitals stable, no acute events.  - ok to use J-tube   - IR will sign off.   - reconsult prn     Please call IR at  [phone] with any questions, concerns, or issues regarding above.    Also available on Teams.  
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 and feeding jejunostomy on 5/13 s/p exchange of J tube on 6/17, presents with dislodged J-tube, pt reports this is the fourth time the tube has come out, he reports he was at rehab being moved when the tube was mechanically dislodged, of note, patient was recently admitted to [facility] 6/22-7/6 for fever, grew MSSA bacteremia, completed course of cefazolin via PICC (finished antibiotics today), patient since discharge has been at [facility], planning to be discharged on 7/22, patient is currently endorsing fatigue but denies f/c, chest pain, palpitations, abdominal pain, n/v, diarrhea. in the ED, VSS, labs were significant for Hg slightly below baseline, pt was given 500cc bolus, IR consulted in ED, admitted to general medicine for further management.  (20 Jul 2022 22:49)      INTERIM EVENTS:Patient seen at bedside ,interim events noted.      PMH -reviewed admission note, no change since admission  HEART FAILURE: Acute[ ]Chronic[ ] Systolic[ ] Diastolic[ ] Combined Systolic and Diastolic[ ]  CAD[ ] CABG[ ] PCI[ ]  DEVICES[ ] PPM[ ] ICD[ ] ILR[ ]  ATRIAL FIBRILLATION[ ] Paroxysmal[ ] Permanent[ ] CHADS2-[  ]  MOSES[ ] CKD1[ ] CKD2[ ] CKD3[ ] CKD4[ ] ESRD[ ]  COPD[ ] HTN[ ]   DM[ ] Type1[ ] Type 2[ ]   CVA[ ] Paresis[ ]    AMBULATION: Assisted[ ] Cane/walker[ ] Independent[ ]    MEDICATIONS  (STANDING):  enoxaparin Injectable 40 milliGRAM(s) SubCutaneous every 24 hours  mineral oil/petrolatum Hydrophilic Ointment 1 Application(s) Topical daily  mineral oil/petrolatum Hydrophilic Ointment 1 Application(s) Topical daily  mupirocin 2% Ointment 1 Application(s) Topical three times a day  pantoprazole  Injectable 40 milliGRAM(s) IV Push daily  sodium chloride 0.9%. 1000 milliLiter(s) (100 mL/Hr) IV Continuous <Continuous>    MEDICATIONS  (PRN):  ondansetron Injectable 4 milliGRAM(s) IV Push every 8 hours PRN Nausea and/or Vomiting            REVIEW OF SYSTEMS:  Constitutional: [ ] fever, [ ]weight loss,  [ ]fatigue [ ]weight gain  Eyes: [ ] visual changes  Respiratory: [ ]shortness of breath;  [ ] cough, [ ]wheezing, [ ]chills, [ ]hemoptysis  Cardiovascular: [ ] chest pain, [ ]palpitations, [ ]dizziness,  [ ]leg swelling[ ]orthopnea[ ]PND  Gastrointestinal: [ ] abdominal pain, [ ]nausea, [ ]vomiting,  [ ]diarrhea [ ]Constipation [ ]Melena  Genitourinary: [ ] dysuria, [ ] hematuria [ ]Celaya  Neurologic: [ ] headaches [ ] tremors[ ]weakness [ ]Paralysis Right[ ] Left[ ]  Skin: [ ] itching, [ ]burning, [ ] rashes  Endocrine: [ ] heat or cold intolerance  Musculoskeletal: [ ] joint pain or swelling; [ ] muscle, back, or extremity pain  Psychiatric: [ ] depression, [ ]anxiety, [ ]mood swings, or [ ]difficulty sleeping  Hematologic: [ ] easy bruising, [ ] bleeding gums    [ ] All remaining systems negative except as per above.   [ ]Unable to obtain.  [x] No change in ROS since admission      Vital Signs Last 24 Hrs  T(C): 36.4 (21 Jul 2022 12:46), Max: 36.9 (21 Jul 2022 01:36)  T(F): 97.6 (21 Jul 2022 12:46), Max: 98.4 (21 Jul 2022 01:36)  HR: 78 (21 Jul 2022 17:39) (60 - 78)  BP: 128/70 (21 Jul 2022 17:39) (110/61 - 137/72)  BP(mean): 73 (20 Jul 2022 19:37) (73 - 73)  RR: 18 (21 Jul 2022 17:39) (16 - 18)  SpO2: 99% (21 Jul 2022 17:39) (98% - 100%)    Parameters below as of 21 Jul 2022 17:39  Patient On (Oxygen Delivery Method): room air      I&O's Summary    20 Jul 2022 07:01 - 21 Jul 2022 07:00  --------------------------------------------------------  IN: 400 mL / OUT: 50 mL / NET: 350 mL    21 Jul 2022 07:01  -  21 Jul 2022 18:07  --------------------------------------------------------  IN: 0 mL / OUT: 300 mL / NET: -300 mL        PHYSICAL EXAM:  General: No acute distress BMI-  HEENT: EOMI, PERRL  Neck: Supple, [ ] JVD  Lungs: Equal air entry bilaterally; [ ] rales [ ] wheezing [ ] rhonchi  Heart: Regular rate and rhythm; [x ] murmur   2/6 [ x] systolic [ ] diastolic [ ] radiation[ ] rubs [ ]  gallops  Abdomen: Nontender, bowel sounds present  Extremities: No clubbing, cyanosis, [ ] edema [ ]Pulses  equal and intact  Nervous system:  Alert & Oriented X3, no focal deficits  Psychiatric: Normal affect  Skin: No rashes or lesions    LABS:  07-21    132<L>  |  98  |  32<H>  ----------------------------<  98  5.0   |  24  |  0.92    Ca    9.6      21 Jul 2022 11:47  Phos  3.5     07-21  Mg     1.9     07-21    TPro  5.7<L>  /  Alb  3.3  /  TBili  0.4  /  DBili  x   /  AST  18  /  ALT  <5<L>  /  AlkPhos  149<H>  07-21    Creatinine Trend: 0.92<--, 0.97<--, 1.02<--, 1.08<--, 1.24<--, 1.15<--                        7.3    6.05  )-----------( 321      ( 21 Jul 2022 11:47 )             23.4     PT/INR - ( 20 Jul 2022 16:46 )   PT: 15.3 sec;   INR: 1.33 ratio         PTT - ( 20 Jul 2022 16:46 )  PTT:28.6 sec

## 2022-07-22 NOTE — DIETITIAN INITIAL EVALUATION ADULT - REASON
Unable to obtain pt's consent. Noted visual signs of severe muscle loss in temporales, clavicles, and shoulders and severe fat loss in orbitals and triceps; unable to visually assess other covered areas.

## 2022-07-22 NOTE — DIETITIAN INITIAL EVALUATION ADULT - EDUCATION DIETARY MODIFICATIONS
----- Message from Morenita Ramirez sent at 2/26/2020  2:09 PM EST -----  Regarding: Dr. Umesh Lopez would like a call back regarding getting a refill on her mother-in-law [address] called to [facility], [phone]. Contact is [phone] (2) meets goals/outcomes/verbalization

## 2022-07-22 NOTE — DIETITIAN INITIAL EVALUATION ADULT - REASON FOR ADMISSION
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

## 2022-07-22 NOTE — DISCHARGE NOTE PROVIDER - CARE PROVIDER_API CALL
Trevor De La Garza)  Cardiology  [address] [place] [address]  Phone: [phone]  Fax: [phone]  Follow Up Time:

## 2022-07-22 NOTE — PROGRESS NOTE ADULT - PROBLEM SELECTOR PLAN 1
-IR consulted by ED, consult noted, appreciate recommendations   -plan for replacement 7/21  -hold all PO meds and tube feeds via j-tube for now  -will give additional 1L NS over 10 hours for supplemental hydration
-IR consulted by ED, consult noted, appreciate recommendations   -plan for replacement 7/21  -hold all PO meds and tube feeds via j-tube for now  -will give additional 1L NS over 10 hours for supplemental hydration

## 2022-07-22 NOTE — PROGRESS NOTE ADULT - ASSESSMENT
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 and feeding jejunostomy on 5/13 s/p exchange of J tube on 6/17, presents with dislodged J-tube, pt reports this is the fourth time the tube has come out, he reports he was at rehab being moved when the tube was mechanically dislodged, of note, patient was recently admitted to [facility] 6/22-7/6 for fever, grew MSSA bacteremia, completed course of cefazolin via PICC (finished antibiotics today), patient since discharge has been at [facility], planning to be discharged on 7/22, patient is currently endorsing fatigue but denies f/c, chest pain, palpitations, abdominal pain, n/v, diarrhea. in the ED, VSS, labs were significant for Hg slightly below baseline, pt was given 500cc bolus, IR consulted in ED, admitted to general medicine for further management.  (20 Jul 2022 22:49)    Hematology/Oncology was called to see patient who is currently under care of Dr. Shantanu Hahn of [facility]. He had a history of anal cancer with metastases to distal lymph nodes, treated with 6 cycles of carboplatin + 5FU, and has been in remission for >6 years. He was diagnosed with an unresectable pancreatic cancer 05/2022 and has received concurrent RT and capecitabine. Pt also with chronic mild anemia.     Pancreatic and anal malignancies   - Under care of Dr. Shantanu Hahn of [facility]  - Received concurrent RT with capecitabine for newly diagnosed unresectable pancreatic ca, currently off treatment but planning to resume soon.   - Anal cancer in remission >6 years     Anemia  - Workup reveals iron deficiency, normal B12 and folate. Will initiate IV Venofer.   - FOBT pending.   - Please obtain GI consult to r/o GI bleeding. If discharged, patient may follow up as outpatient with hematologist and GI.   - Please transfuse for hgb < 7.0 grams     Dislodged J-tube  - Replacement by IR 07/22    After discharge, please resume care with Dr. Shantanu Hahn of [facility]. 
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

## 2022-07-22 NOTE — PROGRESS NOTE ADULT - PROBLEM SELECTOR PLAN 4
-c/w mupirocin to tube insertion wound sites  -c/w A&D to bilateral heels  -c/w Helio to bilateral buttocks
-c/w mupirocin to tube insertion wound sites  -c/w A&D to bilateral heels  -c/w Helio to bilateral buttocks

## 2022-07-22 NOTE — PROGRESS NOTE ADULT - PROBLEM SELECTOR PLAN 3
-unclear etiology but likely multifactorial   -obtain b12, folate, Iron Studies, LDH, haptoglobin   -obtain fecal occult
-unclear etiology but likely multifactorial   -obtain b12, folate, Iron Studies, LDH, haptoglobin   -obtain fecal occult

## 2022-07-22 NOTE — PROGRESS NOTE ADULT - TIME BILLING
- Review of records, telemetry, vital signs and daily labs.   - General and cardiovascular physical examination.  - Generation of cardiovascular treatment plan.  - Coordination of care.      Patient was seen and examined by me on 7/21/2022,interim events noted,labs and radiology studies reviewed.  Trevor De La Garza MD,FACC.  [address] [place], [place] [id number].  [phone]
- Review of records, telemetry, vital signs and daily labs.   - General and cardiovascular physical examination.  - Generation of cardiovascular treatment plan.  - Coordination of care.      Patient was seen and examined by me on 7/22/22,interim events noted,labs and radiology studies reviewed.  Trevor De La Garza MD,FACC.  [address] [place], [place] [id number].  [phone]

## 2022-07-22 NOTE — DIETITIAN INITIAL EVALUATION ADULT - OTHER INFO
Pt reports ~60 pounds weight loss x 3 months PTA, unable to recall reason, from 185 to "120-something". Weight as per previous RD notes (05/03/2022) 162 pounds -> (06/24/2022) 140.8 pounds. Weight as per flow sheets (07/21) 128 pounds.     Pt with questions about drinking supplements on top of receiving tube feedings - reviewed nutritional needs and tube feedings provision. All questions answered - pt made aware RD remains available.

## 2022-07-22 NOTE — DISCHARGE NOTE NURSING/CASE MANAGEMENT/SOCIAL WORK - NSDCPEFALRISK_GEN_ALL_CORE
For information on Fall & Injury Prevention, visit: [email or web address] OR  [email or web address] OR  https://www.cdc.gov/steadi/patient.html

## 2022-07-22 NOTE — PROGRESS NOTE ADULT - PROBLEM SELECTOR PROBLEM 3
Acute on chronic anemia
Acute on chronic anemia
Xeljanz Counseling: I discussed with the patient the risks of Xeljanz therapy including increased risk of infection, liver issues, headache, diarrhea, or cold symptoms. Live vaccines should be avoided. They were instructed to call if they have any problems.

## 2022-07-22 NOTE — DIETITIAN INITIAL EVALUATION ADULT - REASON INDICATOR FOR ASSESSMENT
Pt seen for BMI <19 referral and consult for assessment and tube feeding.   Information obtained from: medical record, rehab transfer notes, and pt.  Pt seen for BMI <19 referral and consult for assessment and tube feeding.   Information obtained from: medical record, rehab transfer notes, pt, and NP.

## 2022-07-22 NOTE — CHART NOTE - NSCHARTNOTEFT_GEN_A_CORE
Pt is waiting for transport to go home. When pt stood up with assistance, then he had a few drops of bright blood per rectum, examined not visible bleeding. Vital signs WNL. As per pt he had a similar episode recently in rehab. D/w the Attending, Dr. De La Garza and patient is cleared to go home. Pt's niece at the bedside.     Renata Vera NP, #[id number]
Request from Dr. De La Garza to facilitate patient discharge.  As per Dr. De La Garza, after patient received blood transfusion today, he can be discharged, and no need to repeat a CBC before discharge.  Discussed with Dr. De La Garza that Hematologist ordered IV Venofer x 3 doses for iron deficiency anemia - Dr. De La Garza requests that the IV Venofer be discontinued, and the patient can be discharged and he will order a CBC to be drawn after discharge to monitor; Dr. De La Garza also states that the patient should not be discharged on oral iron - he states that he will monitor a CBC outpatient and decide on further treatment based on these results.  The patient's niece, Dr. Medina [phone] states that the patient has a PICC line for IV hydration and the patient's oncologist manages this PICC line and requests that it remain in place on discharge - discussed with Dr. De La Garza, who agrees that PICC line should remain in place on discharge - SISSY Woodard made aware that PICC line to remain in place on discharge; PICC line site is clean, dry, and intact with no signs of redness/swelling/pain/bleeding noted.  Also discussed with Dr. De La Garza that dietitian is recommending patient's feeds to be changed to Vital 1.5 @55cc/hr - Dr. De La Garza and family in agreement with this change -  Yamilet made aware of this change.    Medication reconciliation reviewed, revised, and resolved with Dr. De La Garza, who has medically cleared patient for discharge with follow up as advised.  Please refer to discharge note for detailed hospital course.

## 2022-07-22 NOTE — DIETITIAN INITIAL EVALUATION ADULT - NSFNSGIIOFT_GEN_A_CORE
07-21-22 @ 07:01  -  07-22-22 @ 07:00  --------------------------------------------------------  OUT:  Total OUT: 0 mL    Total NET: 392 mL

## 2022-07-22 NOTE — DIETITIAN INITIAL EVALUATION ADULT - ADD RECOMMEND
1. Will continue to monitor EN provision/tolerance, weight, labs, skin, and GI status as able. 2. Consider Multivitamin ONLY if medically feasible to optimize nutrient intake. 3. Malnutrition/BMI <19 notification placed in chart.

## 2022-07-22 NOTE — DIETITIAN INITIAL EVALUATION ADULT - PERTINENT MEDS FT
MEDICATIONS  (STANDING):  enoxaparin Injectable 40 milliGRAM(s) SubCutaneous every 24 hours  mineral oil/petrolatum Hydrophilic Ointment 1 Application(s) Topical daily  mineral oil/petrolatum Hydrophilic Ointment 1 Application(s) Topical daily  mupirocin 2% Ointment 1 Application(s) Topical three times a day  pantoprazole  Injectable 40 milliGRAM(s) IV Push daily  sodium chloride 0.9%. 1000 milliLiter(s) (100 mL/Hr) IV Continuous <Continuous>    MEDICATIONS  (PRN):  ondansetron Injectable 4 milliGRAM(s) IV Push every 8 hours PRN Nausea and/or Vomiting

## 2022-07-22 NOTE — DISCHARGE NOTE PROVIDER - NSDCMRMEDTOKEN_GEN_ALL_CORE_FT
A And D topical ointment: Apply topically to affected area 2 times a day to bilateral heels   acetaminophen 325 mg oral tablet: 2 tab(s) by jejunostomy tube every 6 hours, As Needed - 3), Moderate Pain (4 - 6)  amLODIPine 10 mg oral tablet: 1 tab(s) by jejunostomy tube once a day MDD:one tab   Helio-Protect topical cream: Apply topically to affected area 2 times a day to bilateral buttock   capecitabine:   emollients, topical ointment: 1 application topically once a day to buttocks  emollients, topical ointment: 1 application topically once a day to heels  Multiple Vitamins oral tablet, chewable: 1 tab(s) by jejunostomy tube once a day  mupirocin 2% topical ointment: Apply topically to affected area 3 times a day to feeding tube site  oxyCODONE 5 mg oral tablet: 1 tab(s) orally every 4 hours, As Needed    Reference #: [id number] MDD:6  pantoprazole 40 mg oral granule, delayed release: 40 milligram(s) by jejunostomy tube once a day   polyethylene glycol 3350 oral powder for reconstitution: 17 gram(s) by jejunostomy tube once a day, As Needed  senna oral tablet: 2 tab(s) by jejunostomy tube once a day (at bedtime)  Zofran 4 mg oral tablet: 1 tab(s) by gastrostomy tube every 8 hours, As Needed - for nausea    A And D topical ointment: Apply topically to affected area 2 times a day to bilateral heels   acetaminophen 325 mg oral tablet: 2 tab(s) by jejunostomy tube every 6 hours, As Needed - 3), Moderate Pain (4 - 6)  amLODIPine 10 mg oral tablet: 1 tab(s) by jejunostomy tube once a day MDD:one tab   Helio-Protect topical cream: Apply topically to affected area 2 times a day to bilateral buttock   capecitabine:   Multiple Vitamins oral tablet, chewable: 1 tab(s) by jejunostomy tube once a day  mupirocin 2% topical ointment: Apply topically to affected area 3 times a day to feeding tube site  oxyCODONE 5 mg oral tablet: 1 tab(s) orally every 4 hours, As Needed    Reference #: [id number] MDD:6  pantoprazole 40 mg oral granule, delayed release: 40 milligram(s) by jejunostomy tube once a day   polyethylene glycol 3350 oral powder for reconstitution: 17 gram(s) by jejunostomy tube once a day, As Needed  senna oral tablet: 2 tab(s) by jejunostomy tube once a day (at bedtime)  Zofran 4 mg oral tablet: 1 tab(s) by gastrostomy tube every 8 hours, As Needed - for nausea

## 2022-07-22 NOTE — DIETITIAN INITIAL EVALUATION ADULT - ORAL INTAKE PTA/DIET HISTORY
Pt reports only drinking clear liquids via PO at home and receiving tube feedings - unable to recall tube feeding regimen. As per rehab notes, pt was receiving Jevity 1.5 at 81 ml/hr x 16 hours (provides 1944 kcal and 83 g protein). Pt confirms NKFA. Denies taking any vitamins PTA. States having Promote and Ensure at home but not drinking it (unable to recall which Ensure).   Pt reports only drinking clear liquids via PO at home and receiving tube feedings - unable to recall tube feeding regimen. As per rehab notes, pt was receiving Jevity 1.5 at 81 ml/hr x 16 hours (provides 1944 kcal and 83 g protein). As per NP,  reports pt receiving TwoCal HN at 58 ml/hr x 14 hours at home prior to rehab (provides 1624 kcal and 68 g protein). Pt confirms NKFA. Denies taking any vitamins PTA. States having Promote and Ensure at home but not drinking it (unable to recall which Ensure).

## 2022-07-22 NOTE — DISCHARGE NOTE PROVIDER - NSDCFUADDAPPT_GEN_ALL_CORE_FT
You must follow up with your primary medical doctor within 3-4 days of discharge - please call to make an appointment.  At this appointment, you will need a CBC and a BMP drawn to monitor your hemoglobin/hematocrit and electrolytes.    You must follow up with your oncologist within one week of discharge - please call to make an appointment.

## 2022-07-24 NOTE — PROVIDER CONTACT NOTE (OTHER) - SITUATION
Patient oral temp of 100.1
Pt with a elevated BP of 194/71  giving bp meds through peg tube
Pt stated to RN this AM that he cannot take pills orally. Pt receives continuous tube feeds. Current route for medication is orally. ACP notified.
Patient has oral temp of 100.3
(V5) oriented

## 2022-08-15 PROBLEM — Z93.4 JEJUNOSTOMY STATUS: Status: ACTIVE | Noted: 2022-01-01

## 2022-08-15 PROBLEM — C21.0 ANAL CANCER: Status: ACTIVE | Noted: 2022-01-01

## 2022-08-18 NOTE — PROCEDURE NOTE - PROCEDURE FINDINGS AND DETAILS
HPI: 77y Male with Metastatic anal CA GERD HTN Anemia fed through J tube presents S/P fall with a laceration to the head and missing J tube. Pt is domiciled with his sister, states at 4AM she heard the patient calling out to her and found him on the floor. Denies A/C use, pt does not recall the fall, unknown down time. Pt endorsed chills for the past day. Pt's sister stated she last fed the patient through the J tube last night but states she could not find the tube today.

## 2022-08-18 NOTE — ED PROVIDER NOTE - PHYSICAL EXAMINATION
A comprehensive trauma exam was performed. No hematoma or skull tenderness or deformity, no tenderness or abnormality of facial bones. + 3 CM laceration to posterior scalp. No spinous process or paraspinal muscle tenderness of the cervical, lumbar or thoracic spine. No rib tenderness/crepitus. Abdomen is soft, non-tender no guarding. J tube is missing. Pelvis is stable. No tenderness or shanell deformity of upper or lower extremity, no signs of dislocation, no scaphoid tenderness. No other traumatic abnormality on comprehensive exam.

## 2022-08-18 NOTE — H&P ADULT - NSHPLABSRESULTS_GEN_ALL_CORE
9.6    10.52 )-----------( 324      ( 18 Aug 2022 06:36 )             31.8           141  |  93<L>  |  40<H>  ----------------------------<  148<H>  3.2<L>   |  36<H>  |  0.94    Ca    10.4      18 Aug 2022 06:36    TPro  5.9<L>  /  Alb  3.5  /  TBili  0.5  /  DBili  x   /  AST  19  /  ALT  11  /  AlkPhos  141<H>                Urinalysis Basic - ( 18 Aug 2022 08:41 )    Color: Yellow / Appearance: Clear / S.026 / pH: x  Gluc: x / Ketone: Negative  / Bili: Negative / Urobili: <2 mg/dL   Blood: x / Protein: 30 mg/dL / Nitrite: Negative   Leuk Esterase: Negative / RBC: 1 /HPF / WBC 1 /HPF   Sq Epi: x / Non Sq Epi: 1 /HPF / Bacteria: Negative                  CAPILLARY BLOOD GLUCOSE                  RADIOLOGY & ADDITIONAL TESTS:    Imaging personally reviewed.    Consultant(s) notes reviewed.    Care discussed with consultants and other providers.

## 2022-08-18 NOTE — ED PROVIDER NOTE - NS ED ROS FT
+ S/P Fall, unknown cause. Laceration to head + S/P Fall, unknown cause. Laceration to head, J tube fell out

## 2022-08-18 NOTE — ED PROVIDER NOTE - CLINICAL SUMMARY MEDICAL DECISION MAKING FREE TEXT BOX
78 Y/O M PMH Metastatic anal CA GERD HTN Anemia fed through J tube presents S/P fall with a laceration to the head and missing J tube. Pt is domiciled with his sister, states at 4AM she heard the patient calling out to her and found him on the floor. Denies A/C use, pt does not recall the fall, unknown down time. Pt endorses chills for the past day. Plan is labs to eval for anemia or electrolyte disturbance. CT head and neck to eval for acute traumatic abnormality, CT abdomen to eval for colitis, diverticulitis or other acute abdominal pathology. Pt declines TDAP. Plan to admit for weakness, fall of unknown origin, displaced J tube.

## 2022-08-18 NOTE — CONSULT NOTE ADULT - SUBJECTIVE AND OBJECTIVE BOX
Vascular & Interventional Radiology    HPI: 77y Male with Metastatic anal CA GERD HTN Anemia fed through J tube presents S/P fall with a laceration to the head and missing J tube. Pt is domiciled with his sister, states at 4AM she heard the patient calling out to her and found him on the floor. Denies A/C use, pt does not recall the fall, unknown down time. Pt endorsed chills for the past day. Pt's sister stated she last fed the patient through the J tube last night but states she could not find the tube today.     Last exchanged 8/12 (16Fr).    CT reviewed.     Data:  T(C): 36.3  HR: 70  BP: 129/72  RR: 18  SpO2: 100%    -WBC 10.52 / HgB 9.6 / Hct 31.8 / Plt 324  -Na 141 / Cl 93 / BUN 40 / Glucose 148  -K 3.2 / CO2 36 / Cr 0.94  -ALT 11 / Alk Phos 141 / T.Bili 0.5    Imaging: reviewed.    Assessment:   77y Male w/ dislodged J tube.    Plan:   - For J tube replacement today.  - Place IR procedure order under Dr. Francisco.

## 2022-08-18 NOTE — ED PROVIDER NOTE - CPE EDP RESP NORM
Paige Vega, RN   Plan last modified Paige Vega [facility] [phone]      Anticoagulation Care Providers     Provider Role Specialty Phone number    Lynda Gilbert MD Referring Internal Medicine [phone]            See the Encounter Report to view Anticoagulation Flowsheet and Dosing Calendar (Go to Encounters tab in chart review, and find the Anticoagulation Therapy Visit)        Paige Vega RN               
normal...

## 2022-08-18 NOTE — H&P ADULT - PROBLEM SELECTOR PLAN 4
-s/p fall at home. Suspect iso infection vs mechanical fall. Low c/f cardiac etiology at this time. Elevated trop noted, suspect iso demand ischemia. EKG w/o ischemic changes  -Now downtrending  -CT Head / C-spine negative for fractures/hemorrhage  -Check orthostatics  -Sepsis work up and mgt as above  -Pt eval

## 2022-08-18 NOTE — H&P ADULT - PROBLEM SELECTOR PLAN 1
-Pt met sepsis criteria on admission w/ fever and tachycardia. Mild leukocytosis to 10.52 but w/ bandemia  -Suspect pulmonary source  -CT C/A/P sig for right upper lobe tree-in-bud nodularity, likely infectious vs. inflammatory bronchiolitis.  -s/p IV zosyn x 1 in the E.D  -C/w empiric coverage w/ zosyn  -F/u blood and urine Cx  -Check pro torri  -Trend fever curve  -Trend leukocytosis

## 2022-08-18 NOTE — ED PROVIDER NOTE - PROGRESS NOTE DETAILS
Lissett Juares- pt's trop 73. no gorge or ckd hx. pt denies cp or sob or cardiac hx. has abd pain otherwise asymptomatic. ekg no ewa. repeat trop will be sent. repeat ekg shows improved tachycardia at HR 87, qtc 531. otherwise similar to prior. low suspicion of nstemi. will get CTA chest to eval for PE. pt states this morning he woke up to go to bathroom, pulled out his J-tube (cannot explain why). He does not remember the events around when he fell.

## 2022-08-18 NOTE — CONSULT NOTE ADULT - TIME BILLING
- Review of records, telemetry, vital signs and daily labs.   - General and cardiovascular physical examination.  - Generation of cardiovascular treatment plan.  - Coordination of care.      Patient was seen and examined by me on 08/18/2022,interim events noted,labs and radiology studies reviewed.  Trevor De La Garza MD,FACC.  [address] [place], [place] [id number].  [phone]

## 2022-08-18 NOTE — ED ADULT NURSE NOTE - NSIMPLEMENTINTERV_GEN_ALL_ED
Implemented All Fall Risk Interventions:  [place] to call system. Call bell, personal items and telephone within reach. Instruct patient to call for assistance. Room bathroom lighting operational. Non-slip footwear when patient is off stretcher. Physically safe environment: no spills, clutter or unnecessary equipment. Stretcher in lowest position, wheels locked, appropriate side rails in place. Provide visual cue, wrist band, yellow gown, etc. Monitor gait and stability. Monitor for mental status changes and reorient to person, place, and time. Review medications for side effects contributing to fall risk. Reinforce activity limits and safety measures with patient and family.

## 2022-08-18 NOTE — H&P ADULT - HISTORY OF PRESENT ILLNESS
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 tube dislodgement 7/22 replaced by IR, MSSA bacteremia s/p IV abx now presenting s/p fall at home w/ dislodged J tube. Nice states pt was found on the floor at home by family and looked like he'd hit is head on a step. Tube was found to be dislodged. Pt unable to elaborate on history, states to ask his niece because "I don't know." ROS positive for abdominal pain around J-tube site. Otherwise denies fevers, chills, chest pain, SOB, constipation, diarrhea.     In the E.D, pt febrile to 102, . WBC 10.52 w/ bands.  CT C/A/P w/ progressive malignancy and right upper lobe tree-in-bud nodularity, likely   representing infectious/inflammatory bronchiolitis. Received IV zosyn x 1, IVF, admitted to medicine for further mgt. IR consulted for J tube replacement.

## 2022-08-18 NOTE — H&P ADULT - NSHPREVIEWOFSYSTEMS_GEN_ALL_CORE
CONSTITUTIONAL:  No  fever, chills, +weakness, +fatigue.  HEENT:  No visual loss, blurred vision, No sneezing, congestion, runny nose or sore throat.  SKIN:  No rash or itching.  CARDIOVASCULAR:  No chest pain, chest pressure or chest discomfort. No palpitations.  RESPIRATORY:  No shortness of breath, cough or sputum.  GASTROINTESTINAL:  No nausea, vomiting or diarrhea. No abdominal pain or blood.  GENITOURINARY:  Denies hematuria, dysuria.   NEUROLOGICAL:  No headache, dizziness, syncope  ENDOCRINOLOGIC:  No reports of sweating, cold or heat intolerance. No polyuria or polydipsia.

## 2022-08-18 NOTE — H&P ADULT - PROBLEM SELECTOR PLAN 7
DIET: NPO, Vital 1.5 post tube replacement  DVT: Lovenox  DISPO: Pending hospital course. PT consulted
[Annual] : an annual visit.

## 2022-08-18 NOTE — ED ADULT NURSE REASSESSMENT NOTE - NS ED NURSE REASSESS COMMENT FT1
pt A&ox4, awake and alert, denies chest pain, SOB, dizziness, lightheadedness, and radiating chest pain. breathing is spontaneous and unlabored. continuos cardiac and pulse monitoring in place. pt has J-tube in place draining green fluid. pt skin is dry clean and intact. blanchable redness noted to sacrum. call bell within reach, bed in lowest position, side rails up x2. will continue to monitor. pt A&ox4, awake and alert, denies chest pain, SOB, dizziness, lightheadedness, and radiating chest pain. breathing is spontaneous and unlabored. pt has laceration noted to posterior head, no active bleeding noted. continuos cardiac and pulse monitoring in place. pt has J-tube in place draining green fluid. pt skin is dry clean and intact. blanchable redness noted to sacrum. call bell within reach, bed in lowest position, side rails up x2. will continue to monitor.

## 2022-08-18 NOTE — H&P ADULT - NSHPPHYSICALEXAM_GEN_ALL_CORE
GENERAL: NAD, thin elderly man  HEAD:  Atraumatic, Normocephalic  EYES: EOMI, conjunctiva and sclera clear  NECK: Supple  CHEST/LUNG: dec breath sounds B/L, +rhonchi  HEART: Regular rate and rhythm; No murmurs, rubs, or gallops  ABDOMEN: Soft, Nontender, Nondistended; Bowel sounds present, +J-tube out of place   EXTREMITIES:  2+ Peripheral Pulses, No clubbing, cyanosis, or edema  PSYCH: Awake and alert  NEUROLOGY: non-focal  SKIN: warm and dry

## 2022-08-18 NOTE — H&P ADULT - ASSESSMENT
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 tube dislodgement 7/22 replaced by IR, MSSA bacteremia s/p IV abx now presenting s/p fall at home w/ dislodged J tube, admitted for sepsis.

## 2022-08-18 NOTE — ED PROVIDER NOTE - NSICDXPASTMEDICALHX_GEN_ALL_CORE_FT
PAST MEDICAL HISTORY:  Anal cancer Had Chemo and Radiation 4 years ago    Anemia, unspecified     GERD (gastroesophageal reflux disease)     Hypertension

## 2022-08-18 NOTE — ED PROVIDER NOTE - OBJECTIVE STATEMENT
78 Y/O M PMH Metastatic anal CA GERD HTN Anemia fed through J tube presents S/P fall with a laceration to the head and missing J tube. Pt is domiciled with his sister, states at 4AM she heard the patient calling out to her and found him on the floor. Denies A/C use, pt does not recall the fall, unknown down time. Pt endorses chills for the past day. Pt's sister states she last fed the patient through the J tube last night but states she could not find the tube today. Pt unsure of last TDAP, declines booster. Denies any other sx or acute complaints.

## 2022-08-18 NOTE — CONSULT NOTE ADULT - SUBJECTIVE AND OBJECTIVE BOX
CHIEF COMPLAINT  Fall    HISTORY OF PRESENT ILLNESS  THOMAS DUKE is a 77y Male who presents with a chief complaint of fall.    Patient was admitted on August 18th after falling. Patient's family heard patient calling out at around 0400, and found him on the floor. He did not recall falling. Patient also had dislodged his J-tube. He does not remember doing so.     Patient has history of metastatic anal cancer, and newly diagnosed pancreatic cancer, follows with Dr. Shantanu Hahn of [facility]. He has been undergoing radiation therapy. Performance status has remained very poor.     PAST MEDICAL AND SURGICAL HISTORY  Anal Cancer  Pancreatic Cancer  Gastroesophageal Reflux Disease  Hypertension    FAMILY HISTORY  Non-contributory    SOCIAL HISTORY  Denies tobacco use    REVIEW OF SYSTEMS  A complete review of systems was performed; negative except per HPI    PHYSICAL EXAM  T(C): 37.1 (08-18-22 @ 15:51), Max: 39.1 (08-18-22 @ 05:22)  HR: 85 (08-18-22 @ 15:51) (70 - 103)  BP: 150/65 (08-18-22 @ 15:51) (124/59 - 150/65)  RR: 17 (08-18-22 @ 15:51) (16 - 18)  SpO2: 98% (08-18-22 @ 15:51) (98% - 100%)  Constitutional: alert, awake, in no acute distress  Eyes: PERRL, EOMI  HEENT: normocephalic, atraumatic  Neck: supple, non-tender  Cardiovascular: normal perfusion, no peripheral edema  Respiratory: normal respiratory efforts; no increased use of accessory muscles  Gastrointestinal: soft, non-tender  Musculoskeletal: normal range of motion, no deformities noted  Neurological: alert, CN II to XI grossly intact  Skin: warm, dry    LABORATORY DATA                        9.6    10.52 )-----------( 324      ( 18 Aug 2022 06:36 )             31.8     08-18    141  |  93<L>  |  40<H>  ----------------------------<  148<H>  3.2<L>   |  36<H>  |  0.94    Ca    10.4      18 Aug 2022 06:36    TPro  5.9<L>  /  Alb  3.5  /  TBili  0.5  /  DBili  x   /  AST  19  /  ALT  11  /  AlkPhos  141<H>  08-18

## 2022-08-18 NOTE — ED PROVIDER NOTE - CONSTITUTIONAL, MLM
normal... Ill appearing, awake, alert, oriented to person, place, time/situation and in no immediate distress.

## 2022-08-18 NOTE — ED ADULT NURSE NOTE - OBJECTIVE STATEMENT
pt   pt c/o fall with head trauma. pt with lac on back of head and blood on wall. denies AC use. states he felt dizzy before the fall. PMHx bladder, intestine and anal ca no current treatment. has PEG tube that is currently leaking, GERD, HTN, anemia. pt a&ox4 c/o fall with head trauma. pt lac on back of head. sister at bedside states "put tumeric on it". pt states "felt dizzy before fall". pt denies AC use. pt c/o missing J tube. pt peg tube. drainage for tubes (green in color). pt otherwise skin in tact. 20g to the RAC and 22g to the LH. labs collected and sent. pt medicated as per md order. pt respirations even and unlabored with no accessory muscle use. pt cleaned, turned, and repositioned. pt denies chest pain, sob, nausea, vomiting, dizziness, headache, normal sinus on monitor. pt a&ox4 c/o fall with head trauma. pt lac on back of head. sister at bedside states "put tumeric on it". pt states "felt dizzy before fall". pt denies AC use. pt c/o missing J tube. pt peg tube. drainage for tubes (green in color). pt otherwise skin in tact. 20g to the RAC and 22g to the LH. labs collected and sent. pt medicated as per md order. pt respirations even and unlabored with no accessory muscle use. pt cleaned, turned, and repositioned. pt denies chest pain, sob, nausea, vomiting, dizziness, headache, normal sinus on monitor. pt metastatic anal cancer on radiation with last tx yesterday

## 2022-08-18 NOTE — ED ADULT NURSE REASSESSMENT NOTE - NS ED NURSE REASSESS COMMENT FT1
pt A&ox4, denies chest pain and SOB. no complaints of pain. J-tube draining dark green fluid. breathing is spontaneous and unlabored. no complaints of abdominal pain, abdomen is soft, flat, and nontender. family at bedside, call bell within reach, side rails upx2. pt awaiting transport to IR. report given to IR nurse S[place].

## 2022-08-18 NOTE — CONSULT NOTE ADULT - SUBJECTIVE AND OBJECTIVE BOX
PATIENT SEEN AND EXAMINED ON :-08/18/2022  DATE OF SERVICE:    08/18/2022         Interim events noted,Labs ,Radiological studies and Cardiology tests reviewed .    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 tube dislodgement 7/22 replaced by IR, MSSA bacteremia s/p IV abx now presenting s/p fall at home w/ dislodged J tube. Nice states pt was found on the floor at home by family and looked like he'd hit is head on a step. Tube was found to be dislodged. Pt unable to elaborate on history, states to ask his niece because "I don't know." ROS positive for abdominal pain around J-tube site. Otherwise denies fevers, chills, chest pain, SOB, constipation, diarrhea.     In the E.D, pt febrile to 102, . WBC 10.52 w/ bands.  CT C/A/P w/ progressive malignancy and right upper lobe tree-in-bud nodularity, likely   representing infectious/inflammatory bronchiolitis. Received IV zosyn x 1, IVF, admitted to medicine for further mgt. IR consulted for J tube replacement.    Discussed with patients niece patient for Hospice referral to continue Tube feeds but no further RT or Chemutherapy  Denies chest pain    Patient History:   Past Medical, Past Surgical, and Family History:  PAST MEDICAL HISTORY:  Anal cancer Had Chemo and Radiation 4 years ago    Anemia, unspecified     GERD (gastroesophageal reflux disease)     Hypertension.     PAST SURGICAL HISTORY:  Jejunostomy tube present     S/P laparotomy.     FAMILY HISTORY:  No pertinent family history in first degree relatives. No pertinent family history of: heart disease.    Social History:  · Substance use	No  · Social History (marital status, living situation, occupation, and sexual history)	Lives at home w/ family. Denies ETOH, Tobacco or drug use    Allergies and Intolerances:        Allergies:  	No Known Allergies:     Home Medications:   * Patient Currently Takes Medications as of 18-Aug-2022 16:55 documented in Structured Notes  · 	pantoprazole 40 mg oral granule, delayed release: Last Dose Taken:  , 40 milligram(s) by jejunostomy tube once a day   · 	amLODIPine 10 mg oral tablet: Last Dose Taken:  , 1 tab(s) by jejunostomy tube once a day MDD:one tab   · 	Zofran 4 mg oral tablet: Last Dose Taken:  , 1 tab(s) by gastrostomy tube every 8 hours, As Needed - for nausea   · 	Multiple Vitamins oral tablet, chewable: Last Dose Taken:  , 1 tab(s) by jejunostomy tube once a day  · 	acetaminophen 325 mg oral tablet: Last Dose Taken:  , 2 tab(s) by jejunostomy tube every 6 hours, As Needed - 3), Moderate Pain (4 - 6)  · 	oxyCODONE 5 mg oral tablet: Last Dose Taken:  , 1 tab(s) orally every 4 hours, As Needed  	  	Reference #: [id number] MDD:6  · 	senna oral tablet: Last Dose Taken:  , 2 tab(s) by jejunostomy tube once a day (at bedtime)  · 	polyethylene glycol 3350 oral powder for reconstitution: Last Dose Taken:  , 17 gram(s) by jejunostomy tube once a day, As Needed  · 	mupirocin 2% topical ointment: Last Dose Taken:  , Apply topically to affected area 3 times a day to feeding tube site  · 	Helio-Protect topical cream: Last Dose Taken:  , Apply topically to affected area 2 times a day to bilateral buttock   · 	A And D topical ointment: Last Dose Taken:  , Apply topically to affected area 2 times a day to bilateral heels       Review of Systems:  Review of Systems: CONSTITUTIONAL:  No  fever, chills, +weakness, +fatigue.  HEENT:  No visual loss, blurred vision, No sneezing, congestion, runny nose or sore throat.  SKIN:  No rash or itching.  CARDIOVASCULAR:  No chest pain, chest pressure or chest discomfort. No palpitations.  RESPIRATORY:  No shortness of breath, cough or sputum.  GASTROINTESTINAL:  No nausea, vomiting or diarrhea. No abdominal pain or blood.  GENITOURINARY:  Denies hematuria, dysuria.   NEUROLOGICAL:  No headache, dizziness, syncope  ENDOCRINOLOGIC:  No reports of sweating, cold or heat intolerance. No polyuria or polydipsia.      T(C): 37.1 (08-18-22 @ 15:51), Max: 39.1 (08-18-22 @ 05:22)  HR: 85 (08-18-22 @ 15:51) (70 - 103)  BP: 150/65 (08-18-22 @ 15:51) (124/59 - 150/65)  RR: 17 (08-18-22 @ 15:51) (16 - 18)   PATIENT SEEN AND EXAMINED ON :-08/18/2022  DATE OF SERVICE:    08/18/2022         Interim events noted,Labs ,Radiological studies and Cardiology tests reviewed .    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 tube dislodgement 7/22 replaced by IR, MSSA bacteremia s/p IV abx now presenting s/p fall at home w/ dislodged J tube. Nice states pt was found on the floor at home by family and looked like he'd hit is head on a step. Tube was found to be dislodged. Pt unable to elaborate on history, states to ask his niece because "I don't know." ROS positive for abdominal pain around J-tube site. Otherwise denies fevers, chills, chest pain, SOB, constipation, diarrhea.     In the E.D, pt febrile to 102, . WBC 10.52 w/ bands.  CT C/A/P w/ progressive malignancy and right upper lobe tree-in-bud nodularity, likely   representing infectious/inflammatory bronchiolitis. Received IV zosyn x 1, IVF, admitted to medicine for further mgt. IR consulted for J tube replacement.    Discussed with patients niece patient for Hospice referral to continue Tube feeds but no further RT or Chemutherapy  Denies chest pain had laceration sutured and J tube replaced    Patient History:   Past Medical, Past Surgical, and Family History:  PAST MEDICAL HISTORY:  Anal cancer Had Chemo and Radiation 4 years ago    Anemia, unspecified     GERD (gastroesophageal reflux disease)     Hypertension.     PAST SURGICAL HISTORY:  Jejunostomy tube present     S/P laparotomy.     FAMILY HISTORY:  No pertinent family history in first degree relatives. No pertinent family history of: heart disease.    Social History:  · Substance use	No  · Social History (marital status, living situation, occupation, and sexual history)	Lives at home w/ family. Denies ETOH, Tobacco or drug use    Allergies and Intolerances:        Allergies:  	No Known Allergies:     Home Medications:   * Patient Currently Takes Medications as of 18-Aug-2022 16:55 documented in Structured Notes  · 	pantoprazole 40 mg oral granule, delayed release: Last Dose Taken:  , 40 milligram(s) by jejunostomy tube once a day   · 	amLODIPine 10 mg oral tablet: Last Dose Taken:  , 1 tab(s) by jejunostomy tube once a day MDD:one tab   · 	Zofran 4 mg oral tablet: Last Dose Taken:  , 1 tab(s) by gastrostomy tube every 8 hours, As Needed - for nausea   · 	Multiple Vitamins oral tablet, chewable: Last Dose Taken:  , 1 tab(s) by jejunostomy tube once a day  · 	acetaminophen 325 mg oral tablet: Last Dose Taken:  , 2 tab(s) by jejunostomy tube every 6 hours, As Needed - 3), Moderate Pain (4 - 6)  · 	oxyCODONE 5 mg oral tablet: Last Dose Taken:  , 1 tab(s) orally every 4 hours, As Needed  	  	Reference #: [id number] MDD:6  · 	senna oral tablet: Last Dose Taken:  , 2 tab(s) by jejunostomy tube once a day (at bedtime)  · 	polyethylene glycol 3350 oral powder for reconstitution: Last Dose Taken:  , 17 gram(s) by jejunostomy tube once a day, As Needed  · 	mupirocin 2% topical ointment: Last Dose Taken:  , Apply topically to affected area 3 times a day to feeding tube site  · 	Helio-Protect topical cream: Last Dose Taken:  , Apply topically to affected area 2 times a day to bilateral buttock   · 	A And D topical ointment: Last Dose Taken:  , Apply topically to affected area 2 times a day to bilateral heels       Review of Systems:  Review of Systems: CONSTITUTIONAL:  No  fever, chills, +weakness, +fatigue.  HEENT:  No visual loss, blurred vision, No sneezing, congestion, runny nose or sore throat.  SKIN:  No rash or itching.  CARDIOVASCULAR:  No chest pain, chest pressure or chest discomfort. No palpitations.  RESPIRATORY:  No shortness of breath, cough or sputum.  GASTROINTESTINAL:  No nausea, vomiting or diarrhea. No abdominal pain or blood.  GENITOURINARY:  Denies hematuria, dysuria.   NEUROLOGICAL:  No headache, dizziness, syncope  ENDOCRINOLOGIC:  No reports of sweating, cold or heat intolerance. No polyuria or polydipsia.    VITALS:  T(C): 37.1 (08-18-22 @ 15:51), Max: 39.1 (08-18-22 @ 05:22)  HR: 85 (08-18-22 @ 15:51) (70 - 103)  BP: 150/65 (08-18-22 @ 15:51) (124/59 - 150/65)  RR: 17 (08-18-22 @ 15:51) (16 - 18)    Physical Exam:   Physical Exam: GENERAL: NAD, thin elderly man  HEAD:  Atraumatic, Normocephalic  EYES: EOMI, conjunctiva and sclera clear  NECK: Supple  CHEST/LUNG: dec breath sounds B/L, +rhonchi  HEART: Regular rate and rhythm; 2/6 Systolic murmurs,  Norubs, or gallops  ABDOMEN: Soft, Nontender, Nondistended; Bowel sounds present, +J-tube  and G tube in place  EXTREMITIES:  2+ Peripheral Pulses, No clubbing, cyanosis, or edema  PSYCH: Awake and alert  NEUROLOGY: non-focal  SKIN: warm and dry      LABORATORY DATA                        9.6    10.52 )-----------( 324      ( 18 Aug 2022 06:36 )             31.8     08-18    141  |  93<L>  |  40<H>  ----------------------------<  148<H>  3.2<L>   |  36<H>  |  0.94    Ca    10.4      18 Aug 2022 06:36    TPro  5.9<L>  /  Alb  3.5  /  TBili  0.5 Tr /  DBili  x   /  AST  19  /  ALT  11  /  AlkPhos  141<H>  08-18    Troponins--Troponin T, High Sensitivity Result: 63 <--- 73 ng/L    PROCEDURE:   · Procedure Name	Interventional Radiology  · Procedure Name	J Tube replacement  · Procedure Findings and Details	HPI: 77y Male with Metastatic anal CA GERD HTN Anemia fed through J tube presents S/P fall with a laceration to the head and missing J tube. Pt is domiciled with his sister, states at 4AM she heard the patient calling out to her and found him on the floor. Denies A/C use, pt does not recall the fall, unknown down time. Pt endorsed chills for the past day. Pt's sister stated she last fed the patient through the J tube last night but states she could not find the tube today.      ECG:  · EKG Date/Time: 18-Aug-2022 05:35  · Rate: 101  · Interpretation: Sinus Tachycardia  · Axis: Normal  · ME: 120  · QRS: 86  · ST/T Wave: NO STEMI  · EKG Date/Time: 18-Aug-2022 08:05  · Rate: 87  · ME: 128  · QRS: 88  · ST/T Wave: qtc 531, no GRAY    · Other Findings: otherwise similar to prior

## 2022-08-18 NOTE — H&P ADULT - PROBLEM SELECTOR PLAN 3
-Pt w/ metastatic anal cancer seemingly in remission and new pancreatic ca  -CT A/P w/ known unresectable biopsy-proven pancreatic adenocarcinoma. Demonstrates new intrahepatic and extrahepatic biliary ductal dilatation.  - Follows w/ Dr. Shantanu Hahn of [facility]  -Currently on radiation therapy per heme/onc  -To be held while inpt  -F/u heme/onc recs

## 2022-08-18 NOTE — H&P ADULT - PROBLEM SELECTOR PLAN 5
Pt w/ hypokalemia and metabolic alkalosis. Suspect iso GI losses  -c/w IVF  -J-tube mgt. as above  -Resume diet post replacement  -Repeat VGB  -Monitor BMPs

## 2022-08-18 NOTE — ED PROVIDER NOTE - ATTENDING APP SHARED VISIT CONTRIBUTION OF CARE
78 yo PMH metastatic CA feed through J tube presenting after a fall at 0400 at home.  pt sustained a lac to the head.  His jtube came out during the process of the fall.  Was fed last night prior to the fall.  Pt denies any pain at this time.    Gen: Chronically ill appearing in NAD  Head: NC there is a 3cm lac to the posterior occiput with no bleeding  Neck: trachea midline  Abd: soft jtube site is empty non tender  Resp:  No distress  Ext: no deformities  Neuro:  A&O appears non focal  Skin:  Warm and dry as visualized    78 yo with fall and J tube dislodgement that will require IR replacement.  Also will need repair of the lac on the head once CT is complete.

## 2022-08-18 NOTE — ED ADULT TRIAGE NOTE - CHIEF COMPLAINT QUOTE
pt c/o fall with head trauma. pt with lac on back of head and blood on wall. denies AC use. states he felt dizzy before the fall. PMHx bladder, intestine and anal ca no current treatment. has PEG tube that is currently leaking, GERD, HTN, anemia.

## 2022-08-19 NOTE — DIETITIAN INITIAL EVALUATION ADULT - NS FNS WEIGHT CHANGE REASON
Usual weight 180lbs, Weight as per previous RD notes (05/03/2022) 162 pounds -> (06/24/2022) 140.8 pounds--> (07/21) 128 pounds. This admission 56.5kg/124.3lbs./unintentional

## 2022-08-19 NOTE — PHYSICAL THERAPY INITIAL EVALUATION ADULT - PERTINENT HX OF CURRENT PROBLEM, REHAB EVAL
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

## 2022-08-19 NOTE — PATIENT PROFILE ADULT - FALL HARM RISK - HARM RISK INTERVENTIONS
Assistance with ambulation/Assistance OOB with selected safe patient handling equipment/Communicate Risk of Fall with Harm to all staff/Reinforce activity limits and safety measures with patient and family/Review medications for side effects contributing to fall risk/Sit up slowly, dangle for a short time, stand at bedside before walking/Tailored Fall Risk Interventions/Toileting schedule using arm’s reach rule for commode and bathroom/Visual Cue: Yellow wristband and red socks/Bed in lowest position, wheels locked, appropriate side rails in place/Call bell, personal items and telephone in reach/Instruct patient to call for assistance before getting out of bed or chair/Non-slip footwear when patient is out of bed/[place] to call system/Physically safe environment - no spills, clutter or unnecessary equipment/Purposeful Proactive Rounding/Room/bathroom lighting operational, light cord in reach

## 2022-08-19 NOTE — DIETITIAN INITIAL EVALUATION ADULT - ORAL INTAKE PTA/DIET HISTORY
RD met with patient at bedside. Patient unable to clearly report tube feeding administered at home. RD showed photos and of formula including Vital 1.5cal (recently discharged on at [facility]), Jevity1.2/1.5 and TwoCal and patient reports taking TWOcal. However, family (Cele, [phone]) reports he was taking Vital 1.5 (non-formulary in-house) but unable to provide exact volume taking per day or rate being administered. Unclear if patient was taking any oral po intake at home per patient and family at this time. Per recent RD note on 7/22 patient was only drinking clear liquids via PO at home and receiving tube feedings.

## 2022-08-19 NOTE — PHYSICAL THERAPY INITIAL EVALUATION ADULT - GAIT DISTANCE, PT EVAL
2 lateral steps; deferred further 2/2 pt c/o dizziness in standing, pt also with decreased strength.  Pt states dizziness subsided with returning to bed.

## 2022-08-19 NOTE — DIETITIAN INITIAL EVALUATION ADULT - PERTINENT MEDS FT
MEDICATIONS  (STANDING):  amLODIPine   Tablet 10 milliGRAM(s) Oral daily  chlorhexidine 2% Cloths 1 Application(s) Topical daily  dimethicone/zinc oxide Topical Cream (KARINA PROTECT) - Peds 1 Application(s) Topical two times a day  enoxaparin Injectable 40 milliGRAM(s) SubCutaneous every 24 hours  multivitamin 1 Tablet(s) Oral daily  mupirocin 2% Ointment 1 Application(s) Topical three times a day  nystatin    Suspension [id number] Unit(s) Oral four times a day  pantoprazole   Suspension 40 milliGRAM(s) Oral daily  piperacillin/tazobactam IVPB.. 3.375 Gram(s) IV Intermittent every 8 hours  sodium chloride 0.9%. 1000 milliLiter(s) (75 mL/Hr) IV Continuous <Continuous>    MEDICATIONS  (PRN):  oxyCODONE    IR 5 milliGRAM(s) Oral every 6 hours PRN Severe Pain (7 - 10)  polyethylene glycol 3350 17 Gram(s) Oral daily PRN Constipation  senna 2 Tablet(s) Oral at bedtime PRN Constipation

## 2022-08-19 NOTE — DIETITIAN INITIAL EVALUATION ADULT - REASON FOR ADMISSION
Acute bronchiolitis due to unspecified organism  76 y/o male with medical history of metastatic anal cancer s/p chemo/RT and duodonal cancer c/b GOO s/p lap venting gastrostomy and feeding jejunostomy on 5/13 s/p exchange of J tube on 6/17 with tube dislodgement 7/22 replaced by IR, MSSA bacteremia s/p IV abx now presenting s/p fall at home with dislodge J tube, admitted for sepsis per chart review.

## 2022-08-19 NOTE — PROGRESS NOTE ADULT - ASSESSMENT
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 tube dislodgement 7/22 replaced by IR, MSSA bacteremia s/p IV abx now presenting s/p fall at home w/ dislodged J tube, admitted for sepsis.

## 2022-08-19 NOTE — DIETITIAN INITIAL EVALUATION ADULT - PERTINENT LABORATORY DATA
08-19    137  |  97<L>  |  25<H>  ----------------------------<  99  3.6   |  29  |  0.73    Ca    9.6      19 Aug 2022 06:33  Phos  2.6     08-19  Mg     1.80     08-19    TPro  5.6<L>  /  Alb  2.8<L>  /  TBili  0.6  /  DBili  x   /  AST  22  /  ALT  11  /  AlkPhos  126<H>  08-19

## 2022-08-19 NOTE — DIETITIAN INITIAL EVALUATION ADULT - ENTERAL
1. Recommend Peptamen1.5 prebio at continuous rate via J-tube at goal rate of 80ml/hr x 17hr equates to total volume of 1360mL,2040kcal, 92gpro.   2. Further adjustments to rate/volume/duration/free water provision of enteral feeds dependant on long term monitoring of patient's tolerance, weight trends and needs.

## 2022-08-19 NOTE — DIETITIAN INITIAL EVALUATION ADULT - SIGNS/SYMPTOMS
<75% nutritional needs >1month, weight loss>20% <1yr, severe muscle wasting and fat loss Weight loss>20% <1yr, severe muscle wasting and fat loss

## 2022-08-19 NOTE — PHYSICAL THERAPY INITIAL EVALUATION ADULT - ADDITIONAL COMMENTS
Pt states he mostly stays in bed at home; is able to ambulate short distances with a walker. Pt reports his Sister helps with ADLs and transfers OOB to chair.

## 2022-08-19 NOTE — CONSULT NOTE ADULT - SUBJECTIVE AND OBJECTIVE BOX
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 tube dislodgement 7/22 replaced by IR, MSSA bacteremia s/p IV abx now presenting s/p fall at home w/ dislodged J tube.  Pt was found on the floor at home by family and looked like he'd hit is head on a step. Tube was found to be dislodged. Pt unable to elaborate on history.   ROS was positive for abdominal pain around J-tube site. Otherwise denies fevers, chills, chest pain, SOB, constipation, diarrhea.     In the E.D, pt febrile to 102, . WBC 10.52 w/ 18% bands.  CT C/A/P w/ progressive malignancy and right upper lobe tree-in-bud nodularity, likely   representing infectious/inflammatory bronchiolitis. Received IV zosyn x 1, IVF, admitted to medicine for further mgt. IR consulted for J tube replacement.    J tube replaced today  WBC better today  Mouth very dry with white coating      PAST MEDICAL & SURGICAL HISTORY:  Anal cancer  Had Chemo and Radiation 4 years ago      GERD (gastroesophageal reflux disease)      Hypertension      Anemia, unspecified      S/P laparotomy      Jejunostomy tube present          Social history:    FAMILY HISTORY:  No pertinent family history in first degree relatives      Allergies    No Known Allergies    Intolerances        Antimicrobials:    piperacillin/tazobactam IVPB.. 3.375 Gram(s) IV Intermittent every 8 hours        Vital Signs Last 24 Hrs  T(C): 36.9 (19 Aug 2022 06:13), Max: 37.1 (18 Aug 2022 15:51)  T(F): 98.5 (19 Aug 2022 06:13), Max: 98.8 (18 Aug 2022 15:51)  HR: 78 (19 Aug 2022 06:13) (70 - 85)  BP: 152/56 (19 Aug 2022 06:13) (129/72 - 153/63)  BP(mean): --  RR: 19 (19 Aug 2022 06:13) (17 - 19)  SpO2: 97% (19 Aug 2022 06:13) (97% - 100%)    Parameters below as of 18 Aug 2022 23:00  Patient On (Oxygen Delivery Method): room air      inPHYSICAL EXAM:  General: [x ] non-toxic  HEAD/EYES: [ ] PERRL [ x] white sclera [ ] icterus  ENT:  [ ] normal [x ] supple [ x] thrush [ ] pharyngeal exudate  Cardiovascular:   [ ] murmur [ x] normal [ ] PPM/AICD  Respiratory:  CTA b/;  GI:  [x ] soft, non-tender, normal bowel sounds  Neurologic:  [x ] non-focal exam   Psychiatric:  [x ] appropriate affect [ x] alert & oriented  Lines:  [x ] no phlebitis [ ] central line                            9.8    7.81  )-----------( 262      ( 19 Aug 2022 06:20 )             32.2         08-19    137  |  97<L>  |  25<H>  ----------------------------<  99  3.6   |  29  |  0.73    Ca    9.6      19 Aug 2022 06:33  Phos  2.6     08-19  Mg     1.80     08-19    TPro  5.6<L>  /  Alb  2.8<L>  /  TBili  0.6  /  DBili  x   /  AST  22  /  ALT  11  /  AlkPhos  126<H>  08-19      RECENT CULTURES:    MICROBIOLOGY:  Culture Results:   No growth to date. (08-18 @ 07:41)  Culture Results:   No growth to date. (08-18 @ 06:15)          Radiology:  < from: CT Abdomen and Pelvis w/ IV Cont (08.18.22 @ 09:26) >  IMPRESSION:  No evidence of pulmonary embolism.    Redemonstrated soft tissue mass in the region of the duodenum/pancreatic   head, which has increased in size from prior CT abdomen pelvis 7/5/2022,   consistent with biopsy-proven pancreatic adenocarcinoma. There is new   intrahepatic and extrahepatic biliary ductal dilatation.    New mild clustered right upper lobe tree-in-bud nodularity, likely   representing infectious/inflammatory bronchiolitis. Consider repeat   noncontrast CT chest in 3 months to assess for resolution.    < end of copied text >  < from: CT Head No Cont (08.18.22 @ 09:27) >    IMPRESSION:  No acute intracranial hemorrhage    No acute fracture cervical spine. If pain persists, follow-up MRI exam   recommended    < end of copied text >  < from: Xray Pelvis 2 views (08.18.22 @ 07:13) >  FINDINGS:  No acute displaced fracture or dislocation.  Partially visualized left hip total arthroplasty with single acetabular   augmentation screw. No convincing evidence for hardware loosening or   subsidence on this nondedicated examination. Prosthetic femoral head is   well centered within the acetabular component  Clinical history joints and pubic symphysis are maintained.  Bony demineralization. No aggressive osseous neoplasm.    IMPRESSION:  No acute displaced pelvic fracture or dislocation.    < end of copied text >

## 2022-08-19 NOTE — DIETITIAN NUTRITION RISK NOTIFICATION - TREATMENT: THE FOLLOWING DIET HAS BEEN RECOMMENDED
Diet, NPO with Tube Feed:   Tube Feeding Modality: Jejunostomy  Peptamen 1.5 (PEPTAMEN1.5RTH)  Total Volume for 24 Hours (mL): 1360  Intermittent  Starting Tube Feed Rate {mL per Hour}: 40  Increase Tube Feed Rate by (mL): 10    Every 4 hours  Until Goal Tube Feed Rate (mL per Hour): 80  Tube Feeding Hours ON: 17  Tube Feeding OFF (Hours): 7  Tube Feed Start Time: 14:00 (08-19-22 @ 13:48) [Active]

## 2022-08-19 NOTE — DIETITIAN INITIAL EVALUATION ADULT - NUTRITIONGOAL OUTCOME1
patient to >75% estimated needs via tolerated route patient to meet >75% estimated needs via tolerated route

## 2022-08-19 NOTE — PROGRESS NOTE ADULT - ASSESSMENT
THOMAS DUKE is a 77y Male who presents with a chief complaint of fall.    Metastatic Anal Cancer, Pancreatic Cance  - Patient follows with Dr. Shantanu Hahn, [facility].  - Performance status has remained very poor.   - He has been receiving radiation therapy, but over the last few days has had weakness, fatigue.   - Previously Dr. Hahn had discussed hospice with patient, however upon discussion again, patient would like to continue RT   - Recommend palliative care consult / GOC     Dislodged J-Tube  - IR replaced 08/18    Weakness, Fever  - In the setting of ongoing radiation treatment.   - Will need to be evaluated for infection.   - On Zosyn, f/u cultures      Will continue to follow.    Janis Black PA-C  Hematology/Oncology  [facility]  [phone] (office)  [phone] (alt office)  Evenings and weekends please call MD on call or office

## 2022-08-19 NOTE — CHART NOTE - NSCHARTNOTEFT_GEN_A_CORE
Patient with fever and tachycardia on admission. Procal of 0.28, bandemia of 18.3%. Per discussion with Dr. De La Garza, house ID consulted. Appreciate recommendations.

## 2022-08-19 NOTE — DIETITIAN INITIAL EVALUATION ADULT - OTHER INFO
Patient remains NPO at this time. Patient denies any nausea/vomiting/diarrhea/constipation at this time. S/p J tube re J tube replacement 8/18/22. Case discussed with ACP and recommendation provided.

## 2022-08-20 NOTE — PROGRESS NOTE ADULT - ASSESSMENT
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 tube dislodgement 7/22 replaced by IR, MSSA bacteremia s/p IV abx which was completed 7/20/22 (received 4 weeks)  now presenting s/p fall at home w/ dislodged J tube, admitted for sepsis with fever, bandemia and RUL pna on ct chest. +thrush noted    REC:  1) Abnormal ct chest, possible RUL PNA.   - Continue zosyn 3.35 grams iv q8,   - could pt have aspirated s/p fall?    2) Fever  - as above  - f/up blood cultures, recent MSSA bacteremia s/p 4 weeks treatment which ended 7/20/22.    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 tube dislodgement 7/22 replaced by IR, MSSA bacteremia s/p IV abx which was completed 7/20/22 (received 4 weeks)  now presenting s/p fall at home w/ dislodged J tube, admitted for sepsis with fever, bandemia and RUL pna on ct chest. +thrush noted    REC:  1) Abnormal ct chest, possible RUL PNA.   - Continue zosyn 3.35 grams iv q8 through 8/23/2022  - could pt have aspirated s/p fall?    2) Fever  - as above  - f/up blood cultures, recent MSSA bacteremia s/p 4 weeks treatment which ended 7/20/22.  Blood culture from 8/18 without growth    3) Bandemia  -improved    4) Thrush  -mouth care  - nystatin swish and spit for 10 days    Call Id service with additional questions  Will sign off

## 2022-08-20 NOTE — CHART NOTE - NSCHARTNOTEFT_GEN_A_CORE
Provider notified by RN that patient's J tube is continuing to leak despite improvement in leakage yesterday. Bumper is not loose, however, patient still noted to have leakage around the J tube stoma.     Tube feeds held. Provider paged IR fellow, awaiting call back. Provider notified by RN that patient's J tube is continuing to leak despite improvement in leakage yesterday. Bumper is not loose, however, patient still noted to have leakage around the J tube stoma.     Tube feeds held. Provider paged IR fellow, will assess patient today.

## 2022-08-20 NOTE — CHART NOTE - NSCHARTNOTEFT_GEN_A_CORE
IR Follow-Up     78 yo male s/p recent J-tube re-insertion on 8/18, having leaking from tube.     Patient seen & evaluated at bedside. No signs of irritation or infection.     No intervention indicated at this time. Continue with dressing changes as needed.    D/w Dr. Francisco    --  Pete Bejarano DO/GARDENIA  Interventional Radiology Resident (PGY-4)  Available on [employer] TEAMS    For EMERGENT inquiries/questions:  IR Pager ([facility]): [phone]  IR Pager ([facility]): [phone] ; [phone]    For non-emergent consults/questions:   Please place a sunrise order "Consult- Interventional Radiology" with an appropriate callback number    For questions about scheduling during appropriate work hours, call IR :  [facility]: [phone]  [facility]: [phone]    For outpatient IR booking:  [facility]: [phone]  [facility]: [phone]

## 2022-08-21 NOTE — PROGRESS NOTE ADULT - PROBLEM SELECTOR PLAN 7
DIET: NPO, Vital 1.5 post tube replacement  DVT: Lovenox  DISPO: Pending hospital course. PT consulted

## 2022-08-21 NOTE — PROGRESS NOTE ADULT - PROBLEM SELECTOR PLAN 6
-C/w home amlodipine w/ hold parameters

## 2022-08-22 NOTE — CONSULT NOTE ADULT - SUBJECTIVE AND OBJECTIVE BOX
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 tube dislodgement 7/22 replaced by IR, MSSA bacteremia s/p IV abx now presenting s/p fall at home w/ dislodged J tube. In the E.D, pt febrile to 102, . WBC 10.52 w/ bands.  CT C/A/P w/ progressive malignancy and right upper lobe tree-in-bud nodularity, likely representing infectious/inflammatory bronchiolitis. J tube was reinserted via IR. Patient is a hospice candidate. Palliative consulted for [facility].     Patient was seen this AM, complaining of pain in abdomen and stated he had a BM. Please see [facility] document for discussion wtih patient and his niece Dr. Yasmin Mo (gastroenterologist in [place])      PERTINENT PM/SXH:   Anal cancer    GERD (gastroesophageal reflux disease)    Hypertension    Anemia, unspecified      No significant past surgical history    S/P laparotomy    Jejunostomy tube present      FAMILY HISTORY:  No pertinent family history in first degree relatives      Family Hx substance abuse [ ]yes [ ]no  ITEMS NOT CHECKED ARE NOT PRESENT    SOCIAL HISTORY:   Originally from [place], came to [place] in [place]. Worked as .   Lives with wife, no children. Sister from [place] has come to live to help care for him.   Significant other/partner[X ]  Children[ ]  Mu-ism/Spirituality:  Substance hx:  [ ]   Tobacco hx:  [ ]   Alcohol hx: [ ]   Home Opioid hx:  [ ] I-Stop Reference No:  Living Situation: [X ]Home  [ ]Long term care  [ ]Rehab [ ]Other    ADVANCE DIRECTIVES:    DNR/MOLST  [X ] - signed MOLST on 8/22 for DNR/DNI Living Will  [ ]   DECISION MAKER(s):  [ ] Health Care Proxy(s)  [X ] Surrogate(s)  [ ] Guardian           Name(s): Phone Number(s): Niece Dr. Yasmin Mo     BASELINE (I)ADL(s) (prior to admission):  [place]: [ ]Total  [ ] Moderate [X ]Dependent    Allergies    No Known Allergies    Intolerances    MEDICATIONS  (STANDING):  amLODIPine   Tablet 10 milliGRAM(s) Oral daily  chlorhexidine 2% Cloths 1 Application(s) Topical daily  dimethicone/zinc oxide Topical Cream (KARINA PROTECT) - Peds 1 Application(s) Topical two times a day  enoxaparin Injectable 40 milliGRAM(s) SubCutaneous every 24 hours  multivitamin 1 Tablet(s) Oral daily  mupirocin 2% Ointment 1 Application(s) Topical three times a day  nystatin    Suspension 676123 Unit(s) Oral four times a day  pantoprazole   Suspension 40 milliGRAM(s) Oral daily  piperacillin/tazobactam IVPB.. 3.375 Gram(s) IV Intermittent every 8 hours    MEDICATIONS  (PRN):  loperamide Liquid 2 milliGRAM(s) Oral every 6 hours PRN Diarrhea  oxyCODONE    IR 5 milliGRAM(s) Oral every 6 hours PRN Severe Pain (7 - 10)  polyethylene glycol 3350 17 Gram(s) Oral daily PRN Constipation  senna 2 Tablet(s) Oral at bedtime PRN Constipation    PRESENT SYMPTOMS: [ ]Unable to self-report  [ ] CPOT [ ] PAINADs [ ] RDOS  Source if other than patient:  [ ]Family   [ ]Team     Pain: [X ]yes [ ]no  QOL impact - yes   Location -  Abdominal                   Aggravating factors -   Quality -   Radiation -  Timing-  Severity (0-10 scale): 6/10   Minimal acceptable level (0-10 scale):     CPOT:    https://www.sccm.org/getattachment/dly31n57-7u8x-0t1k-8b4y-4976d9531l2e/Critical-Care-Pain-Observation-Tool-(CPOT)    PAIN AD Score:   http://geriatrictoolkit.[place].[facility]/cog/painad.pdf (press ctrl +  left click to view)    Dyspnea:                           [ ]Mild [ ]Moderate [ ]Severe      RDOS:  0 to 2  minimal or no respiratory distress   3  mild distress  4 to 6 moderate distress  >7 severe distress  https://[email or web address].net/handouts/hen/Respiratory_Distress_Observation_Scale.pdf (Ctrl +  left click to view)     Anxiety:                             [ ]Mild [ ]Moderate [ ]Severe  Fatigue:                             [ ]Mild [ ]Moderate [ ]Severe  Nausea:                             [ ]Mild [ ]Moderate [ ]Severe  Loss of appetite:              [ ]Mild [ ]Moderate [ ]Severe  Constipation:                    [ ]Mild [ ]Moderate [ ]Severe    PCSSQ[Palliative Care Spiritual Screening Question]   Severity (0-10):  Score of 4 or > indicate consideration of Chaplaincy referral.  Chaplaincy Referral: [ ] yes [ ] refused [ ] following    Other Symptoms:  [ ]All other review of systems negative     Palliative Performance Status Version 2:         %    http://[email or web address].org/files/news/palliative_performance_scale_[email or web address].pdf  PHYSICAL EXAM:  Vital Signs Last 24 Hrs  T(C): 37 (22 Aug 2022 14:28), Max: 37.2 (22 Aug 2022 06:30)  T(F): 98.6 (22 Aug 2022 14:28), Max: 99 (22 Aug 2022 06:30)  HR: 85 (22 Aug 2022 14:28) (75 - 85)  BP: 141/81 (22 Aug 2022 14:28) (121/57 - 141/81)  BP(mean): --  RR: 18 (22 Aug 2022 14:28) (18 - 18)  SpO2: 99% (22 Aug 2022 14:28) (99% - 100%)    Parameters below as of 22 Aug 2022 14:28  Patient On (Oxygen Delivery Method): room air     I&O's Summary    21 Aug 2022 07:01  -  22 Aug 2022 07:00  --------------------------------------------------------  IN: 60 mL / OUT: 1100 mL / NET: -1040 mL      GENERAL: [X ]Cachexia    [X ]Alert  [ ]Oriented x   [ ]Lethargic  [ ]Unarousable  [ ]Verbal  [ ]Non-Verbal  Behavioral:   [ ] Anxiety  [ ] Delirium [ ] Agitation [ ] Other  HEENT:  [ ]Normal   [X ]Dry mouth   [ ]ET Tube/Trach  [ ]Oral lesions  PULMONARY:   [X ]Clear [ ]Tachypnea  [ ]Audible excessive secretions   [ ]Rhonchi        [ ]Right [ ]Left [ ]Bilateral  [ ]Crackles        [ ]Right [ ]Left [ ]Bilateral  [ ]Wheezing     [ ]Right [ ]Left [ ]Bilateral  [ ]Diminished breath sounds [ ]right [ ]left [ ]bilateral  CARDIOVASCULAR:    [X ]Regular [ ]Irregular [ ]Tachy  [ ]Ayaz [ ]Murmur [ ]Other  GASTROINTESTINAL:  [ ]Soft  [ ]Distended   [ ]+BS  [ ]Non tender [X ]Tender  [ ]Other [ ]PEG [ ]OGT/ NGT  Last BM:  GENITOURINARY:  [ ]Normal [ ] Incontinent   [ ]Oliguria/Anuria   [ ]Celaya  MUSCULOSKELETAL:   [ ]Normal   [ ]Weakness  [X ]Bed/Wheelchair bound [ ]Edema  NEUROLOGIC:   [X ]No focal deficits  [ ]Cognitive impairment  [ ]Dysphagia [ ]Dysarthria [ ]Paresis [ ]Other   SKIN:   [ ]Normal  [ ]Rash  [ ]Other  [ ]Pressure ulcer(s)       Present on admission [ ]y [ ]n    CRITICAL CARE:  [ ] Shock Present  [ ]Septic [ ]Cardiogenic [ ]Neurologic [ ]Hypovolemic  [ ]  Vasopressors [ ]  Inotropes   [ ]Respiratory failure present [ ]Mechanical ventilation [ ]Non-invasive ventilatory support [ ]High flow    [ ]Acute  [ ]Chronic [ ]Hypoxic  [ ]Hypercarbic [ ]Other  [ ]Other organ failure     LABS:                        9.7    8.91  )-----------( 259      ( 22 Aug 2022 05:20 )             31.2   08-22    136  |  98  |  21  ----------------------------<  98  3.1<L>   |  27  |  0.80    Ca    9.7      22 Aug 2022 05:20  Phos  1.6     08-22  Mg     1.80     08-22    TPro  6.0  /  Alb  3.2<L>  /  TBili  0.6  /  DBili  x   /  AST  19  /  ALT  8   /  AlkPhos  200<H>  08-22    RADIOLOGY & ADDITIONAL STUDIES:  < from: CT Abdomen and Pelvis w/ IV Cont (08.18.22 @ 09:26) >  No evidence of pulmonary embolism.    Redemonstrated soft tissue mass in the region of the duodenum/pancreatic   head, which has increased in size from prior CT abdomen pelvis 7/5/2022,   consistent with biopsy-proven pancreatic adenocarcinoma. There is new   intrahepatic and extrahepatic biliary ductal dilatation.    New mild clustered right upper lobe tree-in-bud nodularity, likely   representing infectious/inflammatory bronchiolitis. Consider repeat   noncontrast CT chest in 3 months to assess for resolution.    < end of copied text >      PROTEIN CALORIE MALNUTRITION PRESENT: [ ]mild [ ]moderate [ ]severe [ ]underweight [ ]morbid obesity  https://www.andeal.org/vault/9940/web/files/ONC/Table_Clinical%20Characteristics%20to%20Document%20Malnutrition-White%20JV%20et%20al%202012.pdf    Height (cm): 157.5 (08-18-22 @ 23:00), 177.8 (07-21-22 @ 01:36), 157.5 (06-22-22 @ 13:12)  Weight (kg): 56.5 (08-18-22 @ 23:00), 58.2 (07-21-22 @ 01:36), 59.5 (07-01-22 @ 10:46)  BMI (kg/m2): 22.8 (08-18-22 @ 23:00), 18.4 (07-21-22 @ 01:36), 24 (07-01-22 @ 10:46)    [ ]PPSV2 < or = to 30% [X ]significant weight loss  [X ]poor nutritional intake  [ ]anasarca[X ]Artificial Nutrition      Other REFERRALS:  [ ]Hospice  [ ]Child Life  [ ]Social Work  [ ]Case management [ ]Holistic Therapy      HPI:  Patient is a 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 tube dislodgement 7/22 replaced by IR, MSSA bacteremia s/p IV abx now presenting s/p fall at home w/ dislodged J tube. In the E.D, pt febrile to 102, . WBC 10.52 w/ bands.  CT C/A/P w/ progressive malignancy and right upper lobe tree-in-bud nodularity, likely representing infectious/inflammatory bronchiolitis. J tube was reinserted via IR. Patient is a hospice candidate. Palliative consulted for [facility].     Patient was seen this AM, complaining of pain in abdomen and stated he had a BM. He points to area of his G and J tubes as sources of pain. Please see [facility] document for discussion wtih patient and his niece Dr. Yasmin Mo (gastroenterologist in [place])      PERTINENT PM/SXH:   Anal cancer    GERD (gastroesophageal reflux disease)    Hypertension    Anemia, unspecified      No significant past surgical history    S/P laparotomy    Jejunostomy tube present      FAMILY HISTORY:  No pertinent family history in first degree relatives      Family Hx substance abuse [ ]yes [ ]no  ITEMS NOT CHECKED ARE NOT PRESENT    SOCIAL HISTORY:   Originally from [place], [place]. Worked as .   Lives with wife, no children. Sister from [place] has come to live to help care for him.   Significant other/partner[X ]  Children[ ]  Baptist/Spirituality:  Substance hx:  [ ]   Tobacco hx:  [ ]   Alcohol hx: [ ]   Home Opioid hx:  [ ] I-Stop Reference No:  Living Situation: [X ]Home  [ ]Long term care  [ ]Rehab [ ]Other    ADVANCE DIRECTIVES:    DNR/MOLST  [X ] - signed MOLST on 8/22 for DNR/DNI Living Will  [ ]   DECISION MAKER(s):  [ ] Health Care Proxy(s)  [X ] Surrogate(s)  [ ] Guardian           Name(s): Phone Number(s): Niece Dr. Yasmin Mo     BASELINE (I)ADL(s) (prior to admission):  [place]: [ ]Total  [ ] Moderate [X ]Dependent    Allergies    No Known Allergies    Intolerances    MEDICATIONS  (STANDING):  amLODIPine   Tablet 10 milliGRAM(s) Oral daily  chlorhexidine 2% Cloths 1 Application(s) Topical daily  dimethicone/zinc oxide Topical Cream (KARINA PROTECT) - Peds 1 Application(s) Topical two times a day  enoxaparin Injectable 40 milliGRAM(s) SubCutaneous every 24 hours  multivitamin 1 Tablet(s) Oral daily  mupirocin 2% Ointment 1 Application(s) Topical three times a day  nystatin    Suspension 589311 Unit(s) Oral four times a day  pantoprazole   Suspension 40 milliGRAM(s) Oral daily  piperacillin/tazobactam IVPB.. 3.375 Gram(s) IV Intermittent every 8 hours    MEDICATIONS  (PRN):  loperamide Liquid 2 milliGRAM(s) Oral every 6 hours PRN Diarrhea  oxyCODONE    IR 5 milliGRAM(s) Oral every 6 hours PRN Severe Pain (7 - 10)  polyethylene glycol 3350 17 Gram(s) Oral daily PRN Constipation  senna 2 Tablet(s) Oral at bedtime PRN Constipation    PRESENT SYMPTOMS: [ ]Unable to self-report  [ ] CPOT [ ] PAINADs [ ] RDOS  Source if other than patient:  [ ]Family   [ ]Team     Pain: [X ]yes [ ]no  QOL impact - yes   Location -  Abdominal                   Aggravating factors -   Quality -   Radiation -  Timing-  Severity (0-10 scale): 6/10   Minimal acceptable level (0-10 scale):     CPOT:    https://www.sccm.org/getattachment/hdf47l66-8o2x-7h4m-3c4e-4719t2531s6k/Critical-Care-Pain-Observation-Tool-(CPOT)    PAIN AD Score:   http://geriatrictoolkit.[facility]/cog/painad.pdf (press ctrl +  left click to view)    Dyspnea:                           [ ]Mild [ ]Moderate [ ]Severe      RDOS:  0 to 2  minimal or no respiratory distress   3  mild distress  4 to 6 moderate distress  >7 severe distress  https://homecareinformation.net/handouts/hen/Respiratory_Distress_Observation_Scale.pdf (Ctrl +  left click to view)     Anxiety:                             [ ]Mild [ ]Moderate [ ]Severe  Fatigue:                             [ ]Mild [ ]Moderate [ ]Severe  Nausea:                             [ ]Mild [ ]Moderate [ ]Severe  Loss of appetite:              [ ]Mild [ ]Moderate [ ]Severe  Constipation:                    [ ]Mild [ ]Moderate [ ]Severe    PCSSQ[Palliative Care Spiritual Screening Question]   Severity (0-10):  Score of 4 or > indicate consideration of Chaplaincy referral.  Chaplaincy Referral: [ ] yes [ ] refused [ ] following    Other Symptoms:  [ ]All other review of systems negative     Palliative Performance Status Version 2:         %    http://[facility].org/files/news/palliative_performance_scale_ppsv2.pdf  PHYSICAL EXAM:  Vital Signs Last 24 Hrs  T(C): 37 (22 Aug 2022 14:28), Max: 37.2 (22 Aug 2022 06:30)  T(F): 98.6 (22 Aug 2022 14:28), Max: 99 (22 Aug 2022 06:30)  HR: 85 (22 Aug 2022 14:28) (75 - 85)  BP: 141/81 (22 Aug 2022 14:28) (121/57 - 141/81)  BP(mean): --  RR: 18 (22 Aug 2022 14:28) (18 - 18)  SpO2: 99% (22 Aug 2022 14:28) (99% - 100%)    Parameters below as of 22 Aug 2022 14:28  Patient On (Oxygen Delivery Method): room air     I&O's Summary    21 Aug 2022 07:01  -  22 Aug 2022 07:00  --------------------------------------------------------  IN: 60 mL / OUT: 1100 mL / NET: -1040 mL      GENERAL: [X ]Cachexia    [X ]Alert  [ ]Oriented x   [ ]Lethargic  [ ]Unarousable  [ ]Verbal  [ ]Non-Verbal  Behavioral:   [ ] Anxiety  [ ] Delirium [ ] Agitation [ ] Other  HEENT:  [ ]Normal   [X ]Dry mouth   [ ]ET Tube/Trach  [ ]Oral lesions  PULMONARY:   [X ]Clear [ ]Tachypnea  [ ]Audible excessive secretions   [ ]Rhonchi        [ ]Right [ ]Left [ ]Bilateral  [ ]Crackles        [ ]Right [ ]Left [ ]Bilateral  [ ]Wheezing     [ ]Right [ ]Left [ ]Bilateral  [ ]Diminished breath sounds [ ]right [ ]left [ ]bilateral  CARDIOVASCULAR:    [X ]Regular [ ]Irregular [ ]Tachy  [ ]Ayaz [ ]Murmur [ ]Other  GASTROINTESTINAL:  [ ]Soft  [ ]Distended   [ ]+BS  [ ]Non tender [X ]Tender  [ ]Other [ ]PEG [ ]OGT/ NGT  Last BM:  GENITOURINARY:  [ ]Normal [ ] Incontinent   [ ]Oliguria/Anuria   [ ]Celaya  MUSCULOSKELETAL:   [ ]Normal   [ ]Weakness  [X ]Bed/Wheelchair bound [ ]Edema  NEUROLOGIC:   [X ]No focal deficits  [ ]Cognitive impairment  [ ]Dysphagia [ ]Dysarthria [ ]Paresis [ ]Other   SKIN:   [ ]Normal  [ ]Rash  [ ]Other  [ ]Pressure ulcer(s)       Present on admission [ ]y [ ]n    CRITICAL CARE:  [ ] Shock Present  [ ]Septic [ ]Cardiogenic [ ]Neurologic [ ]Hypovolemic  [ ]  Vasopressors [ ]  Inotropes   [ ]Respiratory failure present [ ]Mechanical ventilation [ ]Non-invasive ventilatory support [ ]High flow    [ ]Acute  [ ]Chronic [ ]Hypoxic  [ ]Hypercarbic [ ]Other  [ ]Other organ failure     LABS:                        9.7    8.91  )-----------( 259      ( 22 Aug 2022 05:20 )             31.2   08-22    136  |  98  |  21  ----------------------------<  98  3.1<L>   |  27  |  0.80    Ca    9.7      22 Aug 2022 05:20  Phos  1.6     08-22  Mg     1.80     08-22    TPro  6.0  /  Alb  3.2<L>  /  TBili  0.6  /  DBili  x   /  AST  19  /  ALT  8   /  AlkPhos  200<H>  08-22    RADIOLOGY & ADDITIONAL STUDIES:  < from: CT Abdomen and Pelvis w/ IV Cont (08.18.22 @ 09:26) >  No evidence of pulmonary embolism.    Redemonstrated soft tissue mass in the region of the duodenum/pancreatic   head, which has increased in size from prior CT abdomen pelvis 7/5/2022,   consistent with biopsy-proven pancreatic adenocarcinoma. There is new   intrahepatic and extrahepatic biliary ductal dilatation.    New mild clustered right upper lobe tree-in-bud nodularity, likely   representing infectious/inflammatory bronchiolitis. Consider repeat   noncontrast CT chest in 3 months to assess for resolution.    < end of copied text >      PROTEIN CALORIE MALNUTRITION PRESENT: [ ]mild [ ]moderate [ ]severe [ ]underweight [ ]morbid obesity  https://www.andeal.org/vault/2440/web/files/ONC/Table_Clinical%20Characteristics%20to%20Document%20Malnutrition-White%20JV%20et%20al%574692.pdf    Height (cm): 157.5 (08-18-22 @ 23:00), 177.8 (07-21-22 @ 01:36), 157.5 (06-22-22 @ 13:12)  Weight (kg): 56.5 (08-18-22 @ 23:00), 58.2 (07-21-22 @ 01:36), 59.5 (07-01-22 @ 10:46)  BMI (kg/m2): 22.8 (08-18-22 @ 23:00), 18.4 (07-21-22 @ 01:36), 24 (07-01-22 @ 10:46)    [ ]PPSV2 < or = to 30% [X ]significant weight loss  [X ]poor nutritional intake  [ ]anasarca[X ]Artificial Nutrition      Other REFERRALS:  [ ]Hospice  [ ]Child Life  [ ]Social Work  [ ]Case management [ ]Holistic Therapy

## 2022-08-22 NOTE — ADVANCED PRACTICE NURSE CONSULT - RECOMMEDATIONS
Topical recommendations:     LUQ G tube - Cleanse q shift with NS, apply liquid barrier film beneath chela disc. Apply drain gauze under chela disc. Secondary securement to abdomen taping in 'H' fashion with Steri-strips.     LLQ J tube - Cleanse with NS, pat dry. Apply Liquid barrier film to periwound skin (allow to dry). Cut wafer of 2 piece ostomy pouch (item # 14112) to slide under molar disc, remove plastic backing to apply wafer to skin. Make x shaped cut in drainable ostomy pouch (Drainable pouch (2 1/4")- item # 52829) to feed tubing through. Apply ostomy pouch to wafer. Apply waterproof tape around X cut and tube to secure. Change every 3 days and PRN.      Sacrum, perineum, bilateral groin - Cleanse with skin cleanser, pat dry. Apply Helio moisture barrier cream twice a day and PRN with incontinent episodes.     Plan discussed with primary RN Kiran.     Please contact Wound/Ostomy Care Service Line if we can be of further assistance (ext 2021).

## 2022-08-22 NOTE — CONSULT NOTE ADULT - PROBLEM SELECTOR RECOMMENDATION 9
- Patient is refusing any further cancer treatments and is accepting he is approaching end of life  - Patient signed MOLST for DNR/DNI and would like hospice care.   - He is insistent on facility placement as he does not want to place burden on his family to care for him.

## 2022-08-22 NOTE — ADVANCED PRACTICE NURSE CONSULT - REASON FOR CONSULT
Patient seen on skin care rounds after wound care referral received for assessment of skin impairment and recommendations of topical management. 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 Patient presented s/p fall at home w/ dislodged J tube, J tube re-inserted by IR on 8/18, now leaking for past 3 days as per primary RN Kiran. PNA on CT; as per infectious disease c/w empiric zosyn IV q8h through 8/23. Heme/onc following for Pancreatic adenocarcinoma. Chart reviewed: BMI 22.8, azucena 15.

## 2022-08-22 NOTE — CONSULT NOTE ADULT - PROBLEM SELECTOR RECOMMENDATION 3
- Patient reports having pain and leaking around his G and J tubes  - Recommend to change oxycodone 5 mg q6h to q4h PRN for moderate to severe pain   - Patient was hesitant about increasing dose

## 2022-08-22 NOTE — CONSULT NOTE ADULT - PROBLEM SELECTOR RECOMMENDATION 2
- Patient with hx of anal cancer and now with new unresectable pancreatic cancer  - Patient is refusing any further treatments

## 2022-08-22 NOTE — ADVANCED PRACTICE NURSE CONSULT - ASSESSMENT
General: A&Ox4, assessed in bed, able to turn to the side independently, incontinent of stool, continent of urine. Patient had episode of loose natali colored incontinence during assessment, incontinence care provided. Skin warm, dry with increased moisture in intertriginous folds. Patient has LUQ G tube to decompression and LLQ jejunostomy tube for feeds. Tube feeding paused at time of assessment.     LLQ jejunostomy -     Sacrum, perineum, bilateral groin - incontinence associated dermatitis as evidenced by erythema and hyperpigmentation, no suspicion for candidiasis.   General: A&Ox4, assessed in bed, able to turn to the side independently, incontinent of stool, continent of urine. Patient had episode of loose natali colored incontinence during assessment, incontinence care provided. Skin warm, dry with increased moisture in intertriginous folds. Patient has LUQ G tube to decompression and LLQ jejunostomy tube for feeds. Tube feeding paused at time of assessment.     LLQ jejunostomy - continuously leaking gastric output under bumper at time of assessment. Peritubular skin with scattered areas of erythema from 3 to 7 o clock, following pattern of leaking contents, tender to palpation. No increased warmth, no edema, no induration, no signs or symptoms of overt skin infection. Goals of care: contain drainage, protect peritubular skin.      Sacrum, perineum, bilateral groin - incontinence associated dermatitis as evidenced by erythema and hyperpigmentation, no suspicion for candidiasis.      DEEPA Lora at bedside, patient first hesitant about pouching J tube however after discussing with Dr. De La Garza over the phone, patient agreeable to pouching.

## 2022-08-22 NOTE — GOALS OF CARE CONVERSATION - ADVANCED CARE PLANNING - CONVERSATION DETAILS
Referral to palliative care for complex decision making and symptom management in setting of advanced malignancy. Met with patient to discuss overall goals of care.   According to Dr. Hahn, pt's out-pt oncologist, pt's performance status has deteriorated and recommends transition to hospice care. Discussed above with pt who agrees that cancer directed treatment is not optimal at this time. He is interested in supportive and symptom directed care.     Philosophy of hospice care and services provided explained in detail. Pt is amenable to referral to hospice care network for an evaluation. Pt shared that he does not wish to be a burden to his family and prefers facility care for his end of life needs.     Advanced care planning extensively discussed. Reviewed the risks and benefits of resuscitative measures including cardiopulmonary resuscitation and mechanical ventilation at the end of life in the setting of advanced malignancy. Patient understands that these interventions may pose more burden than benefit and is amenable to DNR/DNI and completion of MOLST form. MOLST form completed and placed on patient's medical record.     Pt requested that his niece, Dr. Mo, be contacted to discuss medical decision making. Spoke to Dr. Mo who agrees with hospice care and plan and DNR/DNI.

## 2022-08-22 NOTE — CONSULT NOTE ADULT - ASSESSMENT
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 tube dislodgement 7/22 replaced by IR, MSSA bacteremia s/p IV abx which was completed 7/20/22 (received 4 weeks)  now presenting s/p fall at home w/ dislodged J tube, admitted for sepsis with fever, bandemia and RUL pna on ct chest. +thrush noted    REC:  1) Abnormal ct chest, possible RUL PNA.   - Continue zosyn 3.35 grams iv q8,   - could pt have aspirated s/p fall?    2) Fever  - as above  - f/up blood cultures, recent MSSA bacteremia s/p 4 weeks treatment which ended 7/20/22.    3) Bandemia  - better today, trend WBC    4) Thrush  -mouth care  - nystatin swish and spit    d/w acp  ID to follow this weekend  call ID fellow with questions    Megha Manzo MD  [phone] (pager)  [phone] (office) 
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 tube dislodgement 7/22 replaced by IR, MSSA bacteremia s/p IV abx now presenting s/p fall at home w/ dislodged J tube. In the E.D, pt febrile to 102, . WBC 10.52 w/ bands.  CT C/A/P w/ progressive malignancy and right upper lobe tree-in-bud nodularity, likely representing infectious/inflammatory bronchiolitis. J tube was reinserted via IR. Patient is a hospice candidate. Palliative consulted for [facility]. Patient has accepted he is at end of life and has agreed to DNR/DNI and hospice care.   
THOMAS DUKE is a 77y Male who presents with a chief complaint of fall.    Metastatic Anal Cancer, Pancreatic Cance  - Patient follows with Dr. Shantanu Hahn, [facility].  - Performance status has remained very poor.   - He has been receiving radiation therapy, but over the last few days has had weakness, fatigue.   - Previously Dr. Hahn had discussed hospice with patient.   - No treatment while inpatient. Consider palliative care consult.     Dislodged J-Tube  - Interventional radiology consulted. Plan on replacement today.    Weakness, Fever  - In the setting of ongoing radiation treatment.   - Will need to be evaluated for infection.     Troponin also elevated. Improving.    Will continue to follow.    Alexis Delacruz MD  Hematology/Oncology  O: [phone]
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 tube dislodgement 7/22 replaced by IR, MSSA bacteremia s/p IV abx now presenting s/p fall at home w/ dislodged J tube, admitted for sepsis.      Problem/Plan - 1:  ·  Problem: Sepsis.   ·  Plan: -Pt met sepsis criteria on admission w/ fever and tachycardia. Mild leukocytosis to 10.52 but w/ bandemia  -Suspect pulmonary source  -CT C/A/P sig for right upper lobe tree-in-bud nodularity, likely infectious vs. inflammatory bronchiolitis.  -s/p IV zosyn x 1 in the E.D  -C/w empiric coverage w/ zosyn  -F/u blood and urine Cx  -Check pro torri  -Trend fever curve  -Trend leukocytosis.  Elevated troponins likely demand    Problem/Plan - 2:  ·  Problem: Dislodged jejunostomy tube.   ·  Plan: -Replaced by IR  -NPO  -Resume diet post placement; Vital 1.5  -Nutrition consult.    Problem/Plan - 3:  ·  Problem: Pancreatic adenocarcinoma.   ·  Plan: -Pt w/ metastatic anal cancer seemingly in remission and new pancreatic ca  -CT A/P w/ known unresectable biopsy-proven pancreatic adenocarcinoma. Demonstrates new intrahepatic and extrahepatic biliary ductal dilatation.  - Follows w/ Dr. [facility] of [facility]  -Currently on radiation therapy per heme/onc  -To be held while inpt  -F/u heme/onc recs.    Problem/Plan - 4:  ·  Problem: Fall.   ·  Plan: -s/p fall at home. Suspect iso infection vs mechanical fall. Low c/f cardiac etiology at this time. Elevated trop noted, suspect iso demand ischemia. EKG w/o ischemic changes  -Now downtrending  -CT Head / C-spine negative for fractures/hemorrhage  -Check orthostatics  -Sepsis work up and mgt as above  -Pt eval.    Problem/Plan - 5:  ·  Problem: Electrolyte abnormality.   ·  Plan: Pt w/ hypokalemia and metabolic alkalosis. Suspect iso GI losses  -c/w IVF  -J-tube mgt. as above  -Resume diet post replacement  -Repeat VGB  -Monitor BMPs.    Problem/Plan - 6:  ·  Problem: HTN (hypertension).   ·  Plan: -C/w home amlodipine w/ hold parameters.    Problem/Plan - 7:  ·  Problem: Prophylactic measure.   ·  Plan: DIET: NPO, Vital 1.5 post tube replacement  DVT: Lovenox  DISPO: Pending hospital course. PT consulted.

## 2022-08-23 PROBLEM — C25.9 PANCREATIC ADENOCARCINOMA: Status: ACTIVE | Noted: 2022-01-01

## 2022-08-23 NOTE — HISTORY OF PRESENT ILLNESS
[de-identified] : Mr. Rick Pascal is a 77 year-old male presents for follow up.  \par \par He has a history of anal cancer 2012 s/p chemo radiation, and was recently found to have a new annular mass involving pancreas +duodenal obstruction third portion.  He is s/p lap assisted gastrostomy and 14Fr feeding red rubber Witzel jejunostomy tube on 05/13/22.  He returned to the ED with emesis and concern for clogged G/J tube.  Jejunostomy tube was replaced on 6/12/22.\par \par 6/14/2022- J-tube is currently clamped. Tolerating tube feed from 5 pm- 7 am. Pt is unsure of name of feeding. Otherwise, feeling well without any new health issues. Pt denies nausea, vomiting, or abdominal pain. No change in bowel habits. \par \par He was admitted to [facility] 6/22/22-7/6/22 for fever.  Grew MSSA bacteremia.  Completed a course of cefazolin via PICC.\par \par He was admitted to [facility] 7/20/22-7/22/22 with dislodged feeding tube.  J tube replaced by IR on 7/21/22.\par \par 8/16/22- Patient returns with concerns regarding the feeding tube.

## 2022-08-23 NOTE — PHYSICAL EXAM
[Normal] : supple, no neck mass and thyroid not enlarged [Normal Neck Lymph Nodes] : normal neck lymph nodes  [Normal Supraclavicular Lymph Nodes] : normal supraclavicular lymph nodes [Normal Groin Lymph Nodes] : normal groin lymph nodes [Normal Axillary Lymph Nodes] : normal axillary lymph nodes [Normal] : oriented to person, place and time, with appropriate affect [de-identified] : G tube and J tube in place

## 2022-08-23 NOTE — CHART NOTE - NSCHARTNOTEFT_GEN_A_CORE
Notified by Medical attending to order Clear liquid diet  with tube feedings. Orders placed per attendings request.

## 2022-08-23 NOTE — CONSULT LETTER
[Dear  ___] : Dear  [unfilled], [Courtesy Letter:] : I had the pleasure of seeing your patient, [unfilled], in my office today. [Please see my note below.] : Please see my note below. [Consult Closing:] : Thank you very much for allowing me to participate in the care of this patient.  If you have any questions, please do not hesitate to contact me. [Sincerely,] : Sincerely, [FreeTextEntry3] : Dylan Avielz MD, MPH, FACS, FSSO\par , [facility]\par [facility]\par Associate Professor of Surgery\par [employer] at [facility]

## 2022-08-23 NOTE — PROGRESS NOTE ADULT - ASSESSMENT
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 tube dislodgement 7/22 replaced by IR, MSSA bacteremia s/p IV abx now presenting s/p fall at home w/ dislodged J tube. In the E.D, pt febrile to 102, . WBC 10.52 w/ bands.  CT C/A/P w/ progressive malignancy and right upper lobe tree-in-bud nodularity, likely representing infectious/inflammatory bronchiolitis. J tube was reinserted via IR. Patient is a hospice candidate. Palliative consulted for [facility]. Patient has accepted he is at end of life and has agreed to DNR/DNI and hospice care.

## 2022-08-23 NOTE — ASSESSMENT
[FreeTextEntry1] : 77 year-old male presents for follow up.  He has a history of anal cancer 2012 s/p chemo radiation, and was recently found to have a new annular mass involving pancreas +duodenal obstruction third portion.  He is s/p lap assisted gastrostomy and 14Fr feeding red rubber Witzel jejunostomy tube on 05/13/22.  He returned to the ED with emesis and concern for clogged G/J tube.  Jejunostomy tube was replaced on 6/12/22.  Again dislodged and replaced in IR on 7/21/22.\par \par PLAN:\par 1) We spoke about goals of care with patient, his sister, and his niece Yasmin on phone.  The patient is tired and he is suffering, he will talk to his family more after the visit today and is likely considering having the J tube removed and G tube for venting.  He is considering hospice.\par

## 2022-08-23 NOTE — REASON FOR VISIT
[Follow-Up Visit] : a follow-up visit for [FreeTextEntry2] : status post laparoscopic feeding jejunostomy tube placement on 5/13/22

## 2022-08-23 NOTE — PROGRESS NOTE ADULT - ASSESSMENT
THOMAS DUKE is a 77y Male who presents with a chief complaint of fall.    Metastatic Anal Cancer, Pancreatic Cance  - Patient follows with Dr. Shantanu Hahn, [facility].  - Performance status has remained very poor.   - He has been receiving radiation therapy, but has been having progressive weakness, fatigue.   - Appreciate palliative care team discussions  - Patient and family members now agreeable to hospice. Declining further radiation treatments or other systemic treatments.    Dislodged J-Tube  - IR replaced 08/18    Weakness, Fever  - In the setting of recent radiation treatment.   - Afebrile overnight  - On Zosyn  - ID following. Blood cultures NGTD       Will continue to follow.    Janis Black PA-C  Hematology/Oncology  [facility]  [phone] (office)  [phone] (alt office)  Evenings and weekends please call MD on call or office

## 2022-08-24 NOTE — DISCHARGE NOTE PROVIDER - NSDCMRMEDTOKEN_GEN_ALL_CORE_FT
A And D topical ointment: Apply topically to affected area 2 times a day to bilateral heels   acetaminophen 325 mg oral tablet: 2 tab(s) by jejunostomy tube every 6 hours, As Needed - 3), Moderate Pain (4 - 6)  amLODIPine 10 mg oral tablet: 1 tab(s) by jejunostomy tube once a day MDD:one tab   Helio-Protect topical cream: Apply topically to affected area 2 times a day to bilateral buttock   Multiple Vitamins oral tablet, chewable: 1 tab(s) by jejunostomy tube once a day  mupirocin 2% topical ointment: Apply topically to affected area 3 times a day to feeding tube site  oxyCODONE 5 mg oral tablet: 1 tab(s) orally every 4 hours, As Needed    Reference #: [id number] MDD:6  pantoprazole 40 mg oral granule, delayed release: 40 milligram(s) by jejunostomy tube once a day   polyethylene glycol 3350 oral powder for reconstitution: 17 gram(s) by jejunostomy tube once a day, As Needed  senna oral tablet: 2 tab(s) by jejunostomy tube once a day (at bedtime)  Zofran 4 mg oral tablet: 1 tab(s) by gastrostomy tube every 8 hours, As Needed - for nausea

## 2022-08-24 NOTE — DISCHARGE NOTE PROVIDER - HOSPITAL COURSE
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      Sepsis  - patient met sepsis criteria on admission w/ fever and tachycardia; mild leukocytosis to 10.52 but w/ bandemia  - suspect pulmonary source, likely RUL PNA, possibly aspiration after fall  - CT C/A/P: right upper lobe tree-in-bud nodularity, likely infectious vs. inflammatory bronchiolitis  - UCx: negative  - BCx - NGTD   - procal 0.28  - ID consulted: c/w empiric zosyn for RUL PNA through 8/23  - c/w nystatin for thrush x 10 days, started 8/19    Dislodged jejunostomy tube  - s/p replacement by IR; diet resumed    Pancreatic adenocarcinoma  - patient w/ metastatic anal cancer seemingly in remission and new pancreatic cancer  - CT A/P w/ known unresectable biopsy-proven pancreatic adenocarcinoma; demonstrates new intrahepatic and extrahepatic biliary ductal dilatation  - [facility]  - currently on radiation therapy per heme/onc, to be held while inpatient  -Patient is a hospice candidate. Palliative consulted for [facility]. Pt DNR/DNI.   - heme/onc following    Fall  - s/p fall at home; suspect i/s/o infection vs. mechanical fall; low c/f cardiac etiology at this time; elevated trop noted, suspect i/s/o demand ischemia; EKG w/o ischemic changes  - CT head/C-spine: negative for fractures/hemorrhage  - PT: rehab    Electrolyte abnormality  - patient w/ hypokalemia and metabolic alkalosis; suspect i/s/o GI losses  - monitor BMPs    HTN (hypertension)  - c/w home amlodipine    Diet: on Vital 1.5 at home; c/w Peptamen 1.5 80cc/hr over 17 hours    On_________, discussed with __________, patient is medically cleared and optimized for discharge today. All medications were reviewed with attending,

## 2022-08-24 NOTE — DISCHARGE NOTE PROVIDER - DETAILS OF MALNUTRITION DIAGNOSIS/DIAGNOSES
This patient has been assessed with a concern for Malnutrition and was treated during this hospitalization for the following Nutrition diagnosis/diagnoses:     -  08/19/2022: Severe protein-calorie malnutrition

## 2022-08-25 NOTE — CHART NOTE - NSCHARTNOTEFT_GEN_A_CORE
Source: Patient [x ]    Family [ ]     other [x ] RN, chart review    Patient seen for f/u for severe malnutrition. 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 In the E.D, pt febrile to 102, . WBC 10.52 w/ bands.  CT C/A/P w/ progressive malignancy and right upper lobe tree-in-bud nodularity, likely representing infectious/inflammatory bronchiolitis. J tube was reinserted via IR (8/18) per chart.    Patient currently on clear liquids w/ Peptamen 1.5 tube feeds via JT @ 80 mL/hr. x 17 hrs. for total volume 1,360 mL. Provides 2,040 kcal, 92,5 g pro and 1,050 mL of fluid (TF free water). Patient reporting diarrhea, on Loperamide per chart. Suggest d/c bowel regimen per MD discretion. No edema noted per RN flow sheet. Per advanced practice nurse (8/22), no pressure injuries noted.    Diet : Diet, Clear Liquid:   Tube Feeding Modality: Jejunostomy  Peptamen 1.5 (PEPTAMEN1.5RTH)  Total Volume for 24 Hours (mL): 1360  Intermittent   Starting Tube Feed Rate {mL per Hour}: 40  Increase Tube Feed Rate by (mL): 10    Every 4 hours  Until Goal Tube Feed Rate (mL per Hour): 80  Tube Feeding Hours ON: 17  Tube Feeding OFF (Hours): 7  Tube Feed Start Time: 14:00 (08-23-22 @ 10:49)    Current Weight: Weight (kg): no new weight to assess  56.5 (08-18 @ 23:00)    Pertinent Medications: MEDICATIONS  (STANDING):  amLODIPine   Tablet 10 milliGRAM(s) Oral daily  chlorhexidine 2% Cloths 1 Application(s) Topical daily  enoxaparin Injectable 40 milliGRAM(s) SubCutaneous every 24 hours  multivitamin 1 Tablet(s) Oral daily  mupirocin 2% Ointment 1 Application(s) Topical three times a day  nystatin    Suspension 588388 Unit(s) Oral four times a day  pantoprazole   Suspension 40 milliGRAM(s) Oral daily    MEDICATIONS  (PRN):  loperamide Liquid 2 milliGRAM(s) Oral every 4 hours PRN Diarrhea  oxyCODONE    IR 5 milliGRAM(s) Oral every 4 hours PRN Moderate Pain (4 - 6)  polyethylene glycol 3350 17 Gram(s) Oral daily PRN Constipation  senna 2 Tablet(s) Oral at bedtime PRN Constipation    Pertinent Labs:  08-25 Na135 mmol/L Glu 141 mg/dL<H> K+ 4.3 mmol/L Cr  0.72 mg/dL BUN 25 mg/dL<H> 08-25 Phos 2.5 mg/dL 08-25 Alb 3.1 g/dL<L>    Estimated Needs: [x ] no change since previous assessment: based on weight 56.5 kg  Calories - 4737-6764 kcal (30-35 kcal/kg)  Protein - 79.1-90.4 g pro (1.4-1.6 g/kg)    Previous Nutrition Diagnosis: Severe malnutrition    Nutrition Diagnosis is [x ] ongoing  [ ] resolved [ ] not applicable     Interventions:   - managed with Peptamen 1.5 tube feeds    Recommend  - consider adjusting tube feed provision to Peptamen 1.5 via JT @ 50 mL/hr. x 24 hrs. for total volume 1,200 mL. Provides 1,800 kcal (31.9 kcal/kg), 81.6 g pro (1.4 g/kg) based on weight 56.5 kg and 900 mL of fluid (TF free water). Defer additional free water flushes to team.  - continue with multivitamin  - obtain weekly weight to monitor trend    Monitoring and Evaluation:   - Tolerance to diet, weights, f/u per protocol Source: Patient [x ]    Family [ ]     other [x ] RN, chart review    Patient seen for f/u for severe malnutrition. 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 CT C/A/P w/ progressive malignancy and right upper lobe tree-in-bud nodularity, likely representing infectious/inflammatory bronchiolitis. J tube was reinserted via IR (8/18) per chart.    Patient currently on clear liquids w/ Peptamen 1.5 tube feeds via JT @ 80 mL/hr. x 17 hrs. for total volume 1,360 mL. Provides 2,040 kcal, 92,5 g pro and 1,050 mL of fluid (TF free water). Patient reporting diarrhea, on Loperamide per chart. Suggest d/c bowel regimen per MD discretion. No edema noted per RN flow sheet. Per advanced practice nurse (8/22), no pressure injuries noted.    Diet : Diet, Clear Liquid:   Tube Feeding Modality: Jejunostomy  Peptamen 1.5 (PEPTAMEN1.5RTH)  Total Volume for 24 Hours (mL): 1360  Intermittent   Starting Tube Feed Rate {mL per Hour}: 40  Increase Tube Feed Rate by (mL): 10    Every 4 hours  Until Goal Tube Feed Rate (mL per Hour): 80  Tube Feeding Hours ON: 17  Tube Feeding OFF (Hours): 7  Tube Feed Start Time: 14:00 (08-23-22 @ 10:49)    Current Weight: Weight (kg): no new weight to assess  56.5 (08-18 @ 23:00)    Pertinent Medications: MEDICATIONS  (STANDING):  amLODIPine   Tablet 10 milliGRAM(s) Oral daily  chlorhexidine 2% Cloths 1 Application(s) Topical daily  enoxaparin Injectable 40 milliGRAM(s) SubCutaneous every 24 hours  multivitamin 1 Tablet(s) Oral daily  mupirocin 2% Ointment 1 Application(s) Topical three times a day  nystatin    Suspension 581751 Unit(s) Oral four times a day  pantoprazole   Suspension 40 milliGRAM(s) Oral daily    MEDICATIONS  (PRN):  loperamide Liquid 2 milliGRAM(s) Oral every 4 hours PRN Diarrhea  oxyCODONE    IR 5 milliGRAM(s) Oral every 4 hours PRN Moderate Pain (4 - 6)  polyethylene glycol 3350 17 Gram(s) Oral daily PRN Constipation  senna 2 Tablet(s) Oral at bedtime PRN Constipation    Pertinent Labs:  08-25 Na135 mmol/L Glu 141 mg/dL<H> K+ 4.3 mmol/L Cr  0.72 mg/dL BUN 25 mg/dL<H> 08-25 Phos 2.5 mg/dL 08-25 Alb 3.1 g/dL<L>    Estimated Needs: [x ] no change since previous assessment: based on weight 56.5 kg  Calories - 1680-6246 kcal (30-35 kcal/kg)  Protein - 79.1-90.4 g pro (1.4-1.6 g/kg)    Previous Nutrition Diagnosis: Severe malnutrition    Nutrition Diagnosis is [x ] ongoing  [ ] resolved [ ] not applicable     Interventions:   - managed with Peptamen 1.5 tube feeds    Recommend  - consider adjusting tube feed provision to Peptamen 1.5 via JT @ 50 mL/hr. x 24 hrs. for total volume 1,200 mL. Provides 1,800 kcal (31.9 kcal/kg), 81.6 g pro (1.4 g/kg) based on weight 56.5 kg and 900 mL of fluid (TF free water). Defer additional free water flushes to team.  - continue with multivitamin  - obtain weekly weight to monitor trend    Monitoring and Evaluation:   - Tolerance to diet, weights, f/u per protocol

## 2022-08-25 NOTE — PROGRESS NOTE ADULT - PROBLEM SELECTOR PROBLEM 5
Electrolyte abnormality
Prophylactic measure
Electrolyte abnormality
Electrolyte abnormality
Prophylactic measure

## 2022-08-25 NOTE — PROGRESS NOTE ADULT - PROBLEM SELECTOR PLAN 5
DIET: NPO, Vital 1.5 post tube replacement  DVT: Lovenox    DISPO:  Pending  placement with hospice,
Pt w/ hypokalemia and metabolic alkalosis. Suspect iso GI losses  -c/w IVF  -J-tube mgt. as above  -Resume diet post replacement  -Repeat VGB  -Monitor BMPs
DIET: NPO, Vital 1.5 post tube replacement  DVT: Lovenox    DISPO:  d/c to [facility] with transition to LTC with hospice services
Pt w/ hypokalemia and metabolic alkalosis. Suspect iso GI losses  -c/w IVF  -J-tube mgt. as above  -Resume diet post replacement  -Repeat VGB  -Monitor BMPs
DIET: NPO, Vital 1.5 post tube replacement  DVT: Lovenox    DISPO:  d/c to [facility] with transition to LTC with hospice services
DIET: NPO, Vital 1.5 post tube replacement  DVT: Lovenox    DISPO:  Pending  placement with hospice,
Pt w/ hypokalemia and metabolic alkalosis. Suspect iso GI losses  -c/w IVF  -J-tube mgt. as above  -Resume diet post replacement  -Repeat VGB  -Monitor BMPs

## 2022-08-26 NOTE — PROGRESS NOTE ADULT - PROBLEM SELECTOR PLAN 3
- Patient is refusing any further cancer treatments and is accepting he is approaching end of life  - palliative following
- Patient reports having controlled pain for now   - Continue oxycodone 5 mg q4h PRN for moderate to severe pain
- Patient is refusing any further cancer treatments and is accepting he is approaching end of life  - palliative following
- Patient is refusing any further cancer treatments and is accepting he is approaching end of life  - palliative following
-C/w home amlodipine w/ hold parameters
-Pt w/ metastatic anal cancer seemingly in remission and new pancreatic ca  -CT A/P w/ known unresectable biopsy-proven pancreatic adenocarcinoma. Demonstrates new intrahepatic and extrahepatic biliary ductal dilatation.  - Follows w/ Dr. Shantanu Hahn of [facility]  -Currently on radiation therapy per heme/onc  -To be held while inpt  -F/u heme/onc recs
-Pt w/ metastatic anal cancer seemingly in remission and new pancreatic ca  -CT A/P w/ known unresectable biopsy-proven pancreatic adenocarcinoma. Demonstrates new intrahepatic and extrahepatic biliary ductal dilatation.  - Follows w/ Dr. Shantanu Hahn of [facility]  -Currently on radiation therapy per heme/onc  -To be held while inpt  -F/u heme/onc recs  - palliative f/u - pt is now dnr/dni with [facility]
-Pt w/ metastatic anal cancer seemingly in remission and new pancreatic ca  -CT A/P w/ known unresectable biopsy-proven pancreatic adenocarcinoma. Demonstrates new intrahepatic and extrahepatic biliary ductal dilatation.  - Follows w/ Dr. Shantanu Hahn of [facility]  -Currently on radiation therapy per heme/onc  -To be held while inpt  -F/u heme/onc recs
-Pt w/ metastatic anal cancer seemingly in remission and new pancreatic ca  -CT A/P w/ known unresectable biopsy-proven pancreatic adenocarcinoma. Demonstrates new intrahepatic and extrahepatic biliary ductal dilatation.  - Follows w/ Dr. Shantanu Hahn of [facility]  -Currently on radiation therapy per heme/onc  -To be held while inpt  -F/u heme/onc recs

## 2022-08-26 NOTE — PROGRESS NOTE ADULT - PROBLEM SELECTOR PROBLEM 2
Dislodged jejunostomy tube
Pancreatic adenocarcinoma
Dislodged jejunostomy tube

## 2022-08-26 NOTE — PROGRESS NOTE ADULT - PROBLEM SELECTOR PROBLEM 3
Pancreatic adenocarcinoma
HTN (hypertension)
Pancreatic adenocarcinoma
Pancreatic adenocarcinoma
Acute abdominal pain
Pancreatic adenocarcinoma

## 2022-08-26 NOTE — PROGRESS NOTE ADULT - PROBLEM SELECTOR PLAN 2
- Patient with hx of anal cancer and now with new unresectable pancreatic cancer  - Patient is refusing any further treatments.
s/p J tube replacement  - TF resumed 8/19 per RD recs (on Vital 1.5 at home, restart on d/c) --> G and J tubes noted to be leaking per RN,   - start loperamide pRN  - start feeds
s/p J tube replacement  - TF resumed 8/19 per RD recs (on Vital 1.5 at home, restart on d/c) --> G and J tubes noted to be leaking per RN,   - start loperamide pRN  - start feeds
s/p J tube replacement  - TF resumed 8/19 per RD recs (on Vital 1.5 at home, restart on d/c) --> G and J tubes noted to be leaking per RN,   - start loperamide pRN  - cont feeds
s/p J tube replacement  - TF resumed 8/19 per RD recs (on Vital 1.5 at home, restart on d/c) --> G and J tubes noted to be leaking per RN,   - start loperamide pRN
s/p J tube replacement  - TF resumed 8/19 per RD recs (on Vital 1.5 at home, restart on d/c) --> G and J tubes noted to be leaking per RN,   - start loperamide pRN  - start feeds
-Pending replacement by IR  -NPO  -Resume diet post placement; Vital 1.5  -Nutrition consult
s/p J tube replacement  - TF resumed 8/19 per RD recs (on Vital 1.5 at home, restart on d/c) --> G and J tubes noted to be leaking per RN, IR consult placed, feeds held, f/u IR recs ___
s/p J tube replacement  - TF resumed 8/19 per RD recs (on Vital 1.5 at home, restart on d/c) --> G and J tubes noted to be leaking per RN, IR consult placed, feeds held, f/u IR recs ___

## 2022-08-26 NOTE — PROGRESS NOTE ADULT - PROBLEM SELECTOR PLAN 1
-Pt met sepsis criteria on admission w/ fever and tachycardia. Mild leukocytosis to 10.52 but w/ bandemia  -Suspect pulmonary source  -CT C/A/P sig for right upper lobe tree-in-bud nodularity, likely infectious vs. inflammatory bronchiolitis.  -s/p IV zosyn x 1 in the E.D  -C/w empiric coverage w/ zosyn  -F/u blood and urine Cx  -Check pro torri  -Trend fever curve  -Trend leukocytosis
-s/p abx  resolved
-s/p abx  resolved
- Patient is refusing any further cancer treatments and is accepting he is approaching end of life  - Patient signed MOLST for DNR/DNI and would like hospice care.   - He is insistent on facility placement as he does not want to place burden on his family to care for him
-Pt met sepsis criteria on admission w/ fever and tachycardia. Mild leukocytosis to 10.52 but w/ bandemia  -Suspect pulmonary source  -CT C/A/P sig for right upper lobe tree-in-bud nodularity, likely infectious vs. inflammatory bronchiolitis.  -s/p IV zosyn x 1 in the E.D  -C/w empiric coverage w/ zosyn  -F/u blood and urine Cx  -Check pro torri  -Trend fever curve  -Trend leukocytosis
- likely RUL PNA, possibly aspiration after fall  - CT C/A/P sig for right upper lobe tree-in-bud nodularity, likely infectious vs. inflammatory bronchiolitis.  -C/w zosyn last day today 8/23  -blood and urine Cx negative  -ID on board
- Patient is refusing any further cancer treatments and is accepting he is approaching end of life  - palliative following for LTC hospice
- likely RUL PNA, possibly aspiration after fall  - CT C/A/P sig for right upper lobe tree-in-bud nodularity, likely infectious vs. inflammatory bronchiolitis.  -C/w zosyn  -blood and urine Cx negative
- likely RUL PNA, possibly aspiration after fall  - CT C/A/P sig for right upper lobe tree-in-bud nodularity, likely infectious vs. inflammatory bronchiolitis.  -C/w zosyn  -blood and urine Cx negative  -ID following

## 2022-08-26 NOTE — PROGRESS NOTE ADULT - PROBLEM SELECTOR PLAN 4
-C/w home amlodipine w/ hold parameters
-s/p fall at home. Suspect iso infection vs mechanical fall. Low c/f cardiac etiology at this time. Elevated trop noted, suspect iso demand ischemia. EKG w/o ischemic changes  -Now downtrending  -CT Head / C-spine negative for fractures/hemorrhage  -Check orthostatics  -Sepsis work up and mgt as above  -Pt eval
DIET: NPO, Vital 1.5 post tube replacement  DVT: Lovenox    DISPO:  d/c to [facility] with transition to LTC with hospice services
-C/w home amlodipine w/ hold parameters
-s/p fall at home. Suspect iso infection vs mechanical fall. Low c/f cardiac etiology at this time. Elevated trop noted, suspect iso demand ischemia. EKG w/o ischemic changes  -Now downtrending  -CT Head / C-spine negative for fractures/hemorrhage  -Check orthostatics  -Sepsis work up and mgt as above  -Pt eval
-s/p fall at home. Suspect iso infection vs mechanical fall. Low c/f cardiac etiology at this time. Elevated trop noted, suspect iso demand ischemia. EKG w/o ischemic changes  -Now downtrending  -CT Head / C-spine negative for fractures/hemorrhage  -Check orthostatics  -Sepsis work up and mgt as above  -Pt eval

## 2022-08-26 NOTE — PROGRESS NOTE ADULT - PROBLEM SELECTOR PROBLEM 4
HTN (hypertension)
HTN (hypertension)
Fall
Prophylactic measure
HTN (hypertension)
Fall
HTN (hypertension)
Fall

## 2022-08-26 NOTE — PROGRESS NOTE ADULT - PROBLEM SELECTOR PROBLEM 1
Sepsis
Sepsis
Pancreatic adenocarcinoma
Sepsis
Counseling regarding advanced directives
Sepsis

## 2022-08-27 NOTE — PROGRESS NOTE ADULT - ASSESSMENT
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 tube dislodgement 7/22 replaced by IR, MSSA bacteremia s/p IV abx now presenting s/p fall at home w/ dislodged J tube, admitted for sepsis.    Problem/Plan - 1:  ·  Problem: Pancreatic adenocarcinoma.   ·  Plan: - Patient is refusing any further cancer treatments and is accepting he is approaching end of life  - palliative following for LTC hospice.    Problem/Plan - 2:  ·  Problem: Dislodged jejunostomy tube.   ·  Plan: s/p J tube replacement  - TF resumed 8/19 per RD recs (on Vital 1.5 at home, restart on d/c) --> G and J tubes noted to be leaking per RN,   - start loperamide pRN  - cont feeds.    Problem/Plan - 3:  ·  Problem: HTN (hypertension).   ·  Plan: -C/w home amlodipine w/ hold parameters.    Problem/Plan - 4:  ·  Problem: Prophylactic measure.   ·  Plan: DIET: NPO, Vital 1.5 post tube replacement  DVT: Lovenox    DISPO:  d/c to [facility] with transition to LTC with hospice services.

## 2022-08-28 NOTE — PROGRESS NOTE ADULT - ASSESSMENT
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 tube dislodgement 7/22 replaced by IR, MSSA bacteremia s/p IV abx now presenting s/p fall at home w/ dislodged J tube, admitted for sepsis.    Problem/Plan - 1:  ·  Problem: Pancreatic adenocarcinoma.   ·  Plan: - Patient is refusing any further cancer treatments and is accepting he is approaching end of life  - palliative following for LTC hospice.    Problem/Plan - 2:  ·  Problem: Dislodged jejunostomy tube.   ·  Plan: s/p J tube replacement  - TF resumed 8/19 per RD recs (on Vital 1.5 at home, restart on d/c) --> G and J tubes noted to be leaking per RN,   - start loperamide pRN  - cont feeds.  -IR to evaluate leakage around J Tube    Problem/Plan - 3:  ·  Problem: HTN (hypertension).   ·  Plan: -C/w home amlodipine w/ hold parameters.    Problem/Plan - 4:  ·  Problem: Prophylactic measure.   ·  Plan: DIET: NPO, Vital 1.5 post tube replacement  DVT: Lovenox    DISPO:  d/c to [facility] with transition to LTC with hospice services.

## 2022-08-28 NOTE — CHART NOTE - NSCHARTNOTEFT_GEN_A_CORE
IR Consulted for leakage around jejunostomy tube site.    77y Male with Metastatic anal CA GERD HTN Anemia fed through J tube with reported leakage around the site.    Recommendations:  - Will have team do bedside evaluation of tube tomorrow. IR Consulted for leakage around jejunostomy tube site.    77y Male with Metastatic anal CA GERD HTN Anemia fed through J tube with reported leakage around the site.    Recommendations:   - Will have team do bedside evaluation of tube tomorrow.

## 2022-08-29 NOTE — PROVIDER CONTACT NOTE (OTHER) - ASSESSMENT
No distress noted
Patient has low blood pressure 96/60 and tachycardic. Patient appears to be calm, in no acute distress but has a slight tremor and appears uncomfortable.

## 2022-08-29 NOTE — CHART NOTE - NSCHARTNOTEFT_GEN_A_CORE
Source: Patient [x ]    Family [ ]     other [ x] chart review    Patient seen for f/u for severe malnutrition. 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    Patient is currently on clear liquids and Peptamen 1.5 via JT @ 50 mL/hr. x 24 hrs. for total volume 1,200 mL. Provides 1,800 kcal, 81.6 g pro and 900 mL of fluid (TF free water). Per chart, tube feeds on hold 2/2 J-tube dislodge and is pending IR evaluation. Patient reporting no GI distress at this time. Noted with 4x bowel movements yesterday (8/28) per RN flow sheet. On Loperamide per chart. Noted with coccyx stage 2 pressure injury and no edema per RN flow sheet.    Diet : Diet, Clear Liquid:   Tube Feeding Modality: Jejunostomy  Peptamen 1.5 (PEPTAMEN1.5RTH)  Total Volume for 24 Hours (mL): 1200  Continuous  Starting Tube Feed Rate {mL per Hour}: 50  Until Goal Tube Feed Rate (mL per Hour): 50  Tube Feed Duration (in Hours): 24  Tube Feed Start Time: 16:55 (08-25-22 @ 16:52)    Current Weight: no new weight to assess  56.5 kg (8/18)    Pertinent Medications: MEDICATIONS  (STANDING):  amLODIPine   Tablet 10 milliGRAM(s) Oral daily  chlorhexidine 2% Cloths 1 Application(s) Topical daily  enoxaparin Injectable 40 milliGRAM(s) SubCutaneous every 24 hours  multivitamin 1 Tablet(s) Oral daily  mupirocin 2% Ointment 1 Application(s) Topical three times a day  pantoprazole   Suspension 40 milliGRAM(s) Oral daily    MEDICATIONS  (PRN):  loperamide Liquid 2 milliGRAM(s) Oral every 4 hours PRN Diarrhea  oxyCODONE    IR 5 milliGRAM(s) Oral every 4 hours PRN Moderate Pain (4 - 6)  polyethylene glycol 3350 17 Gram(s) Oral daily PRN Constipation  senna 2 Tablet(s) Oral at bedtime PRN Constipation    Pertinent Labs: no new labs to assess    Estimated Needs:   [x ] no change since previous assessment: based on weight 56.5 kg  Calories - 4031-9011 kcal (30-35 kcal/kg)  Protein - 79.1-90.4 g pro (1.4-1.6 g/kg)    Previous Nutrition Diagnosis: Severe malnutrition    Nutrition Diagnosis is [ x] ongoing  [ ] resolved [ ] not applicable     Interventions:   - managed with Peptamen 1.5 tube feeds    Recommend  - continue tube feed provision as ordered  - consider Banatrol TID to help with loose stools  - continue with multivitamin supplementation  - obtain weekly weight to monitor trend    Monitoring and Evaluation:   [x ] Tolerance to diet prescription [x ] weights [x ] follow up per protocol

## 2022-08-29 NOTE — RAPID RESPONSE TEAM SUMMARY - NSSITUATIONBACKGROUNDRRT_GEN_ALL_CORE
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 tube dislodgement 7/22 replaced by IR, MSSA bacteremia s/p IV abx now presenting s/p fall at home w/ dislodged J tube. RRT called for hypoxia to 74%. Other presenting vitals, /51, , RR 20, Temp 97.7. Patient on NRB on my arrival. Mentating AXoX1 however confused. Patient has metasatic pancreaticadenocarcinoma, being followed by pallaitive DNR/DNI pending hospice placement. CT chest done on 8/18 with right opacities concerning for infectious etiology. Primary medical team and sister at bedside. Primary team confirming that patient pending hospice and DNR/DNI. Spoke to Yasmin hull over the phone, who confirmed that patient is DNR/DNI. Explained to niece that patient is hypoxic and likely will pass over the next couple of hours. Yasmin verbalized her understanding. Discussed comfort measures with Yasmin and that it is directed toward symptomatic management including controlling patient's pain and breathing. Yasmin confirmed comfort measures. Discussed that patient's family would be coming in for end-of-life visit. COmfort measures checked off on MOLST form. Primary team and primary nurse made aware.  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 tube dislodgement 7/22 replaced by IR, MSSA bacteremia s/p IV abx now presenting s/p fall at home w/ dislodged J tube. RRT called for hypoxia to 74%. Other presenting vitals, /51, , RR 20, Temp 97.7. Patient on NRB on my arrival. Mentating AXoX1 however confused. Patient has metastatic pancreaticadenocarcinoma, being followed by palliative DNR/DNI pending hospice placement. CT chest done on 8/18 with right opacities concerning for infectious etiology. Primary medical team and sister at bedside. Primary team confirming that patient pending hospice and DNR/DNI. Spoke to niece, Yasmin Mo over the phone, who confirmed that patient is DNR/DNI.  Explained to niece that patient is hypoxic is actively dying and likely will pass over the next couple of hours. Yasmin verbalized her understanding. Discussed comfort measures with Yasmin and that it is directed toward symptomatic management including controlling patient's pain and breathing. Yasmin confirmed comfort measures. Discussed that patient's family would be coming in for end-of-life visit. Comfort measures checked off on MOLST form. Primary team and primary nurse made aware.

## 2022-08-29 NOTE — PROVIDER CONTACT NOTE (OTHER) - ACTION/TREATMENT ORDERED:
Tylenol for fever, chest x-ray, UA, and blood cultures. Tylenol for fever, chest x-ray, UA, and blood cultures. Continue to monitor patient. IV Ofirmev/Tylenol for fever. Continue to monitor patient.

## 2022-08-29 NOTE — PROGRESS NOTE ADULT - PROVIDER SPECIALTY LIST ADULT
Heme/Onc
Cardiology
Infectious Disease
Cardiology
Internal Medicine
Palliative Care
Cardiology

## 2022-08-29 NOTE — PROVIDER CONTACT NOTE (OTHER) - SITUATION
Patient is a 76 y/o male w/hx. of metastatic anal cancer. Patient has a laparoscopic venting gastrostomy tube and feeding jejunostomy and had an exhange of J tube on 6/17 with tube dislodgement.

## 2022-08-29 NOTE — CHART NOTE - NSCHARTNOTESELECT_GEN_ALL_CORE
Event Note
Interventional Radiology/Off Service Note
ACP-NP Note/Event Note
Event Note
IR Resident/Off Service Note
Nutrition Services
Nutrition Services

## 2022-08-29 NOTE — PROVIDER CONTACT NOTE (OTHER) - BACKGROUND
Acute bronchiolitis due to unspecified organism, Hypertension, Anemia
Patient's J tube has been dislodged again and tube feedings are on hold at this time. Both G and J tubes are leaking. IR aware and will consult patient at bedside.

## 2022-08-29 NOTE — PROVIDER CONTACT NOTE (OTHER) - RECOMMENDATIONS
Obtain rectal temperature to determine source of symptom presentation, despite normal oral temperature. (97.4F)

## 2022-08-29 NOTE — PROGRESS NOTE ADULT - ASSESSMENT
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 tube dislodgement 7/22 replaced by IR, MSSA bacteremia s/p IV abx now presenting s/p fall at home w/ dislodged J tube, admitted for sepsis.    Problem/Plan - 1:  ·  Problem: Pancreatic adenocarcinoma.   ·  Plan: - Patient is refusing any further cancer treatments and is accepting he is approaching end of life  - palliative following for LTC hospice.    Problem/Plan - 2:  ·  Problem: Dislodged jejunostomy tube.   ·  Plan: s/p J tube replacement  - TF resumed 8/19 per RD recs (on Vital 1.5 at home, restart on d/c) --> G and J tubes noted to be leaking per RN,   - start loperamide pRN  - cont feeds.  -IR to evaluate leakage around J Tube today    Problem/Plan - 3:  ·  Problem: HTN (hypertension).   ·  Plan: -C/w home amlodipine w/ hold parameters.    Problem/Plan - 4:  ·  Problem: Prophylactic measure.   ·  Plan: DIET: NPO, Vital 1.5 post tube replacement  DVT: Lovenox    DISPO:  d/c to [facility] with transition to LTC with hospice services.

## 2022-08-29 NOTE — PROGRESS NOTE ADULT - SUBJECTIVE AND OBJECTIVE BOX
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 tube dislodgement 7/22 replaced by IR, MSSA bacteremia s/p IV abx now presenting s/p fall at home w/ dislodged J tube. Nice states pt was found on the floor at home by family and looked like he'd hit is head on a step. Tube was found to be dislodged. Pt unable to elaborate on history, states to ask his niece because "I don't know." ROS positive for abdominal pain around J-tube site. Otherwise denies fevers, chills, chest pain, SOB, constipation, diarrhea.     In the E.D, pt febrile to 102, . WBC 10.52 w/ bands.  CT C/A/P w/ progressive malignancy and right upper lobe tree-in-bud nodularity, likely   representing infectious/inflammatory bronchiolitis. Received IV zosyn x 1, IVF, admitted to medicine for further mgt. IR consulted for J tube replacement.        INTERIM EVENTS:Patient seen at bedside ,interim events noted.Awake no dyspnea still has loose BM       PMH -reviewed admission note, no change since admission  HEART FAILURE: Acute[ ]Chronic[ ] Systolic[ ] Diastolic[ ] Combined Systolic and Diastolic[ ]  CAD[ ] CABG[ ] PCI[ ]  DEVICES[ ] PPM[ ] ICD[ ] ILR[ ]  ATRIAL FIBRILLATION[ ] Paroxysmal[ ] Permanent[ ] CHADS2-[  ]  MOSES[ ] CKD1[ ] CKD2[ ] CKD3[ ] CKD4[ ] ESRD[ ]  COPD[ ] HTN[ ]   DM[ ] Type1[ ] Type 2[ ]   CVA[ ] Paresis[ ]    AMBULATION: Assisted[ ] Cane/walker[x ] Independent[ ]    MEDICATIONS  (STANDING):  amLODIPine   Tablet 10 milliGRAM(s) Oral daily  chlorhexidine 2% Cloths 1 Application(s) Topical daily  enoxaparin Injectable 40 milliGRAM(s) SubCutaneous every 24 hours  multivitamin 1 Tablet(s) Oral daily  mupirocin 2% Ointment 1 Application(s) Topical three times a day  pantoprazole   Suspension 40 milliGRAM(s) Oral daily    MEDICATIONS  (PRN):  loperamide Liquid 2 milliGRAM(s) Oral every 4 hours PRN Diarrhea  oxyCODONE    IR 5 milliGRAM(s) Oral every 4 hours PRN Moderate Pain (4 - 6)  polyethylene glycol 3350 17 Gram(s) Oral daily PRN Constipation  senna 2 Tablet(s) Oral at bedtime PRN Constipation            REVIEW OF SYSTEMS:  Constitutional: [ ] fever, [ ]weight loss,  [ ]fatigue [ ]weight gain  Eyes: [ ] visual changes  Respiratory: [ ]shortness of breath;  [ ] cough, [ ]wheezing, [ ]chills, [ ]hemoptysis  Cardiovascular: [ ] chest pain, [ ]palpitations, [ ]dizziness,  [ ]leg swelling[ ]orthopnea[ ]PND  Gastrointestinal: [ ] abdominal pain, [ ]nausea, [ ]vomiting,  [ ]diarrhea [ ]Constipation [ ]Melena  Genitourinary: [ ] dysuria, [ ] hematuria [ ]Celaya  Neurologic: [ ] headaches [ ] tremors[ ]weakness [ ]Paralysis Right[ ] Left[ ]  Skin: [ ] itching, [ ]burning, [ ] rashes  Endocrine: [ ] heat or cold intolerance  Musculoskeletal: [ ] joint pain or swelling; [ ] muscle, back, or extremity pain  Psychiatric: [ ] depression, [ ]anxiety, [ ]mood swings, or [ ]difficulty sleeping  Hematologic: [ ] easy bruising, [ ] bleeding gums    [ ] All remaining systems negative except as per above.   [ ]Unable to obtain.  [x] No change in ROS since admission          Vital Signs Last 24 Hrs  T(C): 36.3 (29 Aug 2022 15:48), Max: 38.1 (29 Aug 2022 04:10)  T(F): 97.4 (29 Aug 2022 15:48), Max: 100.5 (29 Aug 2022 04:10)  HR: 88 (29 Aug 2022 15:48) (73 - 123)  BP: 103/67 (29 Aug 2022 15:48) (89/53 - 103/67)  RR: 18 (29 Aug 2022 15:48) (18 - 19)  SpO2: 100% (29 Aug 2022 15:48) (96% - 100%)      PHYSICAL EXAM:  General: No acute distress BMI-28  HEENT: EOMI, PERRL  Neck: Supple, [ ] JVD  Lungs: Equal air entry bilaterally; [ ] rales [ ] wheezing [ ] rhonchi  Heart: Regular rate and rhythm; [x ] murmur   2/6 [ x] systolic [ ] diastolic [ ] radiation[ ] rubs [ ]  gallops  Abdomen: Nontender, bowel sounds present  Extremities: No clubbing, cyanosis, [ ] edema [ ]Pulses  equal and intact  Nervous system:  Alert & Oriented X3, no focal deficits  Psychiatric: Normal affect  Skin: No rashes or lesions    LABS:        Creatinine Trend: 0.75<--, 0.72<--, 0.69<--, 0.71<--, 0.80<--, 0.73<--              
Follow Up:      Inverval History/ROS:Patient is a 77y old  Male who presents with a chief complaint of Acute bronchiolitis due to unspecified organism  78 y/o male with medical history of metastatic anal cancer s/p chemo/RT and duodonal cancer c/b GOO s/p lap venting gastrostomy and feeding jejunostomy on 5/13 s/p exchange of J tube on 6/17 with tube dislodgement 7/22 replaced by IR, MSSA bacteremia s/p IV abx now presenting s/p fall at home with dislodge J tube, admitted for sepsis per chart review.    (19 Aug 2022 13:45)    No fever    Allergies    No Known Allergies    Intolerances        ANTIMICROBIALS:  nystatin    Suspension 507101 four times a day  piperacillin/tazobactam IVPB.. 3.375 every 8 hours      OTHER MEDS:  amLODIPine   Tablet 10 milliGRAM(s) Oral daily  chlorhexidine 2% Cloths 1 Application(s) Topical daily  dimethicone/zinc oxide Topical Cream (KARINA PROTECT) - Peds 1 Application(s) Topical two times a day  enoxaparin Injectable 40 milliGRAM(s) SubCutaneous every 24 hours  multivitamin 1 Tablet(s) Oral daily  mupirocin 2% Ointment 1 Application(s) Topical three times a day  oxyCODONE    IR 5 milliGRAM(s) Oral every 6 hours PRN  pantoprazole   Suspension 40 milliGRAM(s) Oral daily  polyethylene glycol 3350 17 Gram(s) Oral daily PRN  senna 2 Tablet(s) Oral at bedtime PRN      Vital Signs Last 24 Hrs  T(C): 36.4 (20 Aug 2022 05:03), Max: 37 (19 Aug 2022 21:39)  T(F): 97.6 (20 Aug 2022 05:03), Max: 98.6 (19 Aug 2022 21:39)  HR: 71 (20 Aug 2022 05:03) (71 - 83)  BP: 152/76 (20 Aug 2022 05:03) (142/76 - 181/72)  BP(mean): --  RR: 18 (20 Aug 2022 05:03) (17 - 18)  SpO2: 100% (20 Aug 2022 05:03) (100% - 100%)    Parameters below as of 20 Aug 2022 05:03  Patient On (Oxygen Delivery Method): room air        PHYSICAL EXAM:  General: [ ] non-toxic  HEAD/EYES: [ ] PERRL [x ] white sclera [ ] icterus  ENT:  [ ] normal [ ] supple [ ] thrush [ ] pharyngeal exudate  Cardiovascular:   [ ] murmur [ ] normal [ ] PPM/AICD  Respiratory:  [x ] clear to ausculation bilaterally  GI:  [x ] soft, non-tender, normal bowel sounds  :  [ ] foster [ ] no CVA tenderness   Musculoskeletal:  [ ] no synovitis  Neurologic:  [ ] non-focal exam   Skin:  [ x] no rash  Lymph: [ ] no lymphadenopathy  Psychiatric:  [ ] appropriate affect [ ] alert & oriented  Lines:  [x ] no phlebitis [ ] central line                                10.0   5.50  )-----------( 297      ( 20 Aug 2022 06:57 )             32.9       08-20    135  |  100  |  22  ----------------------------<  119<H>  4.4   |  19<L>  |  0.73    Ca    9.8      20 Aug 2022 06:57  Phos  3.1     08-20  Mg     1.90     08-20    TPro  5.6<L>  /  Alb  2.9<L>  /  TBili  0.6  /  DBili  x   /  AST  30  /  ALT  9   /  AlkPhos  135<H>  08-20          MICROBIOLOGY:Culture Results:   <10,000 CFU/mL Normal Urogenital [place] (08-18-22 @ 11:33)  Culture Results:   No growth to date. (08-18-22 @ 07:41)  Culture Results:   No growth to date. (08-18-22 @ 06:15)    Culture - Blood (08.18.22 @ 07:41)    Specimen Source: .Blood Blood-Venous    Culture Results:   No growth to date.      RADIOLOGY:    
PATIENT SEEN AND EXAMINED ON :- 8/26/22  DATE OF SERVICE:   8/26/22          Interim events noted,Labs ,Radiological studies and Cardiology tests reviewed .         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 tube dislodgement 7/22 replaced by IR, MSSA bacteremia s/p IV abx now presenting s/p fall at home w/ dislodged J tube. Nice states pt was found on the floor at home by family and looked like he'd hit is head on a step. Tube was found to be dislodged. Pt unable to elaborate on history, states to ask his niece because "I don't know." ROS positive for abdominal pain around J-tube site. Otherwise denies fevers, chills, chest pain, SOB, constipation, diarrhea.     In the E.D, pt febrile to 102, . WBC 10.52 w/ bands.  CT C/A/P w/ progressive malignancy and right upper lobe tree-in-bud nodularity, likely   representing infectious/inflammatory bronchiolitis. Received IV zosyn x 1, IVF, admitted to medicine for further mgt. IR consulted for J tube replacement.   (18 Aug 2022 17:50)      INTERIM EVENTS:Patient seen at bedside ,interim events noted.      PMH -reviewed admission note, no change since admission  HEART FAILURE: Acute[ ]Chronic[ ] Systolic[ ] Diastolic[ ] Combined Systolic and Diastolic[ ]  CAD[ ] CABG[ ] PCI[ ]  DEVICES[ ] PPM[ ] ICD[ ] ILR[ ]  ATRIAL FIBRILLATION[ ] Paroxysmal[ ] Permanent[ ] CHADS2-[  ]  MOSES[ ] CKD1[ ] CKD2[ ] CKD3[ ] CKD4[ ] ESRD[ ]  COPD[ ] HTN[ ]   DM[ ] Type1[ ] Type 2[ ]   CVA[ ] Paresis[ ]    AMBULATION: Assisted[ ] Cane/walker[ ] Independent[ ]    MEDICATIONS  (STANDING):  amLODIPine   Tablet 10 milliGRAM(s) Oral daily  chlorhexidine 2% Cloths 1 Application(s) Topical daily  enoxaparin Injectable 40 milliGRAM(s) SubCutaneous every 24 hours  multivitamin 1 Tablet(s) Oral daily  mupirocin 2% Ointment 1 Application(s) Topical three times a day  nystatin    Suspension 965789 Unit(s) Oral four times a day  pantoprazole   Suspension 40 milliGRAM(s) Oral daily    MEDICATIONS  (PRN):  loperamide Liquid 2 milliGRAM(s) Oral every 4 hours PRN Diarrhea  oxyCODONE    IR 5 milliGRAM(s) Oral every 4 hours PRN Moderate Pain (4 - 6)  polyethylene glycol 3350 17 Gram(s) Oral daily PRN Constipation  senna 2 Tablet(s) Oral at bedtime PRN Constipation            REVIEW OF SYSTEMS:  Constitutional: [ ] fever, [ ]weight loss,  [ ]fatigue [ ]weight gain  Eyes: [ ] visual changes  Respiratory: [ ]shortness of breath;  [ ] cough, [ ]wheezing, [ ]chills, [ ]hemoptysis  Cardiovascular: [ ] chest pain, [ ]palpitations, [ ]dizziness,  [ ]leg swelling[ ]orthopnea[ ]PND  Gastrointestinal: [ ] abdominal pain, [ ]nausea, [ ]vomiting,  [ ]diarrhea [ ]Constipation [ ]Melena  Genitourinary: [ ] dysuria, [ ] hematuria [ ]Celaya  Neurologic: [ ] headaches [ ] tremors[ ]weakness [ ]Paralysis Right[ ] Left[ ]  Skin: [ ] itching, [ ]burning, [ ] rashes  Endocrine: [ ] heat or cold intolerance  Musculoskeletal: [ ] joint pain or swelling; [ ] muscle, back, or extremity pain  Psychiatric: [ ] depression, [ ]anxiety, [ ]mood swings, or [ ]difficulty sleeping  Hematologic: [ ] easy bruising, [ ] bleeding gums    [ ] All remaining systems negative except as per above.   [ ]Unable to obtain.  [x] No change in ROS since admission      Vital Signs Last 24 Hrs  T(C): 37.4 (26 Aug 2022 06:16), Max: 37.4 (26 Aug 2022 06:16)  T(F): 99.3 (26 Aug 2022 06:16), Max: 99.3 (26 Aug 2022 06:16)  HR: 83 (26 Aug 2022 06:16) (73 - 90)  BP: 141/80 (26 Aug 2022 06:16) (114/58 - 141/80)  BP(mean): --  RR: 18 (26 Aug 2022 06:16) (18 - 18)  SpO2: 100% (26 Aug 2022 06:16) (100% - 100%)    Parameters below as of 26 Aug 2022 06:16  Patient On (Oxygen Delivery Method): room air      I&O's Summary      PHYSICAL EXAM:  General: No acute distress BMI-  HEENT: EOMI, PERRL  Neck: Supple, [ ] JVD  Lungs: Equal air entry bilaterally; [ ] rales [ ] wheezing [ ] rhonchi  Heart: Regular rate and rhythm; [x ] murmur   2/6 [ x] systolic [ ] diastolic [ ] radiation[ ] rubs [ ]  gallops  Abdomen: Nontender, bowel sounds present  Extremities: No clubbing, cyanosis, [ ] edema [ ]Pulses  equal and intact  Nervous system:  Alert & Oriented X3, no focal deficits  Psychiatric: Normal affect  Skin: No rashes or lesions    LABS:  08-26    136  |  105  |  27<H>  ----------------------------<  133<H>  5.1   |  20<L>  |  0.75    Ca    9.8      26 Aug 2022 06:45  Phos  1.9     08-26  Mg     2.30     08-26    TPro  5.7<L>  /  Alb  3.1<L>  /  TBili  0.5  /  DBili  x   /  AST  19  /  ALT  13  /  AlkPhos  137<H>  08-25    Creatinine Trend: 0.75<--, 0.72<--, 0.69<--, 0.71<--, 0.80<--, 0.73<--                        10.6   8.87  )-----------( 257      ( 26 Aug 2022 06:45 )             34.1               
PATIENT SEEN AND EXAMINED ON :-08/27/2022  DATE OF SERVICE:         08/27/2022    Interim events noted,Labs ,Radiological studies and Cardiology tests reviewed .    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 tube dislodgement 7/22 replaced by IR, MSSA bacteremia s/p IV abx now presenting s/p fall at home w/ dislodged J tube. Nice states pt was found on the floor at home by family and looked like he'd hit is head on a step. Tube was found to be dislodged. Pt unable to elaborate on history, states to ask his niece because "I don't know." ROS positive for abdominal pain around J-tube site. Otherwise denies fevers, chills, chest pain, SOB, constipation, diarrhea.     In the E.D, pt febrile to 102, . WBC 10.52 w/ bands.  CT C/A/P w/ progressive malignancy and right upper lobe tree-in-bud nodularity, likely   representing infectious/inflammatory bronchiolitis. Received IV zosyn x 1, IVF, admitted to medicine for further mgt. IR consulted for J tube replacement.   (18 Aug 2022 17:50)      INTERIM EVENTS:Patient seen at bedside ,interim events noted.Awake still has loose BM        MEDICATIONS  (STANDING):  amLODIPine   Tablet 10 milliGRAM(s) Oral daily  chlorhexidine 2% Cloths 1 Application(s) Topical daily  enoxaparin Injectable 40 milliGRAM(s) SubCutaneous every 24 hours  multivitamin 1 Tablet(s) Oral daily  mupirocin 2% Ointment 1 Application(s) Topical three times a day  nystatin    Suspension 229200 Unit(s) Oral four times a day  pantoprazole   Suspension 40 milliGRAM(s) Oral daily    MEDICATIONS  (PRN):  loperamide Liquid 2 milliGRAM(s) Oral every 4 hours PRN Diarrhea  oxyCODONE    IR 5 milliGRAM(s) Oral every 4 hours PRN Moderate Pain (4 - 6)  polyethylene glycol 3350 17 Gram(s) Oral daily PRN Constipation    PHYSICAL EXAM:  General: No acute distress BMI-  HEENT: EOMI, PERRL  Neck: Supple, [ ] JVD  Lungs: Equal air entry bilaterally; [ ] rales [ ] wheezing [ ] rhonchi  Heart: Regular rate and rhythm; [x ] murmur   2/6 [ x] systolic [ ] diastolic [ ] radiation[ ] rubs [ ]  gallops  Abdomen: Nontender, bowel sounds present  Extremities: No clubbing, cyanosis, [ ] edema [ ]Pulses  equal and intact  Nervous system:  Alert & Oriented X3, no focal deficits  Psychiatric: Normal affect  Skin: No rashes or lesions  
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 tube dislodgement 7/22 replaced by IR, MSSA bacteremia s/p IV abx now presenting s/p fall at home w/ dislodged J tube. Nice states pt was found on the floor at home by family and looked like he'd hit is head on a step. Tube was found to be dislodged. Pt unable to elaborate on history, states to ask his niece because "I don't know." ROS positive for abdominal pain around J-tube site. Otherwise denies fevers, chills, chest pain, SOB, constipation, diarrhea.     In the E.D, pt febrile to 102, . WBC 10.52 w/ bands.  CT C/A/P w/ progressive malignancy and right upper lobe tree-in-bud nodularity, likely   representing infectious/inflammatory bronchiolitis. Received IV zosyn x 1, IVF, admitted to medicine for further mgt. IR consulted for J tube replacement.   (18 Aug 2022 17:50)      INTERIM EVENTS:Patient seen at bedside ,interim events noted.Awake afebrile c/o mild abdominal discomfort no dyspbea      AMBULATION: Assisted[ ] Cane/walker[x ] Independent[ ]    MEDICATIONS  (STANDING):  amLODIPine   Tablet 10 milliGRAM(s) Oral daily  chlorhexidine 2% Cloths 1 Application(s) Topical daily  enoxaparin Injectable 40 milliGRAM(s) SubCutaneous every 24 hours  multivitamin 1 Tablet(s) Oral daily  mupirocin 2% Ointment 1 Application(s) Topical three times a day  pantoprazole   Suspension 40 milliGRAM(s) Oral daily    MEDICATIONS  (PRN):  loperamide Liquid 2 milliGRAM(s) Oral every 4 hours PRN Diarrhea  oxyCODONE    IR 5 milliGRAM(s) Oral every 4 hours PRN Moderate Pain (4 - 6)  polyethylene glycol 3350 17 Gram(s) Oral daily PRN Constipation  senna 2 Tablet(s) Oral at bedtime PRN Constipation            REVIEW OF SYSTEMS:  Constitutional: [ ] fever, [ ]weight loss,  [ ]fatigue [ ]weight gain  Eyes: [ ] visual changes  Respiratory: [ ]shortness of breath;  [ ] cough, [ ]wheezing, [ ]chills, [ ]hemoptysis  Cardiovascular: [ ] chest pain, [ ]palpitations, [ ]dizziness,  [ ]leg swelling[ ]orthopnea[ ]PND  Gastrointestinal: [ ] abdominal pain, [ ]nausea, [ ]vomiting,  [ ]diarrhea [ ]Constipation [ ]Melena  Genitourinary: [ ] dysuria, [ ] hematuria [ ]Celaya  Neurologic: [ ] headaches [ ] tremors[ ]weakness [ ]Paralysis Right[ ] Left[ ]  Skin: [ ] itching, [ ]burning, [ ] rashes  Endocrine: [ ] heat or cold intolerance  Musculoskeletal: [ ] joint pain or swelling; [ ] muscle, back, or extremity pain  Psychiatric: [ ] depression, [ ]anxiety, [ ]mood swings, or [ ]difficulty sleeping  Hematologic: [ ] easy bruising, [ ] bleeding gums    [ ] All remaining systems negative except as per above.   [ ]Unable to obtain.  [x] No change in ROS since admission        PHYSICAL EXAM:  General: No acute distress BMI-  HEENT: EOMI, PERRL  Neck: Supple, [ ] JVD  Lungs: Equal air entry bilaterally; [ ] rales [ ] wheezing [ ] rhonchi  Heart: Regular rate and rhythm; [x ] murmur   2/6 [ x] systolic [ ] diastolic [ ] radiation[ ] rubs [ ]  gallops  Abdomen: Nontender, bowel sounds present  Extremities: No clubbing, cyanosis, [ ] edema [ ]Pulses  equal and intact  Nervous system:  Alert & Oriented X3, no focal deficits  Psychiatric: Normal affect  Skin: No rashes or lesions    LABS:        Creatinine Trend: 0.75<--, 0.72<--, 0.69<--, 0.71<--, 0.80<--, 0.73<--              
Patient is a 77y old  Male who presents with a chief complaint of sepsis (23 Aug 2022 11:54)    Patient seen this morning, reports feeling fine. No new complaints.     MEDICATIONS  (STANDING):  amLODIPine   Tablet 10 milliGRAM(s) Oral daily  chlorhexidine 2% Cloths 1 Application(s) Topical daily  dimethicone/zinc oxide Topical Cream (KARINA PROTECT) - Peds 1 Application(s) Topical two times a day  enoxaparin Injectable 40 milliGRAM(s) SubCutaneous every 24 hours  multivitamin 1 Tablet(s) Oral daily  mupirocin 2% Ointment 1 Application(s) Topical three times a day  nystatin    Suspension 127748 Unit(s) Oral four times a day  pantoprazole   Suspension 40 milliGRAM(s) Oral daily  piperacillin/tazobactam IVPB.. 3.375 Gram(s) IV Intermittent every 8 hours    MEDICATIONS  (PRN):  loperamide Liquid 2 milliGRAM(s) Oral every 6 hours PRN Diarrhea  oxyCODONE    IR 5 milliGRAM(s) Oral every 4 hours PRN Moderate Pain (4 - 6)  polyethylene glycol 3350 17 Gram(s) Oral daily PRN Constipation  senna 2 Tablet(s) Oral at bedtime PRN Constipation        Vital Signs Last 24 Hrs  T(C): 36.8 (23 Aug 2022 13:49), Max: 36.8 (22 Aug 2022 21:32)  T(F): 98.2 (23 Aug 2022 13:49), Max: 98.3 (23 Aug 2022 06:00)  HR: 72 (23 Aug 2022 13:49) (72 - 74)  BP: 133/77 (23 Aug 2022 13:49) (133/77 - 144/77)  BP(mean): --  RR: 18 (23 Aug 2022 06:00) (18 - 18)  SpO2: 100% (23 Aug 2022 13:49) (100% - 100%)    Parameters below as of 23 Aug 2022 13:49  Patient On (Oxygen Delivery Method): room air        PE  NAD, comfortable  Awake, alert  Anicteric, MMM  RRR  CTAB  Abd soft, NT, ND  No c/c/e  No rash grossly                          9.7    8.91  )-----------( 259      ( 22 Aug 2022 05:20 )             31.2       08-22    136  |  98  |  21  ----------------------------<  98  3.1<L>   |  27  |  0.80    Ca    9.7      22 Aug 2022 05:20  Phos  1.6     08-22  Mg     1.80     08-22    TPro  6.0  /  Alb  3.2<L>  /  TBili  0.6  /  DBili  x   /  AST  19  /  ALT  8   /  AlkPhos  200<H>  08-22      
Patient is a 77y old  Male who presents with a chief complaint of sepsis (28 Aug 2022 11:30)    Pt seen this afternoon, reports some throat hoarseness/dryness. Otherwise feeling ok.     MEDICATIONS  (STANDING):  amLODIPine   Tablet 10 milliGRAM(s) Oral daily  chlorhexidine 2% Cloths 1 Application(s) Topical daily  enoxaparin Injectable 40 milliGRAM(s) SubCutaneous every 24 hours  multivitamin 1 Tablet(s) Oral daily  mupirocin 2% Ointment 1 Application(s) Topical three times a day  pantoprazole   Suspension 40 milliGRAM(s) Oral daily    MEDICATIONS  (PRN):  loperamide Liquid 2 milliGRAM(s) Oral every 4 hours PRN Diarrhea  oxyCODONE    IR 5 milliGRAM(s) Oral every 4 hours PRN Moderate Pain (4 - 6)  polyethylene glycol 3350 17 Gram(s) Oral daily PRN Constipation  senna 2 Tablet(s) Oral at bedtime PRN Constipation        Vital Signs Last 24 Hrs  T(C): 36.3 (29 Aug 2022 15:48), Max: 38.1 (29 Aug 2022 04:10)  T(F): 97.4 (29 Aug 2022 15:48), Max: 100.5 (29 Aug 2022 04:10)  HR: 88 (29 Aug 2022 15:48) (73 - 123)  BP: 103/67 (29 Aug 2022 15:48) (89/53 - 103/67)  BP(mean): --  RR: 18 (29 Aug 2022 15:48) (18 - 19)  SpO2: 100% (29 Aug 2022 15:48) (96% - 100%)    Parameters below as of 29 Aug 2022 15:48  Patient On (Oxygen Delivery Method): room air        PE  NAD  Awake, alert  Anicteric, MMM  RRR  CTAB  Abd soft, NT, ND  No c/c/e  No rash grossly                
Patient is a 77y old  Male who presents with a chief complaint of sepsis (18 Aug 2022 19:52)    Patient seen this morning, reports feeling well overall.     MEDICATIONS  (STANDING):  amLODIPine   Tablet 10 milliGRAM(s) Oral daily  chlorhexidine 2% Cloths 1 Application(s) Topical daily  dimethicone/zinc oxide Topical Cream (KARINA PROTECT) - Peds 1 Application(s) Topical two times a day  enoxaparin Injectable 40 milliGRAM(s) SubCutaneous every 24 hours  multivitamin 1 Tablet(s) Oral daily  mupirocin 2% Ointment 1 Application(s) Topical three times a day  pantoprazole   Suspension 40 milliGRAM(s) Oral daily  piperacillin/tazobactam IVPB.. 3.375 Gram(s) IV Intermittent every 8 hours  sodium chloride 0.9%. 1000 milliLiter(s) (75 mL/Hr) IV Continuous <Continuous>    MEDICATIONS  (PRN):  oxyCODONE    IR 5 milliGRAM(s) Oral every 6 hours PRN Severe Pain (7 - 10)  polyethylene glycol 3350 17 Gram(s) Oral daily PRN Constipation  senna 2 Tablet(s) Oral at bedtime PRN Constipation        Vital Signs Last 24 Hrs  T(C): 36.9 (19 Aug 2022 06:13), Max: 37.1 (18 Aug 2022 15:51)  T(F): 98.5 (19 Aug 2022 06:13), Max: 98.8 (18 Aug 2022 15:51)  HR: 78 (19 Aug 2022 06:13) (70 - 85)  BP: 152/56 (19 Aug 2022 06:13) (129/72 - 153/63)  BP(mean): --  RR: 19 (19 Aug 2022 06:13) (17 - 19)  SpO2: 97% (19 Aug 2022 06:13) (97% - 100%)    Parameters below as of 18 Aug 2022 23:00  Patient On (Oxygen Delivery Method): room air        PE  NAD  Awake, alert  Anicteric, MMM  RRR  CTAB  No c/c/e  No rash grossly                          9.8    7.81  )-----------( 262      ( 19 Aug 2022 06:20 )             32.2       08-19    137  |  97<L>  |  25<H>  ----------------------------<  99  3.6   |  29  |  0.73    Ca    9.6      19 Aug 2022 06:33  Phos  2.6     08-19  Mg     1.80     08-19    TPro  5.6<L>  /  Alb  2.8<L>  /  TBili  0.6  /  DBili  x   /  AST  22  /  ALT  11  /  AlkPhos  126<H>  08-19      
PATIENT SEEN AND EXAMINED ON :- 8/24/22  DATE OF SERVICE:    8/24/22         Interim events noted,Labs ,Radiological studies and Cardiology tests reviewed .       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 tube dislodgement 7/22 replaced by IR, MSSA bacteremia s/p IV abx now presenting s/p fall at home w/ dislodged J tube. Nice states pt was found on the floor at home by family and looked like he'd hit is head on a step. Tube was found to be dislodged. Pt unable to elaborate on history, states to ask his niece because "I don't know." ROS positive for abdominal pain around J-tube site. Otherwise denies fevers, chills, chest pain, SOB, constipation, diarrhea.     In the E.D, pt febrile to 102, . WBC 10.52 w/ bands.  CT C/A/P w/ progressive malignancy and right upper lobe tree-in-bud nodularity, likely   representing infectious/inflammatory bronchiolitis. Received IV zosyn x 1, IVF, admitted to medicine for further mgt. IR consulted for J tube replacement.   (18 Aug 2022 17:50)      INTERIM EVENTS:Patient seen at bedside ,interim events noted.      PMH -reviewed admission note, no change since admission  HEART FAILURE: Acute[ ]Chronic[ ] Systolic[ ] Diastolic[ ] Combined Systolic and Diastolic[ ]  CAD[ ] CABG[ ] PCI[ ]  DEVICES[ ] PPM[ ] ICD[ ] ILR[ ]  ATRIAL FIBRILLATION[ ] Paroxysmal[ ] Permanent[ ] CHADS2-[  ]  MOSES[ ] CKD1[ ] CKD2[ ] CKD3[ ] CKD4[ ] ESRD[ ]  COPD[ ] HTN[ ]   DM[ ] Type1[ ] Type 2[ ]   CVA[ ] Paresis[ ]    AMBULATION: Assisted[ ] Cane/walker[ ] Independent[ ]    MEDICATIONS  (STANDING):  amLODIPine   Tablet 10 milliGRAM(s) Oral daily  chlorhexidine 2% Cloths 1 Application(s) Topical daily  dimethicone/zinc oxide Topical Cream (KARINA PROTECT) - Peds 1 Application(s) Topical two times a day  enoxaparin Injectable 40 milliGRAM(s) SubCutaneous every 24 hours  multivitamin 1 Tablet(s) Oral daily  mupirocin 2% Ointment 1 Application(s) Topical three times a day  nystatin    Suspension 252048 Unit(s) Oral four times a day  pantoprazole   Suspension 40 milliGRAM(s) Oral daily    MEDICATIONS  (PRN):  loperamide Liquid 2 milliGRAM(s) Oral every 4 hours PRN Diarrhea  oxyCODONE    IR 5 milliGRAM(s) Oral every 4 hours PRN Moderate Pain (4 - 6)  polyethylene glycol 3350 17 Gram(s) Oral daily PRN Constipation  senna 2 Tablet(s) Oral at bedtime PRN Constipation            REVIEW OF SYSTEMS:  Constitutional: [ ] fever, [ ]weight loss,  [ ]fatigue [ ]weight gain  Eyes: [ ] visual changes  Respiratory: [ ]shortness of breath;  [ ] cough, [ ]wheezing, [ ]chills, [ ]hemoptysis  Cardiovascular: [ ] chest pain, [ ]palpitations, [ ]dizziness,  [ ]leg swelling[ ]orthopnea[ ]PND  Gastrointestinal: [ ] abdominal pain, [ ]nausea, [ ]vomiting,  [ ]diarrhea [ ]Constipation [ ]Melena  Genitourinary: [ ] dysuria, [ ] hematuria [ ]Celaya  Neurologic: [ ] headaches [ ] tremors[ ]weakness [ ]Paralysis Right[ ] Left[ ]  Skin: [ ] itching, [ ]burning, [ ] rashes  Endocrine: [ ] heat or cold intolerance  Musculoskeletal: [ ] joint pain or swelling; [ ] muscle, back, or extremity pain  Psychiatric: [ ] depression, [ ]anxiety, [ ]mood swings, or [ ]difficulty sleeping  Hematologic: [ ] easy bruising, [ ] bleeding gums    [ ] All remaining systems negative except as per above.   [ ]Unable to obtain.  [x] No change in ROS since admission      Vital Signs Last 24 Hrs  T(C): 36.9 (24 Aug 2022 05:08), Max: 36.9 (24 Aug 2022 05:08)  T(F): 98.5 (24 Aug 2022 05:08), Max: 98.5 (24 Aug 2022 05:08)  HR: 81 (24 Aug 2022 13:00) (71 - 81)  BP: 151/78 (24 Aug 2022 13:00) (135/66 - 151/78)  BP(mean): --  RR: 18 (24 Aug 2022 13:00) (18 - 18)  SpO2: 100% (24 Aug 2022 13:00) (100% - 100%)    Parameters below as of 24 Aug 2022 13:00  Patient On (Oxygen Delivery Method): room air      I&O's Summary    23 Aug 2022 07:01  -  24 Aug 2022 07:00  --------------------------------------------------------  IN: 0 mL / OUT: 500 mL / NET: -500 mL    24 Aug 2022 07:01  -  24 Aug 2022 20:40  --------------------------------------------------------  IN: 0 mL / OUT: 950 mL / NET: -950 mL        PHYSICAL EXAM:  General: No acute distress BMI-  HEENT: EOMI, PERRL  Neck: Supple, [ ] JVD  Lungs: Equal air entry bilaterally; [ ] rales [ ] wheezing [ ] rhonchi  Heart: Regular rate and rhythm; [x ] murmur   2/6 [ x] systolic [ ] diastolic [ ] radiation[ ] rubs [ ]  gallops  Abdomen: Nontender, bowel sounds present  Extremities: No clubbing, cyanosis, [ ] edema [ ]Pulses  equal and intact  Nervous system:  Alert & Oriented X3, no focal deficits  Psychiatric: Normal affect  Skin: No rashes or lesions    LABS:  08-24    136  |  103  |  19  ----------------------------<  117<H>  4.4   |  22  |  0.69    Ca    9.7      24 Aug 2022 06:40  Phos  3.1     08-24  Mg     1.90     08-24    TPro  5.8<L>  /  Alb  3.1<L>  /  TBili  0.6  /  DBili  x   /  AST  19  /  ALT  12  /  AlkPhos  155<H>  08-24    Creatinine Trend: 0.69<--, 0.71<--, 0.80<--, 0.73<--, 0.74<--, 0.73<--                        9.8    9.64  )-----------( 258      ( 24 Aug 2022 06:40 )             31.6               
SUBJECTIVE AND OBJECTIVE:  Indication of Geriatrcis and Palliative Medicine Services: GOC and pain management   INTERVAL: Patient was seen this AM, resting comfortably in no distress. He is denying pain at this time. He was made aware there is oxycodone available to him if he needs.     DNR on chart:Yes  Yes      Allergies    No Known Allergies    Intolerances    MEDICATIONS  (STANDING):  amLODIPine   Tablet 10 milliGRAM(s) Oral daily  chlorhexidine 2% Cloths 1 Application(s) Topical daily  dimethicone/zinc oxide Topical Cream (KARINA PROTECT) - Peds 1 Application(s) Topical two times a day  enoxaparin Injectable 40 milliGRAM(s) SubCutaneous every 24 hours  multivitamin 1 Tablet(s) Oral daily  mupirocin 2% Ointment 1 Application(s) Topical three times a day  nystatin    Suspension 000204 Unit(s) Oral four times a day  pantoprazole   Suspension 40 milliGRAM(s) Oral daily  piperacillin/tazobactam IVPB.. 3.375 Gram(s) IV Intermittent every 8 hours    MEDICATIONS  (PRN):  loperamide Liquid 2 milliGRAM(s) Oral every 6 hours PRN Diarrhea  oxyCODONE    IR 5 milliGRAM(s) Oral every 4 hours PRN Moderate Pain (4 - 6)  polyethylene glycol 3350 17 Gram(s) Oral daily PRN Constipation  senna 2 Tablet(s) Oral at bedtime PRN Constipation    ITEMS UNCHECKED ARE NOT PRESENT    PRESENT SYMPTOMS: [ ]  Due to Covid 19 infection data obtained from nursing assessment   Source if other than patient:  [ ]Family   [ ]Team     Pain:  [ ]yes [X ]no  QOL impact -   Location -                    Aggravating factors -  Quality -  Radiation -  Timing-  Severity (0-10 scale):  Minimal acceptable level (0-10 scale):     CPOT:    https://www.sccm.org/getattachment/vgj22p70-4w5n-8c1r-3i2f-5718f4872y4m/Critical-Care-Pain-Observation-Tool-(CPOT)    PAIN AD Score:	  http://geriatrictoolkit.[facility]/cog/painad.pdf (Ctrl + left click to view)    Dyspnea:                           [ ]Mild [ ]Moderate [ ]Severe    RDOS:  0 to 2 minimal or no respiratory distress  3 mild distress  4 to 6 moderate distress  >7 severe distress    Anxiety:                             [ ]Mild [ ]Moderate [ ]Severe  Fatigue:                             [ ]Mild [ ]Moderate [ ]Severe  Nausea:                             [ ]Mild [ ]Moderate [ ]Severe  Loss of appetite:              [ ]Mild [ ]Moderate [ ]Severe  Constipation:                    [ ]Mild [ ]Moderate [ ]Severe    PCSSQ[Palliative Care Spiritual Screening Question]   Severity (0-10):  Score of 4 or > indicate consideration of Chaplaincy referral.  Chaplaincy Referral: [ ] yes [ ] refused [ ] following    Other Symptoms:  [ ]All other review of systems negative     Palliative Performance Status Version 2:         %      http://npcrc.org/files/news/palliative_performance_scale_ppsv2.pdf  PHYSICAL EXAM: DUE TO COVID-19 INFECTION  physical exam findings from the clinician caring for this patient directly are used.   Vital Signs Last 24 Hrs  T(C): 36.8 (23 Aug 2022 06:00), Max: 37 (22 Aug 2022 14:28)  T(F): 98.3 (23 Aug 2022 06:00), Max: 98.6 (22 Aug 2022 14:28)  HR: 73 (23 Aug 2022 06:00) (73 - 85)  BP: 144/77 (23 Aug 2022 06:00) (139/62 - 144/77)  BP(mean): --  RR: 18 (23 Aug 2022 06:00) (18 - 18)  SpO2: 100% (23 Aug 2022 06:00) (99% - 100%)    Parameters below as of 23 Aug 2022 06:00  Patient On (Oxygen Delivery Method): room air     I&O's Summary     GENERAL: [X ]Cachexia    [X ]Alert  [ ]Oriented x   [ ]Lethargic  [ ]Unarousable  [ ]Verbal  [ ]Non-Verbal  Behavioral:   [ ] Anxiety  [ ] Delirium [ ] Agitation [ ] Other  HEENT:  [ ]Normal   [X ]Dry mouth   [ ]ET Tube/Trach  [ ]Oral lesions  PULMONARY:   [X ]Clear [ ]Tachypnea  [ ]Audible excessive secretions   [ ]Rhonchi        [ ]Right [ ]Left [ ]Bilateral  [ ]Crackles        [ ]Right [ ]Left [ ]Bilateral  [ ]Wheezing     [ ]Right [ ]Left [ ]Bilateral  [ ]Diminished breath sounds [ ]right [ ]left [ ]bilateral  CARDIOVASCULAR:    [X ]Regular [ ]Irregular [ ]Tachy  [ ]Ayaz [ ]Murmur [ ]Other  GASTROINTESTINAL:  [ ]Soft  [ ]Distended   [ ]+BS  [ ]Non tender [X ]Tender  [ ]Other [ ]PEG [ ]OGT/ NGT  Last BM:  GENITOURINARY:  [ ]Normal [ ] Incontinent   [ ]Oliguria/Anuria   [ ]Celaya  MUSCULOSKELETAL:   [ ]Normal   [ ]Weakness  [X ]Bed/Wheelchair bound [ ]Edema  NEUROLOGIC:   [X ]No focal deficits  [ ]Cognitive impairment  [ ]Dysphagia [ ]Dysarthria [ ]Paresis [ ]Other   SKIN:   [ ]Normal  [ ]Rash  [ ]Other  [ ]Pressure ulcer(s)       Present on admission [ ]y [ ]n      CRITICAL CARE:  [ ]Shock Present  [ ]Septic [ ]Cardiogenic [ ]Neurologic [ ]Hypovolemic  [ ]Vasopressors [ ]Inotropes  [ ]Respiratory failure present [ ]Mechanical Ventilation [ ]Non-invasive ventilatory support [ ]High-Flow   [ ]Acute  [ ]Chronic [ ]Hypoxic  [ ]Hypercarbic [ ]Other  [ ]Other organ failure     LABS:                        9.7    8.91  )-----------( 259      ( 22 Aug 2022 05:20 )             31.2   08-22    136  |  98  |  21  ----------------------------<  98  3.1<L>   |  27  |  0.80    Ca    9.7      22 Aug 2022 05:20  Phos  1.6     08-22  Mg     1.80     08-22    TPro  6.0  /  Alb  3.2<L>  /  TBili  0.6  /  DBili  x   /  AST  19  /  ALT  8   /  AlkPhos  200<H>  08-22      Procalcitonin, Serum: 0.20 ng/mL (08-20-22 @ 06:57)  Procalcitonin, Serum: 0.28 ng/mL (08-19-22 @ 06:33)    RADIOLOGY & ADDITIONAL STUDIES:     Protein Calorie Malnutrition Present: [ ]mild [ ]moderate [ ]severe [ ]underweight [ ]morbid obesity  https://www.andeal.org/vault/0090/web/files/ONC/Table_Clinical%20Characteristics%20to%20Document%20Malnutrition-White%20JV%20et%20al%202012.pdf    Height (cm): 157.5 (08-18-22 @ 23:00), 177.8 (07-21-22 @ 01:36), 157.5 (06-22-22 @ 13:12)  Weight (kg): 56.5 (08-18-22 @ 23:00), 58.2 (07-21-22 @ 01:36), 59.5 (07-01-22 @ 10:46)  BMI (kg/m2): 22.8 (08-18-22 @ 23:00), 18.4 (07-21-22 @ 01:36), 24 (07-01-22 @ 10:46)    [ ]PPSV2 < or = 30%  [ ]significant weight loss [ ]poor nutritional intake [ ]anasarca    [ ]Artificial Nutrition    REFERRALS:   [ ]Chaplaincy  [X ]Hospice  [ ]Child Life  [ ]Social Work  [ ]Case management [ ]Holistic Therapy   
PATIENT SEEN AND EXAMINED ON :- 8/21/22  DATE OF SERVICE:   8/21/22          Interim events noted,Labs ,Radiological studies and Cardiology tests reviewed .       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 tube dislodgement 7/22 replaced by IR, MSSA bacteremia s/p IV abx now presenting s/p fall at home w/ dislodged J tube. Nice states pt was found on the floor at home by family and looked like he'd hit is head on a step. Tube was found to be dislodged. Pt unable to elaborate on history, states to ask his niece because "I don't know." ROS positive for abdominal pain around J-tube site. Otherwise denies fevers, chills, chest pain, SOB, constipation, diarrhea.     In the E.D, pt febrile to 102, . WBC 10.52 w/ bands.  CT C/A/P w/ progressive malignancy and right upper lobe tree-in-bud nodularity, likely   representing infectious/inflammatory bronchiolitis. Received IV zosyn x 1, IVF, admitted to medicine for further mgt. IR consulted for J tube replacement.   (18 Aug 2022 17:50)      INTERIM EVENTS:Patient seen at bedside ,interim events noted.      PMH -reviewed admission note, no change since admission  HEART FAILURE: Acute[ ]Chronic[ ] Systolic[ ] Diastolic[ ] Combined Systolic and Diastolic[ ]  CAD[ ] CABG[ ] PCI[ ]  DEVICES[ ] PPM[ ] ICD[ ] ILR[ ]  ATRIAL FIBRILLATION[ ] Paroxysmal[ ] Permanent[ ] CHADS2-[  ]  MOSES[ ] CKD1[ ] CKD2[ ] CKD3[ ] CKD4[ ] ESRD[ ]  COPD[ ] HTN[ ]   DM[ ] Type1[ ] Type 2[ ]   CVA[ ] Paresis[ ]    AMBULATION: Assisted[ ] Cane/walker[ ] Independent[ ]    MEDICATIONS  (STANDING):  amLODIPine   Tablet 10 milliGRAM(s) Oral daily  chlorhexidine 2% Cloths 1 Application(s) Topical daily  dimethicone/zinc oxide Topical Cream (KARINA PROTECT) - Peds 1 Application(s) Topical two times a day  enoxaparin Injectable 40 milliGRAM(s) SubCutaneous every 24 hours  multivitamin 1 Tablet(s) Oral daily  mupirocin 2% Ointment 1 Application(s) Topical three times a day  nystatin    Suspension 238606 Unit(s) Oral four times a day  pantoprazole   Suspension 40 milliGRAM(s) Oral daily  piperacillin/tazobactam IVPB.. 3.375 Gram(s) IV Intermittent every 8 hours    MEDICATIONS  (PRN):  oxyCODONE    IR 5 milliGRAM(s) Oral every 6 hours PRN Severe Pain (7 - 10)  polyethylene glycol 3350 17 Gram(s) Oral daily PRN Constipation  senna 2 Tablet(s) Oral at bedtime PRN Constipation            REVIEW OF SYSTEMS:  Constitutional: [ ] fever, [ ]weight loss,  [ ]fatigue [ ]weight gain  Eyes: [ ] visual changes  Respiratory: [ ]shortness of breath;  [ ] cough, [ ]wheezing, [ ]chills, [ ]hemoptysis  Cardiovascular: [ ] chest pain, [ ]palpitations, [ ]dizziness,  [ ]leg swelling[ ]orthopnea[ ]PND  Gastrointestinal: [ ] abdominal pain, [ ]nausea, [ ]vomiting,  [ ]diarrhea [ ]Constipation [ ]Melena  Genitourinary: [ ] dysuria, [ ] hematuria [ ]Celaya  Neurologic: [ ] headaches [ ] tremors[ ]weakness [ ]Paralysis Right[ ] Left[ ]  Skin: [ ] itching, [ ]burning, [ ] rashes  Endocrine: [ ] heat or cold intolerance  Musculoskeletal: [ ] joint pain or swelling; [ ] muscle, back, or extremity pain  Psychiatric: [ ] depression, [ ]anxiety, [ ]mood swings, or [ ]difficulty sleeping  Hematologic: [ ] easy bruising, [ ] bleeding gums    [ ] All remaining systems negative except as per above.   [ ]Unable to obtain.  [x] No change in ROS since admission      Vital Signs Last 24 Hrs  T(C): 36.5 (21 Aug 2022 13:43), Max: 36.5 (20 Aug 2022 21:37)  T(F): 97.7 (21 Aug 2022 13:43), Max: 97.7 (20 Aug 2022 21:37)  HR: 69 (21 Aug 2022 13:43) (66 - 78)  BP: 115/55 (21 Aug 2022 13:43) (115/55 - 136/63)  BP(mean): --  RR: 18 (21 Aug 2022 13:43) (18 - 18)  SpO2: 99% (21 Aug 2022 13:43) (99% - 100%)    Parameters below as of 21 Aug 2022 13:43  Patient On (Oxygen Delivery Method): room air      I&O's Summary    20 Aug 2022 07:01  -  21 Aug 2022 07:00  --------------------------------------------------------  IN: 540 mL / OUT: 610 mL / NET: -70 mL    21 Aug 2022 07:01  -  21 Aug 2022 16:11  --------------------------------------------------------  IN: 60 mL / OUT: 0 mL / NET: 60 mL        PHYSICAL EXAM:  General: No acute distress BMI-  HEENT: EOMI, PERRL  Neck: Supple, [ ] JVD  Lungs: Equal air entry bilaterally; [ ] rales [ ] wheezing [ ] rhonchi  Heart: Regular rate and rhythm; [x ] murmur   2/6 [ x] systolic [ ] diastolic [ ] radiation[ ] rubs [ ]  gallops  Abdomen: Nontender, bowel sounds present  Extremities: No clubbing, cyanosis, [ ] edema [ ]Pulses  equal and intact  Nervous system:  Alert & Oriented X3, no focal deficits  Psychiatric: Normal affect  Skin: No rashes or lesions    LABS:  08-20    135  |  100  |  22  ----------------------------<  119<H>  4.4   |  19<L>  |  0.73    Ca    9.8      20 Aug 2022 06:57  Phos  3.1     08-20  Mg     1.90     08-20    TPro  5.6<L>  /  Alb  2.9<L>  /  TBili  0.6  /  DBili  x   /  AST  30  /  ALT  9   /  AlkPhos  135<H>  08-20    Creatinine Trend: 0.73<--, 0.74<--, 0.73<--, 0.94<--                        10.0   5.50  )-----------( 297      ( 20 Aug 2022 06:57 )             32.9               
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 tube dislodgement 7/22 replaced by IR, MSSA bacteremia s/p IV abx now presenting s/p fall at home w/ dislodged J tube. Nice states pt was found on the floor at home by family and looked like he'd hit is head on a step. Tube was found to be dislodged. Pt unable to elaborate on history, states to ask his niece because "I don't know." ROS positive for abdominal pain around J-tube site. Otherwise denies fevers, chills, chest pain, SOB, constipation, diarrhea.     In the E.D, pt febrile to 102, . WBC 10.52 w/ bands.  CT C/A/P w/ progressive malignancy and right upper lobe tree-in-bud nodularity, likely   representing infectious/inflammatory bronchiolitis. Received IV zosyn x 1, IVF, admitted to medicine for further mgt. IR consulted for J tube replacement.   (18 Aug 2022 17:50)      INTERIM EVENTS:Patient seen at bedside ,interim events noted.      PMH -reviewed admission note, no change since admission  HEART FAILURE: Acute[ ]Chronic[ ] Systolic[ ] Diastolic[ ] Combined Systolic and Diastolic[ ]  CAD[ ] CABG[ ] PCI[ ]  DEVICES[ ] PPM[ ] ICD[ ] ILR[ ]  ATRIAL FIBRILLATION[ ] Paroxysmal[ ] Permanent[ ] CHADS2-[  ]  MOSES[ ] CKD1[ ] CKD2[ ] CKD3[ ] CKD4[ ] ESRD[ ]  COPD[ ] HTN[ ]   DM[ ] Type1[ ] Type 2[ ]   CVA[ ] Paresis[ ]    AMBULATION: Assisted[ ] Cane/walker[ ] Independent[ ]    MEDICATIONS  (STANDING):  amLODIPine   Tablet 10 milliGRAM(s) Oral daily  chlorhexidine 2% Cloths 1 Application(s) Topical daily  dimethicone/zinc oxide Topical Cream (KARINA PROTECT) - Peds 1 Application(s) Topical two times a day  enoxaparin Injectable 40 milliGRAM(s) SubCutaneous every 24 hours  multivitamin 1 Tablet(s) Oral daily  mupirocin 2% Ointment 1 Application(s) Topical three times a day  nystatin    Suspension 175581 Unit(s) Oral four times a day  pantoprazole   Suspension 40 milliGRAM(s) Oral daily  piperacillin/tazobactam IVPB.. 3.375 Gram(s) IV Intermittent every 8 hours  sodium chloride 0.9%. 1000 milliLiter(s) (75 mL/Hr) IV Continuous <Continuous>    MEDICATIONS  (PRN):  oxyCODONE    IR 5 milliGRAM(s) Oral every 6 hours PRN Severe Pain (7 - 10)  polyethylene glycol 3350 17 Gram(s) Oral daily PRN Constipation  senna 2 Tablet(s) Oral at bedtime PRN Constipation            REVIEW OF SYSTEMS:  Constitutional: [ ] fever, [ ]weight loss,  [ ]fatigue [ ]weight gain  Eyes: [ ] visual changes  Respiratory: [ ]shortness of breath;  [ ] cough, [ ]wheezing, [ ]chills, [ ]hemoptysis  Cardiovascular: [ ] chest pain, [ ]palpitations, [ ]dizziness,  [ ]leg swelling[ ]orthopnea[ ]PND  Gastrointestinal: [ ] abdominal pain, [ ]nausea, [ ]vomiting,  [ ]diarrhea [ ]Constipation [ ]Melena  Genitourinary: [ ] dysuria, [ ] hematuria [ ]Celaya  Neurologic: [ ] headaches [ ] tremors[ ]weakness [ ]Paralysis Right[ ] Left[ ]  Skin: [ ] itching, [ ]burning, [ ] rashes  Endocrine: [ ] heat or cold intolerance  Musculoskeletal: [ ] joint pain or swelling; [ ] muscle, back, or extremity pain  Psychiatric: [ ] depression, [ ]anxiety, [ ]mood swings, or [ ]difficulty sleeping  Hematologic: [ ] easy bruising, [ ] bleeding gums    [ ] All remaining systems negative except as per above.   [ ]Unable to obtain.  [x] No change in ROS since admission      Vital Signs Last 24 Hrs  T(C): 36.2 (19 Aug 2022 14:08), Max: 36.9 (18 Aug 2022 21:13)  T(F): 97.2 (19 Aug 2022 14:08), Max: 98.5 (18 Aug 2022 21:13)  HR: 76 (19 Aug 2022 14:08) (76 - 83)  BP: 144/57 (19 Aug 2022 14:08) (144/57 - 152/56)  BP(mean): --  RR: 16 (19 Aug 2022 14:08) (16 - 19)  SpO2: 99% (19 Aug 2022 14:08) (97% - 100%)    Parameters below as of 19 Aug 2022 14:08  Patient On (Oxygen Delivery Method): room air      I&O's Summary      PHYSICAL EXAM:  General: No acute distress BMI-  HEENT: EOMI, PERRL  Neck: Supple, [ ] JVD  Lungs: Equal air entry bilaterally; [ ] rales [ ] wheezing [ ] rhonchi  Heart: Regular rate and rhythm; [x ] murmur   2/6 [ x] systolic [ ] diastolic [ ] radiation[ ] rubs [ ]  gallops  Abdomen: Nontender, bowel sounds present  Extremities: No clubbing, cyanosis, [ ] edema [ ]Pulses  equal and intact  Nervous system:  Alert & Oriented X3, no focal deficits  Psychiatric: Normal affect  Skin: No rashes or lesions    LABS:  08-19    138  |  99  |  24<H>  ----------------------------<  98  4.2   |  27  |  0.74    Ca    9.9      19 Aug 2022 15:43  Phos  2.4     08-19  Mg     1.80     08-19    TPro  5.6<L>  /  Alb  2.8<L>  /  TBili  0.6  /  DBili  x   /  AST  22  /  ALT  11  /  AlkPhos  126<H>  08-19    Creatinine Trend: 0.74<--, 0.73<--, 0.94<--, 0.97<--, 0.92<--, 0.97<--                        9.8    7.81  )-----------( 262      ( 19 Aug 2022 06:20 )             32.2               
PATIENT SEEN AND EXAMINED ON :- 8/20/22  DATE OF SERVICE: 8/20/22            Interim events noted,Labs ,Radiological studies and Cardiology tests reviewed .       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 tube dislodgement 7/22 replaced by IR, MSSA bacteremia s/p IV abx now presenting s/p fall at home w/ dislodged J tube. Nice states pt was found on the floor at home by family and looked like he'd hit is head on a step. Tube was found to be dislodged. Pt unable to elaborate on history, states to ask his niece because "I don't know." ROS positive for abdominal pain around J-tube site. Otherwise denies fevers, chills, chest pain, SOB, constipation, diarrhea.     In the E.D, pt febrile to 102, . WBC 10.52 w/ bands.  CT C/A/P w/ progressive malignancy and right upper lobe tree-in-bud nodularity, likely   representing infectious/inflammatory bronchiolitis. Received IV zosyn x 1, IVF, admitted to medicine for further mgt. IR consulted for J tube replacement.   (18 Aug 2022 17:50)      INTERIM EVENTS:Patient seen at bedside ,interim events noted.      PMH -reviewed admission note, no change since admission  HEART FAILURE: Acute[ ]Chronic[ ] Systolic[ ] Diastolic[ ] Combined Systolic and Diastolic[ ]  CAD[ ] CABG[ ] PCI[ ]  DEVICES[ ] PPM[ ] ICD[ ] ILR[ ]  ATRIAL FIBRILLATION[ ] Paroxysmal[ ] Permanent[ ] CHADS2-[  ]  MOSES[ ] CKD1[ ] CKD2[ ] CKD3[ ] CKD4[ ] ESRD[ ]  COPD[ ] HTN[ ]   DM[ ] Type1[ ] Type 2[ ]   CVA[ ] Paresis[ ]    AMBULATION: Assisted[ ] Cane/walker[ ] Independent[ ]    MEDICATIONS  (STANDING):  amLODIPine   Tablet 10 milliGRAM(s) Oral daily  chlorhexidine 2% Cloths 1 Application(s) Topical daily  dimethicone/zinc oxide Topical Cream (KARINA PROTECT) - Peds 1 Application(s) Topical two times a day  enoxaparin Injectable 40 milliGRAM(s) SubCutaneous every 24 hours  multivitamin 1 Tablet(s) Oral daily  mupirocin 2% Ointment 1 Application(s) Topical three times a day  nystatin    Suspension 177677 Unit(s) Oral four times a day  pantoprazole   Suspension 40 milliGRAM(s) Oral daily  piperacillin/tazobactam IVPB.. 3.375 Gram(s) IV Intermittent every 8 hours    MEDICATIONS  (PRN):  oxyCODONE    IR 5 milliGRAM(s) Oral every 6 hours PRN Severe Pain (7 - 10)  polyethylene glycol 3350 17 Gram(s) Oral daily PRN Constipation  senna 2 Tablet(s) Oral at bedtime PRN Constipation      REVIEW OF SYSTEMS:  Constitutional: [ ] fever, [ ]weight loss,  [ ]fatigue [ ]weight gain  Eyes: [ ] visual changes  Respiratory: [ ]shortness of breath;  [ ] cough, [ ]wheezing, [ ]chills, [ ]hemoptysis  Cardiovascular: [ ] chest pain, [ ]palpitations, [ ]dizziness,  [ ]leg swelling[ ]orthopnea[ ]PND  Gastrointestinal: [ ] abdominal pain, [ ]nausea, [ ]vomiting,  [ ]diarrhea [ ]Constipation [ ]Melena  Genitourinary: [ ] dysuria, [ ] hematuria [ ]Celaya  Neurologic: [ ] headaches [ ] tremors[ ]weakness [ ]Paralysis Right[ ] Left[ ]  Skin: [ ] itching, [ ]burning, [ ] rashes  Endocrine: [ ] heat or cold intolerance  Musculoskeletal: [ ] joint pain or swelling; [ ] muscle, back, or extremity pain  Psychiatric: [ ] depression, [ ]anxiety, [ ]mood swings, or [ ]difficulty sleeping  Hematologic: [ ] easy bruising, [ ] bleeding gums    [ ] All remaining systems negative except as per above.   [ ]Unable to obtain.  [x] No change in ROS since admission      Vital Signs Last 24 Hrs  T(C): 36.8 (20 Aug 2022 13:40), Max: 37 (19 Aug 2022 21:39)  T(F): 98.2 (20 Aug 2022 13:40), Max: 98.6 (19 Aug 2022 21:39)  HR: 74 (20 Aug 2022 13:40) (71 - 83)  BP: 148/78 (20 Aug 2022 13:40) (142/76 - 181/72)  BP(mean): --  RR: 18 (20 Aug 2022 13:40) (17 - 18)  SpO2: 100% (20 Aug 2022 13:40) (100% - 100%)    Parameters below as of 20 Aug 2022 13:40  Patient On (Oxygen Delivery Method): room air      I&O's Summary    19 Aug 2022 07:01  -  20 Aug 2022 07:00  --------------------------------------------------------  IN: 0 mL / OUT: 150 mL / NET: -150 mL    20 Aug 2022 07:01  -  20 Aug 2022 21:22  --------------------------------------------------------  IN: 0 mL / OUT: 300 mL / NET: -300 mL        PHYSICAL EXAM:  General: No acute distress BMI-  HEENT: EOMI, PERRL  Neck: Supple, [ ] JVD  Lungs: Equal air entry bilaterally; [ ] rales [ ] wheezing [ ] rhonchi  Heart: Regular rate and rhythm; [x ] murmur   2/6 [ x] systolic [ ] diastolic [ ] radiation[ ] rubs [ ]  gallops  Abdomen: Nontender, bowel sounds present  Extremities: No clubbing, cyanosis, [ ] edema [ ]Pulses  equal and intact  Nervous system:  Alert & Oriented X3, no focal deficits  Psychiatric: Normal affect  Skin: No rashes or lesions    LABS:  08-20    135  |  100  |  22  ----------------------------<  119<H>  4.4   |  19<L>  |  0.73    Ca    9.8      20 Aug 2022 06:57  Phos  3.1     08-20  Mg     1.90     08-20    TPro  5.6<L>  /  Alb  2.9<L>  /  TBili  0.6  /  DBili  x   /  AST  30  /  ALT  9   /  AlkPhos  135<H>  08-20    Creatinine Trend: 0.73<--, 0.74<--, 0.73<--, 0.94<--, 0.97<--                        10.0   5.50  )-----------( 297      ( 20 Aug 2022 06:57 )             32.9               
PATIENT SEEN AND EXAMINED ON :- 8/25/22  DATE OF SERVICE:  8/25/22           Interim events noted,Labs ,Radiological studies and Cardiology tests reviewed .       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 tube dislodgement 7/22 replaced by IR, MSSA bacteremia s/p IV abx now presenting s/p fall at home w/ dislodged J tube. Nice states pt was found on the floor at home by family and looked like he'd hit is head on a step. Tube was found to be dislodged. Pt unable to elaborate on history, states to ask his niece because "I don't know." ROS positive for abdominal pain around J-tube site. Otherwise denies fevers, chills, chest pain, SOB, constipation, diarrhea.     In the E.D, pt febrile to 102, . WBC 10.52 w/ bands.  CT C/A/P w/ progressive malignancy and right upper lobe tree-in-bud nodularity, likely   representing infectious/inflammatory bronchiolitis. Received IV zosyn x 1, IVF, admitted to medicine for further mgt. IR consulted for J tube replacement.   (18 Aug 2022 17:50)      INTERIM EVENTS:Patient seen at bedside ,interim events noted.      PMH -reviewed admission note, no change since admission  HEART FAILURE: Acute[ ]Chronic[ ] Systolic[ ] Diastolic[ ] Combined Systolic and Diastolic[ ]  CAD[ ] CABG[ ] PCI[ ]  DEVICES[ ] PPM[ ] ICD[ ] ILR[ ]  ATRIAL FIBRILLATION[ ] Paroxysmal[ ] Permanent[ ] CHADS2-[  ]  MOSES[ ] CKD1[ ] CKD2[ ] CKD3[ ] CKD4[ ] ESRD[ ]  COPD[ ] HTN[ ]   DM[ ] Type1[ ] Type 2[ ]   CVA[ ] Paresis[ ]    AMBULATION: Assisted[ ] Cane/walker[ ] Independent[ ]    MEDICATIONS  (STANDING):  amLODIPine   Tablet 10 milliGRAM(s) Oral daily  chlorhexidine 2% Cloths 1 Application(s) Topical daily  enoxaparin Injectable 40 milliGRAM(s) SubCutaneous every 24 hours  multivitamin 1 Tablet(s) Oral daily  mupirocin 2% Ointment 1 Application(s) Topical three times a day  nystatin    Suspension 986384 Unit(s) Oral four times a day  pantoprazole   Suspension 40 milliGRAM(s) Oral daily    MEDICATIONS  (PRN):  loperamide Liquid 2 milliGRAM(s) Oral every 4 hours PRN Diarrhea  oxyCODONE    IR 5 milliGRAM(s) Oral every 4 hours PRN Moderate Pain (4 - 6)  polyethylene glycol 3350 17 Gram(s) Oral daily PRN Constipation  senna 2 Tablet(s) Oral at bedtime PRN Constipation            REVIEW OF SYSTEMS:  Constitutional: [ ] fever, [ ]weight loss,  [ ]fatigue [ ]weight gain  Eyes: [ ] visual changes  Respiratory: [ ]shortness of breath;  [ ] cough, [ ]wheezing, [ ]chills, [ ]hemoptysis  Cardiovascular: [ ] chest pain, [ ]palpitations, [ ]dizziness,  [ ]leg swelling[ ]orthopnea[ ]PND  Gastrointestinal: [ ] abdominal pain, [ ]nausea, [ ]vomiting,  [ ]diarrhea [ ]Constipation [ ]Melena  Genitourinary: [ ] dysuria, [ ] hematuria [ ]Celaya  Neurologic: [ ] headaches [ ] tremors[ ]weakness [ ]Paralysis Right[ ] Left[ ]  Skin: [ ] itching, [ ]burning, [ ] rashes  Endocrine: [ ] heat or cold intolerance  Musculoskeletal: [ ] joint pain or swelling; [ ] muscle, back, or extremity pain  Psychiatric: [ ] depression, [ ]anxiety, [ ]mood swings, or [ ]difficulty sleeping  Hematologic: [ ] easy bruising, [ ] bleeding gums    [ ] All remaining systems negative except as per above.   [ ]Unable to obtain.  [x] No change in ROS since admission      Vital Signs Last 24 Hrs  T(C): 36.7 (25 Aug 2022 15:35), Max: 36.8 (25 Aug 2022 07:33)  T(F): 98.1 (25 Aug 2022 15:35), Max: 98.2 (25 Aug 2022 07:33)  HR: 73 (25 Aug 2022 15:35) (73 - 81)  BP: 114/58 (25 Aug 2022 15:35) (114/58 - 157/69)  BP(mean): --  RR: 18 (25 Aug 2022 15:35) (17 - 18)  SpO2: 100% (25 Aug 2022 15:35) (100% - 100%)    Parameters below as of 25 Aug 2022 15:35  Patient On (Oxygen Delivery Method): room air      I&O's Summary    24 Aug 2022 07:01  -  25 Aug 2022 07:00  --------------------------------------------------------  IN: 0 mL / OUT: 1450 mL / NET: -1450 mL        PHYSICAL EXAM:  General: No acute distress BMI-  HEENT: EOMI, PERRL  Neck: Supple, [ ] JVD  Lungs: Equal air entry bilaterally; [ ] rales [ ] wheezing [ ] rhonchi  Heart: Regular rate and rhythm; [x ] murmur   2/6 [ x] systolic [ ] diastolic [ ] radiation[ ] rubs [ ]  gallops  Abdomen: Nontender, bowel sounds present  Extremities: No clubbing, cyanosis, [ ] edema [ ]Pulses  equal and intact  Nervous system:  Alert & Oriented X3, no focal deficits  Psychiatric: Normal affect  Skin: No rashes or lesions    LABS:  08-25    135  |  104  |  25<H>  ----------------------------<  141<H>  4.3   |  23  |  0.72    Ca    9.7      25 Aug 2022 06:25  Phos  2.5     08-25  Mg     2.10     08-25    TPro  5.7<L>  /  Alb  3.1<L>  /  TBili  0.5  /  DBili  x   /  AST  19  /  ALT  13  /  AlkPhos  137<H>  08-25    Creatinine Trend: 0.72<--, 0.69<--, 0.71<--, 0.80<--, 0.73<--, 0.74<--                        9.9    7.72  )-----------( 245      ( 25 Aug 2022 06:25 )             31.8               
PATIENT SEEN AND EXAMINED ON :- 8/22/22  DATE OF SERVICE:   8/22/22          Interim events noted,Labs ,Radiological studies and Cardiology tests reviewed .       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 tube dislodgement 7/22 replaced by IR, MSSA bacteremia s/p IV abx now presenting s/p fall at home w/ dislodged J tube. Nice states pt was found on the floor at home by family and looked like he'd hit is head on a step. Tube was found to be dislodged. Pt unable to elaborate on history, states to ask his niece because "I don't know." ROS positive for abdominal pain around J-tube site. Otherwise denies fevers, chills, chest pain, SOB, constipation, diarrhea.     In the E.D, pt febrile to 102, . WBC 10.52 w/ bands.  CT C/A/P w/ progressive malignancy and right upper lobe tree-in-bud nodularity, likely   representing infectious/inflammatory bronchiolitis. Received IV zosyn x 1, IVF, admitted to medicine for further mgt. IR consulted for J tube replacement.   (18 Aug 2022 17:50)      INTERIM EVENTS:Patient seen at bedside ,interim events noted.      PMH -reviewed admission note, no change since admission  HEART FAILURE: Acute[ ]Chronic[ ] Systolic[ ] Diastolic[ ] Combined Systolic and Diastolic[ ]  CAD[ ] CABG[ ] PCI[ ]  DEVICES[ ] PPM[ ] ICD[ ] ILR[ ]  ATRIAL FIBRILLATION[ ] Paroxysmal[ ] Permanent[ ] CHADS2-[  ]  MOSES[ ] CKD1[ ] CKD2[ ] CKD3[ ] CKD4[ ] ESRD[ ]  COPD[ ] HTN[ ]   DM[ ] Type1[ ] Type 2[ ]   CVA[ ] Paresis[ ]    AMBULATION: Assisted[ ] Cane/walker[ ] Independent[ ]    MEDICATIONS  (STANDING):  amLODIPine   Tablet 10 milliGRAM(s) Oral daily  chlorhexidine 2% Cloths 1 Application(s) Topical daily  dimethicone/zinc oxide Topical Cream (KARINA PROTECT) - Peds 1 Application(s) Topical two times a day  enoxaparin Injectable 40 milliGRAM(s) SubCutaneous every 24 hours  multivitamin 1 Tablet(s) Oral daily  mupirocin 2% Ointment 1 Application(s) Topical three times a day  nystatin    Suspension 672266 Unit(s) Oral four times a day  pantoprazole   Suspension 40 milliGRAM(s) Oral daily  piperacillin/tazobactam IVPB.. 3.375 Gram(s) IV Intermittent every 8 hours    MEDICATIONS  (PRN):  loperamide Liquid 2 milliGRAM(s) Oral every 6 hours PRN Diarrhea  oxyCODONE    IR 5 milliGRAM(s) Oral every 4 hours PRN Moderate Pain (4 - 6)  polyethylene glycol 3350 17 Gram(s) Oral daily PRN Constipation  senna 2 Tablet(s) Oral at bedtime PRN Constipation            REVIEW OF SYSTEMS:  Constitutional: [ ] fever, [ ]weight loss,  [ ]fatigue [ ]weight gain  Eyes: [ ] visual changes  Respiratory: [ ]shortness of breath;  [ ] cough, [ ]wheezing, [ ]chills, [ ]hemoptysis  Cardiovascular: [ ] chest pain, [ ]palpitations, [ ]dizziness,  [ ]leg swelling[ ]orthopnea[ ]PND  Gastrointestinal: [ ] abdominal pain, [ ]nausea, [ ]vomiting,  [ ]diarrhea [ ]Constipation [ ]Melena  Genitourinary: [ ] dysuria, [ ] hematuria [ ]Celaya  Neurologic: [ ] headaches [ ] tremors[ ]weakness [ ]Paralysis Right[ ] Left[ ]  Skin: [ ] itching, [ ]burning, [ ] rashes  Endocrine: [ ] heat or cold intolerance  Musculoskeletal: [ ] joint pain or swelling; [ ] muscle, back, or extremity pain  Psychiatric: [ ] depression, [ ]anxiety, [ ]mood swings, or [ ]difficulty sleeping  Hematologic: [ ] easy bruising, [ ] bleeding gums    [ ] All remaining systems negative except as per above.   [ ]Unable to obtain.  [x] No change in ROS since admission      Vital Signs Last 24 Hrs  T(C): 37 (22 Aug 2022 14:28), Max: 37.2 (22 Aug 2022 06:30)  T(F): 98.6 (22 Aug 2022 14:28), Max: 99 (22 Aug 2022 06:30)  HR: 85 (22 Aug 2022 14:28) (75 - 85)  BP: 141/81 (22 Aug 2022 14:28) (121/57 - 141/81)  BP(mean): --  RR: 18 (22 Aug 2022 14:28) (18 - 18)  SpO2: 99% (22 Aug 2022 14:28) (99% - 100%)    Parameters below as of 22 Aug 2022 14:28  Patient On (Oxygen Delivery Method): room air      I&O's Summary    21 Aug 2022 07:01  -  22 Aug 2022 07:00  --------------------------------------------------------  IN: 60 mL / OUT: 1100 mL / NET: -1040 mL        PHYSICAL EXAM:  General: No acute distress BMI-  HEENT: EOMI, PERRL  Neck: Supple, [ ] JVD  Lungs: Equal air entry bilaterally; [ ] rales [ ] wheezing [ ] rhonchi  Heart: Regular rate and rhythm; [x ] murmur   2/6 [ x] systolic [ ] diastolic [ ] radiation[ ] rubs [ ]  gallops  Abdomen: Nontender, bowel sounds present  Extremities: No clubbing, cyanosis, [ ] edema [ ]Pulses  equal and intact  Nervous system:  Alert & Oriented X3, no focal deficits  Psychiatric: Normal affect  Skin: No rashes or lesions    LABS:  08-22    136  |  98  |  21  ----------------------------<  98  3.1<L>   |  27  |  0.80    Ca    9.7      22 Aug 2022 05:20  Phos  1.6     08-22  Mg     1.80     08-22    TPro  6.0  /  Alb  3.2<L>  /  TBili  0.6  /  DBili  x   /  AST  19  /  ALT  8   /  AlkPhos  200<H>  08-22    Creatinine Trend: 0.80<--, 0.73<--, 0.74<--, 0.73<--, 0.94<--                        9.7    8.91  )-----------( 259      ( 22 Aug 2022 05:20 )             31.2               
PATIENT SEEN AND EXAMINED ON :- 8/23/22  DATE OF SERVICE:     8/23/22        Interim events noted,Labs ,Radiological studies and Cardiology tests reviewed .       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 tube dislodgement 7/22 replaced by IR, MSSA bacteremia s/p IV abx now presenting s/p fall at home w/ dislodged J tube. Nice states pt was found on the floor at home by family and looked like he'd hit is head on a step. Tube was found to be dislodged. Pt unable to elaborate on history, states to ask his niece because "I don't know." ROS positive for abdominal pain around J-tube site. Otherwise denies fevers, chills, chest pain, SOB, constipation, diarrhea.     In the E.D, pt febrile to 102, . WBC 10.52 w/ bands.  CT C/A/P w/ progressive malignancy and right upper lobe tree-in-bud nodularity, likely   representing infectious/inflammatory bronchiolitis. Received IV zosyn x 1, IVF, admitted to medicine for further mgt. IR consulted for J tube replacement.   (18 Aug 2022 17:50)      INTERIM EVENTS:Patient seen at bedside ,interim events noted.      PMH -reviewed admission note, no change since admission  HEART FAILURE: Acute[ ]Chronic[ ] Systolic[ ] Diastolic[ ] Combined Systolic and Diastolic[ ]  CAD[ ] CABG[ ] PCI[ ]  DEVICES[ ] PPM[ ] ICD[ ] ILR[ ]  ATRIAL FIBRILLATION[ ] Paroxysmal[ ] Permanent[ ] CHADS2-[  ]  MOSES[ ] CKD1[ ] CKD2[ ] CKD3[ ] CKD4[ ] ESRD[ ]  COPD[ ] HTN[ ]   DM[ ] Type1[ ] Type 2[ ]   CVA[ ] Paresis[ ]    AMBULATION: Assisted[ ] Cane/walker[ ] Independent[ ]    MEDICATIONS  (STANDING):  amLODIPine   Tablet 10 milliGRAM(s) Oral daily  chlorhexidine 2% Cloths 1 Application(s) Topical daily  dimethicone/zinc oxide Topical Cream (KARINA PROTECT) - Peds 1 Application(s) Topical two times a day  enoxaparin Injectable 40 milliGRAM(s) SubCutaneous every 24 hours  multivitamin 1 Tablet(s) Oral daily  mupirocin 2% Ointment 1 Application(s) Topical three times a day  nystatin    Suspension 321239 Unit(s) Oral four times a day  pantoprazole   Suspension 40 milliGRAM(s) Oral daily  piperacillin/tazobactam IVPB.. 3.375 Gram(s) IV Intermittent every 8 hours  potassium phosphate IVPB 30 milliMole(s) IV Intermittent once    MEDICATIONS  (PRN):  loperamide Liquid 2 milliGRAM(s) Oral every 4 hours PRN Diarrhea  oxyCODONE    IR 5 milliGRAM(s) Oral every 4 hours PRN Moderate Pain (4 - 6)  polyethylene glycol 3350 17 Gram(s) Oral daily PRN Constipation  senna 2 Tablet(s) Oral at bedtime PRN Constipation            REVIEW OF SYSTEMS:  Constitutional: [ ] fever, [ ]weight loss,  [ ]fatigue [ ]weight gain  Eyes: [ ] visual changes  Respiratory: [ ]shortness of breath;  [ ] cough, [ ]wheezing, [ ]chills, [ ]hemoptysis  Cardiovascular: [ ] chest pain, [ ]palpitations, [ ]dizziness,  [ ]leg swelling[ ]orthopnea[ ]PND  Gastrointestinal: [ ] abdominal pain, [ ]nausea, [ ]vomiting,  [ ]diarrhea [ ]Constipation [ ]Melena  Genitourinary: [ ] dysuria, [ ] hematuria [ ]Celaya  Neurologic: [ ] headaches [ ] tremors[ ]weakness [ ]Paralysis Right[ ] Left[ ]  Skin: [ ] itching, [ ]burning, [ ] rashes  Endocrine: [ ] heat or cold intolerance  Musculoskeletal: [ ] joint pain or swelling; [ ] muscle, back, or extremity pain  Psychiatric: [ ] depression, [ ]anxiety, [ ]mood swings, or [ ]difficulty sleeping  Hematologic: [ ] easy bruising, [ ] bleeding gums    [ ] All remaining systems negative except as per above.   [ ]Unable to obtain.  [x] No change in ROS since admission      Vital Signs Last 24 Hrs  T(C): 36.8 (23 Aug 2022 13:49), Max: 36.8 (22 Aug 2022 21:32)  T(F): 98.2 (23 Aug 2022 13:49), Max: 98.3 (23 Aug 2022 06:00)  HR: 72 (23 Aug 2022 13:49) (72 - 74)  BP: 133/77 (23 Aug 2022 13:49) (133/77 - 144/77)  BP(mean): --  RR: 18 (23 Aug 2022 06:00) (18 - 18)  SpO2: 100% (23 Aug 2022 13:49) (100% - 100%)    Parameters below as of 23 Aug 2022 13:49  Patient On (Oxygen Delivery Method): room air      I&O's Summary      PHYSICAL EXAM:  General: No acute distress BMI-  HEENT: EOMI, PERRL  Neck: Supple, [ ] JVD  Lungs: Equal air entry bilaterally; [ ] rales [ ] wheezing [ ] rhonchi  Heart: Regular rate and rhythm; [x ] murmur   2/6 [ x] systolic [ ] diastolic [ ] radiation[ ] rubs [ ]  gallops  Abdomen: Nontender, bowel sounds present  Extremities: No clubbing, cyanosis, [ ] edema [ ]Pulses  equal and intact  Nervous system:  Alert & Oriented X3, no focal deficits  Psychiatric: Normal affect  Skin: No rashes or lesions    LABS:  08-23    135  |  100  |  18  ----------------------------<  159<H>  4.0   |  24  |  0.71    Ca    9.7      23 Aug 2022 19:00  Phos  1.9     08-23  Mg     1.80     08-23    TPro  5.7<L>  /  Alb  3.2<L>  /  TBili  0.6  /  DBili  x   /  AST  19  /  ALT  11  /  AlkPhos  164<H>  08-23    Creatinine Trend: 0.71<--, 0.80<--, 0.73<--, 0.74<--, 0.73<--, 0.94<--                        9.8    10.96 )-----------( 266      ( 23 Aug 2022 19:00 )             31.5

## 2022-08-29 NOTE — PROGRESS NOTE ADULT - NUTRITIONAL ASSESSMENT
This patient has been assessed with a concern for Malnutrition and has been determined to have a diagnosis/diagnoses of Severe protein-calorie malnutrition.    This patient is being managed with:   Diet NPO with Tube Feed-  Tube Feeding Modality: Jejunostomy  Peptamen 1.5 (PEPTAMEN1.5RTH)  Total Volume for 24 Hours (mL): 1360  Intermittent  Starting Tube Feed Rate {mL per Hour}: 40  Increase Tube Feed Rate by (mL): 10    Every 4 hours  Until Goal Tube Feed Rate (mL per Hour): 80  Tube Feeding Hours ON: 17  Tube Feeding OFF (Hours): 7  Tube Feed Start Time: 14:00  Entered: Aug 21 2022  9:52AM    
This patient has been assessed with a concern for Malnutrition and has been determined to have a diagnosis/diagnoses of Severe protein-calorie malnutrition.    This patient is being managed with:   Diet Clear Liquid-  Tube Feeding Modality: Jejunostomy  Peptamen 1.5 (PEPTAMEN1.5RTH)  Total Volume for 24 Hours (mL): 1200  Continuous  Starting Tube Feed Rate {mL per Hour}: 50  Until Goal Tube Feed Rate (mL per Hour): 50  Tube Feed Duration (in Hours): 24  Tube Feed Start Time: 16:55  Entered: Aug 25 2022  4:51PM    
This patient has been assessed with a concern for Malnutrition and has been determined to have a diagnosis/diagnoses of Severe protein-calorie malnutrition.    This patient is being managed with:   Diet NPO with Tube Feed-  Tube Feeding Modality: Jejunostomy  Peptamen 1.5 (PEPTAMEN1.5RTH)  Total Volume for 24 Hours (mL): 1360  Intermittent  Starting Tube Feed Rate {mL per Hour}: 40  Increase Tube Feed Rate by (mL): 10    Every 4 hours  Until Goal Tube Feed Rate (mL per Hour): 80  Tube Feeding Hours ON: 17  Tube Feeding OFF (Hours): 7  Tube Feed Start Time: 14:00  Entered: Aug 19 2022  1:48PM    
This patient has been assessed with a concern for Malnutrition and has been determined to have a diagnosis/diagnoses of Severe protein-calorie malnutrition.    This patient is being managed with:   Diet NPO with Tube Feed-  Tube Feeding Modality: Jejunostomy  Peptamen 1.5 (PEPTAMEN1.5RTH)  Total Volume for 24 Hours (mL): 1360  Intermittent  Starting Tube Feed Rate {mL per Hour}: 40  Increase Tube Feed Rate by (mL): 10    Every 4 hours  Until Goal Tube Feed Rate (mL per Hour): 80  Tube Feeding Hours ON: 17  Tube Feeding OFF (Hours): 7  Tube Feed Start Time: 14:00  Entered: Aug 21 2022  9:52AM    
This patient has been assessed with a concern for Malnutrition and has been determined to have a diagnosis/diagnoses of Severe protein-calorie malnutrition.    This patient is being managed with:   Diet Clear Liquid-  Tube Feeding Modality: Jejunostomy  Peptamen 1.5 (PEPTAMEN1.5RTH)  Total Volume for 24 Hours (mL): 1360  Intermittent   Starting Tube Feed Rate {mL per Hour}: 40  Increase Tube Feed Rate by (mL): 10    Every 4 hours  Until Goal Tube Feed Rate (mL per Hour): 80  Tube Feeding Hours ON: 17  Tube Feeding OFF (Hours): 7  Tube Feed Start Time: 14:00  Entered: Aug 23 2022 10:49AM    
This patient has been assessed with a concern for Malnutrition and has been determined to have a diagnosis/diagnoses of Severe protein-calorie malnutrition.    This patient is being managed with:   Diet Clear Liquid-  Tube Feeding Modality: Jejunostomy  Peptamen 1.5 (PEPTAMEN1.5RTH)  Total Volume for 24 Hours (mL): 1360  Intermittent   Starting Tube Feed Rate {mL per Hour}: 40  Increase Tube Feed Rate by (mL): 10    Every 4 hours  Until Goal Tube Feed Rate (mL per Hour): 80  Tube Feeding Hours ON: 17  Tube Feeding OFF (Hours): 7  Tube Feed Start Time: 14:00  Entered: Aug 23 2022 10:49AM    
This patient has been assessed with a concern for Malnutrition and has been determined to have a diagnosis/diagnoses of Severe protein-calorie malnutrition.    This patient is being managed with:   Diet NPO with Tube Feed-  Tube Feeding Modality: Jejunostomy  Peptamen 1.5 (PEPTAMEN1.5RTH)  Total Volume for 24 Hours (mL): 1360  Intermittent  Starting Tube Feed Rate {mL per Hour}: 40  Increase Tube Feed Rate by (mL): 10    Every 4 hours  Until Goal Tube Feed Rate (mL per Hour): 80  Tube Feeding Hours ON: 17  Tube Feeding OFF (Hours): 7  Tube Feed Start Time: 14:00  Entered: Aug 19 2022  1:48PM

## 2022-08-29 NOTE — RAPID RESPONSE TEAM SUMMARY - [id number]_GEN_ALL_CORE
RECOMMENDATIONS to primary team:  []Comfort measures order placed in EMR  [] RECOMMENDATIONS to primary team:  []Comfort measures order placed in EMR  []For symptom directed management can consider:  For secretions: IV robinul 0.4mg q6h PRN  For anxiety IV ativan 0.25mg q6h PRN  For nausea/vomiting: IV zofran 4mg q8h PRN  For pain/dyspnea: IV dilaudid 0.2 mg q2h PRN (hold for sedation, decreased RR)

## 2022-08-29 NOTE — PROGRESS NOTE ADULT - TIME BILLING
- Review of records, telemetry, vital signs and daily labs.   - General and cardiovascular physical examination.  - Generation of cardiovascular treatment plan.  - Coordination of care.      Patient was seen and examined by me on 08/29/2022,interim events noted,labs and radiology studies reviewed.  Trevor De La Garza MD,FACC.  [address] [place], [place] [id number].  [phone]
- Review of records, telemetry, vital signs and daily labs.   - General and cardiovascular physical examination.  - Generation of cardiovascular treatment plan.  - Coordination of care.      Patient was seen and examined by me on 8/26/22,interim events noted,labs and radiology studies reviewed.  Trevor De La Garza MD,FACC.  [address] [place], [place] [id number].  [phone]
- Review of records, telemetry, vital signs and daily labs.   - General and cardiovascular physical examination.  - Generation of cardiovascular treatment plan.  - Coordination of care.      Patient was seen and examined by me on 08/27/2022,interim events noted,labs and radiology studies reviewed.  Trevor De La Garza MD,FACC.  [address] [place], [place] [id number].  [phone]
- Review of records, telemetry, vital signs and daily labs.   - General and cardiovascular physical examination.  - Generation of cardiovascular treatment plan.  - Coordination of care.      Patient was seen and examined by me on 08/28/2022,interim events noted,labs and radiology studies reviewed.  Trevor De La Garza MD,FACC.  [address] [place], [place] [id number].  [phone]
- Review of records, telemetry, vital signs and daily labs.   - General and cardiovascular physical examination.  - Generation of cardiovascular treatment plan.  - Coordination of care.      Patient was seen and examined by me on 8/23/22,interim events noted,labs and radiology studies reviewed.  Trevor De La Garza MD,FACC.  [address] [place], [place] [id number].  [phone]
- Review of records, telemetry, vital signs and daily labs.   - General and cardiovascular physical examination.  - Generation of cardiovascular treatment plan.  - Coordination of care.      Patient was seen and examined by me on 8/21/22,interim events noted,labs and radiology studies reviewed.  Trevor De La Garza MD,FACC.  [address] [place], [place] [id number].  [phone]
- Review of records, telemetry, vital signs and daily labs.   - General and cardiovascular physical examination.  - Generation of cardiovascular treatment plan.  - Coordination of care.      Patient was seen and examined by me on 8/22/22,interim events noted,labs and radiology studies reviewed.  Trevor De La Garza MD,FACC.  [address] [place], [place] [id number].  [phone]
- Review of records, telemetry, vital signs and daily labs.   - General and cardiovascular physical examination.  - Generation of cardiovascular treatment plan.  - Coordination of care.      Patient was seen and examined by me on 8/19/22,interim events noted,labs and radiology studies reviewed.  Trevor De La Garza MD,FACC.  [address] [place], [place] [id number].  [phone]
- Review of records, telemetry, vital signs and daily labs.   - General and cardiovascular physical examination.  - Generation of cardiovascular treatment plan.  - Coordination of care.      Patient was seen and examined by me on 8/25/22,interim events noted,labs and radiology studies reviewed.  Trevor De La Garza MD,FACC.  [address] [place], [place] [id number].  [phone]
- Review of records, telemetry, vital signs and daily labs.   - General and cardiovascular physical examination.  - Generation of cardiovascular treatment plan.  - Coordination of care.      Patient was seen and examined by me on 8/24/22,interim events noted,labs and radiology studies reviewed.  Trevor De La Garza MD,FACC.  [address] [place], [place] [id number].  [phone]
- Review of records, telemetry, vital signs and daily labs.   - General and cardiovascular physical examination.  - Generation of cardiovascular treatment plan.  - Coordination of care.      Patient was seen and examined by me on 8/20/22,interim events noted,labs and radiology studies reviewed.  Trevor De La Garza MD,FACC.  [address] [place], [place] [id number].  [phone]

## 2022-08-29 NOTE — PROGRESS NOTE ADULT - ASSESSMENT
THOMAS DUKE is a 77y Male who presents with a chief complaint of fall.    Metastatic Anal Cancer, Pancreatic Cance  - Patient follows with Dr. Shantanu Hahn, [facility].  - Performance status has remained very poor.   - He has been receiving radiation therapy, but has been having progressive weakness, fatigue.   - Appreciate palliative care team discussions  - Patient and family members now agreeable to hospice. Declining further radiation treatments or other systemic treatments.     Dislodged J-Tube  - IR replaced 08/18  - Re-evaluation by IR for possible leakage     Weakness, Fever  - In the setting of recent radiation treatment.   - Completed course of Zosyn      Will continue to follow.    Janis Black PA-C  Hematology/Oncology  [facility]  [phone] (office)  [phone] (alt office)  Evenings and weekends please call MD on call or office

## 2022-08-29 NOTE — CHART NOTE - NSCHARTNOTEFT_GEN_A_CORE
RRT called for hypoxia. Patient DNR/DNI. Sister at bedside. Patient on NRB saturating in the 70's.  Patient also hypotensive to 60s. MAR d/w niece Yasmin Mo who made patient comfort care and is aware that patient is actively dying. Rest of family to come to bedside. Will monitor patient closely. Dr. De La Garza notified.

## 2022-08-29 NOTE — PROGRESS NOTE ADULT - REASON FOR ADMISSION
sepsis

## 2022-08-30 NOTE — DISCHARGE NOTE FOR THE EXPIRED PATIENT - HOSPITAL COURSE
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    Sepsis  - patient met sepsis criteria on admission w/ fever and tachycardia; mild leukocytosis to 10.52 but w/ bandemia  - suspect pulmonary source, likely RUL PNA, possibly aspiration after fall  - CT C/A/P: right upper lobe tree-in-bud nodularity, likely infectious vs. inflammatory bronchiolitis  - UCx: negative  - BCx - NGTD   - procal 0.28  - ID consulted: s/p  empiric zosyn for RUL PNA through 8/23  - c/w nystatin for thrush x 10 days, started 8/19    Dislodged jejunostomy tube  - s/p replacement by IR; diet resumed    Pancreatic adenocarcinoma  - patient w/ metastatic anal cancer seemingly in remission and new pancreatic cancer  - CT A/P w/ known unresectable biopsy-proven pancreatic adenocarcinoma; demonstrates new intrahepatic and extrahepatic biliary ductal dilatation  - follows w/ Dr. Shantanu Hahn of [facility]  - currently on radiation therapy per heme/onc, to be held while inpatient  - heme/onc following    Fall  - s/p fall at home; suspect i/s/o infection vs. mechanical fall; low c/f cardiac etiology at this time; elevated trop noted, suspect i/s/o demand ischemia; EKG w/o ischemic changes  - CT head/C-spine: negative for fractures/hemorrhage  - PT: rehab    Electrolyte abnormality  - patient w/ hypokalemia and metabolic alkalosis; suspect i/s/o GI losses  - monitor BMPs    HTN (hypertension)  - c/w home amlodipine      8/30: Notified by family at bedside that patient without respirations. Patient DNR/DNI/comfort care. Patient examined at bedside. Patient unresponsive to verbal/noxious stimuli.  Pupils fixed and dilated. No spontaneous breathing. No palpable carotid/radial pulse. No heart sounds. No breath sounds.  Time of Death: 12:11am on 08/30/2022  Family at bedside including wife and sister. Niece Yasmin Mo called and notified as she is current decision maker.    Attending will be notified in am. 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    Sepsis  - patient met sepsis criteria on admission w/ fever and tachycardia; mild leukocytosis to 10.52 but w/ bandemia  - suspect pulmonary source, likely RUL PNA, possibly aspiration after fall  - CT C/A/P: right upper lobe tree-in-bud nodularity, likely infectious vs. inflammatory bronchiolitis  - UCx: negative  - BCx - NGTD   - procal 0.28  - ID consulted: s/p  empiric zosyn for RUL PNA through 8/23  - c/w nystatin for thrush x 10 days, started 8/19    Dislodged jejunostomy tube  - s/p replacement by IR; diet resumed    Pancreatic adenocarcinoma  - patient w/ metastatic anal cancer seemingly in remission and new pancreatic cancer  - CT A/P w/ known unresectable biopsy-proven pancreatic adenocarcinoma; demonstrates new intrahepatic and extrahepatic biliary ductal dilatation  - follows w/ Dr. Shantanu Hahn of [facility]  - currently on radiation therapy per heme/onc, to be held while inpatient  - heme/onc following    Fall  - s/p fall at home; suspect i/s/o infection vs. mechanical fall; low c/f cardiac etiology at this time; elevated trop noted, suspect i/s/o demand ischemia; EKG w/o ischemic changes  - CT head/C-spine: negative for fractures/hemorrhage  - PT: rehab    Electrolyte abnormality  - patient w/ hypokalemia and metabolic alkalosis; suspect i/s/o GI losses  - monitor BMPs    HTN (hypertension)  - c/w home amlodipine      8/30: Notified by family at bedside that patient without respirations. Patient DNR/DNI/comfort care. Patient examined at bedside. Patient unresponsive to verbal/noxious stimuli.  Pupils fixed and dilated. No spontaneous breathing. No palpable carotid/radial pulse. No heart sounds. No breath sounds.  Time of Death: 12:11am on 08/30/2022  Family at bedside including wife and sister. Niece Yasmin Mo called and notified as she is current decision maker.    Attending will be notified in am.    Discussed case with [facility] as patient a/w fall. Spoke with Investigator Vinita. Case not accepted by medical examiners office.

## 2022-11-01 NOTE — PHYSICAL THERAPY INITIAL EVALUATION ADULT - FOLLOWS COMMANDS/ANSWERS QUESTIONS, REHAB EVAL
Detail Level: Detailed
Add 78106 Cpt? (Important Note: In 2017 The Use Of 35706 Is Being Tracked By Cms To Determine Future Global Period Reimbursement For Global Periods): yes
100% of the time

## 2023-01-04 NOTE — ED ADULT NURSE NOTE - COVID-19 RESULT DATE/TIME
Pt stated that previous ortho surgeons have recommended reverse shoulder replacement but deferred surgery due to pt's young age and comorbities. She stated that she is no longer diabetic and has lost over 100 lbs. Pt stated that she has had extensive treatment/testing to rule out neck involvement. I explained that I was unsure if any of our surgeons performed this type of Sx, but that I can schedule a surgical consult. Pt agreed and stated that she will obtain all outside records and imaging. She will call to r/s if she is unable to get her records. She also has a Hx of RCR in 2010 in [place], but has been unsuccessful in getting the op report.     ----- Message from Kisha Ibarra sent at 1/4/2023  9:52 AM CST -----  Contact: Namita Breaux is needing a call back to see if there is an orthopedic doctor that would be willing to do a shoulder replacement with her age. She states she has had testing done, but her current provider is refusing to do the procedure. Please call her back at [phone]    
27-May-2022 17:38

## 2023-03-15 NOTE — PATIENT PROFILE ADULT - NSTRANSFERBELONGINGSDISPO_GEN_A_NUR
with patient Lot # (Optional): [id number] Performed By: SANTINO Pacheco MA Expiration Date (Optional): 3/31/2024 Detail Level: None Urine Pregnancy Test Result: negative

## 2023-04-06 NOTE — PHYSICAL THERAPY INITIAL EVALUATION ADULT - IMPAIRED TRANSFERS: SIT/STAND, REHAB EVAL
Dr. Rene Bwoles made multiple attempts to contact patient this morning and was unable to leave a voicemail. impaired balance/decreased strength

## 2023-07-25 NOTE — DIETITIAN INITIAL EVALUATION ADULT - PERTINENT LABORATORY DATA
05-11    133<L>  |  99  |  20  ----------------------------<  78  4.0   |  25  |  1.13    Ca    9.6      11 May 2022 05:15  Phos  3.0     05-11  Mg     1.60     05-11    TPro  5.5<L>  /  Alb  3.7  /  TBili  0.4  /  DBili  x   /  AST  17  /  ALT  22  /  AlkPhos  67  05-10   Rinvoq Counseling: I discussed with the patient the risks of Rinvoq therapy including but not limited to upper respiratory tract infections, shingles, cold sores, bronchitis, nausea, cough, fever, acne, and headache. Live vaccines should be avoided.  This medication has been linked to serious infections; higher rate of mortality; malignancy and lymphoproliferative disorders; major adverse cardiovascular events; thrombosis; thrombocytopenia, anemia, and neutropenia; lipid elevations; liver enzyme elevations; and gastrointestinal perforations.

## 2023-08-28 NOTE — DISCHARGE NOTE NURSING/CASE MANAGEMENT/SOCIAL WORK - DATE OF FIRST COVID-19 BOOSTER
[FreeTextEntry1] :  This 57-year-old woman initially presented with a left breast mass in September 2019. 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 Neoadjuvant dose dense Adriamycin/Cytoxan was followed by weekly Taxol, completed in February 2020. Left lumpectomy on March 30, 2020 showed complete pathologic response. On June 24, 2020 nipple sparing bilateral mastectomy was performed.  She remains free of recurrent disease. Plastic surgery is following her HARSHIL flap reconstruction sites which have recently been revised  .
23-Mar-2022
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
## 2023-10-02 NOTE — ED ADULT NURSE NOTE - HIV OFFER
Previously Declined (within the last year) 89 yo M w CAD s/p stents, HTN, recently admitted to [facility] 9/18-9/22 for AHRF 2/2 Covid19 infection, discharged to [facility], now sent in after becoming agitated and then reportedly stating suicidal ideation to rehab staff, found to have THOMAS

## 2023-11-24 NOTE — ED ADULT NURSE NOTE - SKIN INTEGRITY
Sudden numbness or weakness of the face, arm, or leg, especially on one side of the body. Confusion, trouble speaking or understanding. Trouble seeing in one or both eyes. Trouble walking, dizziness, loss of balance or coordination. Severe headache. intact

## 2024-03-26 NOTE — PACU DISCHARGE NOTE - NS MD DISCHARGE NOTE DISCHARGE
Floor
Floor

## 2024-05-02 NOTE — PATIENT PROFILE ADULT - HEALTH LITERACY
I, Cha Montenegro DO,  performed the initial face to face bedside interview with this patient regarding history of present illness, review of symptoms and relevant past medical, social and family history.  I completed an independent physical examination.  I was the initial provider who evaluated this patient.   I personally saw the patient and performed a substantive portion of the visit including all aspects of the medical decision making.  I have signed out the follow up of any pending tests (i.e. labs, radiological studies) to the TATE.  I have communicated the patient’s plan of care and disposition with the TATE.  The history, relevant review of systems, past medical and surgical history, medical decision making, and physical examination was documented by the scribe in my presence and I attest to the accuracy of the documentation.
no

## 2024-08-26 NOTE — ED ADULT NURSE NOTE - CHPI ED SYMPTOMS NEG
no stiffness/no deformity/no abrasion/no fever details… normal affect/alert and oriented x3/normal behavior

## 2024-09-13 NOTE — ED ADULT NURSE NOTE - HAVE YOU HAD A FIRST COVID-19 BOOSTER?
"Consults    Reason For Consult  Left foot ulcer    History Of Present Illness  Elliot Partida is a 74 y.o. male presenting with PMH of R frontal extradural mass since 2016, arthritis, CAD, diabetes type 2, MI, HTN, thoracic radiculopathy, neuropathy who presented to ED yesterday with left leg pain that has been ongoing for \"months\". His leg is edematous, erythematous, weeping with a large necrotic ulcer under his 5th toe on the plantar surface. The skin on his leg is very dry and flaking and the odor is foul. His right leg has dry skin as well and is slightly edematous and pink. He has not seen a provider or taken any medications for a couple years. He states that he only walks when he has to go to the  bathroom with a walker, otherwise is chair/bed ridden. He states he still has good feeling in his lower extremities. He was found to have DVT which is being treated with heparin. He is afebrile.      Past Medical History  He has a past medical history of Arthritis, CAD (coronary artery disease), Diabetes mellitus (Multi), Hypertension, Peripheral neuropathy, Stroke (Multi), and Thoracic radiculopathy.    Surgical History  He has a past surgical history that includes Carpal tunnel release (10/10/2014); Eye surgery; FL arthrocentesis ASP INJ joint.; Cardiac catheterization; and Iridotomy / iridectomy (Bilateral).     Social History  He reports that he has never smoked. He has never been exposed to tobacco smoke. He has never used smokeless tobacco. No history on file for alcohol use and drug use.    Family History  Family History   Problem Relation Name Age of Onset    Heart disease Father      Colon cancer Other      Heart attack Other          Allergies  Patient has no known allergies.    Review of Systems   Constitutional:  Negative for chills and fever.   Respiratory:  Negative for shortness of breath.    Cardiovascular:  Negative for chest pain.   Musculoskeletal:  Positive for arthralgias and myalgias.   Skin:  " "Positive for rash and wound.         Objective:     Physical Exam    Vasc: DP and PT pulses are palpable bilateral.  CFT is less than 3 seconds bilateral.  Skin temperature is warm to cool proximal to distal bilateral.      Neuro:  Tenderness to palpation of left foot and calf throughout, extremel;y tender over plantar surface of foot. Light touch is intact to the foot bilateral.  There is no clonus noted.  The hallux is downgoing bilateral.      Derm: Nails 1-5 bilateral are intact.  Skin is dry, flakey to the bilateral calves. Left calf and shin is erythematous with weeping. Strong odor present. Necrotic ulcer plantar surface 5th MTP joint.     Ortho:  Ankle joint, subtalar joint, 1st MPJ and lesser MPJ ROM is limited and painful.            Last Recorded Vitals  Blood pressure 137/66, pulse 75, temperature 37.4 °C (99.4 °F), temperature source Temporal, resp. rate 17, height 1.753 m (5' 9\"), weight 107 kg (236 lb 1.8 oz), SpO2 94%.    Relevant Results  Results for orders placed or performed during the hospital encounter of 09/12/24 (from the past 24 hour(s))   CBC and Auto Differential   Result Value Ref Range    WBC 10.6 4.4 - 11.3 x10*3/uL    nRBC 0.0 0.0 - 0.0 /100 WBCs    RBC 4.28 (L) 4.50 - 5.90 x10*6/uL    Hemoglobin 12.2 (L) 13.5 - 17.5 g/dL    Hematocrit 36.9 (L) 41.0 - 52.0 %    MCV 86 80 - 100 fL    MCH 28.5 26.0 - 34.0 pg    MCHC 33.1 32.0 - 36.0 g/dL    RDW 12.6 11.5 - 14.5 %    Platelets 231 150 - 450 x10*3/uL    Neutrophils % 69.4 40.0 - 80.0 %    Immature Granulocytes %, Automated 0.8 0.0 - 0.9 %    Lymphocytes % 16.4 13.0 - 44.0 %    Monocytes % 11.9 2.0 - 10.0 %    Eosinophils % 1.1 0.0 - 6.0 %    Basophils % 0.4 0.0 - 2.0 %    Neutrophils Absolute 7.35 (H) 1.60 - 5.50 x10*3/uL    Immature Granulocytes Absolute, Automated 0.09 0.00 - 0.50 x10*3/uL    Lymphocytes Absolute 1.74 0.80 - 3.00 x10*3/uL    Monocytes Absolute 1.26 (H) 0.05 - 0.80 x10*3/uL    Eosinophils Absolute 0.12 0.00 - 0.40 x10*3/uL "    Basophils Absolute 0.04 0.00 - 0.10 x10*3/uL   Lactate   Result Value Ref Range    Lactate 1.4 0.4 - 2.0 mmol/L   Blood Culture    Specimen: Peripheral Venipuncture; Blood culture   Result Value Ref Range    Blood Culture Loaded on Instrument - Culture in progress    B-type natriuretic peptide   Result Value Ref Range    BNP 25 0 - 99 pg/mL   Blood Culture    Specimen: Peripheral Venipuncture; Blood culture   Result Value Ref Range    Blood Culture Loaded on Instrument - Culture in progress    Urinalysis with Reflex Culture and Microscopic   Result Value Ref Range    Color, Urine Yellow Light-Yellow, Yellow, Dark-Yellow    Appearance, Urine Clear Clear    Specific Gravity, Urine 1.025 1.005 - 1.035    pH, Urine 6.5 5.0, 5.5, 6.0, 6.5, 7.0, 7.5, 8.0    Protein, Urine NEGATIVE NEGATIVE, 10 (TRACE), 20 (TRACE) mg/dL    Glucose, Urine OVER (4+) (A) Normal mg/dL    Blood, Urine NEGATIVE NEGATIVE    Ketones, Urine NEGATIVE NEGATIVE mg/dL    Bilirubin, Urine NEGATIVE NEGATIVE    Urobilinogen, Urine 2 (1+) (A) Normal mg/dL    Nitrite, Urine NEGATIVE NEGATIVE    Leukocyte Esterase, Urine NEGATIVE NEGATIVE   Extra Urine Gray Tube   Result Value Ref Range    Extra Tube Hold for add-ons.    Comprehensive Metabolic Panel   Result Value Ref Range    Glucose 310 (H) 74 - 99 mg/dL    Sodium 129 (L) 136 - 145 mmol/L    Potassium 4.6 3.5 - 5.3 mmol/L    Chloride 92 (L) 98 - 107 mmol/L    Bicarbonate 25 21 - 32 mmol/L    Anion Gap 17 10 - 20 mmol/L    Urea Nitrogen 22 6 - 23 mg/dL    Creatinine 0.81 0.50 - 1.30 mg/dL    eGFR >90 >60 mL/min/1.73m*2    Calcium 8.9 8.6 - 10.3 mg/dL    Albumin 3.3 (L) 3.4 - 5.0 g/dL    Alkaline Phosphatase 127 33 - 136 U/L    Total Protein 7.6 6.4 - 8.2 g/dL    AST 26 9 - 39 U/L    Bilirubin, Total 0.6 0.0 - 1.2 mg/dL    ALT 18 10 - 52 U/L   APTT   Result Value Ref Range    aPTT 27 27 - 38 seconds   Heparin Assay, UFH   Result Value Ref Range    Heparin Unfractionated 0.8 See Comment Below for  Therapeutic Ranges IU/mL   Basic metabolic panel   Result Value Ref Range    Glucose 200 (H) 74 - 99 mg/dL    Sodium 130 (L) 136 - 145 mmol/L    Potassium 3.8 3.5 - 5.3 mmol/L    Chloride 96 (L) 98 - 107 mmol/L    Bicarbonate 26 21 - 32 mmol/L    Anion Gap 12 10 - 20 mmol/L    Urea Nitrogen 21 6 - 23 mg/dL    Creatinine 0.77 0.50 - 1.30 mg/dL    eGFR >90 >60 mL/min/1.73m*2    Calcium 8.3 (L) 8.6 - 10.3 mg/dL   Heparin Assay, UFH   Result Value Ref Range    Heparin Unfractionated 0.7 See Comment Below for Therapeutic Ranges IU/mL   SST TOP   Result Value Ref Range    Extra Tube Hold for add-ons.    CBC   Result Value Ref Range    WBC 11.6 (H) 4.4 - 11.3 x10*3/uL    nRBC 0.0 0.0 - 0.0 /100 WBCs    RBC 3.90 (L) 4.50 - 5.90 x10*6/uL    Hemoglobin 11.0 (L) 13.5 - 17.5 g/dL    Hematocrit 33.2 (L) 41.0 - 52.0 %    MCV 85 80 - 100 fL    MCH 28.2 26.0 - 34.0 pg    MCHC 33.1 32.0 - 36.0 g/dL    RDW 12.3 11.5 - 14.5 %    Platelets 235 150 - 450 x10*3/uL   Heparin Assay, UFH   Result Value Ref Range    Heparin Unfractionated 0.5 See Comment Below for Therapeutic Ranges IU/mL   Lavender Top   Result Value Ref Range    Extra Tube Hold for add-ons.    SST TOP   Result Value Ref Range    Extra Tube Hold for add-ons.    PST Top   Result Value Ref Range    Extra Tube Hold for add-ons.    Sedimentation rate, automated   Result Value Ref Range    Sedimentation Rate 24 (H) 0 - 20 mm/h   C-reactive protein   Result Value Ref Range    C-Reactive Protein 9.51 (H) <1.00 mg/dL   POCT GLUCOSE   Result Value Ref Range    POCT Glucose 198 (H) 74 - 99 mg/dL   POCT GLUCOSE   Result Value Ref Range    POCT Glucose 189 (H) 74 - 99 mg/dL        Lower extremity venous duplex left    Result Date: 9/12/2024  STUDY: Left Lower Extremity Venous Doppler Ultrasound; 9/12/2024 4:43 PM INDICATION: Swelling. COMPARISON: US LE Venous 3/21/2022. ACCESSION NUMBER(S): [id number] ORDERING CLINICIAN: KATIE HILTON TECHNIQUE:  Real-time grayscale imaging, color  Doppler flow imaging, and spectral Doppler imaging of the left lower extremity veins was performed. FINDINGS: There is acute occlusive DVT demonstrated within the left mid-distal femoral and popliteal veins. The left common femoral and profunda femoral veins demonstrated normal compressibility, normal phasic venous flow and normal response to augmentation.  There is no evidence for echogenic thrombi.  The deep calf veins are not well visualized due to subcutaneous edema. The contralateral common femoral vein is free of thrombosis.    Evidence for acute occlusive DVT within the left mid-distal femoral and popliteal veins. Compared to prior ultrasound, this represents a new finding. The positive findings on this exam were discussed with and acknowledged by Dr. Katie Hilton abdomen interpretation, 5:00 PM September 12, 2024 Signed by Jason Tobar MD    XR foot left 3+ views    Result Date: 9/12/2024  STUDY: Foot Radiographs; 9/12/2024 4:19 PM INDICATION: Left foot pain. COMPARISON: None Available. ACCESSION NUMBER(S): [id number] ORDERING CLINICIAN: KATIE HILTON TECHNIQUE:  Three view(s) of the left foot (four images). FINDINGS:  There is no displaced fracture.  The alignment is anatomic.  Soft tissue swelling and ulceration noted lateral to the fifth MTP joint associated with demineralization of the fifth proximal phalanx and the distal head of the fifth metatarsal.  Bony changes consistent with osteomyelitis.    Osteomyelitis of the fifth metatarsal head and proximal phalanx of the fifth toe. Signed by Td Francisco MD        Assessment/Plan   Left foot necrotic ulcer in setting of lower extremity cellulitis  Osteomyelitis  DM2    75 y/o M presents with pain and swelling to lower extremities for over a month, progressively worsening. On exam he has weeping, erythema and tenderness as well as necrotic ulcer on plantar surface of left foot. Elevated WBC, CRP, ESR. He was started on IV abx.  He was also have to  have DVT in this left leg and started on heparin. Xray shows osteomyelitis 5th MTP joint which is where the ulcer lies. He appears to be overall in poor health and neglecting his health for some time. He would not be ideal surgical candidate especially in this acute state, however with osteomyelitis of the 5th metatarsal this will need to be addressed. Will need infection to calm down first.     -Vascular BEN lower extremity  -CT angiogram lower extremities  -MRI per ID request  -Will need to consult vascular to determine if appropriate to undergo surgery at this facility or will need transfer  -IV abx per ID recs  -4x4 gauze wet to dry dressings with Dakins, change daily  -May need TMA      I spent 30 minutes in the professional and overall care of this patient.  Case was discussed with Dr. Torres in detail.            Yes

## 2024-10-11 NOTE — ED ADULT TRIAGE NOTE - IDEAL BODY WEIGHT(KG)
Medication:     losartan-hydrochlorothiazide (HYZAAR) 100-25 MG per tablet    passed protocol.   Last office visit date: 10/30/2023  Next appointment scheduled?: Yes 11/04/2024  Number of refills given: 1    64

## 2024-10-21 NOTE — PATIENT PROFILE ADULT - PUBLIC BENEFITS
Pt is completely obtunded, cannot follow directions, cannot answer questions. MD aware. New IV now infiltrated. MD aware, to do Central line at bedside.    no

## 2024-11-26 NOTE — ED ADULT NURSE NOTE - CCCP TRG CHIEF CMPLNT
What Type Of Note Output Would You Prefer (Optional)?: Bullet Format Is This A New Presentation, Or A Follow-Up?: Rash fall

## 2025-02-11 NOTE — PROGRESS NOTE ADULT - PROBLEM SELECTOR PROBLEM 5
Need for prophylactic measure
Detail Level: Detailed
Quality 47: Advance Care Plan: Advance Care Planning discussed and documented; advance care plan or surrogate decision maker documented in the medical record.
Quality 226: Preventive Care And Screening: Tobacco Use: Screening And Cessation Intervention: Patient screened for tobacco use and is an ex/non-smoker
Need for prophylactic measure

## 2025-02-19 NOTE — PATIENT PROFILE ADULT - NSPROPOAPRESSUREINJURY_GEN_A_NUR
PROCEDURES:  Open repair of inguinal hernia using mesh in adult 19-Feb-2025 08:21:23  Chris Mitchell   no

## 2025-03-11 NOTE — ED ADULT NURSE NOTE - NSFALLRSKINDICTYPE_ED_ALL_ED
Thank very much for coming.  I am very happy to see you.    Thank you very much for taking care of yourself.  Thank you for taking her medications.  Please continue eating sensibly.  [place] diet book, DASH diet, Mediterranean diet for ideas!    Please increase physical activity.  Increase your BRISK WALKING.  This will help not only with weight management, but also with blood pressure, as well as mood and energy.  It can also help you with your sleep.    Please do not stay up late unless you really have to.    Thank you for having your LABORATORY examinations done.  Your results are all very good.  Your marker for sugar is good.  Your marker for cholesterol is very good.  Your kidneys are okay, your liver is okay.    Please come back in 6 months.  Let us repeat some FASTING laboratory examinations, then see me soon after.  Until then, please call for refills.  Please continue to take care of yourself and each other, and please continue to pray for our recovery from this pandemic.    Please call me if you ever develop fever 101 °F, or colored phlegm or nasal discharge.  You may need an appointment and may be some antibiotics at that point.    Again, thank you very much for coming.  See you in SEPTEMBER.  Do not forget to do FASTING laboratory examinations a few days prior.  Take care and God bless.  I hope you have a good Lent and a happy Easter!            0  Return in 6 months.  40 minutes please.  Repeat FASTING laboratory examination, then see me for yearly PHYSICAL examination.  Patient no longer seeing GYNECOLOGY.  She will likely need a breast examination.  Reassess mood, energy, function, preventive strategies, cardiovascular risk.            0     Impaired Gait/Need for Mobility Assisted Device

## 2025-07-25 NOTE — H&P ADULT - PROBLEM/PLAN-1
This is a pharmacy generated request, please approve or deny.     Next Appt: 7/31/25 7:30 am.   DISPLAY PLAN FREE TEXT

## (undated) DEVICE — DRAIN PENROSE .5" X 18" LATEX

## (undated) DEVICE — GLV 6.5 PROTEXIS (WHITE)

## (undated) DEVICE — Device

## (undated) DEVICE — MARKING PEN W RULER

## (undated) DEVICE — SOL IRR POUR NS 0.9% 500ML

## (undated) DEVICE — TUBING STRYKEFLOW II SUCTION / IRRIGATOR

## (undated) DEVICE — DRAIN RESERVOIR FOR JACKSON PRATT 100CC CARDINAL

## (undated) DEVICE — LIGASURE ATLAS 10MM 37CM

## (undated) DEVICE — FORCEP RADIAL JAW 4 JUMBO 2.8MM 3.2MM 240CM ORANGE DISP

## (undated) DEVICE — URETERAL CATH RED RUBBER 14FR (GREEN)

## (undated) DEVICE — SUT ENDOSTITCH DEVICE 10MM

## (undated) DEVICE — LIGASURE IMPACT

## (undated) DEVICE — INSUFFLATION NDL COVIDIEN STEP 14G SHORT FOR STEP/VERSASTEP

## (undated) DEVICE — TRAP SPECIMEN SPUTUM 40CC

## (undated) DEVICE — GLV 7.5 PROTEXIS (WHITE)

## (undated) DEVICE — DRSG VAC ABTHERS

## (undated) DEVICE — TROCAR COVIDIEN BLUNT TIP HASSAN 10MM

## (undated) DEVICE — NDL INJ SCLERO INTERJECT 25G

## (undated) DEVICE — LIGASURE BLUNT TIP 37CM

## (undated) DEVICE — PREP CHLORAPREP HI-LITE ORANGE 26ML

## (undated) DEVICE — DRAPE 1/2 SHEET 40X57"

## (undated) DEVICE — TUBING SUCTION CONN 6FT STERILE

## (undated) DEVICE — GLV 7 PROTEXIS (WHITE)

## (undated) DEVICE — FOLEY HOLDER STATLOCK 2 WAY ADULT

## (undated) DEVICE — DRAPE TOWEL BLUE 17" X 24"

## (undated) DEVICE — WARMING BLANKET LOWER ADULT

## (undated) DEVICE — TUBING IV SET GRAVITY 3Y 100" MACRO

## (undated) DEVICE — VALVE YELLOW PORT SEAL PLUS 5MM

## (undated) DEVICE — ELCTR DISSECTOR ULTRASONIC CORDLESS CVD JAW 5MM-39CM

## (undated) DEVICE — SYR ALLIANCE II INFLATION 60ML

## (undated) DEVICE — FOLEY TRAY 16FR 5CC LTX UMETER CLOSED

## (undated) DEVICE — CATH IV SAFE BC 20G X 1.16" (PINK)

## (undated) DEVICE — TROCAR APPLIED MEDICAL KII BALLOON BLUNT TIP 12MM X 100MM

## (undated) DEVICE — STAPLER COVIDIEN ENDO GIA STANDARD HANDLE

## (undated) DEVICE — PACK IV START WITH CHG

## (undated) DEVICE — BRUSH COLONOSCOPY CYTOLOGY

## (undated) DEVICE — BITE BLOCK ADULT 20 X 27MM (GREEN)

## (undated) DEVICE — TROCAR COVIDIEN STEP 5MM SHORT 70MM

## (undated) DEVICE — ENDOCATCH 10MM SPECIMEN POUCH

## (undated) DEVICE — PACK LAP CHOLE LAP APPENDECTOMY

## (undated) DEVICE — DRSG TELFA 3 X 8

## (undated) DEVICE — TUBING SUCTION 20FT

## (undated) DEVICE — PACKING GAUZE IODOFORM 0.5"

## (undated) DEVICE — GOWN TRIMAX LG

## (undated) DEVICE — DRAIN JACKSON PRATT 10MM FLAT FULL NO TROCAR

## (undated) DEVICE — SUT BIOSYN 4-0 18" P-12

## (undated) DEVICE — TUBING INSUFFLATION LAP FILTER 10FT

## (undated) DEVICE — GLV 8.5 PROTEXIS (WHITE)

## (undated) DEVICE — STAPLER SKIN VISI-STAT 35 WIDE

## (undated) DEVICE — GLV 8 PROTEXIS (WHITE)

## (undated) DEVICE — SPECIMEN CONTAINER 100ML

## (undated) DEVICE — DRAIN CHANNEL 19FR ROUND FULL FLUTED

## (undated) DEVICE — DRSG TEGADERM 6"X8"

## (undated) DEVICE — SOL IRR POUR H2O 250ML

## (undated) DEVICE — SUCTION YANKAUER NO CONTROL VENT

## (undated) DEVICE — LIGASURE MARYLAND 37CM

## (undated) DEVICE — WARMING BLANKET UPPER ADULT

## (undated) DEVICE — D HELP - CLEARVIEW CLEARIFY SYSTEM

## (undated) DEVICE — DRSG OPSITE 13.75 X 4"

## (undated) DEVICE — INSUFFLATION NDL COVIDIEN STEP 14G FOR STEP/VERSASTEP

## (undated) DEVICE — POSITIONER FOAM EGG CRATE ULNAR 2PCS (PINK)

## (undated) DEVICE — TROCAR APPLIED MEDICAL KII BALLOON BLUNT TIP 12MM X 130MM

## (undated) DEVICE — DRAPE INSTRUMENT POUCH 6.75" X 11"

## (undated) DEVICE — BLADE SCALPEL SAFETYLOCK #15

## (undated) DEVICE — TROCAR COVIDIEN VERSASTEP PLUS 15MM STANDARD

## (undated) DEVICE — BLADE SCALPEL SAFETYLOCK #10

## (undated) DEVICE — SYS BIOPSY NDL FILE SS STRL 22G

## (undated) DEVICE — MEDICATION LABELS W MARKER

## (undated) DEVICE — PACK MAJOR ABDOMINAL SUPINE

## (undated) DEVICE — PACK BASIN SPECIAL PROCEDURE

## (undated) DEVICE — SENSOR O2 FINGER ADULT

## (undated) DEVICE — DRAPE GENERAL ENDOSCOPY

## (undated) DEVICE — DRAPE MAYO STAND 30"

## (undated) DEVICE — SHEARS COVIDIEN ENDO SHEAR 5MM X 31CM W UNIPOLAR CAUTERY

## (undated) DEVICE — DRSG STERISTRIPS 0.5 X 4"

## (undated) DEVICE — TROCAR COVIDIEN VERSASTEP PLUS 12MM STANDARD

## (undated) DEVICE — VENODYNE/SCD SLEEVE CALF MEDIUM

## (undated) DEVICE — TROCAR COVIDIEN VERSASTEP PLUS 11MM STANDARD

## (undated) DEVICE — SCOPE WARMER SEAL DISP

## (undated) DEVICE — SI ELCTR SEALER DA VINCI VESSEL

## (undated) DEVICE — SUT MAXON 1 36" GS-24

## (undated) DEVICE — SOL INJ NS 0.9% 500ML 2 PORT

## (undated) DEVICE — SUT PDS II 0 18" ENDOLOOP LIGATURE

## (undated) DEVICE — SUT SOFSILK 3-0 18" V-20 (POP-OFF)

## (undated) DEVICE — BALLOON US ENDO

## (undated) DEVICE — TROCAR COVIDIEN BLUNT TIP HASSAN 12MM

## (undated) DEVICE — DRSG CURITY GAUZE SPONGE 4 X 4" 12-PLY

## (undated) DEVICE — CATH IV SAFE BC 22G X 1" (BLUE)

## (undated) DEVICE — TUBING IRRIGATION DAVOL SYSTEM X STREAM

## (undated) DEVICE — SHEARS HARMONIC ACE 5MM X 36CM CURVED TIP

## (undated) DEVICE — NDL INJ SCLERO INTERJECT 23G

## (undated) DEVICE — DRAPE 3/4 SHEET 52X76"